# Patient Record
Sex: MALE | Race: WHITE | NOT HISPANIC OR LATINO | URBAN - METROPOLITAN AREA
[De-identification: names, ages, dates, MRNs, and addresses within clinical notes are randomized per-mention and may not be internally consistent; named-entity substitution may affect disease eponyms.]

---

## 2017-07-13 ENCOUNTER — INPATIENT (INPATIENT)
Facility: HOSPITAL | Age: 67
LOS: 0 days | Discharge: HOME | End: 2017-07-14
Attending: INTERNAL MEDICINE

## 2017-07-13 DIAGNOSIS — H40.50X0 GLAUCOMA SECONDARY TO OTHER EYE DISORDERS, UNSPECIFIED EYE, STAGE UNSPECIFIED: ICD-10-CM

## 2017-07-13 DIAGNOSIS — H54.41 BLINDNESS, RIGHT EYE, NORMAL VISION LEFT EYE: ICD-10-CM

## 2017-07-13 DIAGNOSIS — I10 ESSENTIAL (PRIMARY) HYPERTENSION: ICD-10-CM

## 2017-07-13 DIAGNOSIS — I25.10 ATHEROSCLEROTIC HEART DISEASE OF NATIVE CORONARY ARTERY WITHOUT ANGINA PECTORIS: ICD-10-CM

## 2017-07-13 DIAGNOSIS — E11.621 TYPE 2 DIABETES MELLITUS WITH FOOT ULCER: ICD-10-CM

## 2017-07-13 DIAGNOSIS — E11.9 TYPE 2 DIABETES MELLITUS WITHOUT COMPLICATIONS: ICD-10-CM

## 2017-07-13 DIAGNOSIS — M54.9 DORSALGIA, UNSPECIFIED: ICD-10-CM

## 2017-07-19 DIAGNOSIS — Z72.0 TOBACCO USE: ICD-10-CM

## 2017-07-19 DIAGNOSIS — H35.051 RETINAL NEOVASCULARIZATION, UNSPECIFIED, RIGHT EYE: ICD-10-CM

## 2017-07-19 DIAGNOSIS — Z86.73 PERSONAL HISTORY OF TRANSIENT ISCHEMIC ATTACK (TIA), AND CEREBRAL INFARCTION WITHOUT RESIDUAL DEFICITS: ICD-10-CM

## 2017-07-19 DIAGNOSIS — G89.4 CHRONIC PAIN SYNDROME: ICD-10-CM

## 2017-07-19 DIAGNOSIS — Z95.1 PRESENCE OF AORTOCORONARY BYPASS GRAFT: ICD-10-CM

## 2017-07-19 DIAGNOSIS — E11.39 TYPE 2 DIABETES MELLITUS WITH OTHER DIABETIC OPHTHALMIC COMPLICATION: ICD-10-CM

## 2017-07-19 DIAGNOSIS — H40.89 OTHER SPECIFIED GLAUCOMA: ICD-10-CM

## 2017-07-19 DIAGNOSIS — M54.5 LOW BACK PAIN: ICD-10-CM

## 2017-07-19 DIAGNOSIS — H54.41 BLINDNESS, RIGHT EYE, NORMAL VISION LEFT EYE: ICD-10-CM

## 2017-07-19 DIAGNOSIS — I25.10 ATHEROSCLEROTIC HEART DISEASE OF NATIVE CORONARY ARTERY WITHOUT ANGINA PECTORIS: ICD-10-CM

## 2017-07-19 DIAGNOSIS — I10 ESSENTIAL (PRIMARY) HYPERTENSION: ICD-10-CM

## 2017-07-19 DIAGNOSIS — H40.51X0 GLAUCOMA SECONDARY TO OTHER EYE DISORDERS, RIGHT EYE, STAGE UNSPECIFIED: ICD-10-CM

## 2017-07-19 DIAGNOSIS — R53.1 WEAKNESS: ICD-10-CM

## 2017-07-19 DIAGNOSIS — H33.301 UNSPECIFIED RETINAL BREAK, RIGHT EYE: ICD-10-CM

## 2017-07-19 DIAGNOSIS — Z79.4 LONG TERM (CURRENT) USE OF INSULIN: ICD-10-CM

## 2017-07-19 DIAGNOSIS — E11.40 TYPE 2 DIABETES MELLITUS WITH DIABETIC NEUROPATHY, UNSPECIFIED: ICD-10-CM

## 2017-08-14 ENCOUNTER — EMERGENCY (EMERGENCY)
Facility: HOSPITAL | Age: 67
LOS: 0 days | Discharge: HOME | End: 2017-08-14

## 2017-08-14 DIAGNOSIS — R51 HEADACHE: ICD-10-CM

## 2017-08-14 DIAGNOSIS — Z79.02 LONG TERM (CURRENT) USE OF ANTITHROMBOTICS/ANTIPLATELETS: ICD-10-CM

## 2017-08-14 DIAGNOSIS — I10 ESSENTIAL (PRIMARY) HYPERTENSION: ICD-10-CM

## 2017-08-14 DIAGNOSIS — E11.9 TYPE 2 DIABETES MELLITUS WITHOUT COMPLICATIONS: ICD-10-CM

## 2017-08-14 DIAGNOSIS — Z79.899 OTHER LONG TERM (CURRENT) DRUG THERAPY: ICD-10-CM

## 2017-08-14 DIAGNOSIS — H57.11 OCULAR PAIN, RIGHT EYE: ICD-10-CM

## 2017-08-14 DIAGNOSIS — M54.9 DORSALGIA, UNSPECIFIED: ICD-10-CM

## 2017-08-14 DIAGNOSIS — H40.50X0 GLAUCOMA SECONDARY TO OTHER EYE DISORDERS, UNSPECIFIED EYE, STAGE UNSPECIFIED: ICD-10-CM

## 2017-08-14 DIAGNOSIS — E11.621 TYPE 2 DIABETES MELLITUS WITH FOOT ULCER: ICD-10-CM

## 2017-08-14 DIAGNOSIS — I25.10 ATHEROSCLEROTIC HEART DISEASE OF NATIVE CORONARY ARTERY WITHOUT ANGINA PECTORIS: ICD-10-CM

## 2017-08-14 DIAGNOSIS — H40.051 OCULAR HYPERTENSION, RIGHT EYE: ICD-10-CM

## 2017-08-14 DIAGNOSIS — Z79.4 LONG TERM (CURRENT) USE OF INSULIN: ICD-10-CM

## 2017-08-14 DIAGNOSIS — Z79.82 LONG TERM (CURRENT) USE OF ASPIRIN: ICD-10-CM

## 2017-08-14 DIAGNOSIS — Z95.5 PRESENCE OF CORONARY ANGIOPLASTY IMPLANT AND GRAFT: ICD-10-CM

## 2017-08-14 DIAGNOSIS — Z86.73 PERSONAL HISTORY OF TRANSIENT ISCHEMIC ATTACK (TIA), AND CEREBRAL INFARCTION WITHOUT RESIDUAL DEFICITS: ICD-10-CM

## 2018-06-14 ENCOUNTER — OUTPATIENT (OUTPATIENT)
Dept: OUTPATIENT SERVICES | Facility: HOSPITAL | Age: 68
LOS: 1 days | Discharge: HOME | End: 2018-06-14

## 2018-06-14 VITALS
SYSTOLIC BLOOD PRESSURE: 144 MMHG | RESPIRATION RATE: 20 BRPM | OXYGEN SATURATION: 96 % | HEIGHT: 70 IN | HEART RATE: 100 BPM | TEMPERATURE: 99 F | WEIGHT: 241.19 LBS | DIASTOLIC BLOOD PRESSURE: 78 MMHG

## 2018-06-14 DIAGNOSIS — Z90.49 ACQUIRED ABSENCE OF OTHER SPECIFIED PARTS OF DIGESTIVE TRACT: Chronic | ICD-10-CM

## 2018-06-14 DIAGNOSIS — Z01.818 ENCOUNTER FOR OTHER PREPROCEDURAL EXAMINATION: ICD-10-CM

## 2018-06-14 DIAGNOSIS — G89.29 OTHER CHRONIC PAIN: ICD-10-CM

## 2018-06-14 DIAGNOSIS — I25.810 ATHEROSCLEROSIS OF CORONARY ARTERY BYPASS GRAFT(S) WITHOUT ANGINA PECTORIS: Chronic | ICD-10-CM

## 2018-06-14 DIAGNOSIS — Z89.431 ACQUIRED ABSENCE OF RIGHT FOOT: Chronic | ICD-10-CM

## 2018-06-14 LAB
ALBUMIN SERPL ELPH-MCNC: 4.3 G/DL — SIGNIFICANT CHANGE UP (ref 3.5–5.2)
ALP SERPL-CCNC: 86 U/L — SIGNIFICANT CHANGE UP (ref 30–115)
ALT FLD-CCNC: 21 U/L — SIGNIFICANT CHANGE UP (ref 0–41)
ANION GAP SERPL CALC-SCNC: 12 MMOL/L — SIGNIFICANT CHANGE UP (ref 7–14)
APTT BLD: 32.5 SEC — SIGNIFICANT CHANGE UP (ref 27–39.2)
AST SERPL-CCNC: 18 U/L — SIGNIFICANT CHANGE UP (ref 0–41)
BASOPHILS # BLD AUTO: 0.11 K/UL — SIGNIFICANT CHANGE UP (ref 0–0.2)
BASOPHILS NFR BLD AUTO: 1 % — SIGNIFICANT CHANGE UP (ref 0–1)
BILIRUB SERPL-MCNC: 0.4 MG/DL — SIGNIFICANT CHANGE UP (ref 0.2–1.2)
BLD GP AB SCN SERPL QL: SIGNIFICANT CHANGE UP
BUN SERPL-MCNC: 19 MG/DL — SIGNIFICANT CHANGE UP (ref 10–20)
CALCIUM SERPL-MCNC: 9.1 MG/DL — SIGNIFICANT CHANGE UP (ref 8.5–10.1)
CHLORIDE SERPL-SCNC: 103 MMOL/L — SIGNIFICANT CHANGE UP (ref 98–110)
CO2 SERPL-SCNC: 26 MMOL/L — SIGNIFICANT CHANGE UP (ref 17–32)
CREAT SERPL-MCNC: 1.1 MG/DL — SIGNIFICANT CHANGE UP (ref 0.7–1.5)
EOSINOPHIL # BLD AUTO: 0.32 K/UL — SIGNIFICANT CHANGE UP (ref 0–0.7)
EOSINOPHIL NFR BLD AUTO: 2.9 % — SIGNIFICANT CHANGE UP (ref 0–8)
GLUCOSE SERPL-MCNC: 150 MG/DL — HIGH (ref 70–99)
HCT VFR BLD CALC: 41.5 % — LOW (ref 42–52)
HGB BLD-MCNC: 13 G/DL — LOW (ref 14–18)
IMM GRANULOCYTES NFR BLD AUTO: 0.4 % — HIGH (ref 0.1–0.3)
INR BLD: 1.09 RATIO — SIGNIFICANT CHANGE UP (ref 0.65–1.3)
LYMPHOCYTES # BLD AUTO: 2.61 K/UL — SIGNIFICANT CHANGE UP (ref 1.2–3.4)
LYMPHOCYTES # BLD AUTO: 23.3 % — SIGNIFICANT CHANGE UP (ref 20.5–51.1)
MCHC RBC-ENTMCNC: 28.1 PG — SIGNIFICANT CHANGE UP (ref 27–31)
MCHC RBC-ENTMCNC: 31.3 G/DL — LOW (ref 32–37)
MCV RBC AUTO: 89.8 FL — SIGNIFICANT CHANGE UP (ref 80–94)
MONOCYTES # BLD AUTO: 0.81 K/UL — HIGH (ref 0.1–0.6)
MONOCYTES NFR BLD AUTO: 7.2 % — SIGNIFICANT CHANGE UP (ref 1.7–9.3)
NEUTROPHILS # BLD AUTO: 7.29 K/UL — HIGH (ref 1.4–6.5)
NEUTROPHILS NFR BLD AUTO: 65.2 % — SIGNIFICANT CHANGE UP (ref 42.2–75.2)
NRBC # BLD: 0 /100 WBCS — SIGNIFICANT CHANGE UP (ref 0–0)
PLATELET # BLD AUTO: 234 K/UL — SIGNIFICANT CHANGE UP (ref 130–400)
POTASSIUM SERPL-MCNC: 5 MMOL/L — SIGNIFICANT CHANGE UP (ref 3.5–5)
POTASSIUM SERPL-SCNC: 5 MMOL/L — SIGNIFICANT CHANGE UP (ref 3.5–5)
PROT SERPL-MCNC: 7.2 G/DL — SIGNIFICANT CHANGE UP (ref 6–8)
PROTHROM AB SERPL-ACNC: 11.8 SEC — SIGNIFICANT CHANGE UP (ref 9.95–12.87)
RBC # BLD: 4.62 M/UL — LOW (ref 4.7–6.1)
RBC # FLD: 13.7 % — SIGNIFICANT CHANGE UP (ref 11.5–14.5)
SODIUM SERPL-SCNC: 141 MMOL/L — SIGNIFICANT CHANGE UP (ref 135–146)
TYPE + AB SCN PNL BLD: SIGNIFICANT CHANGE UP
WBC # BLD: 11.19 K/UL — HIGH (ref 4.8–10.8)
WBC # FLD AUTO: 11.19 K/UL — HIGH (ref 4.8–10.8)

## 2018-06-14 NOTE — H&P PST ADULT - PMH
Anemia    Arthritis    CAD (coronary artery disease) of bypass graft  cabg 1995 3v  Depression    DM (diabetes mellitus)    GERD (gastroesophageal reflux disease)    Glaucoma    HTN (hypertension)

## 2018-06-14 NOTE — H&P PST ADULT - ATTENDING COMMENTS
06-27-18 @ 07:16  PRIYANK WESLEY  [id number]  67yMale    I have discussed the risks and benefits of ITP removal with the patient including but not limited to bleeding, infection, weakness, numbness, paralysis, CSF leak requiring repair, no change or increase in pain or other symptoms, change in bowel/bladder/sexual function, need for decompression, revision, repeat or other procedure(s).  I have also discussed the possibility of the following:    I have also discussed the alternatives to the procedure as well as options for no treatment and/or expected courses for all.  I also discussed the role of any MD/PA first assistant and the patient assents to their participation.  All questions were answered and the patient wishes to proceed.

## 2018-06-14 NOTE — H&P PST ADULT - HISTORY OF PRESENT ILLNESS
69 y/o male scheduled for removal of intrathecal pain pump pt reports that intrathecal pump is non-functioning x 3 yrs  reports no c/o cp,sob,palpitations,cough or dysuria  1/2 block without sob

## 2018-06-14 NOTE — H&P PST ADULT - PSH
CAD (coronary artery disease) of bypass graft    Foot amputation status, right  partial foot amputation  tarsals and metatarsals of right foot  S/P laparoscopic cholecystectomy

## 2018-06-27 ENCOUNTER — RESULT REVIEW (OUTPATIENT)
Age: 68
End: 2018-06-27

## 2018-06-27 ENCOUNTER — OUTPATIENT (OUTPATIENT)
Dept: OUTPATIENT SERVICES | Facility: HOSPITAL | Age: 68
LOS: 1 days | Discharge: HOME | End: 2018-06-27

## 2018-06-27 VITALS — HEART RATE: 70 BPM | DIASTOLIC BLOOD PRESSURE: 66 MMHG | RESPIRATION RATE: 17 BRPM | SYSTOLIC BLOOD PRESSURE: 170 MMHG

## 2018-06-27 VITALS
HEIGHT: 69 IN | WEIGHT: 240.08 LBS | RESPIRATION RATE: 18 BRPM | SYSTOLIC BLOOD PRESSURE: 166 MMHG | HEART RATE: 84 BPM | TEMPERATURE: 99 F | DIASTOLIC BLOOD PRESSURE: 93 MMHG

## 2018-06-27 DIAGNOSIS — Z89.431 ACQUIRED ABSENCE OF RIGHT FOOT: Chronic | ICD-10-CM

## 2018-06-27 DIAGNOSIS — Z90.49 ACQUIRED ABSENCE OF OTHER SPECIFIED PARTS OF DIGESTIVE TRACT: Chronic | ICD-10-CM

## 2018-06-27 DIAGNOSIS — I25.810 ATHEROSCLEROSIS OF CORONARY ARTERY BYPASS GRAFT(S) WITHOUT ANGINA PECTORIS: Chronic | ICD-10-CM

## 2018-06-27 RX ORDER — OXYCODONE AND ACETAMINOPHEN 5; 325 MG/1; MG/1
1 TABLET ORAL ONCE
Qty: 0 | Refills: 0 | Status: DISCONTINUED | OUTPATIENT
Start: 2018-06-27 | End: 2018-06-28

## 2018-06-27 RX ORDER — ASPIRIN/CALCIUM CARB/MAGNESIUM 324 MG
1 TABLET ORAL
Qty: 0 | Refills: 0 | COMMUNITY

## 2018-06-27 RX ORDER — HYDROMORPHONE HYDROCHLORIDE 2 MG/ML
0.5 INJECTION INTRAMUSCULAR; INTRAVENOUS; SUBCUTANEOUS
Qty: 0 | Refills: 0 | Status: DISCONTINUED | OUTPATIENT
Start: 2018-06-27 | End: 2018-06-27

## 2018-06-27 RX ORDER — MEPERIDINE HYDROCHLORIDE 50 MG/ML
12.5 INJECTION INTRAMUSCULAR; INTRAVENOUS; SUBCUTANEOUS
Qty: 0 | Refills: 0 | Status: DISCONTINUED | OUTPATIENT
Start: 2018-06-27 | End: 2018-06-28

## 2018-06-27 RX ORDER — SODIUM CHLORIDE 9 MG/ML
1000 INJECTION, SOLUTION INTRAVENOUS
Qty: 0 | Refills: 0 | Status: DISCONTINUED | OUTPATIENT
Start: 2018-06-27 | End: 2018-06-28

## 2018-06-27 RX ORDER — ONDANSETRON 8 MG/1
4 TABLET, FILM COATED ORAL ONCE
Qty: 0 | Refills: 0 | Status: DISCONTINUED | OUTPATIENT
Start: 2018-06-27 | End: 2018-06-28

## 2018-06-27 RX ORDER — MORPHINE SULFATE 50 MG/1
2 CAPSULE, EXTENDED RELEASE ORAL
Qty: 0 | Refills: 0 | Status: DISCONTINUED | OUTPATIENT
Start: 2018-06-27 | End: 2018-06-28

## 2018-06-27 RX ADMIN — SODIUM CHLORIDE 100 MILLILITER(S): 9 INJECTION, SOLUTION INTRAVENOUS at 10:58

## 2018-06-27 RX ADMIN — HYDROMORPHONE HYDROCHLORIDE 0.5 MILLIGRAM(S): 2 INJECTION INTRAMUSCULAR; INTRAVENOUS; SUBCUTANEOUS at 11:22

## 2018-06-27 RX ADMIN — HYDROMORPHONE HYDROCHLORIDE 0.5 MILLIGRAM(S): 2 INJECTION INTRAMUSCULAR; INTRAVENOUS; SUBCUTANEOUS at 12:00

## 2018-06-27 RX ADMIN — HYDROMORPHONE HYDROCHLORIDE 0.5 MILLIGRAM(S): 2 INJECTION INTRAMUSCULAR; INTRAVENOUS; SUBCUTANEOUS at 11:37

## 2018-06-27 NOTE — CHART NOTE - NSCHARTNOTEFT_GEN_A_CORE
06-27-18 @ 07:18  PRIYANK WESLEY  [id number]  67yMale    I have discussed the risks and benefits of ITP removal with the patient including but not limited to bleeding, infection, weakness, numbness, paralysis, CSF leak requiring repair, no change or increase in pain or other symptoms, change in bowel/bladder/sexual function, need for decompression, revision, repeat or other procedure(s).  I have also discussed the possibility of the following:    I have also discussed the alternatives to the procedure as well as options for no treatment and/or expected courses for all.  I also discussed the role of any MD/PA first assistant and the patient assents to their participation.  All questions were answered and the patient wishes to proceed.

## 2018-06-27 NOTE — BRIEF OPERATIVE NOTE - PROCEDURE
<<-----Click on this checkbox to enter Procedure Intrathecal morphine pump implantation  06/27/2018  removal of intrathecal pain pump  Active  [id number]

## 2018-06-27 NOTE — CHART NOTE - NSCHARTNOTEFT_GEN_A_CORE
PACU ANESTHESIA ADMISSION NOTE      Procedure: Intrathecal morphine pump implantation: removal of intrathecal pain pump    Post op diagnosis:  Chronic pain      ____  Intubated  TV:______       Rate: ______      FiO2: ______    _x___  Patent Airway    __x__  Full return of protective reflexes    _x___  Full recovery from anesthesia / back to baseline     Vitals:   T: 97.9          R:   16               BP:  164/72                Sat:  96%                 P: 16      Mental Status:  __x__ Awake   _____ Alert   _____ Drowsy   _____ Sedated    Nausea/Vomiting:  ____ NO  ____x__Yes,   See Post - Op Orders          Pain Scale (0-10):  _____    Treatment: ____ None    _x___ See Post - Op/PCA Orders    Post - Operative Fluids:   ____ Oral   __x__ See Post - Op Orders    Plan: Discharge:   _x___Home       _____Floor     _____Critical Care    _____  Other:_________________    Comments:  V/S stable  no anesthesia complication  D/C when criteria met

## 2018-06-28 RX ORDER — ASPIRIN/CALCIUM CARB/MAGNESIUM 324 MG
1 TABLET ORAL
Qty: 0 | Refills: 0 | COMMUNITY
Start: 2018-06-28

## 2018-06-29 DIAGNOSIS — Z46.2 ENCOUNTER FOR FITTING AND ADJUSTMENT OF OTHER DEVICES RELATED TO NERVOUS SYSTEM AND SPECIAL SENSES: ICD-10-CM

## 2018-06-29 DIAGNOSIS — Z79.4 LONG TERM (CURRENT) USE OF INSULIN: ICD-10-CM

## 2018-06-29 DIAGNOSIS — E11.9 TYPE 2 DIABETES MELLITUS WITHOUT COMPLICATIONS: ICD-10-CM

## 2018-06-29 DIAGNOSIS — G89.29 OTHER CHRONIC PAIN: ICD-10-CM

## 2018-06-29 DIAGNOSIS — F32.9 MAJOR DEPRESSIVE DISORDER, SINGLE EPISODE, UNSPECIFIED: ICD-10-CM

## 2018-06-29 DIAGNOSIS — Z88.0 ALLERGY STATUS TO PENICILLIN: ICD-10-CM

## 2018-06-29 DIAGNOSIS — I10 ESSENTIAL (PRIMARY) HYPERTENSION: ICD-10-CM

## 2018-06-29 DIAGNOSIS — F12.99 CANNABIS USE, UNSPECIFIED WITH UNSPECIFIED CANNABIS-INDUCED DISORDER: ICD-10-CM

## 2018-07-01 LAB — SURGICAL PATHOLOGY STUDY: SIGNIFICANT CHANGE UP

## 2018-07-16 ENCOUNTER — INPATIENT (INPATIENT)
Facility: HOSPITAL | Age: 68
LOS: 12 days | Discharge: HOME | End: 2018-07-29
Attending: NEUROLOGICAL SURGERY | Admitting: NEUROLOGICAL SURGERY

## 2018-07-16 VITALS
SYSTOLIC BLOOD PRESSURE: 177 MMHG | TEMPERATURE: 98 F | HEART RATE: 76 BPM | OXYGEN SATURATION: 98 % | DIASTOLIC BLOOD PRESSURE: 110 MMHG | RESPIRATION RATE: 18 BRPM

## 2018-07-16 DIAGNOSIS — Z90.49 ACQUIRED ABSENCE OF OTHER SPECIFIED PARTS OF DIGESTIVE TRACT: Chronic | ICD-10-CM

## 2018-07-16 DIAGNOSIS — Z89.431 ACQUIRED ABSENCE OF RIGHT FOOT: Chronic | ICD-10-CM

## 2018-07-16 DIAGNOSIS — I25.810 ATHEROSCLEROSIS OF CORONARY ARTERY BYPASS GRAFT(S) WITHOUT ANGINA PECTORIS: Chronic | ICD-10-CM

## 2018-07-16 RX ORDER — LATANOPROST 0.05 MG/ML
1 SOLUTION/ DROPS OPHTHALMIC; TOPICAL AT BEDTIME
Qty: 0 | Refills: 0 | Status: DISCONTINUED | OUTPATIENT
Start: 2018-07-16 | End: 2018-07-18

## 2018-07-16 RX ORDER — SERTRALINE 25 MG/1
100 TABLET, FILM COATED ORAL DAILY
Qty: 0 | Refills: 0 | Status: DISCONTINUED | OUTPATIENT
Start: 2018-07-16 | End: 2018-07-18

## 2018-07-16 RX ORDER — ACETAMINOPHEN 500 MG
650 TABLET ORAL EVERY 6 HOURS
Qty: 0 | Refills: 0 | Status: DISCONTINUED | OUTPATIENT
Start: 2018-07-16 | End: 2018-07-18

## 2018-07-16 RX ORDER — DEXTROSE 50 % IN WATER 50 %
25 SYRINGE (ML) INTRAVENOUS ONCE
Qty: 0 | Refills: 0 | Status: DISCONTINUED | OUTPATIENT
Start: 2018-07-16 | End: 2018-07-18

## 2018-07-16 RX ORDER — OXYCODONE HYDROCHLORIDE 5 MG/1
10 TABLET ORAL EVERY 12 HOURS
Qty: 0 | Refills: 0 | Status: DISCONTINUED | OUTPATIENT
Start: 2018-07-16 | End: 2018-07-18

## 2018-07-16 RX ORDER — INSULIN LISPRO 100/ML
VIAL (ML) SUBCUTANEOUS
Qty: 0 | Refills: 0 | Status: DISCONTINUED | OUTPATIENT
Start: 2018-07-16 | End: 2018-07-18

## 2018-07-16 RX ORDER — FERROUS SULFATE 325(65) MG
325 TABLET ORAL DAILY
Qty: 0 | Refills: 0 | Status: DISCONTINUED | OUTPATIENT
Start: 2018-07-16 | End: 2018-07-18

## 2018-07-16 RX ORDER — TIMOLOL 0.5 %
1 DROPS OPHTHALMIC (EYE) EVERY 12 HOURS
Qty: 0 | Refills: 0 | Status: DISCONTINUED | OUTPATIENT
Start: 2018-07-16 | End: 2018-07-18

## 2018-07-16 RX ORDER — METHOCARBAMOL 500 MG/1
750 TABLET, FILM COATED ORAL ONCE
Qty: 0 | Refills: 0 | Status: COMPLETED | OUTPATIENT
Start: 2018-07-16 | End: 2018-07-16

## 2018-07-16 RX ORDER — LANOLIN ALCOHOL/MO/W.PET/CERES
5 CREAM (GRAM) TOPICAL AT BEDTIME
Qty: 0 | Refills: 0 | Status: DISCONTINUED | OUTPATIENT
Start: 2018-07-16 | End: 2018-07-18

## 2018-07-16 RX ORDER — FENTANYL CITRATE 50 UG/ML
2 INJECTION INTRAVENOUS
Qty: 0 | Refills: 0 | Status: DISCONTINUED | OUTPATIENT
Start: 2018-07-16 | End: 2018-07-18

## 2018-07-16 RX ORDER — INSULIN GLARGINE 100 [IU]/ML
5 INJECTION, SOLUTION SUBCUTANEOUS EVERY MORNING
Qty: 0 | Refills: 0 | Status: DISCONTINUED | OUTPATIENT
Start: 2018-07-16 | End: 2018-07-18

## 2018-07-16 RX ORDER — PANTOPRAZOLE SODIUM 20 MG/1
40 TABLET, DELAYED RELEASE ORAL
Qty: 0 | Refills: 0 | Status: DISCONTINUED | OUTPATIENT
Start: 2018-07-16 | End: 2018-07-18

## 2018-07-16 RX ORDER — BUPROPION HYDROCHLORIDE 150 MG/1
150 TABLET, EXTENDED RELEASE ORAL DAILY
Qty: 0 | Refills: 0 | Status: DISCONTINUED | OUTPATIENT
Start: 2018-07-16 | End: 2018-07-18

## 2018-07-16 RX ORDER — METHOCARBAMOL 500 MG/1
750 TABLET, FILM COATED ORAL EVERY 8 HOURS
Qty: 0 | Refills: 0 | Status: DISCONTINUED | OUTPATIENT
Start: 2018-07-16 | End: 2018-07-18

## 2018-07-16 RX ORDER — CLOPIDOGREL BISULFATE 75 MG/1
1 TABLET, FILM COATED ORAL
Qty: 0 | Refills: 0 | COMMUNITY

## 2018-07-16 RX ORDER — INSULIN LISPRO 100/ML
5 VIAL (ML) SUBCUTANEOUS
Qty: 0 | Refills: 0 | Status: DISCONTINUED | OUTPATIENT
Start: 2018-07-16 | End: 2018-07-18

## 2018-07-16 RX ORDER — BRIMONIDINE TARTRATE 2 MG/MG
1 SOLUTION/ DROPS OPHTHALMIC THREE TIMES A DAY
Qty: 0 | Refills: 0 | Status: DISCONTINUED | OUTPATIENT
Start: 2018-07-16 | End: 2018-07-18

## 2018-07-16 RX ORDER — LISINOPRIL 2.5 MG/1
20 TABLET ORAL DAILY
Qty: 0 | Refills: 0 | Status: DISCONTINUED | OUTPATIENT
Start: 2018-07-16 | End: 2018-07-18

## 2018-07-16 RX ORDER — INSULIN GLARGINE 100 [IU]/ML
5 INJECTION, SOLUTION SUBCUTANEOUS AT BEDTIME
Qty: 0 | Refills: 0 | Status: DISCONTINUED | OUTPATIENT
Start: 2018-07-16 | End: 2018-07-18

## 2018-07-16 RX ORDER — NIFEDIPINE 30 MG
60 TABLET, EXTENDED RELEASE 24 HR ORAL DAILY
Qty: 0 | Refills: 0 | Status: DISCONTINUED | OUTPATIENT
Start: 2018-07-16 | End: 2018-07-18

## 2018-07-16 RX ORDER — CHOLECALCIFEROL (VITAMIN D3) 125 MCG
1000 CAPSULE ORAL DAILY
Qty: 0 | Refills: 0 | Status: DISCONTINUED | OUTPATIENT
Start: 2018-07-16 | End: 2018-07-18

## 2018-07-16 RX ORDER — SENNA PLUS 8.6 MG/1
2 TABLET ORAL AT BEDTIME
Qty: 0 | Refills: 0 | Status: DISCONTINUED | OUTPATIENT
Start: 2018-07-16 | End: 2018-07-18

## 2018-07-16 RX ADMIN — Medication 5 MILLIGRAM(S): at 21:42

## 2018-07-16 RX ADMIN — LATANOPROST 1 DROP(S): 0.05 SOLUTION/ DROPS OPHTHALMIC; TOPICAL at 22:09

## 2018-07-16 RX ADMIN — INSULIN GLARGINE 5 UNIT(S): 100 INJECTION, SOLUTION SUBCUTANEOUS at 22:09

## 2018-07-16 RX ADMIN — BRIMONIDINE TARTRATE 1 DROP(S): 2 SOLUTION/ DROPS OPHTHALMIC at 22:09

## 2018-07-16 RX ADMIN — Medication 60 MILLIGRAM(S): at 21:43

## 2018-07-16 RX ADMIN — METHOCARBAMOL 750 MILLIGRAM(S): 500 TABLET, FILM COATED ORAL at 21:42

## 2018-07-16 RX ADMIN — Medication 1 DROP(S): at 21:42

## 2018-07-16 RX ADMIN — METHOCARBAMOL 750 MILLIGRAM(S): 500 TABLET, FILM COATED ORAL at 16:51

## 2018-07-16 NOTE — ED PROVIDER NOTE - OBJECTIVE STATEMENT
68 y/o M pmh DM, neuropathy, p/w leakage if fluid from site of intrathecal pump removed 6/27 by Dr Paris. Pt had leak after removal, found to have CSF leak, pt had blood patch placed with no resolution. 66 y/o M pmh DM, neuropathy, p/w leakage if fluid from site of intrathecal pump removed 6/27 by Dr Paris. Pt had leak after removal, found to have CSF leak, pt had blood patch placed with no resolution. No other complaints at this time. Denies f/c.

## 2018-07-16 NOTE — H&P ADULT - ASSESSMENT
Assessment and PlaN:  Admit pt to Neurosurgery  will need medical/cardiology clearanmce for OR  Labs, CXR, EKG  will likely return to OR for Lumbar repair of CSF leak on weds  d/w attending Dr. Hall

## 2018-07-16 NOTE — CONSULT NOTE ADULT - ASSESSMENT
67 yr old male with PMH of CAD/3v CABG in 1995 (no stents, no MI since then), DM2, HTN, CVA, neuropathy, chronic back pain, carotid stent, right SFA stent, p/w CSF leak after his recent procedure for intrathecal pump removal.  As per neurosurgery, pt will go to OR for lumbar CSF leak repair and cardiology was consulted of preop clearance.     -h/o 3v CABG in 1995 ( no MI, no stents since then )  -denies any chest pain/dyspnea upon exertion  -clinically not in HF  -ECG- SR, 1st degree AV block, no ischemic changes  -h/o DM2/HTN/CVA/PVD  -RCRI- 3, METs <4  -Pt is atleast at moderate risk for the planned intermediate risk procedure  -no further cardiac work up needed  -resume antiplatelets after surgery when pt is cleared by neurosurgery 67 yr old male with PMH of CAD s/p 3V CABG in 1995, DM2, HTN, CVA, JAMEL s/p stenting, PAD s/p right SFA stent, p/w CSF leak      -denies any chest pain/dyspnea upon exertion  -euvolemic and clinically not in HF  -ECG - SR, 1st degree AV block, no ischemic changes  -RCRI 3, METs <4  -Pt is at least at moderate risk for the planned intermediate risk procedure  -no further cardiac workup needed  -resume antiplatelets after surgery when pt is cleared by neurosurgery

## 2018-07-16 NOTE — ED PROVIDER NOTE - PHYSICAL EXAMINATION
AOx4, Non toxic appearing, NAD, speaking in full sentences. Skin  warm and dry, no acute rash. Head normocephalic, atraumatic. Conjunctiva and sclera clear. MM moist, no nasal discharge.  Neck supple nt, no meningeal signs. Heart RRR s1s2 nl, no rub/murmur. Lungs- No retractions, BS equal, CTAB. Abdomen soft ntnd no r/g,m c/d/i surgical wound over L side. Back: bandage w/ tegaderm in place over lumbar spine, c/d/i, no s/s of infection, no active drainage noted. Extremities- moves all.

## 2018-07-16 NOTE — H&P ADULT - HISTORY OF PRESENT ILLNESS
Pt is a 67yoM with a pmhx of   CAD (coronary artery disease) of bypass graft: cabg 1995 3v  Glaucoma  Arthritis  GERD (gastroesophageal reflux disease)  HTN (hypertension)  Depression  Anemia  DM (diabetes mellitus)  Foot amputation status, right: partial foot amputation  tarsals and metatarsals of right foot  S/P laparoscopic cholecystectomy  CAD (coronary artery disease) of bypass graft    Pt had surgery for placement of intrathecal pain pump in 1996 in [place]. The pump malfunctioned and was removed and replaced in 2006. Pt was doing well with pump until this year when it malfunctioned again. Pt went to see Dr. Crenshaw and the intrathecal pain pump was removed on 6/27/18. At this time pt had an uneventful procedure and pt was discharged home the same day as the procedure. Three days later, he noted some oozing from the back. Pt eventually went to see [facility] and pt was scheduled for a blood patch with Dr. Reddy. PT went for the blood patch on 7/13/18 and said initially had relief of symptoms. Two days later his back was wet again and returned to Dr. Crenshaw's office today. Pt was noted to have leakage from the lumbar wound and sent to the ER. Pt was seen and examined in the ER> Complaining of mild headache, no neck pain, dizziness or visual changes. Pt has baseline neuropathy with numbness to the b/l ankles down to the toes.

## 2018-07-16 NOTE — H&P ADULT - NSHPPHYSICALEXAM_GEN_ALL_CORE
Neuro Exam:  AAOX3. Verbal function intact  tongue midline, facial motions symmetric  PERRLA, EOMI  Pronator Drift: none  Finger to Nose intact  Motor: MAEx4, 5/5 power in b/l UE and LE  Sensation: decreased sensation b/l, past hx of diabetes, half od right foot amputated    Wound: dressing removed, staples in place, when pressure is applied, serous/clear fluid is expressed from superior portion of the wound.

## 2018-07-16 NOTE — ED PROVIDER NOTE - MEDICAL DECISION MAKING DETAILS
As documented Patient presented with leakage from a recently placed intra-thecal pump. Neurosurgery consulted, who recommended admission to their service for repair.

## 2018-07-16 NOTE — ED PROVIDER NOTE - NS ED ROS FT
Constitutional: See HPI.  Eyes: No visual changes, eye pain or discharge.  ENMT: No hearing changes, pain, discharge or infections. No neck pain or stiffness.  Cardiac: No chest pain, SOB or edema. No chest pain with exertion.  Respiratory: No cough or respiratory distress.   GI: No nausea, vomiting, diarrhea or abdominal pain.  : No dysuria, frequency or burning.  MS: No myalgia, muscle weakness, joint pain or back pain. +fluid leak from spine  Neuro: No headache or weakness. No LOC.  Skin: No skin rash.

## 2018-07-16 NOTE — H&P ADULT - ATTENDING COMMENTS
Persistent CSF leak after ITP removal even despite blood patch by Dr. Reddy on Friday.  Will need OR tomorrow for open exploration and repair of CSF fistula tomorrow.

## 2018-07-16 NOTE — ED PROVIDER NOTE - ATTENDING CONTRIBUTION TO CARE
66 y/o M pmh DM, neuropathy, p/w leakage if fluid from site of intrathecal pump removed 6/27 by Dr Paris. Pt had leak after removal, found to have CSF leak, pt had blood patch placed with no resolution.    Vital Signs: I have reviewed the initial vital signs.  Constitutional: NAD, well-nourished, appears stated age, no acute distress.  HEENT: Airway patent, moist MM, no erythema/swelling/deformity of oral structures. EOMI, PERRLA.  CV: regular rate, regular rhythm, well-perfused extremities, 2+ b/l DP and radial pulses equal.  Lungs: BCTA, no increased WOB.  ABD: NTND, no guarding or rebound, no pulsatile mass, no hernias.   MSK: Neck supple, nontender, nl ROM, no stepoff. Chest nontender. Back nontender in TLS spine or to b/l bony structures or flanks. Ext nontender, nl rom, no deformity.   INTEG: Skin warm, dry, no rash.  NEURO: A&Ox3, CN II-XII intact, normal strength 5/5 all 4 ext, nl sensation throughout, normal speech and coordination.  PSYCH: Calm, cooperative, normal affect and interaction.    Will consult neurosurgery for further recommendations, re-eval. 68 y/o M pmh DM, neuropathy, p/w leakage if fluid from site of intrathecal pump removed 6/27 by Dr Paris. Pt had leak after removal, found to have CSF leak, pt had blood patch placed with no resolution.    Vital Signs: I have reviewed the initial vital signs.  Constitutional: NAD, well-nourished, appears stated age, no acute distress.  HEENT: Airway patent, moist MM, no erythema/swelling/deformity of oral structures. EOMI, PERRLA.  CV: regular rate, regular rhythm, well-perfused extremities, 2+ b/l DP and radial pulses equal.  Lungs: BCTA, no increased WOB.  ABD: NTND, no guarding or rebound, no pulsatile mass, no hernias.   MSK: Neck supple, nontender, nl ROM, no stepoff. Chest nontender. Back nontender in TLS spine or to b/l bony structures or flanks. Ext nontender, nl rom, no deformity.   INTEG: Skin warm, dry, no rash.  NEURO: A&Ox3, CN II-XII intact, normal strength 5/5 all 4 ext, nl sensation throughout, normal speech and coordination.  BACK: Wound site C/d/i, tegaderm in place, no obvious drainage  PSYCH: Calm, cooperative, normal affect and interaction.    Will consult neurosurgery for further recommendations, re-eval.

## 2018-07-16 NOTE — CONSULT NOTE ADULT - SUBJECTIVE AND OBJECTIVE BOX
HPI:  67 yr old male with PMH of CAD/3v CABG in 1995 (no stents, no MI since then), DM2, HTN, CVA, neuropathy, chronic back pain, carotid stent, right SFA stent, p/w CSF leak. pt had intrathecal pump for several years which was initially placed for post laminectomy syndrome. Pump was removed 3 weeks ago by neurosurgery. pt had CSF leak post procedure which was not improved with blood patch and pt presented to ED. As per neurosurgery, pt will go to OR for lumbar CSF leak repair and cardiology was consulted of preop clearance. Pt doesnot remember the name of his cardiologist but states that he was cleared by his cardiologist for his recent procedure done 3 weeks ago. Pt was taking ASA, plavix regularly but were stopped prior to his recent procedure. At baseline, pt has chronic back pain and pt walks with cane. pt denies any chest pain/dyspnea upon exertion.     ROS:  All other systems reviewed and are negative    PAST MEDICAL & SURGICAL HISTORY  CAD (coronary artery disease) of bypass graft: cabg 1995 3v  Glaucoma  Arthritis  GERD (gastroesophageal reflux disease)  HTN (hypertension)  Depression  Anemia  DM (diabetes mellitus)  Foot amputation status, right: partial foot amputation  tarsals and metatarsals of right foot  S/P laparoscopic cholecystectomy  CAD (coronary artery disease) of bypass graft      FAMILY HISTORY:  FAMILY HISTORY:  CAD (coronary artery disease) (Father)      SOCIAL HISTORY:  [-]smoker  [-]Alcohol  [-]Drug    ALLERGIES:  penicillins (Other (U))      MEDICATIONS:  MEDICATIONS  (STANDING):  brimonidine 0.2% Ophthalmic Solution 1 Drop(s) Both EYES three times a day  buPROPion XL . 150 milliGRAM(s) Oral daily  cholecalciferol 1000 Unit(s) Oral daily  dextrose 50% Injectable 25 Gram(s) IV Push once  fentaNYL   Patch 100 MICROgram(s)/Hr 2 Patch Transdermal every 72 hours  ferrous    sulfate 325 milliGRAM(s) Oral daily  insulin glargine Injectable (LANTUS) 5 Unit(s) SubCutaneous every morning  insulin glargine Injectable (LANTUS) 5 Unit(s) SubCutaneous at bedtime  insulin lispro (HumaLOG) corrective regimen sliding scale   SubCutaneous three times a day before meals  insulin lispro Injectable (HumaLOG) 5 Unit(s) SubCutaneous before breakfast  insulin lispro Injectable (HumaLOG) 5 Unit(s) SubCutaneous before lunch  insulin lispro Injectable (HumaLOG) 5 Unit(s) SubCutaneous before dinner  latanoprost 0.005% Ophthalmic Solution 1 Drop(s) Both EYES at bedtime  lisinopril 20 milliGRAM(s) Oral daily  melatonin 5 milliGRAM(s) Oral at bedtime  methocarbamol 750 milliGRAM(s) Oral every 8 hours  NIFEdipine XL 60 milliGRAM(s) Oral daily  pantoprazole    Tablet 40 milliGRAM(s) Oral before breakfast  pregabalin 150 milliGRAM(s) Oral every 12 hours  sertraline 100 milliGRAM(s) Oral daily  timolol 0.5% Solution 1 Drop(s) Both EYES every 12 hours    MEDICATIONS  (PRN):  acetaminophen   Tablet. 650 milliGRAM(s) Oral every 6 hours PRN Mild Pain (1 - 3)  oxyCODONE  ER Tablet 10 milliGRAM(s) Oral every 12 hours PRN pain  senna 2 Tablet(s) Oral at bedtime PRN Constipation      HOME MEDICATIONS:  Home Medications:  Basaglar KwikPen 100 units/mL subcutaneous solution: 50 unit(s) subcutaneous once a day (27 Jun 2018 08:04)  benazepril 20 mg oral tablet: 1 tab(s) orally once a day (27 Jun 2018 08:04)  brimonidine 0.2% ophthalmic solution: 1 drop(s) to each affected eye 3 times a day (27 Jun 2018 08:04)  buPROPion 150 mg/24 hours (XL) oral tablet, extended release: 1 tab(s) orally every 24 hours (27 Jun 2018 08:04)  clopidogrel 75 mg oral tablet: 1 tab(s) orally once a day (16 Jul 2018 17:36)  fentaNYL 100 mcg/hr transdermal film, extended release: 1 film(s) transdermal every 72 hours (27 Jun 2018 08:04)  ferrous sulfate 325 mg (65 mg elemental iron) oral tablet:  (27 Jun 2018 08:04)  latanoprost 0.005% ophthalmic solution: 1 drop(s) to each affected eye once a day (in the evening) (27 Jun 2018 08:04)  Lyrica 150 mg oral capsule: 1 cap(s) orally 2 times a day (27 Jun 2018 08:04)  Melatonin 5 mg oral tablet: 2 tab(s) orally once a day (at bedtime) (27 Jun 2018 08:04)  methocarbamol 750 mg oral tablet: 2 tab(s) orally 3 times a day (27 Jun 2018 08:04)  NIFEdipine 60 mg oral tablet, extended release: 1 tab(s) orally once a day (27 Jun 2018 08:04)  NovoLOG 100 units/mL injectable solution: 2 unit(s) injectable 3 times (27 Jun 2018 08:04)  oxyCODONE 10 mg oral tablet, extended release: 1 tab(s) orally once a day, As Needed (27 Jun 2018 08:04)  pantoprazole 20 mg oral delayed release tablet: 1 tab(s) orally once a day (27 Jun 2018 08:04)  Senexon-S 50 mg-8.6 mg oral tablet: 2 tab(s) orally once a day (at bedtime) (27 Jun 2018 08:04)  sertraline 100 mg oral tablet: 1.5 tab(s) orally once a day (27 Jun 2018 08:04)  timolol hemihydrate 0.5% ophthalmic solution: 1 drop(s) to each affected eye 2 times a day (27 Jun 2018 08:04)  Vitamin D3 2000 intl units oral tablet: 1 tab(s) orally once a day (27 Jun 2018 08:04)      VITALS:   T(F): 99.1 (07-16 @ 19:30), Max: 99.1 (07-16 @ 19:30)  HR: 72 (07-16 @ 19:30) (72 - 76)  BP: 172/73 (07-16 @ 19:30) (172/73 - 177/110)  BP(mean): --  RR: 18 (07-16 @ 19:30) (18 - 18)  SpO2: 98% (07-16 @ 14:38) (98% - 98%)    I&O's Summary      PHYSICAL EXAM:  GEN: Alert and oriented X 3, Well nourished, No acute distress  NECK: Supple, no JVD  LUNGS: Clear to auscultation bilaterally  CARDIOVASCULAR: S1/S2 regular , no murmus or rubs  ABD: Soft, non-tender  EXT: No Lower extremity edema/cyanosis  NEURO: decreased sensation to b/l feet due to neuropathy, right eye blindness    LABS:    hb-13  platelets-234  serum creatinine-1.1        Troponin trend:        Hemoglobin A1C   Thyroid      RADIOLOGY:  -CXR:  -TTE: 2013: normal EF, atheroma in prox ascending aorta.   -CCTA:  -STRESS TEST:  -CATHETERIZATION:    ECG:  SR, 1st degree AV block  TELEMETRY EVENTS: HPI:  67 yr old male with PMH of CAD/3v CABG in 1995 (no stents, no MI since then), DM2, HTN, CVA, neuropathy, chronic back pain, carotid stent, right SFA stent, p/w CSF leak. pt had intrathecal pump for several years which was initially placed for post laminectomy syndrome. Pump was removed 3 weeks ago by neurosurgery. pt had CSF leak post procedure which was not improved with blood patch and pt presented to ED. As per neurosurgery, pt will go to OR for lumbar CSF leak repair and cardiology was consulted of preop clearance. Pt doesnot remember the name of his cardiologist but states that he was cleared by his cardiologist for his recent procedure done 3 weeks ago. Pt was taking ASA, plavix regularly but were stopped prior to his recent procedure. At baseline, pt has chronic back pain and pt walks with cane. pt denies any chest pain/dyspnea upon exertion.    ROS:  All other systems reviewed and are negative    PAST MEDICAL & SURGICAL HISTORY  CAD (coronary artery disease) of bypass graft: cabg 1995 3v  Glaucoma  Arthritis  GERD (gastroesophageal reflux disease)  HTN (hypertension)  Depression  Anemia  DM (diabetes mellitus)  Foot amputation status, right: partial foot amputation  tarsals and metatarsals of right foot  S/P laparoscopic cholecystectomy  CAD (coronary artery disease) of bypass graft      FAMILY HISTORY:  FAMILY HISTORY:  CAD (coronary artery disease) (Father)      SOCIAL HISTORY:  [-]smoker  [-]Alcohol  [-]Drug    ALLERGIES:  penicillins (Other (U))      MEDICATIONS  (STANDING):  brimonidine 0.2% Ophthalmic Solution 1 Drop(s) Both EYES three times a day  buPROPion XL . 150 milliGRAM(s) Oral daily  cholecalciferol 1000 Unit(s) Oral daily  dextrose 50% Injectable 25 Gram(s) IV Push once  fentaNYL   Patch 100 MICROgram(s)/Hr 2 Patch Transdermal every 72 hours  ferrous    sulfate 325 milliGRAM(s) Oral daily  insulin glargine Injectable (LANTUS) 5 Unit(s) SubCutaneous every morning  insulin glargine Injectable (LANTUS) 5 Unit(s) SubCutaneous at bedtime  insulin lispro (HumaLOG) corrective regimen sliding scale   SubCutaneous three times a day before meals  insulin lispro Injectable (HumaLOG) 5 Unit(s) SubCutaneous before breakfast  insulin lispro Injectable (HumaLOG) 5 Unit(s) SubCutaneous before lunch  insulin lispro Injectable (HumaLOG) 5 Unit(s) SubCutaneous before dinner  latanoprost 0.005% Ophthalmic Solution 1 Drop(s) Both EYES at bedtime  lisinopril 20 milliGRAM(s) Oral daily  melatonin 5 milliGRAM(s) Oral at bedtime  methocarbamol 750 milliGRAM(s) Oral every 8 hours  NIFEdipine XL 60 milliGRAM(s) Oral daily  pantoprazole    Tablet 40 milliGRAM(s) Oral before breakfast  pregabalin 150 milliGRAM(s) Oral every 12 hours  sertraline 100 milliGRAM(s) Oral daily  timolol 0.5% Solution 1 Drop(s) Both EYES every 12 hours    MEDICATIONS  (PRN):  acetaminophen   Tablet. 650 milliGRAM(s) Oral every 6 hours PRN Mild Pain (1 - 3)  oxyCODONE  ER Tablet 10 milliGRAM(s) Oral every 12 hours PRN pain  senna 2 Tablet(s) Oral at bedtime PRN Constipation      HOME MEDICATIONS:  Home Medications:  Basaglar KwikPen 100 units/mL subcutaneous solution: 50 unit(s) subcutaneous once a day (27 Jun 2018 08:04)  benazepril 20 mg oral tablet: 1 tab(s) orally once a day (27 Jun 2018 08:04)  brimonidine 0.2% ophthalmic solution: 1 drop(s) to each affected eye 3 times a day (27 Jun 2018 08:04)  buPROPion 150 mg/24 hours (XL) oral tablet, extended release: 1 tab(s) orally every 24 hours (27 Jun 2018 08:04)  clopidogrel 75 mg oral tablet: 1 tab(s) orally once a day (16 Jul 2018 17:36)  fentaNYL 100 mcg/hr transdermal film, extended release: 1 film(s) transdermal every 72 hours (27 Jun 2018 08:04)  ferrous sulfate 325 mg (65 mg elemental iron) oral tablet:  (27 Jun 2018 08:04)  latanoprost 0.005% ophthalmic solution: 1 drop(s) to each affected eye once a day (in the evening) (27 Jun 2018 08:04)  Lyrica 150 mg oral capsule: 1 cap(s) orally 2 times a day (27 Jun 2018 08:04)  Melatonin 5 mg oral tablet: 2 tab(s) orally once a day (at bedtime) (27 Jun 2018 08:04)  methocarbamol 750 mg oral tablet: 2 tab(s) orally 3 times a day (27 Jun 2018 08:04)  NIFEdipine 60 mg oral tablet, extended release: 1 tab(s) orally once a day (27 Jun 2018 08:04)  NovoLOG 100 units/mL injectable solution: 2 unit(s) injectable 3 times (27 Jun 2018 08:04)  oxyCODONE 10 mg oral tablet, extended release: 1 tab(s) orally once a day, As Needed (27 Jun 2018 08:04)  pantoprazole 20 mg oral delayed release tablet: 1 tab(s) orally once a day (27 Jun 2018 08:04)  Senexon-S 50 mg-8.6 mg oral tablet: 2 tab(s) orally once a day (at bedtime) (27 Jun 2018 08:04)  sertraline 100 mg oral tablet: 1.5 tab(s) orally once a day (27 Jun 2018 08:04)  timolol hemihydrate 0.5% ophthalmic solution: 1 drop(s) to each affected eye 2 times a day (27 Jun 2018 08:04)  Vitamin D3 2000 intl units oral tablet: 1 tab(s) orally once a day (27 Jun 2018 08:04)      VITALS:   T(F): 99.1 (07-16 @ 19:30), Max: 99.1 (07-16 @ 19:30)  HR: 72 (07-16 @ 19:30) (72 - 76)  BP: 172/73 (07-16 @ 19:30) (172/73 - 177/110)  RR: 18 (07-16 @ 19:30) (18 - 18)  SpO2: 98% (07-16 @ 14:38) (98% - 98%)      PHYSICAL EXAM:  GEN: Alert and oriented X 3, Well nourished, No acute distress  NECK: Supple, no JVD  LUNGS: Clear to auscultation bilaterally  CARDIOVASCULAR: S1/S2 regular , no murmus or rubs  ABD: Soft, non-tender  EXT: No Lower extremity edema/cyanosis  NEURO: decreased sensation to b/l feet due to neuropathy, right eye blindness    LABS:    hb-13  platelets-234  serum creatinine-1.1      -TTE: 2013: normal EF, atheroma in prox ascending aorta    ECG:  SR, 1st degree AV block HPI:  67 yr old male with PMH of CAD s/p 3V CABG in 1995, DM2, HTN, CVA, JAMEL s/p stenting, PAD s/p right SFA stent, p/w CSF leak. Patient had intrathecal pump for several years which was initially placed for post laminectomy syndrome. Pump was removed 3 weeks ago by neurosurgery complicated by CSF leak post procedure which was not improved with blood patch. As per neurosurgery, pt will go to OR for lumbar CSF leak repair and cardiology was consulted of preop clearance. Pt does not remember the name of his cardiologist but states that he was cleared by his cardiologist for his recent procedure performed 3 weeks ago. Pt was taking ASA and Plavix regularly but were stopped prior to his recent procedure. At baseline, pt has chronic back pain and walks with cane. Denies any chest pain or dyspnea on exertion.    EKG: SR, 1AVB      PAST MEDICAL & SURGICAL HISTORY  CAD (coronary artery disease) of bypass graft: 3V CABG 1995  Glaucoma  Arthritis  GERD (gastroesophageal reflux disease)  HTN (hypertension)  Depression  Anemia  DM (diabetes mellitus)  Foot amputation status, right: partial foot amputation  tarsals and metatarsals of right foot  S/P laparoscopic cholecystectomy  CAD (coronary artery disease) of bypass graft      FAMILY HISTORY:  FAMILY HISTORY:  CAD (coronary artery disease) (Father)      SOCIAL HISTORY:  [-]smoker  [-]Alcohol  [-]Drug    ALLERGIES:  penicillins (Other (U))      MEDICATIONS  (STANDING):  brimonidine 0.2% Ophthalmic Solution 1 Drop(s) Both EYES three times a day  buPROPion XL . 150 milliGRAM(s) Oral daily  cholecalciferol 1000 Unit(s) Oral daily  dextrose 50% Injectable 25 Gram(s) IV Push once  fentaNYL   Patch 100 MICROgram(s)/Hr 2 Patch Transdermal every 72 hours  ferrous    sulfate 325 milliGRAM(s) Oral daily  insulin glargine Injectable (LANTUS) 5 Unit(s) SubCutaneous every morning  insulin glargine Injectable (LANTUS) 5 Unit(s) SubCutaneous at bedtime  insulin lispro (HumaLOG) corrective regimen sliding scale   SubCutaneous three times a day before meals  insulin lispro Injectable (HumaLOG) 5 Unit(s) SubCutaneous before breakfast  insulin lispro Injectable (HumaLOG) 5 Unit(s) SubCutaneous before lunch  insulin lispro Injectable (HumaLOG) 5 Unit(s) SubCutaneous before dinner  latanoprost 0.005% Ophthalmic Solution 1 Drop(s) Both EYES at bedtime  lisinopril 20 milliGRAM(s) Oral daily  melatonin 5 milliGRAM(s) Oral at bedtime  methocarbamol 750 milliGRAM(s) Oral every 8 hours  NIFEdipine XL 60 milliGRAM(s) Oral daily  pantoprazole    Tablet 40 milliGRAM(s) Oral before breakfast  pregabalin 150 milliGRAM(s) Oral every 12 hours  sertraline 100 milliGRAM(s) Oral daily  timolol 0.5% Solution 1 Drop(s) Both EYES every 12 hours    MEDICATIONS  (PRN):  acetaminophen   Tablet. 650 milliGRAM(s) Oral every 6 hours PRN Mild Pain (1 - 3)  oxyCODONE  ER Tablet 10 milliGRAM(s) Oral every 12 hours PRN pain  senna 2 Tablet(s) Oral at bedtime PRN Constipation      HOME MEDICATIONS:  Basaglar KwikPen 100 units/mL subcutaneous solution: 50 unit(s) subcutaneous once a day (27 Jun 2018 08:04)  benazepril 20 mg oral tablet: 1 tab(s) orally once a day (27 Jun 2018 08:04)  brimonidine 0.2% ophthalmic solution: 1 drop(s) to each affected eye 3 times a day (27 Jun 2018 08:04)  buPROPion 150 mg/24 hours (XL) oral tablet, extended release: 1 tab(s) orally every 24 hours (27 Jun 2018 08:04)  clopidogrel 75 mg oral tablet: 1 tab(s) orally once a day (16 Jul 2018 17:36)  fentaNYL 100 mcg/hr transdermal film, extended release: 1 film(s) transdermal every 72 hours (27 Jun 2018 08:04)  ferrous sulfate 325 mg (65 mg elemental iron) oral tablet:  (27 Jun 2018 08:04)  latanoprost 0.005% ophthalmic solution: 1 drop(s) to each affected eye once a day (in the evening) (27 Jun 2018 08:04)  Lyrica 150 mg oral capsule: 1 cap(s) orally 2 times a day (27 Jun 2018 08:04)  Melatonin 5 mg oral tablet: 2 tab(s) orally once a day (at bedtime) (27 Jun 2018 08:04)  methocarbamol 750 mg oral tablet: 2 tab(s) orally 3 times a day (27 Jun 2018 08:04)  NIFEdipine 60 mg oral tablet, extended release: 1 tab(s) orally once a day (27 Jun 2018 08:04)  NovoLOG 100 units/mL injectable solution: 2 unit(s) injectable 3 times (27 Jun 2018 08:04)  oxyCODONE 10 mg oral tablet, extended release: 1 tab(s) orally once a day, As Needed (27 Jun 2018 08:04)  pantoprazole 20 mg oral delayed release tablet: 1 tab(s) orally once a day (27 Jun 2018 08:04)  Senexon-S 50 mg-8.6 mg oral tablet: 2 tab(s) orally once a day (at bedtime) (27 Jun 2018 08:04)  sertraline 100 mg oral tablet: 1.5 tab(s) orally once a day (27 Jun 2018 08:04)  timolol hemihydrate 0.5% ophthalmic solution: 1 drop(s) to each affected eye 2 times a day (27 Jun 2018 08:04)  Vitamin D3 2000 intl units oral tablet: 1 tab(s) orally once a day (27 Jun 2018 08:04)      REVIEW OF SYSTEMS:  CONSTITUTIONAL: No fever, weight loss, fatigue  NECK: No pain or stiffness  RESPIRATORY: No cough, wheezing, shortness of breath  CARDIOVASCULAR: No chest pain, palpitations, leg swelling  GASTROINTESTINAL: No abdominal/epigastric pain, nausea, vomiting, hematemesis, diarrhea, constipation, melena or hematochezia  GENITOURINARY: No dysuria, frequency, hematuria, incontinence  NEUROLOGICAL: No headaches, memory loss, loss of strength, numbness, tremors  SKIN: No rashes or lesions   ENDOCRINE: No heat/cold intolerance or hair loss  MUSCULOSKELETAL: See HPI  HEME/LYMPH: No easy bruising or bleeding gums    VITALS:   T(F): 99.1 (07-16 @ 19:30), Max: 99.1 (07-16 @ 19:30)  HR: 72 (07-16 @ 19:30) (72 - 76)  BP: 172/73 (07-16 @ 19:30) (172/73 - 177/110)  RR: 18 (07-16 @ 19:30) (18 - 18)  SpO2: 98% (07-16 @ 14:38) (98% - 98%)      PHYSICAL EXAM:  GEN: NAD, AAOx3, obese  HEENT: Supple, no JVD,  right eye blindness  LUNGS: Clear to auscultation bilaterally  CARDIOVASCULAR: RRR, S1S2 nl, no murmurs  ABD: soft, NT/ND  EXT: no c/c/e  NEURO: decreased sensation in b/l feet due to neuropathy                            12.9   16.77 )-----------( 241      ( 16 Jul 2018 23:30 )             40.4       07-16    141  |  101  |  19  ----------------------------<  152<H>  5.0   |  24  |  1.1    Ca    9.3      16 Jul 2018 23:30  Mg     2.0     07-16        TTE: 2013: normal EF, atheroma in prox ascending aorta    ECG:  SR, 1st degree AV block

## 2018-07-17 LAB
ALBUMIN SERPL ELPH-MCNC: 4.2 G/DL — SIGNIFICANT CHANGE UP (ref 3.5–5.2)
ALP SERPL-CCNC: 92 U/L — SIGNIFICANT CHANGE UP (ref 30–115)
ALT FLD-CCNC: 21 U/L — SIGNIFICANT CHANGE UP (ref 0–41)
ANION GAP SERPL CALC-SCNC: 16 MMOL/L — HIGH (ref 7–14)
APTT BLD: 29.3 SEC — SIGNIFICANT CHANGE UP (ref 27–39.2)
AST SERPL-CCNC: 20 U/L — SIGNIFICANT CHANGE UP (ref 0–41)
BASOPHILS # BLD AUTO: 0.09 K/UL — SIGNIFICANT CHANGE UP (ref 0–0.2)
BASOPHILS NFR BLD AUTO: 0.5 % — SIGNIFICANT CHANGE UP (ref 0–1)
BILIRUB SERPL-MCNC: 0.2 MG/DL — SIGNIFICANT CHANGE UP (ref 0.2–1.2)
BLD GP AB SCN SERPL QL: SIGNIFICANT CHANGE UP
BUN SERPL-MCNC: 19 MG/DL — SIGNIFICANT CHANGE UP (ref 10–20)
CALCIUM SERPL-MCNC: 9.3 MG/DL — SIGNIFICANT CHANGE UP (ref 8.5–10.1)
CHLORIDE SERPL-SCNC: 101 MMOL/L — SIGNIFICANT CHANGE UP (ref 98–110)
CO2 SERPL-SCNC: 24 MMOL/L — SIGNIFICANT CHANGE UP (ref 17–32)
CREAT SERPL-MCNC: 1.1 MG/DL — SIGNIFICANT CHANGE UP (ref 0.7–1.5)
EOSINOPHIL # BLD AUTO: 0.21 K/UL — SIGNIFICANT CHANGE UP (ref 0–0.7)
EOSINOPHIL NFR BLD AUTO: 1.3 % — SIGNIFICANT CHANGE UP (ref 0–8)
GLUCOSE SERPL-MCNC: 152 MG/DL — HIGH (ref 70–99)
HCT VFR BLD CALC: 40.4 % — LOW (ref 42–52)
HGB BLD-MCNC: 12.9 G/DL — LOW (ref 14–18)
IMM GRANULOCYTES NFR BLD AUTO: 0.4 % — HIGH (ref 0.1–0.3)
INR BLD: 1.08 RATIO — SIGNIFICANT CHANGE UP (ref 0.65–1.3)
LYMPHOCYTES # BLD AUTO: 24.4 % — SIGNIFICANT CHANGE UP (ref 20.5–51.1)
LYMPHOCYTES # BLD AUTO: 4.1 K/UL — HIGH (ref 1.2–3.4)
MAGNESIUM SERPL-MCNC: 2 MG/DL — SIGNIFICANT CHANGE UP (ref 1.8–2.4)
MCHC RBC-ENTMCNC: 28.5 PG — SIGNIFICANT CHANGE UP (ref 27–31)
MCHC RBC-ENTMCNC: 31.9 G/DL — LOW (ref 32–37)
MCV RBC AUTO: 89.4 FL — SIGNIFICANT CHANGE UP (ref 80–94)
MONOCYTES # BLD AUTO: 1.36 K/UL — HIGH (ref 0.1–0.6)
MONOCYTES NFR BLD AUTO: 8.1 % — SIGNIFICANT CHANGE UP (ref 1.7–9.3)
NEUTROPHILS # BLD AUTO: 10.95 K/UL — HIGH (ref 1.4–6.5)
NEUTROPHILS NFR BLD AUTO: 65.3 % — SIGNIFICANT CHANGE UP (ref 42.2–75.2)
NRBC # BLD: 0 /100 WBCS — SIGNIFICANT CHANGE UP (ref 0–0)
PLATELET # BLD AUTO: 241 K/UL — SIGNIFICANT CHANGE UP (ref 130–400)
POTASSIUM SERPL-MCNC: 5 MMOL/L — SIGNIFICANT CHANGE UP (ref 3.5–5)
POTASSIUM SERPL-SCNC: 5 MMOL/L — SIGNIFICANT CHANGE UP (ref 3.5–5)
PROT SERPL-MCNC: 6.8 G/DL — SIGNIFICANT CHANGE UP (ref 6–8)
PROTHROM AB SERPL-ACNC: 11.7 SEC — SIGNIFICANT CHANGE UP (ref 9.95–12.87)
RBC # BLD: 4.52 M/UL — LOW (ref 4.7–6.1)
RBC # FLD: 13.5 % — SIGNIFICANT CHANGE UP (ref 11.5–14.5)
SODIUM SERPL-SCNC: 141 MMOL/L — SIGNIFICANT CHANGE UP (ref 135–146)
TYPE + AB SCN PNL BLD: SIGNIFICANT CHANGE UP
WBC # BLD: 16.77 K/UL — HIGH (ref 4.8–10.8)
WBC # FLD AUTO: 16.77 K/UL — HIGH (ref 4.8–10.8)

## 2018-07-17 RX ORDER — GABAPENTIN 400 MG/1
400 CAPSULE ORAL EVERY 8 HOURS
Qty: 0 | Refills: 0 | Status: DISCONTINUED | OUTPATIENT
Start: 2018-07-17 | End: 2018-07-18

## 2018-07-17 RX ADMIN — METHOCARBAMOL 750 MILLIGRAM(S): 500 TABLET, FILM COATED ORAL at 21:13

## 2018-07-17 RX ADMIN — LISINOPRIL 20 MILLIGRAM(S): 2.5 TABLET ORAL at 05:28

## 2018-07-17 RX ADMIN — Medication 1 DROP(S): at 18:03

## 2018-07-17 RX ADMIN — SERTRALINE 100 MILLIGRAM(S): 25 TABLET, FILM COATED ORAL at 14:37

## 2018-07-17 RX ADMIN — FENTANYL CITRATE 2 PATCH: 50 INJECTION INTRAVENOUS at 11:45

## 2018-07-17 RX ADMIN — PANTOPRAZOLE SODIUM 40 MILLIGRAM(S): 20 TABLET, DELAYED RELEASE ORAL at 08:24

## 2018-07-17 RX ADMIN — BRIMONIDINE TARTRATE 1 DROP(S): 2 SOLUTION/ DROPS OPHTHALMIC at 05:27

## 2018-07-17 RX ADMIN — Medication 5 MILLIGRAM(S): at 21:13

## 2018-07-17 RX ADMIN — Medication 5 UNIT(S): at 11:53

## 2018-07-17 RX ADMIN — LATANOPROST 1 DROP(S): 0.05 SOLUTION/ DROPS OPHTHALMIC; TOPICAL at 21:13

## 2018-07-17 RX ADMIN — Medication 5 UNIT(S): at 08:23

## 2018-07-17 RX ADMIN — Medication 325 MILLIGRAM(S): at 11:53

## 2018-07-17 RX ADMIN — BRIMONIDINE TARTRATE 1 DROP(S): 2 SOLUTION/ DROPS OPHTHALMIC at 21:13

## 2018-07-17 RX ADMIN — Medication 60 MILLIGRAM(S): at 05:28

## 2018-07-17 RX ADMIN — Medication 150 MILLIGRAM(S): at 05:27

## 2018-07-17 RX ADMIN — INSULIN GLARGINE 5 UNIT(S): 100 INJECTION, SOLUTION SUBCUTANEOUS at 09:39

## 2018-07-17 RX ADMIN — Medication 1000 UNIT(S): at 11:53

## 2018-07-17 RX ADMIN — METHOCARBAMOL 750 MILLIGRAM(S): 500 TABLET, FILM COATED ORAL at 14:22

## 2018-07-17 RX ADMIN — BUPROPION HYDROCHLORIDE 150 MILLIGRAM(S): 150 TABLET, EXTENDED RELEASE ORAL at 11:53

## 2018-07-17 RX ADMIN — INSULIN GLARGINE 5 UNIT(S): 100 INJECTION, SOLUTION SUBCUTANEOUS at 21:46

## 2018-07-17 RX ADMIN — METHOCARBAMOL 750 MILLIGRAM(S): 500 TABLET, FILM COATED ORAL at 05:28

## 2018-07-17 RX ADMIN — BRIMONIDINE TARTRATE 1 DROP(S): 2 SOLUTION/ DROPS OPHTHALMIC at 14:22

## 2018-07-17 RX ADMIN — Medication 5 UNIT(S): at 17:01

## 2018-07-17 RX ADMIN — Medication 1 DROP(S): at 05:29

## 2018-07-17 RX ADMIN — GABAPENTIN 400 MILLIGRAM(S): 400 CAPSULE ORAL at 21:43

## 2018-07-17 NOTE — CONSULT NOTE ADULT - SKIN
detailed exam rash, erythema following the shape of dressing over lower back at the leakage site, scaly also noted over left arm, back and fontanelle

## 2018-07-17 NOTE — CONSULT NOTE ADULT - ATTENDING COMMENTS
Intermediate-risk for intermediate-risk surgery  No further cardiac testing required prior to surgery  Resume DAPT when cleared by Neurosurgery
Patient is seen and examined independently at bedside. I, myself, completed the note, physical exam, and plan above. All labs, radiologic studies, vitals, orders and medications list reviewed.

## 2018-07-17 NOTE — CONSULT NOTE ADULT - SUBJECTIVE AND OBJECTIVE BOX
HPI:  Pt is a 67yoM with a pmhx of   CAD (coronary artery disease) of bypass graft: cabg 1995 3v  Glaucoma  Arthritis  GERD (gastroesophageal reflux disease)  HTN (hypertension)  Depression  Anemia  DM (diabetes mellitus)  Foot amputation status, right: partial foot amputation  tarsals and metatarsals of right foot  S/P laparoscopic cholecystectomy  CAD (coronary artery disease) of bypass graft    Pt had surgery for placement of intrathecal pain pump in 1996 in [place]. The pump malfunctioned and was removed and replaced in 2006. Pt was doing well with pump until this year when it malfunctioned again. Pt went to see Dr. Crenshaw and the intrathecal pain pump was removed on 6/27/18. At this time pt had an uneventful procedure and pt was discharged home the same day as the procedure. Three days later, he noted some oozing from the back. Pt eventually went to see [facility] and pt was scheduled for a blood patch with Dr. Reddy. PT went for the blood patch on 7/13/18 and said initially had relief of symptoms. Two days later his back was wet again and returned to Dr. Crenshaw's office today. Pt was noted to have leakage from the lumbar wound and sent to the ER. Pt was seen and examined in the ER> Complaining of mild headache, no neck pain, dizziness or visual changes. Pt has baseline neuropathy with numbness to the b/l ankles down to the toes. (16 Jul 2018 17:13) HPI:    Pt had surgery for placement of intrathecal pain pump in 1996 in [place]. The pump malfunctioned and was removed and replaced in 2006. Pt was doing well with pump until this year when it malfunctioned again. Pt went to see Dr. Crenshaw and the intrathecal pain pump was removed on 6/27/18. At this time pt had an uneventful procedure and pt was discharged home the same day as the procedure. Three days later, he noted some oozing from the back. Pt eventually went to see [facility] and pt was scheduled for a blood patch with Dr. Reddy. PT went for the blood patch on 7/13/18 and said initially had relief of symptoms. Two days later his back was wet again and returned to Dr. Crenshaw's office today. Pt was noted to have leakage from the lumbar wound and sent to the ER. Pt was seen and examined in the ER> Complaining of mild headache, no neck pain, dizziness or visual changes. Pt has baseline neuropathy with numbness to the b/l ankles down to the toes. (16 Jul 2018 17:13)    PAST MEDICAL & SURGICAL HISTORY:  CAD (coronary artery disease) of bypass graft: cabg 1995 3v  Glaucoma  Arthritis  GERD (gastroesophageal reflux disease)  HTN (hypertension)  Depression  Anemia  DM (diabetes mellitus)  Foot amputation status, right: partial foot amputation  tarsals and metatarsals of right foot  S/P laparoscopic cholecystectomy  CAD (coronary artery disease) of bypass graft    Allergies  penicillins (Other (U))    Home Medications:  Basaglar KwikPen 100 units/mL subcutaneous solution: 50 unit(s) subcutaneous once a day (27 Jun 2018 08:04)  benazepril 20 mg oral tablet: 1 tab(s) orally once a day (27 Jun 2018 08:04)  brimonidine 0.2% ophthalmic solution: 1 drop(s) to each affected eye 3 times a day (27 Jun 2018 08:04)  buPROPion 150 mg/24 hours (XL) oral tablet, extended release: 1 tab(s) orally every 24 hours (27 Jun 2018 08:04)  clopidogrel 75 mg oral tablet: 1 tab(s) orally once a day (16 Jul 2018 17:36)  fentaNYL 100 mcg/hr transdermal film, extended release: 1 film(s) transdermal every 72 hours (27 Jun 2018 08:04)  ferrous sulfate 325 mg (65 mg elemental iron) oral tablet:  (27 Jun 2018 08:04)  latanoprost 0.005% ophthalmic solution: 1 drop(s) to each affected eye once a day (in the evening) (27 Jun 2018 08:04)  Lyrica 150 mg oral capsule: 1 cap(s) orally 2 times a day (27 Jun 2018 08:04)  Melatonin 5 mg oral tablet: 2 tab(s) orally once a day (at bedtime) (27 Jun 2018 08:04)  methocarbamol 750 mg oral tablet: 2 tab(s) orally 3 times a day (27 Jun 2018 08:04)  NIFEdipine 60 mg oral tablet, extended release: 1 tab(s) orally once a day (27 Jun 2018 08:04)  NovoLOG 100 units/mL injectable solution: 2 unit(s) injectable 3 times (27 Jun 2018 08:04)  oxyCODONE 10 mg oral tablet, extended release: 1 tab(s) orally once a day, As Needed (27 Jun 2018 08:04)  pantoprazole 20 mg oral delayed release tablet: 1 tab(s) orally once a day (27 Jun 2018 08:04)  Senexon-S 50 mg-8.6 mg oral tablet: 2 tab(s) orally once a day (at bedtime) (27 Jun 2018 08:04)  sertraline 100 mg oral tablet: 1.5 tab(s) orally once a day (27 Jun 2018 08:04)  timolol hemihydrate 0.5% ophthalmic solution: 1 drop(s) to each affected eye 2 times a day (27 Jun 2018 08:04)  Vitamin D3 2000 intl units oral tablet: 1 tab(s) orally once a day (27 Jun 2018 08:04)    Social History: Quit smoking 20 years ago. Denies alcohol or illicit drug use other than chronic pain medications.    Family History: Family of valvular heart disease in mother (mitral valve)

## 2018-07-17 NOTE — CONSULT NOTE ADULT - ASSESSMENT
66 y/o M with PMH of CAD s/p CABG for 3V Disease in 1995, PAD s/p right LE angioplasty s/p right foot amputation in 2013, right carotid stenosis with carotid stent placed in 2012, multiple replacement of intrathecal pump last one in 2008 which stopped functioning a few years ago presented with drainage of CSF from surgical site. Patient recently had removal of non-functioning pump on 6/27/18. The procedure was uneventful. However, pt noted some oozing from the back 3 days after surgery, went for the blood patch on 7/13/18 with initially relief of symptoms but continues to have leakage from the lumbar wound. Patient can walk a block without experiencing chest symptoms, however, activity is limited due to pain.    CSF Leakage requiring surgical closure for medical clearance  - moderate interoperative risk for surgery  - EKG reviewed, normal sinus rhythm, no ischemic changes  - wound care as per neurosurgerical team    CAD s/p CABG    Glaucoma, Right eye blindness  - c/w eye dropsArthritis  GERD (gastroesophageal reflux disease)  HTN (hypertension)  Depression  Anemia  DM (diabetes mellitus)  Foot amputation status, right: partial foot amputation  tarsals and metatarsals of right foot  S/P laparoscopic cholecystectomy  CAD (coronary artery disease) of bypass graft 68 y/o M with PMH of CAD s/p CABG for 3V Disease in 1995, PAD s/p right LE angioplasty s/p right foot amputation in 2013, right carotid stenosis with carotid stent placed in 2012, multiple replacement of intrathecal pump last one in 2008 which stopped functioning a few years ago presented with drainage of CSF from surgical site. Patient recently had removal of non-functioning pump on 6/27/18. The procedure was uneventful. However, pt noted some oozing from the back 3 days after surgery, went for the blood patch on 7/13/18 with initially relief of symptoms but continues to have leakage from the lumbar wound. Patient can walk a block without experiencing chest symptoms, however, activity is limited due to pain.    CSF Leakage at prior Intrathecal pump insertion site at lower back requiring surgical closure for medical clearance  - moderate interoperative risk for surgery,  - cardio clearance noted; EKG reviewed, normal sinus rhythm, no ischemic changes  - wound care and pain control as per neurosurgerical team  CAD s/p CABG, no further heart attacks or stent placement since 1995  - as per patient, stopped aspirin and plavix more than 10 days ago due to recent surgery and acute CSF leakage; continue to hold aspirin and plavix until clear by neurosurgery  Glaucoma, Right eye blindness - c/w eye drops to left eye  Neuropathy - c/w gabapentin 400 mg TID (patient is not on lyrica)  HTN - c/w ACE-inhibitor and nifedipine  GERD - c/w protnix  Depression - hold buproprion and zoloft on the day of surgical procedure  DM - c/w insulin regimen, monitor fingerstick  DVT Prophylaxis - SCD since patient currently cannot be on chemical prophylaxis 68 y/o M with PMH of CAD s/p CABG for 3V Disease in 1995, PAD s/p right LE angioplasty s/p right foot amputation in 2013, right carotid stenosis with carotid stent placed in 2012, multiple replacement of intrathecal pump last one in 2008 which stopped functioning a few years ago presented with drainage of CSF from surgical site. Patient recently had removal of non-functioning pump on 6/27/18. The procedure was uneventful. However, pt noted some oozing from the back 3 days after surgery, went for the blood patch on 7/13/18 with initially relief of symptoms but continues to have leakage from the lumbar wound. Patient can walk a block without experiencing chest symptoms, however, activity is limited due to pain.    CSF Leakage at prior Intrathecal pump insertion site at lower back requiring surgical closure for medical clearance  - moderate interoperative risk for surgery,  - cardio clearance noted; EKG reviewed, normal sinus rhythm, no ischemic changes  - wound care and pain control as per neurosurgerical team  PAD, Carotid Stenosis s/p right carotid stent, CAD s/p CABG  - no further heart attacks or cardiac stent placement since 1995  - as per patient, stopped aspirin and plavix more than 10 days ago due to recent surgery and acute CSF leakage; continue to hold aspirin and plavix until clear by neurosurgery  Glaucoma, Right eye blindness - c/w eye drops to left eye  Neuropathy - c/w gabapentin 400 mg TID (patient is not on lyrica)  HTN - stable, c/w ACE-inhibitor and nifedipine  GERD - c/w protonix  Depression - hold buproprion and zoloft on the day of surgical procedure  DM - c/w insulin regimen, monitor fingerstick  DVT Prophylaxis - SCD since patient currently cannot be on chemical prophylaxis

## 2018-07-18 RX ORDER — BRIMONIDINE TARTRATE 2 MG/MG
1 SOLUTION/ DROPS OPHTHALMIC THREE TIMES A DAY
Qty: 0 | Refills: 0 | Status: DISCONTINUED | OUTPATIENT
Start: 2018-07-18 | End: 2018-07-23

## 2018-07-18 RX ORDER — TIMOLOL 0.5 %
1 DROPS OPHTHALMIC (EYE) EVERY 12 HOURS
Qty: 0 | Refills: 0 | Status: DISCONTINUED | OUTPATIENT
Start: 2018-07-18 | End: 2018-07-23

## 2018-07-18 RX ORDER — ONDANSETRON 8 MG/1
4 TABLET, FILM COATED ORAL ONCE
Qty: 0 | Refills: 0 | Status: COMPLETED | OUTPATIENT
Start: 2018-07-18 | End: 2018-07-18

## 2018-07-18 RX ORDER — ACETAMINOPHEN 500 MG
650 TABLET ORAL EVERY 6 HOURS
Qty: 0 | Refills: 0 | Status: DISCONTINUED | OUTPATIENT
Start: 2018-07-18 | End: 2018-07-23

## 2018-07-18 RX ORDER — LATANOPROST 0.05 MG/ML
1 SOLUTION/ DROPS OPHTHALMIC; TOPICAL AT BEDTIME
Qty: 0 | Refills: 0 | Status: DISCONTINUED | OUTPATIENT
Start: 2018-07-18 | End: 2018-07-23

## 2018-07-18 RX ORDER — NIFEDIPINE 30 MG
60 TABLET, EXTENDED RELEASE 24 HR ORAL DAILY
Qty: 0 | Refills: 0 | Status: DISCONTINUED | OUTPATIENT
Start: 2018-07-18 | End: 2018-07-23

## 2018-07-18 RX ORDER — HYDROMORPHONE HYDROCHLORIDE 2 MG/ML
2 INJECTION INTRAMUSCULAR; INTRAVENOUS; SUBCUTANEOUS
Qty: 0 | Refills: 0 | Status: DISCONTINUED | OUTPATIENT
Start: 2018-07-18 | End: 2018-07-18

## 2018-07-18 RX ORDER — ACETAMINOPHEN 500 MG
975 TABLET ORAL ONCE
Qty: 0 | Refills: 0 | Status: DISCONTINUED | OUTPATIENT
Start: 2018-07-18 | End: 2018-07-18

## 2018-07-18 RX ORDER — OXYCODONE HYDROCHLORIDE 5 MG/1
10 TABLET ORAL EVERY 12 HOURS
Qty: 0 | Refills: 0 | Status: DISCONTINUED | OUTPATIENT
Start: 2018-07-18 | End: 2018-07-23

## 2018-07-18 RX ORDER — MEPERIDINE HYDROCHLORIDE 50 MG/ML
12.5 INJECTION INTRAMUSCULAR; INTRAVENOUS; SUBCUTANEOUS
Qty: 0 | Refills: 0 | Status: DISCONTINUED | OUTPATIENT
Start: 2018-07-18 | End: 2018-07-18

## 2018-07-18 RX ORDER — FERROUS SULFATE 325(65) MG
325 TABLET ORAL DAILY
Qty: 0 | Refills: 0 | Status: DISCONTINUED | OUTPATIENT
Start: 2018-07-18 | End: 2018-07-23

## 2018-07-18 RX ORDER — BUPROPION HYDROCHLORIDE 150 MG/1
150 TABLET, EXTENDED RELEASE ORAL DAILY
Qty: 0 | Refills: 0 | Status: DISCONTINUED | OUTPATIENT
Start: 2018-07-18 | End: 2018-07-23

## 2018-07-18 RX ORDER — INSULIN GLARGINE 100 [IU]/ML
5 INJECTION, SOLUTION SUBCUTANEOUS EVERY MORNING
Qty: 0 | Refills: 0 | Status: DISCONTINUED | OUTPATIENT
Start: 2018-07-18 | End: 2018-07-23

## 2018-07-18 RX ORDER — OXYCODONE AND ACETAMINOPHEN 5; 325 MG/1; MG/1
1 TABLET ORAL EVERY 4 HOURS
Qty: 0 | Refills: 0 | Status: DISCONTINUED | OUTPATIENT
Start: 2018-07-18 | End: 2018-07-18

## 2018-07-18 RX ORDER — GABAPENTIN 400 MG/1
400 CAPSULE ORAL EVERY 8 HOURS
Qty: 0 | Refills: 0 | Status: DISCONTINUED | OUTPATIENT
Start: 2018-07-18 | End: 2018-07-23

## 2018-07-18 RX ORDER — MORPHINE SULFATE 50 MG/1
4 CAPSULE, EXTENDED RELEASE ORAL EVERY 4 HOURS
Qty: 0 | Refills: 0 | Status: DISCONTINUED | OUTPATIENT
Start: 2018-07-18 | End: 2018-07-23

## 2018-07-18 RX ORDER — METHOCARBAMOL 500 MG/1
750 TABLET, FILM COATED ORAL EVERY 8 HOURS
Qty: 0 | Refills: 0 | Status: DISCONTINUED | OUTPATIENT
Start: 2018-07-18 | End: 2018-07-23

## 2018-07-18 RX ORDER — SODIUM CHLORIDE 9 MG/ML
1000 INJECTION, SOLUTION INTRAVENOUS
Qty: 0 | Refills: 0 | Status: DISCONTINUED | OUTPATIENT
Start: 2018-07-18 | End: 2018-07-18

## 2018-07-18 RX ORDER — LISINOPRIL 2.5 MG/1
20 TABLET ORAL DAILY
Qty: 0 | Refills: 0 | Status: DISCONTINUED | OUTPATIENT
Start: 2018-07-18 | End: 2018-07-23

## 2018-07-18 RX ORDER — FENTANYL CITRATE 50 UG/ML
2 INJECTION INTRAVENOUS
Qty: 0 | Refills: 0 | Status: DISCONTINUED | OUTPATIENT
Start: 2018-07-18 | End: 2018-07-23

## 2018-07-18 RX ORDER — PANTOPRAZOLE SODIUM 20 MG/1
40 TABLET, DELAYED RELEASE ORAL
Qty: 0 | Refills: 0 | Status: DISCONTINUED | OUTPATIENT
Start: 2018-07-18 | End: 2018-07-23

## 2018-07-18 RX ORDER — INSULIN GLARGINE 100 [IU]/ML
5 INJECTION, SOLUTION SUBCUTANEOUS AT BEDTIME
Qty: 0 | Refills: 0 | Status: DISCONTINUED | OUTPATIENT
Start: 2018-07-18 | End: 2018-07-23

## 2018-07-18 RX ORDER — INSULIN LISPRO 100/ML
5 VIAL (ML) SUBCUTANEOUS
Qty: 0 | Refills: 0 | Status: DISCONTINUED | OUTPATIENT
Start: 2018-07-18 | End: 2018-07-23

## 2018-07-18 RX ORDER — HYDROMORPHONE HYDROCHLORIDE 2 MG/ML
1 INJECTION INTRAMUSCULAR; INTRAVENOUS; SUBCUTANEOUS
Qty: 0 | Refills: 0 | Status: DISCONTINUED | OUTPATIENT
Start: 2018-07-18 | End: 2018-07-18

## 2018-07-18 RX ORDER — DEXTROSE 50 % IN WATER 50 %
25 SYRINGE (ML) INTRAVENOUS ONCE
Qty: 0 | Refills: 0 | Status: DISCONTINUED | OUTPATIENT
Start: 2018-07-18 | End: 2018-07-23

## 2018-07-18 RX ORDER — SENNA PLUS 8.6 MG/1
2 TABLET ORAL AT BEDTIME
Qty: 0 | Refills: 0 | Status: DISCONTINUED | OUTPATIENT
Start: 2018-07-18 | End: 2018-07-23

## 2018-07-18 RX ORDER — ONDANSETRON 8 MG/1
4 TABLET, FILM COATED ORAL ONCE
Qty: 0 | Refills: 0 | Status: DISCONTINUED | OUTPATIENT
Start: 2018-07-18 | End: 2018-07-18

## 2018-07-18 RX ORDER — INSULIN LISPRO 100/ML
VIAL (ML) SUBCUTANEOUS
Qty: 0 | Refills: 0 | Status: DISCONTINUED | OUTPATIENT
Start: 2018-07-18 | End: 2018-07-23

## 2018-07-18 RX ORDER — SERTRALINE 25 MG/1
100 TABLET, FILM COATED ORAL DAILY
Qty: 0 | Refills: 0 | Status: DISCONTINUED | OUTPATIENT
Start: 2018-07-18 | End: 2018-07-23

## 2018-07-18 RX ORDER — LABETALOL HCL 100 MG
5 TABLET ORAL
Qty: 0 | Refills: 0 | Status: DISCONTINUED | OUTPATIENT
Start: 2018-07-18 | End: 2018-07-23

## 2018-07-18 RX ORDER — LANOLIN ALCOHOL/MO/W.PET/CERES
5 CREAM (GRAM) TOPICAL AT BEDTIME
Qty: 0 | Refills: 0 | Status: DISCONTINUED | OUTPATIENT
Start: 2018-07-18 | End: 2018-07-23

## 2018-07-18 RX ADMIN — HYDROMORPHONE HYDROCHLORIDE 1 MILLIGRAM(S): 2 INJECTION INTRAMUSCULAR; INTRAVENOUS; SUBCUTANEOUS at 19:15

## 2018-07-18 RX ADMIN — BRIMONIDINE TARTRATE 1 DROP(S): 2 SOLUTION/ DROPS OPHTHALMIC at 06:42

## 2018-07-18 RX ADMIN — Medication 100 MILLIGRAM(S): at 22:51

## 2018-07-18 RX ADMIN — Medication 60 MILLIGRAM(S): at 23:25

## 2018-07-18 RX ADMIN — Medication 1 DROP(S): at 22:52

## 2018-07-18 RX ADMIN — MORPHINE SULFATE 4 MILLIGRAM(S): 50 CAPSULE, EXTENDED RELEASE ORAL at 23:24

## 2018-07-18 RX ADMIN — GABAPENTIN 400 MILLIGRAM(S): 400 CAPSULE ORAL at 22:53

## 2018-07-18 RX ADMIN — HYDROMORPHONE HYDROCHLORIDE 1 MILLIGRAM(S): 2 INJECTION INTRAMUSCULAR; INTRAVENOUS; SUBCUTANEOUS at 18:50

## 2018-07-18 RX ADMIN — BRIMONIDINE TARTRATE 1 DROP(S): 2 SOLUTION/ DROPS OPHTHALMIC at 22:52

## 2018-07-18 RX ADMIN — Medication 5 MILLIGRAM(S): at 19:00

## 2018-07-18 RX ADMIN — ONDANSETRON 4 MILLIGRAM(S): 8 TABLET, FILM COATED ORAL at 14:37

## 2018-07-18 RX ADMIN — SODIUM CHLORIDE 100 MILLILITER(S): 9 INJECTION, SOLUTION INTRAVENOUS at 19:30

## 2018-07-18 RX ADMIN — METHOCARBAMOL 750 MILLIGRAM(S): 500 TABLET, FILM COATED ORAL at 22:52

## 2018-07-18 RX ADMIN — SERTRALINE 100 MILLIGRAM(S): 25 TABLET, FILM COATED ORAL at 11:36

## 2018-07-18 RX ADMIN — HYDROMORPHONE HYDROCHLORIDE 1 MILLIGRAM(S): 2 INJECTION INTRAMUSCULAR; INTRAVENOUS; SUBCUTANEOUS at 20:15

## 2018-07-18 RX ADMIN — BUPROPION HYDROCHLORIDE 150 MILLIGRAM(S): 150 TABLET, EXTENDED RELEASE ORAL at 11:36

## 2018-07-18 RX ADMIN — Medication 325 MILLIGRAM(S): at 22:52

## 2018-07-18 RX ADMIN — LATANOPROST 1 DROP(S): 0.05 SOLUTION/ DROPS OPHTHALMIC; TOPICAL at 22:52

## 2018-07-18 RX ADMIN — HYDROMORPHONE HYDROCHLORIDE 1 MILLIGRAM(S): 2 INJECTION INTRAMUSCULAR; INTRAVENOUS; SUBCUTANEOUS at 19:20

## 2018-07-18 RX ADMIN — Medication 1 DROP(S): at 06:42

## 2018-07-18 RX ADMIN — Medication 325 MILLIGRAM(S): at 11:36

## 2018-07-18 RX ADMIN — PANTOPRAZOLE SODIUM 40 MILLIGRAM(S): 20 TABLET, DELAYED RELEASE ORAL at 11:37

## 2018-07-18 RX ADMIN — INSULIN GLARGINE 5 UNIT(S): 100 INJECTION, SOLUTION SUBCUTANEOUS at 22:53

## 2018-07-18 RX ADMIN — BRIMONIDINE TARTRATE 1 DROP(S): 2 SOLUTION/ DROPS OPHTHALMIC at 13:58

## 2018-07-18 RX ADMIN — Medication 5 MILLIGRAM(S): at 23:23

## 2018-07-18 NOTE — BRIEF OPERATIVE NOTE - PROCEDURE
<<-----Click on this checkbox to enter Procedure Lumbar laminectomy with repair of CSF leak using dural graft  07/18/2018    Active  [id number]

## 2018-07-18 NOTE — CHART NOTE - NSCHARTNOTEFT_GEN_A_CORE
07-18-18 @ 11:33  PRIYANK WESLEY  [id number]  67yMale    I have discussed the risks and benefits of CSF fistula repair with the patient including but not limited to bleeding, infection, weakness, numbness, paralysis, further CSF leak requiring repair or lumbar drainage, no change or increase in pain or other symptoms, change in bowel/bladder/sexual function, need for decompression, revision, repeat or other procedure(s).  I have also discussed the possibility of the following:    Need for flat bedrest    I have also discussed the alternatives to the procedure as well as options for no treatment and/or expected courses for all.  I also discussed the role of any MD/PA first assistant and the patient assents to their participation.  All questions were answered and the patient wishes to proceed.

## 2018-07-19 RX ORDER — ACETAMINOPHEN 500 MG
650 TABLET ORAL EVERY 6 HOURS
Qty: 0 | Refills: 0 | Status: DISCONTINUED | OUTPATIENT
Start: 2018-07-19 | End: 2018-07-23

## 2018-07-19 RX ORDER — INSULIN LISPRO 100/ML
5 VIAL (ML) SUBCUTANEOUS
Qty: 0 | Refills: 0 | Status: DISCONTINUED | OUTPATIENT
Start: 2018-07-19 | End: 2018-07-23

## 2018-07-19 RX ADMIN — MORPHINE SULFATE 4 MILLIGRAM(S): 50 CAPSULE, EXTENDED RELEASE ORAL at 09:47

## 2018-07-19 RX ADMIN — Medication 100 MILLIGRAM(S): at 14:13

## 2018-07-19 RX ADMIN — FENTANYL CITRATE 2 PATCH: 50 INJECTION INTRAVENOUS at 12:22

## 2018-07-19 RX ADMIN — Medication 5 UNIT(S): at 11:48

## 2018-07-19 RX ADMIN — PANTOPRAZOLE SODIUM 40 MILLIGRAM(S): 20 TABLET, DELAYED RELEASE ORAL at 07:51

## 2018-07-19 RX ADMIN — BRIMONIDINE TARTRATE 1 DROP(S): 2 SOLUTION/ DROPS OPHTHALMIC at 21:29

## 2018-07-19 RX ADMIN — Medication 60 MILLIGRAM(S): at 05:28

## 2018-07-19 RX ADMIN — LATANOPROST 1 DROP(S): 0.05 SOLUTION/ DROPS OPHTHALMIC; TOPICAL at 21:31

## 2018-07-19 RX ADMIN — METHOCARBAMOL 750 MILLIGRAM(S): 500 TABLET, FILM COATED ORAL at 21:34

## 2018-07-19 RX ADMIN — MORPHINE SULFATE 4 MILLIGRAM(S): 50 CAPSULE, EXTENDED RELEASE ORAL at 07:08

## 2018-07-19 RX ADMIN — METHOCARBAMOL 750 MILLIGRAM(S): 500 TABLET, FILM COATED ORAL at 14:14

## 2018-07-19 RX ADMIN — Medication 100 MILLIGRAM(S): at 05:28

## 2018-07-19 RX ADMIN — GABAPENTIN 400 MILLIGRAM(S): 400 CAPSULE ORAL at 05:29

## 2018-07-19 RX ADMIN — BRIMONIDINE TARTRATE 1 DROP(S): 2 SOLUTION/ DROPS OPHTHALMIC at 05:29

## 2018-07-19 RX ADMIN — Medication 2: at 17:01

## 2018-07-19 RX ADMIN — INSULIN GLARGINE 5 UNIT(S): 100 INJECTION, SOLUTION SUBCUTANEOUS at 07:52

## 2018-07-19 RX ADMIN — BUPROPION HYDROCHLORIDE 150 MILLIGRAM(S): 150 TABLET, EXTENDED RELEASE ORAL at 11:24

## 2018-07-19 RX ADMIN — GABAPENTIN 400 MILLIGRAM(S): 400 CAPSULE ORAL at 14:13

## 2018-07-19 RX ADMIN — Medication 325 MILLIGRAM(S): at 11:24

## 2018-07-19 RX ADMIN — LISINOPRIL 20 MILLIGRAM(S): 2.5 TABLET ORAL at 05:28

## 2018-07-19 RX ADMIN — Medication 1 DROP(S): at 05:29

## 2018-07-19 RX ADMIN — MORPHINE SULFATE 4 MILLIGRAM(S): 50 CAPSULE, EXTENDED RELEASE ORAL at 17:30

## 2018-07-19 RX ADMIN — GABAPENTIN 400 MILLIGRAM(S): 400 CAPSULE ORAL at 21:30

## 2018-07-19 RX ADMIN — BRIMONIDINE TARTRATE 1 DROP(S): 2 SOLUTION/ DROPS OPHTHALMIC at 14:13

## 2018-07-19 RX ADMIN — Medication 5 UNIT(S): at 07:52

## 2018-07-19 RX ADMIN — METHOCARBAMOL 750 MILLIGRAM(S): 500 TABLET, FILM COATED ORAL at 05:28

## 2018-07-19 RX ADMIN — Medication 5 MILLIGRAM(S): at 21:33

## 2018-07-19 RX ADMIN — Medication 2: at 07:52

## 2018-07-19 RX ADMIN — INSULIN GLARGINE 5 UNIT(S): 100 INJECTION, SOLUTION SUBCUTANEOUS at 21:30

## 2018-07-19 RX ADMIN — Medication 650 MILLIGRAM(S): at 09:02

## 2018-07-19 RX ADMIN — Medication 1 DROP(S): at 17:49

## 2018-07-19 RX ADMIN — MORPHINE SULFATE 4 MILLIGRAM(S): 50 CAPSULE, EXTENDED RELEASE ORAL at 10:00

## 2018-07-19 RX ADMIN — MORPHINE SULFATE 4 MILLIGRAM(S): 50 CAPSULE, EXTENDED RELEASE ORAL at 17:10

## 2018-07-19 RX ADMIN — SERTRALINE 100 MILLIGRAM(S): 25 TABLET, FILM COATED ORAL at 11:24

## 2018-07-19 RX ADMIN — MORPHINE SULFATE 4 MILLIGRAM(S): 50 CAPSULE, EXTENDED RELEASE ORAL at 05:26

## 2018-07-19 NOTE — PROGRESS NOTE ADULT - ASSESSMENT
67y old Male s/p lumbar laminectomy with repair of CSF leak POD#0.    PLAN:  1.	Pain control  2.	Flat bed rest x 24H  3.	Clindamycin x24H for ppx  4.	Neuro checks q4H

## 2018-07-19 NOTE — PROGRESS NOTE ADULT - SUBJECTIVE AND OBJECTIVE BOX
Patient is a 67y old Male s/p lumbar laminectomy with repair of CSF leak POD#0. Patient seen and examined at bedside. Had some pain to lumbar back but controlled. Denies numbness/tingling to b/l LE, no saddle anesthesia, no HA, N/V, fever, chills, bowel/bladder incontinence.    VS: T(F): 98.5, Max: 98.5 (07-18-18 @ 18:40)  HR: 59 (54 - 86)  BP: 186/80 (139/72 - 188/86)  RR: 18  SpO2: 99%  GEN: Awake, alert, NAD  NEURO: A&O, NAPIER x 4 with strength 5/5 throughout, sensation intact to light touch, helen-incisonal TTP

## 2018-07-20 RX ORDER — HEPARIN SODIUM 5000 [USP'U]/ML
5000 INJECTION INTRAVENOUS; SUBCUTANEOUS EVERY 8 HOURS
Qty: 0 | Refills: 0 | Status: DISCONTINUED | OUTPATIENT
Start: 2018-07-20 | End: 2018-07-22

## 2018-07-20 RX ADMIN — BRIMONIDINE TARTRATE 1 DROP(S): 2 SOLUTION/ DROPS OPHTHALMIC at 13:47

## 2018-07-20 RX ADMIN — INSULIN GLARGINE 5 UNIT(S): 100 INJECTION, SOLUTION SUBCUTANEOUS at 21:15

## 2018-07-20 RX ADMIN — MORPHINE SULFATE 4 MILLIGRAM(S): 50 CAPSULE, EXTENDED RELEASE ORAL at 08:22

## 2018-07-20 RX ADMIN — Medication 2: at 11:50

## 2018-07-20 RX ADMIN — Medication 1 DROP(S): at 05:18

## 2018-07-20 RX ADMIN — GABAPENTIN 400 MILLIGRAM(S): 400 CAPSULE ORAL at 05:17

## 2018-07-20 RX ADMIN — Medication 60 MILLIGRAM(S): at 05:18

## 2018-07-20 RX ADMIN — Medication 5 UNIT(S): at 07:44

## 2018-07-20 RX ADMIN — PANTOPRAZOLE SODIUM 40 MILLIGRAM(S): 20 TABLET, DELAYED RELEASE ORAL at 06:21

## 2018-07-20 RX ADMIN — Medication 5 MILLIGRAM(S): at 20:38

## 2018-07-20 RX ADMIN — LISINOPRIL 20 MILLIGRAM(S): 2.5 TABLET ORAL at 05:17

## 2018-07-20 RX ADMIN — METHOCARBAMOL 750 MILLIGRAM(S): 500 TABLET, FILM COATED ORAL at 21:07

## 2018-07-20 RX ADMIN — LATANOPROST 1 DROP(S): 0.05 SOLUTION/ DROPS OPHTHALMIC; TOPICAL at 21:04

## 2018-07-20 RX ADMIN — BRIMONIDINE TARTRATE 1 DROP(S): 2 SOLUTION/ DROPS OPHTHALMIC at 05:16

## 2018-07-20 RX ADMIN — SERTRALINE 100 MILLIGRAM(S): 25 TABLET, FILM COATED ORAL at 11:41

## 2018-07-20 RX ADMIN — BRIMONIDINE TARTRATE 1 DROP(S): 2 SOLUTION/ DROPS OPHTHALMIC at 21:04

## 2018-07-20 RX ADMIN — MORPHINE SULFATE 4 MILLIGRAM(S): 50 CAPSULE, EXTENDED RELEASE ORAL at 08:45

## 2018-07-20 RX ADMIN — MORPHINE SULFATE 4 MILLIGRAM(S): 50 CAPSULE, EXTENDED RELEASE ORAL at 20:54

## 2018-07-20 RX ADMIN — METHOCARBAMOL 750 MILLIGRAM(S): 500 TABLET, FILM COATED ORAL at 13:48

## 2018-07-20 RX ADMIN — Medication 4: at 16:55

## 2018-07-20 RX ADMIN — Medication 5 UNIT(S): at 11:50

## 2018-07-20 RX ADMIN — Medication 5 UNIT(S): at 16:55

## 2018-07-20 RX ADMIN — BUPROPION HYDROCHLORIDE 150 MILLIGRAM(S): 150 TABLET, EXTENDED RELEASE ORAL at 11:41

## 2018-07-20 RX ADMIN — HEPARIN SODIUM 5000 UNIT(S): 5000 INJECTION INTRAVENOUS; SUBCUTANEOUS at 21:05

## 2018-07-20 RX ADMIN — MORPHINE SULFATE 4 MILLIGRAM(S): 50 CAPSULE, EXTENDED RELEASE ORAL at 21:24

## 2018-07-20 RX ADMIN — Medication 5 MILLIGRAM(S): at 21:05

## 2018-07-20 RX ADMIN — INSULIN GLARGINE 5 UNIT(S): 100 INJECTION, SOLUTION SUBCUTANEOUS at 07:44

## 2018-07-20 RX ADMIN — Medication 325 MILLIGRAM(S): at 11:41

## 2018-07-20 RX ADMIN — Medication 1 DROP(S): at 17:21

## 2018-07-20 RX ADMIN — MORPHINE SULFATE 4 MILLIGRAM(S): 50 CAPSULE, EXTENDED RELEASE ORAL at 01:23

## 2018-07-20 RX ADMIN — METHOCARBAMOL 750 MILLIGRAM(S): 500 TABLET, FILM COATED ORAL at 05:18

## 2018-07-20 RX ADMIN — GABAPENTIN 400 MILLIGRAM(S): 400 CAPSULE ORAL at 13:47

## 2018-07-20 RX ADMIN — GABAPENTIN 400 MILLIGRAM(S): 400 CAPSULE ORAL at 21:05

## 2018-07-20 NOTE — PROGRESS NOTE ADULT - SUBJECTIVE AND OBJECTIVE BOX
POD # 2    S/P Repair of CSF Leak    Pt seen and examined at bedside. Pt c/o incisional pain and some mild HA. Denies radiculopathy    Vital Signs Last 24 Hrs  T(C): 37.6 (20 Jul 2018 19:33), Max: 37.6 (19 Jul 2018 22:55)  T(F): 99.6 (20 Jul 2018 19:33), Max: 99.7 (19 Jul 2018 22:55)  HR: 63 (20 Jul 2018 19:33) (59 - 66)  BP: 183/77 (20 Jul 2018 19:33) (110/63 - 183/77)  BP(mean): --  RR: 20 (20 Jul 2018 19:33) (18 - 20)  SpO2: --    PHYSICAL EXAM:  + clear drainage from mid incision  No erythema  No pus    MEDICATIONS:  Antibiotics:    Neuro:  acetaminophen   Tablet 650 milliGRAM(s) Oral every 6 hours PRN  acetaminophen   Tablet. 650 milliGRAM(s) Oral every 6 hours PRN  buPROPion XL . 150 milliGRAM(s) Oral daily  fentaNYL   Patch 100 MICROgram(s)/Hr 2 Patch Transdermal every 72 hours  gabapentin 400 milliGRAM(s) Oral every 8 hours  melatonin 5 milliGRAM(s) Oral at bedtime  methocarbamol 750 milliGRAM(s) Oral every 8 hours  morphine  - Injectable 4 milliGRAM(s) IV Push every 4 hours PRN  oxyCODONE  ER Tablet 10 milliGRAM(s) Oral every 12 hours PRN  sertraline 100 milliGRAM(s) Oral daily    Anticoagulation:    OTHER:  brimonidine 0.2% Ophthalmic Solution 1 Drop(s) Both EYES three times a day  dextrose 50% Injectable 25 Gram(s) IV Push once  insulin glargine Injectable (LANTUS) 5 Unit(s) SubCutaneous every morning  insulin glargine Injectable (LANTUS) 5 Unit(s) SubCutaneous at bedtime  insulin lispro (HumaLOG) corrective regimen sliding scale   SubCutaneous three times a day before meals  insulin lispro Injectable (HumaLOG) 5 Unit(s) SubCutaneous before lunch  insulin lispro Injectable (HumaLOG) 5 Unit(s) SubCutaneous before breakfast  insulin lispro Injectable (HumaLOG) 5 Unit(s) SubCutaneous before dinner  labetalol Injectable 5 milliGRAM(s) IV Push every 20 minutes PRN  latanoprost 0.005% Ophthalmic Solution 1 Drop(s) Both EYES at bedtime  lisinopril 20 milliGRAM(s) Oral daily  NIFEdipine XL 60 milliGRAM(s) Oral daily  pantoprazole    Tablet 40 milliGRAM(s) Oral before breakfast  senna 2 Tablet(s) Oral at bedtime PRN  timolol 0.5% Solution 1 Drop(s) Both EYES every 12 hours    IVF:  ferrous    sulfate 325 milliGRAM(s) Oral daily    Assessment:  As above    Plan:  Keep flat  Possible OR Monday

## 2018-07-21 LAB
CK SERPL-CCNC: 237 U/L — HIGH (ref 0–225)
TROPONIN T SERPL-MCNC: <0.01 NG/ML — SIGNIFICANT CHANGE UP

## 2018-07-21 RX ADMIN — Medication 5 UNIT(S): at 11:53

## 2018-07-21 RX ADMIN — MORPHINE SULFATE 4 MILLIGRAM(S): 50 CAPSULE, EXTENDED RELEASE ORAL at 11:24

## 2018-07-21 RX ADMIN — LATANOPROST 1 DROP(S): 0.05 SOLUTION/ DROPS OPHTHALMIC; TOPICAL at 22:06

## 2018-07-21 RX ADMIN — Medication 60 MILLIGRAM(S): at 05:37

## 2018-07-21 RX ADMIN — GABAPENTIN 400 MILLIGRAM(S): 400 CAPSULE ORAL at 06:39

## 2018-07-21 RX ADMIN — MORPHINE SULFATE 4 MILLIGRAM(S): 50 CAPSULE, EXTENDED RELEASE ORAL at 06:40

## 2018-07-21 RX ADMIN — METHOCARBAMOL 750 MILLIGRAM(S): 500 TABLET, FILM COATED ORAL at 22:03

## 2018-07-21 RX ADMIN — INSULIN GLARGINE 5 UNIT(S): 100 INJECTION, SOLUTION SUBCUTANEOUS at 08:14

## 2018-07-21 RX ADMIN — BRIMONIDINE TARTRATE 1 DROP(S): 2 SOLUTION/ DROPS OPHTHALMIC at 22:05

## 2018-07-21 RX ADMIN — SERTRALINE 100 MILLIGRAM(S): 25 TABLET, FILM COATED ORAL at 11:20

## 2018-07-21 RX ADMIN — Medication 4: at 17:22

## 2018-07-21 RX ADMIN — BRIMONIDINE TARTRATE 1 DROP(S): 2 SOLUTION/ DROPS OPHTHALMIC at 14:36

## 2018-07-21 RX ADMIN — HEPARIN SODIUM 5000 UNIT(S): 5000 INJECTION INTRAVENOUS; SUBCUTANEOUS at 14:37

## 2018-07-21 RX ADMIN — PANTOPRAZOLE SODIUM 40 MILLIGRAM(S): 20 TABLET, DELAYED RELEASE ORAL at 06:43

## 2018-07-21 RX ADMIN — INSULIN GLARGINE 5 UNIT(S): 100 INJECTION, SOLUTION SUBCUTANEOUS at 22:04

## 2018-07-21 RX ADMIN — Medication 2: at 11:54

## 2018-07-21 RX ADMIN — METHOCARBAMOL 750 MILLIGRAM(S): 500 TABLET, FILM COATED ORAL at 05:37

## 2018-07-21 RX ADMIN — Medication 325 MILLIGRAM(S): at 11:20

## 2018-07-21 RX ADMIN — BRIMONIDINE TARTRATE 1 DROP(S): 2 SOLUTION/ DROPS OPHTHALMIC at 05:38

## 2018-07-21 RX ADMIN — GABAPENTIN 400 MILLIGRAM(S): 400 CAPSULE ORAL at 22:03

## 2018-07-21 RX ADMIN — LISINOPRIL 20 MILLIGRAM(S): 2.5 TABLET ORAL at 05:37

## 2018-07-21 RX ADMIN — HEPARIN SODIUM 5000 UNIT(S): 5000 INJECTION INTRAVENOUS; SUBCUTANEOUS at 05:38

## 2018-07-21 RX ADMIN — Medication 5 UNIT(S): at 17:20

## 2018-07-21 RX ADMIN — METHOCARBAMOL 750 MILLIGRAM(S): 500 TABLET, FILM COATED ORAL at 14:37

## 2018-07-21 RX ADMIN — Medication 5 MILLIGRAM(S): at 22:03

## 2018-07-21 RX ADMIN — BUPROPION HYDROCHLORIDE 150 MILLIGRAM(S): 150 TABLET, EXTENDED RELEASE ORAL at 11:20

## 2018-07-21 RX ADMIN — OXYCODONE HYDROCHLORIDE 10 MILLIGRAM(S): 5 TABLET ORAL at 15:38

## 2018-07-21 RX ADMIN — Medication 1 DROP(S): at 05:38

## 2018-07-21 RX ADMIN — Medication 1 DROP(S): at 17:22

## 2018-07-21 RX ADMIN — HEPARIN SODIUM 5000 UNIT(S): 5000 INJECTION INTRAVENOUS; SUBCUTANEOUS at 22:03

## 2018-07-21 RX ADMIN — Medication 5 UNIT(S): at 08:14

## 2018-07-21 RX ADMIN — MORPHINE SULFATE 4 MILLIGRAM(S): 50 CAPSULE, EXTENDED RELEASE ORAL at 11:40

## 2018-07-21 RX ADMIN — MORPHINE SULFATE 4 MILLIGRAM(S): 50 CAPSULE, EXTENDED RELEASE ORAL at 22:05

## 2018-07-21 RX ADMIN — MORPHINE SULFATE 4 MILLIGRAM(S): 50 CAPSULE, EXTENDED RELEASE ORAL at 22:35

## 2018-07-21 RX ADMIN — GABAPENTIN 400 MILLIGRAM(S): 400 CAPSULE ORAL at 14:36

## 2018-07-21 NOTE — PROGRESS NOTE ADULT - SUBJECTIVE AND OBJECTIVE BOX
POD # 3    S/P Repair of CSF Leak    Pt seen and examined at [facility].e Pt c/o slight HA at this time. Denies dizziness or visual changes. C/o incisional pain. Denies radiculopathy. c/o baseline lower extremity parasthesias.     Vital Signs Last 24 Hrs  T(C): 36.7 (21 Jul 2018 05:58), Max: 37.6 (20 Jul 2018 19:33)  T(F): 98.1 (21 Jul 2018 05:58), Max: 99.7 (21 Jul 2018 01:23)  HR: 61 (21 Jul 2018 05:58) (59 - 66)  BP: 130/60 (21 Jul 2018 05:58) (111/57 - 183/77)  BP(mean): --  RR: 18 (21 Jul 2018 05:58) (18 - 20)  SpO2: --    PHYSICAL EXAM:  Strength 5/5  sensation intact to light touch  + Drainage from incision    MEDICATIONS:  Antibiotics:    Neuro:  acetaminophen   Tablet 650 milliGRAM(s) Oral every 6 hours PRN  acetaminophen   Tablet. 650 milliGRAM(s) Oral every 6 hours PRN  buPROPion XL . 150 milliGRAM(s) Oral daily  fentaNYL   Patch 100 MICROgram(s)/Hr 2 Patch Transdermal every 72 hours  gabapentin 400 milliGRAM(s) Oral every 8 hours  melatonin 5 milliGRAM(s) Oral at bedtime  methocarbamol 750 milliGRAM(s) Oral every 8 hours  morphine  - Injectable 4 milliGRAM(s) IV Push every 4 hours PRN  oxyCODONE  ER Tablet 10 milliGRAM(s) Oral every 12 hours PRN  sertraline 100 milliGRAM(s) Oral daily    Anticoagulation:  heparin  Injectable 5000 Unit(s) SubCutaneous every 8 hours    OTHER:  brimonidine 0.2% Ophthalmic Solution 1 Drop(s) Both EYES three times a day  dextrose 50% Injectable 25 Gram(s) IV Push once  insulin glargine Injectable (LANTUS) 5 Unit(s) SubCutaneous every morning  insulin glargine Injectable (LANTUS) 5 Unit(s) SubCutaneous at bedtime  insulin lispro (HumaLOG) corrective regimen sliding scale   SubCutaneous three times a day before meals  insulin lispro Injectable (HumaLOG) 5 Unit(s) SubCutaneous before lunch  insulin lispro Injectable (HumaLOG) 5 Unit(s) SubCutaneous before breakfast  insulin lispro Injectable (HumaLOG) 5 Unit(s) SubCutaneous before dinner  labetalol Injectable 5 milliGRAM(s) IV Push every 20 minutes PRN  latanoprost 0.005% Ophthalmic Solution 1 Drop(s) Both EYES at bedtime  lisinopril 20 milliGRAM(s) Oral daily  NIFEdipine XL 60 milliGRAM(s) Oral daily  pantoprazole    Tablet 40 milliGRAM(s) Oral before breakfast  senna 2 Tablet(s) Oral at bedtime PRN  timolol 0.5% Solution 1 Drop(s) Both EYES every 12 hours    IVF:  ferrous    sulfate 325 milliGRAM(s) Oral daily    Assessment:  As above    Plan:  Monitor incision  Possible OR monday

## 2018-07-21 NOTE — PROGRESS NOTE ADULT - SUBJECTIVE AND OBJECTIVE BOX
EKG shows Atrial Fibrillation of new onset. Cardiac enzymes sent. Cardiology fellow called. Recommends transfer to Telemetry for cardiac monitoring. Pt unable to be put on Heparin at this time due to CSF Leak. Will monitor on 3C. Dr. Hughes aware

## 2018-07-22 LAB
ANION GAP SERPL CALC-SCNC: 12 MMOL/L — SIGNIFICANT CHANGE UP (ref 7–14)
APTT BLD: 29.3 SEC — SIGNIFICANT CHANGE UP (ref 27–39.2)
BLD GP AB SCN SERPL QL: SIGNIFICANT CHANGE UP
BUN SERPL-MCNC: 19 MG/DL — SIGNIFICANT CHANGE UP (ref 10–20)
CALCIUM SERPL-MCNC: 8.8 MG/DL — SIGNIFICANT CHANGE UP (ref 8.5–10.1)
CHLORIDE SERPL-SCNC: 101 MMOL/L — SIGNIFICANT CHANGE UP (ref 98–110)
CO2 SERPL-SCNC: 29 MMOL/L — SIGNIFICANT CHANGE UP (ref 17–32)
CREAT SERPL-MCNC: 1 MG/DL — SIGNIFICANT CHANGE UP (ref 0.7–1.5)
GLUCOSE SERPL-MCNC: 219 MG/DL — HIGH (ref 70–99)
HCT VFR BLD CALC: 31.7 % — LOW (ref 42–52)
HGB BLD-MCNC: 10 G/DL — LOW (ref 14–18)
INR BLD: 1.14 RATIO — SIGNIFICANT CHANGE UP (ref 0.65–1.3)
MCHC RBC-ENTMCNC: 28.1 PG — SIGNIFICANT CHANGE UP (ref 27–31)
MCHC RBC-ENTMCNC: 31.5 G/DL — LOW (ref 32–37)
MCV RBC AUTO: 89 FL — SIGNIFICANT CHANGE UP (ref 80–94)
NRBC # BLD: 0 /100 WBCS — SIGNIFICANT CHANGE UP (ref 0–0)
PLATELET # BLD AUTO: 248 K/UL — SIGNIFICANT CHANGE UP (ref 130–400)
POTASSIUM SERPL-MCNC: 5.1 MMOL/L — HIGH (ref 3.5–5)
POTASSIUM SERPL-SCNC: 5.1 MMOL/L — HIGH (ref 3.5–5)
PROTHROM AB SERPL-ACNC: 12.3 SEC — SIGNIFICANT CHANGE UP (ref 9.95–12.87)
RBC # BLD: 3.56 M/UL — LOW (ref 4.7–6.1)
RBC # FLD: 13.3 % — SIGNIFICANT CHANGE UP (ref 11.5–14.5)
SODIUM SERPL-SCNC: 142 MMOL/L — SIGNIFICANT CHANGE UP (ref 135–146)
TYPE + AB SCN PNL BLD: SIGNIFICANT CHANGE UP
WBC # BLD: 11.99 K/UL — HIGH (ref 4.8–10.8)
WBC # FLD AUTO: 11.99 K/UL — HIGH (ref 4.8–10.8)

## 2018-07-22 RX ORDER — SIMETHICONE 80 MG/1
80 TABLET, CHEWABLE ORAL EVERY 8 HOURS
Qty: 0 | Refills: 0 | Status: DISCONTINUED | OUTPATIENT
Start: 2018-07-22 | End: 2018-07-23

## 2018-07-22 RX ADMIN — Medication 5 UNIT(S): at 12:43

## 2018-07-22 RX ADMIN — BRIMONIDINE TARTRATE 1 DROP(S): 2 SOLUTION/ DROPS OPHTHALMIC at 06:07

## 2018-07-22 RX ADMIN — MORPHINE SULFATE 4 MILLIGRAM(S): 50 CAPSULE, EXTENDED RELEASE ORAL at 14:48

## 2018-07-22 RX ADMIN — PANTOPRAZOLE SODIUM 40 MILLIGRAM(S): 20 TABLET, DELAYED RELEASE ORAL at 06:08

## 2018-07-22 RX ADMIN — MORPHINE SULFATE 4 MILLIGRAM(S): 50 CAPSULE, EXTENDED RELEASE ORAL at 09:14

## 2018-07-22 RX ADMIN — FENTANYL CITRATE 2 PATCH: 50 INJECTION INTRAVENOUS at 13:05

## 2018-07-22 RX ADMIN — HEPARIN SODIUM 5000 UNIT(S): 5000 INJECTION INTRAVENOUS; SUBCUTANEOUS at 06:12

## 2018-07-22 RX ADMIN — Medication 1 DROP(S): at 18:39

## 2018-07-22 RX ADMIN — GABAPENTIN 400 MILLIGRAM(S): 400 CAPSULE ORAL at 13:18

## 2018-07-22 RX ADMIN — Medication 60 MILLIGRAM(S): at 06:08

## 2018-07-22 RX ADMIN — Medication 5 UNIT(S): at 18:39

## 2018-07-22 RX ADMIN — LATANOPROST 1 DROP(S): 0.05 SOLUTION/ DROPS OPHTHALMIC; TOPICAL at 21:59

## 2018-07-22 RX ADMIN — HEPARIN SODIUM 5000 UNIT(S): 5000 INJECTION INTRAVENOUS; SUBCUTANEOUS at 13:18

## 2018-07-22 RX ADMIN — Medication 4: at 12:43

## 2018-07-22 RX ADMIN — MORPHINE SULFATE 4 MILLIGRAM(S): 50 CAPSULE, EXTENDED RELEASE ORAL at 20:13

## 2018-07-22 RX ADMIN — Medication 5 UNIT(S): at 08:41

## 2018-07-22 RX ADMIN — MORPHINE SULFATE 4 MILLIGRAM(S): 50 CAPSULE, EXTENDED RELEASE ORAL at 20:43

## 2018-07-22 RX ADMIN — Medication 5 MILLIGRAM(S): at 21:57

## 2018-07-22 RX ADMIN — BRIMONIDINE TARTRATE 1 DROP(S): 2 SOLUTION/ DROPS OPHTHALMIC at 13:17

## 2018-07-22 RX ADMIN — SERTRALINE 100 MILLIGRAM(S): 25 TABLET, FILM COATED ORAL at 12:44

## 2018-07-22 RX ADMIN — METHOCARBAMOL 750 MILLIGRAM(S): 500 TABLET, FILM COATED ORAL at 06:08

## 2018-07-22 RX ADMIN — INSULIN GLARGINE 5 UNIT(S): 100 INJECTION, SOLUTION SUBCUTANEOUS at 10:45

## 2018-07-22 RX ADMIN — BUPROPION HYDROCHLORIDE 150 MILLIGRAM(S): 150 TABLET, EXTENDED RELEASE ORAL at 12:44

## 2018-07-22 RX ADMIN — Medication 325 MILLIGRAM(S): at 12:44

## 2018-07-22 RX ADMIN — GABAPENTIN 400 MILLIGRAM(S): 400 CAPSULE ORAL at 06:08

## 2018-07-22 RX ADMIN — LISINOPRIL 20 MILLIGRAM(S): 2.5 TABLET ORAL at 06:08

## 2018-07-22 RX ADMIN — FENTANYL CITRATE 2 PATCH: 50 INJECTION INTRAVENOUS at 13:10

## 2018-07-22 RX ADMIN — INSULIN GLARGINE 5 UNIT(S): 100 INJECTION, SOLUTION SUBCUTANEOUS at 21:57

## 2018-07-22 RX ADMIN — METHOCARBAMOL 750 MILLIGRAM(S): 500 TABLET, FILM COATED ORAL at 13:17

## 2018-07-22 RX ADMIN — METHOCARBAMOL 750 MILLIGRAM(S): 500 TABLET, FILM COATED ORAL at 21:57

## 2018-07-22 RX ADMIN — BRIMONIDINE TARTRATE 1 DROP(S): 2 SOLUTION/ DROPS OPHTHALMIC at 21:56

## 2018-07-22 RX ADMIN — GABAPENTIN 400 MILLIGRAM(S): 400 CAPSULE ORAL at 21:58

## 2018-07-22 RX ADMIN — Medication 1 DROP(S): at 06:13

## 2018-07-22 RX ADMIN — MORPHINE SULFATE 4 MILLIGRAM(S): 50 CAPSULE, EXTENDED RELEASE ORAL at 08:44

## 2018-07-22 NOTE — PROGRESS NOTE ADULT - NSHPATTENDINGPLANDISCUSS_GEN_ALL_CORE
neurosurgery Erich Danielson
WILMER Edwards
neurosurgery PA
neurosurgery WILMER Edwards
neurosurgery WILMER Edwards

## 2018-07-22 NOTE — PRE-ANESTHESIA EVALUATION ADULT - MALLAMPATI CLASS
Class III - visualization of the soft palate and the base of the uvula
Class II - visualization of the soft palate, fauces, and uvula
Class I (easy) - visualization of the soft palate, fauces, uvula, and both anterior and posterior pillars

## 2018-07-22 NOTE — PRE-ANESTHESIA EVALUATION ADULT - NSANTHADDINFOFT_GEN_ALL_CORE
patient unable to be anticoagulated due to neurosurgical procedure. he was advised of possibility of perioperative stroke and was willing to accept this risk. also, the patient denies any medication allergies.

## 2018-07-22 NOTE — PROGRESS NOTE ADULT - SUBJECTIVE AND OBJECTIVE BOX
POD # 4    S/P Repair of CSF Leak    Pt seen and examined at bedside. Pt c/o incisional pain. no HA at this time. Sts pain in his left sided chest resolved.     Vital Signs Last 24 Hrs  T(C): 35.7 (22 Jul 2018 15:09), Max: 36.3 (21 Jul 2018 20:29)  T(F): 96.2 (22 Jul 2018 13:20), Max: 97.4 (21 Jul 2018 20:29)  HR: 81 (22 Jul 2018 15:09) (69 - 81)  BP: 154/78 (22 Jul 2018 15:09) (125/60 - 177/74)  BP(mean): --  RR: 18 (22 Jul 2018 15:09) (18 - 18)  SpO2: 97% (22 Jul 2018 15:09) (97% - 98%)    PHYSICAL EXAM:  + drainage from wound  No pus    MEDICATIONS:  Antibiotics:    Neuro:  acetaminophen   Tablet 650 milliGRAM(s) Oral every 6 hours PRN  acetaminophen   Tablet. 650 milliGRAM(s) Oral every 6 hours PRN  buPROPion XL . 150 milliGRAM(s) Oral daily  fentaNYL   Patch 100 MICROgram(s)/Hr 2 Patch Transdermal every 72 hours  gabapentin 400 milliGRAM(s) Oral every 8 hours  melatonin 5 milliGRAM(s) Oral at bedtime  methocarbamol 750 milliGRAM(s) Oral every 8 hours  morphine  - Injectable 4 milliGRAM(s) IV Push every 4 hours PRN  oxyCODONE  ER Tablet 10 milliGRAM(s) Oral every 12 hours PRN  sertraline 100 milliGRAM(s) Oral daily    Anticoagulation:  heparin  Injectable 5000 Unit(s) SubCutaneous every 8 hours    OTHER:  brimonidine 0.2% Ophthalmic Solution 1 Drop(s) Both EYES three times a day  dextrose 50% Injectable 25 Gram(s) IV Push once  insulin glargine Injectable (LANTUS) 5 Unit(s) SubCutaneous every morning  insulin glargine Injectable (LANTUS) 5 Unit(s) SubCutaneous at bedtime  insulin lispro (HumaLOG) corrective regimen sliding scale   SubCutaneous three times a day before meals  insulin lispro Injectable (HumaLOG) 5 Unit(s) SubCutaneous before lunch  insulin lispro Injectable (HumaLOG) 5 Unit(s) SubCutaneous before breakfast  insulin lispro Injectable (HumaLOG) 5 Unit(s) SubCutaneous before dinner  labetalol Injectable 5 milliGRAM(s) IV Push every 20 minutes PRN  latanoprost 0.005% Ophthalmic Solution 1 Drop(s) Both EYES at bedtime  lisinopril 20 milliGRAM(s) Oral daily  NIFEdipine XL 60 milliGRAM(s) Oral daily  pantoprazole    Tablet 40 milliGRAM(s) Oral before breakfast  senna 2 Tablet(s) Oral at bedtime PRN  timolol 0.5% Solution 1 Drop(s) Both EYES every 12 hours    IVF:  ferrous    sulfate 325 milliGRAM(s) Oral daily    Assessment:  As above    Plan:  OR tomorrow  Cardiology F/U

## 2018-07-22 NOTE — PRE-ANESTHESIA EVALUATION ADULT - NSANTHOSAYNRD_GEN_A_CORE
No. SHREYAS screening performed.  STOP BANG Legend: 0-2 = LOW Risk; 3-4 = INTERMEDIATE Risk; 5-8 = HIGH Risk/see shreyas tool

## 2018-07-23 RX ORDER — DEXTROSE 50 % IN WATER 50 %
25 SYRINGE (ML) INTRAVENOUS ONCE
Qty: 0 | Refills: 0 | Status: DISCONTINUED | OUTPATIENT
Start: 2018-07-23 | End: 2018-07-29

## 2018-07-23 RX ORDER — FENTANYL CITRATE 50 UG/ML
2 INJECTION INTRAVENOUS
Qty: 0 | Refills: 0 | Status: DISCONTINUED | OUTPATIENT
Start: 2018-07-23 | End: 2018-07-28

## 2018-07-23 RX ORDER — TIMOLOL 0.5 %
1 DROPS OPHTHALMIC (EYE) EVERY 12 HOURS
Qty: 0 | Refills: 0 | Status: DISCONTINUED | OUTPATIENT
Start: 2018-07-23 | End: 2018-07-29

## 2018-07-23 RX ORDER — INSULIN GLARGINE 100 [IU]/ML
5 INJECTION, SOLUTION SUBCUTANEOUS AT BEDTIME
Qty: 0 | Refills: 0 | Status: DISCONTINUED | OUTPATIENT
Start: 2018-07-23 | End: 2018-07-29

## 2018-07-23 RX ORDER — DEXAMETHASONE 0.5 MG/5ML
8 ELIXIR ORAL ONCE
Qty: 0 | Refills: 0 | Status: DISCONTINUED | OUTPATIENT
Start: 2018-07-23 | End: 2018-07-23

## 2018-07-23 RX ORDER — MEPERIDINE HYDROCHLORIDE 50 MG/ML
12.5 INJECTION INTRAMUSCULAR; INTRAVENOUS; SUBCUTANEOUS
Qty: 0 | Refills: 0 | Status: DISCONTINUED | OUTPATIENT
Start: 2018-07-23 | End: 2018-07-23

## 2018-07-23 RX ORDER — METOCLOPRAMIDE HCL 10 MG
10 TABLET ORAL ONCE
Qty: 0 | Refills: 0 | Status: DISCONTINUED | OUTPATIENT
Start: 2018-07-23 | End: 2018-07-23

## 2018-07-23 RX ORDER — HYDROMORPHONE HYDROCHLORIDE 2 MG/ML
1 INJECTION INTRAMUSCULAR; INTRAVENOUS; SUBCUTANEOUS
Qty: 0 | Refills: 0 | Status: DISCONTINUED | OUTPATIENT
Start: 2018-07-23 | End: 2018-07-23

## 2018-07-23 RX ORDER — BUPROPION HYDROCHLORIDE 150 MG/1
150 TABLET, EXTENDED RELEASE ORAL DAILY
Qty: 0 | Refills: 0 | Status: DISCONTINUED | OUTPATIENT
Start: 2018-07-23 | End: 2018-07-29

## 2018-07-23 RX ORDER — MORPHINE SULFATE 50 MG/1
4 CAPSULE, EXTENDED RELEASE ORAL EVERY 4 HOURS
Qty: 0 | Refills: 0 | Status: DISCONTINUED | OUTPATIENT
Start: 2018-07-23 | End: 2018-07-24

## 2018-07-23 RX ORDER — ONDANSETRON 8 MG/1
4 TABLET, FILM COATED ORAL ONCE
Qty: 0 | Refills: 0 | Status: DISCONTINUED | OUTPATIENT
Start: 2018-07-23 | End: 2018-07-23

## 2018-07-23 RX ORDER — GABAPENTIN 400 MG/1
400 CAPSULE ORAL EVERY 8 HOURS
Qty: 0 | Refills: 0 | Status: DISCONTINUED | OUTPATIENT
Start: 2018-07-23 | End: 2018-07-29

## 2018-07-23 RX ORDER — SENNA PLUS 8.6 MG/1
2 TABLET ORAL AT BEDTIME
Qty: 0 | Refills: 0 | Status: DISCONTINUED | OUTPATIENT
Start: 2018-07-23 | End: 2018-07-29

## 2018-07-23 RX ORDER — SERTRALINE 25 MG/1
100 TABLET, FILM COATED ORAL DAILY
Qty: 0 | Refills: 0 | Status: DISCONTINUED | OUTPATIENT
Start: 2018-07-23 | End: 2018-07-29

## 2018-07-23 RX ORDER — HYDROMORPHONE HYDROCHLORIDE 2 MG/ML
0.5 INJECTION INTRAMUSCULAR; INTRAVENOUS; SUBCUTANEOUS
Qty: 0 | Refills: 0 | Status: DISCONTINUED | OUTPATIENT
Start: 2018-07-23 | End: 2018-07-23

## 2018-07-23 RX ORDER — INSULIN LISPRO 100/ML
5 VIAL (ML) SUBCUTANEOUS
Qty: 0 | Refills: 0 | Status: DISCONTINUED | OUTPATIENT
Start: 2018-07-23 | End: 2018-07-29

## 2018-07-23 RX ORDER — ACETAMINOPHEN 500 MG
650 TABLET ORAL EVERY 6 HOURS
Qty: 0 | Refills: 0 | Status: DISCONTINUED | OUTPATIENT
Start: 2018-07-23 | End: 2018-07-29

## 2018-07-23 RX ORDER — SODIUM CHLORIDE 9 MG/ML
1000 INJECTION, SOLUTION INTRAVENOUS
Qty: 0 | Refills: 0 | Status: DISCONTINUED | OUTPATIENT
Start: 2018-07-23 | End: 2018-07-24

## 2018-07-23 RX ORDER — LANOLIN ALCOHOL/MO/W.PET/CERES
5 CREAM (GRAM) TOPICAL AT BEDTIME
Qty: 0 | Refills: 0 | Status: DISCONTINUED | OUTPATIENT
Start: 2018-07-23 | End: 2018-07-29

## 2018-07-23 RX ORDER — INSULIN GLARGINE 100 [IU]/ML
5 INJECTION, SOLUTION SUBCUTANEOUS EVERY MORNING
Qty: 0 | Refills: 0 | Status: DISCONTINUED | OUTPATIENT
Start: 2018-07-23 | End: 2018-07-29

## 2018-07-23 RX ORDER — LISINOPRIL 2.5 MG/1
20 TABLET ORAL DAILY
Qty: 0 | Refills: 0 | Status: DISCONTINUED | OUTPATIENT
Start: 2018-07-23 | End: 2018-07-29

## 2018-07-23 RX ORDER — OXYCODONE HYDROCHLORIDE 5 MG/1
10 TABLET ORAL EVERY 12 HOURS
Qty: 0 | Refills: 0 | Status: DISCONTINUED | OUTPATIENT
Start: 2018-07-23 | End: 2018-07-29

## 2018-07-23 RX ORDER — LABETALOL HCL 100 MG
5 TABLET ORAL
Qty: 0 | Refills: 0 | Status: DISCONTINUED | OUTPATIENT
Start: 2018-07-23 | End: 2018-07-29

## 2018-07-23 RX ORDER — NIFEDIPINE 30 MG
60 TABLET, EXTENDED RELEASE 24 HR ORAL DAILY
Qty: 0 | Refills: 0 | Status: DISCONTINUED | OUTPATIENT
Start: 2018-07-23 | End: 2018-07-29

## 2018-07-23 RX ORDER — LATANOPROST 0.05 MG/ML
1 SOLUTION/ DROPS OPHTHALMIC; TOPICAL AT BEDTIME
Qty: 0 | Refills: 0 | Status: DISCONTINUED | OUTPATIENT
Start: 2018-07-23 | End: 2018-07-29

## 2018-07-23 RX ORDER — INSULIN LISPRO 100/ML
VIAL (ML) SUBCUTANEOUS
Qty: 0 | Refills: 0 | Status: DISCONTINUED | OUTPATIENT
Start: 2018-07-23 | End: 2018-07-29

## 2018-07-23 RX ORDER — BRIMONIDINE TARTRATE 2 MG/MG
1 SOLUTION/ DROPS OPHTHALMIC THREE TIMES A DAY
Qty: 0 | Refills: 0 | Status: DISCONTINUED | OUTPATIENT
Start: 2018-07-23 | End: 2018-07-29

## 2018-07-23 RX ORDER — PANTOPRAZOLE SODIUM 20 MG/1
40 TABLET, DELAYED RELEASE ORAL
Qty: 0 | Refills: 0 | Status: DISCONTINUED | OUTPATIENT
Start: 2018-07-23 | End: 2018-07-29

## 2018-07-23 RX ORDER — METHOCARBAMOL 500 MG/1
750 TABLET, FILM COATED ORAL EVERY 8 HOURS
Qty: 0 | Refills: 0 | Status: DISCONTINUED | OUTPATIENT
Start: 2018-07-23 | End: 2018-07-29

## 2018-07-23 RX ORDER — SIMETHICONE 80 MG/1
80 TABLET, CHEWABLE ORAL EVERY 8 HOURS
Qty: 0 | Refills: 0 | Status: DISCONTINUED | OUTPATIENT
Start: 2018-07-23 | End: 2018-07-29

## 2018-07-23 RX ORDER — FERROUS SULFATE 325(65) MG
325 TABLET ORAL DAILY
Qty: 0 | Refills: 0 | Status: DISCONTINUED | OUTPATIENT
Start: 2018-07-23 | End: 2018-07-29

## 2018-07-23 RX ADMIN — SODIUM CHLORIDE 75 MILLILITER(S): 9 INJECTION, SOLUTION INTRAVENOUS at 15:54

## 2018-07-23 RX ADMIN — HYDROMORPHONE HYDROCHLORIDE 1 MILLIGRAM(S): 2 INJECTION INTRAMUSCULAR; INTRAVENOUS; SUBCUTANEOUS at 15:37

## 2018-07-23 RX ADMIN — LISINOPRIL 20 MILLIGRAM(S): 2.5 TABLET ORAL at 06:03

## 2018-07-23 RX ADMIN — Medication 2: at 18:20

## 2018-07-23 RX ADMIN — SIMETHICONE 80 MILLIGRAM(S): 80 TABLET, CHEWABLE ORAL at 00:10

## 2018-07-23 RX ADMIN — HYDROMORPHONE HYDROCHLORIDE 1 MILLIGRAM(S): 2 INJECTION INTRAMUSCULAR; INTRAVENOUS; SUBCUTANEOUS at 15:00

## 2018-07-23 RX ADMIN — GABAPENTIN 400 MILLIGRAM(S): 400 CAPSULE ORAL at 06:03

## 2018-07-23 RX ADMIN — MORPHINE SULFATE 4 MILLIGRAM(S): 50 CAPSULE, EXTENDED RELEASE ORAL at 23:25

## 2018-07-23 RX ADMIN — METHOCARBAMOL 750 MILLIGRAM(S): 500 TABLET, FILM COATED ORAL at 16:04

## 2018-07-23 RX ADMIN — BRIMONIDINE TARTRATE 1 DROP(S): 2 SOLUTION/ DROPS OPHTHALMIC at 21:38

## 2018-07-23 RX ADMIN — MORPHINE SULFATE 4 MILLIGRAM(S): 50 CAPSULE, EXTENDED RELEASE ORAL at 22:55

## 2018-07-23 RX ADMIN — GABAPENTIN 400 MILLIGRAM(S): 400 CAPSULE ORAL at 21:38

## 2018-07-23 RX ADMIN — INSULIN GLARGINE 5 UNIT(S): 100 INJECTION, SOLUTION SUBCUTANEOUS at 21:40

## 2018-07-23 RX ADMIN — HYDROMORPHONE HYDROCHLORIDE 1 MILLIGRAM(S): 2 INJECTION INTRAMUSCULAR; INTRAVENOUS; SUBCUTANEOUS at 16:17

## 2018-07-23 RX ADMIN — LATANOPROST 1 DROP(S): 0.05 SOLUTION/ DROPS OPHTHALMIC; TOPICAL at 21:37

## 2018-07-23 RX ADMIN — METHOCARBAMOL 750 MILLIGRAM(S): 500 TABLET, FILM COATED ORAL at 06:03

## 2018-07-23 RX ADMIN — Medication 1 DROP(S): at 06:03

## 2018-07-23 RX ADMIN — MORPHINE SULFATE 4 MILLIGRAM(S): 50 CAPSULE, EXTENDED RELEASE ORAL at 18:42

## 2018-07-23 RX ADMIN — Medication 5 MILLIGRAM(S): at 21:37

## 2018-07-23 RX ADMIN — Medication 5 UNIT(S): at 18:20

## 2018-07-23 RX ADMIN — Medication 1 DROP(S): at 18:19

## 2018-07-23 RX ADMIN — OXYCODONE HYDROCHLORIDE 10 MILLIGRAM(S): 5 TABLET ORAL at 03:35

## 2018-07-23 RX ADMIN — PANTOPRAZOLE SODIUM 40 MILLIGRAM(S): 20 TABLET, DELAYED RELEASE ORAL at 06:03

## 2018-07-23 RX ADMIN — Medication 60 MILLIGRAM(S): at 21:37

## 2018-07-23 RX ADMIN — MORPHINE SULFATE 4 MILLIGRAM(S): 50 CAPSULE, EXTENDED RELEASE ORAL at 06:33

## 2018-07-23 RX ADMIN — HYDROMORPHONE HYDROCHLORIDE 1 MILLIGRAM(S): 2 INJECTION INTRAMUSCULAR; INTRAVENOUS; SUBCUTANEOUS at 15:21

## 2018-07-23 RX ADMIN — Medication 100 MILLIGRAM(S): at 21:39

## 2018-07-23 RX ADMIN — PANTOPRAZOLE SODIUM 40 MILLIGRAM(S): 20 TABLET, DELAYED RELEASE ORAL at 21:37

## 2018-07-23 RX ADMIN — BRIMONIDINE TARTRATE 1 DROP(S): 2 SOLUTION/ DROPS OPHTHALMIC at 06:03

## 2018-07-23 RX ADMIN — Medication 60 MILLIGRAM(S): at 06:03

## 2018-07-23 RX ADMIN — MORPHINE SULFATE 4 MILLIGRAM(S): 50 CAPSULE, EXTENDED RELEASE ORAL at 06:15

## 2018-07-23 RX ADMIN — GABAPENTIN 400 MILLIGRAM(S): 400 CAPSULE ORAL at 16:00

## 2018-07-23 RX ADMIN — LISINOPRIL 20 MILLIGRAM(S): 2.5 TABLET ORAL at 21:37

## 2018-07-23 RX ADMIN — SODIUM CHLORIDE 125 MILLILITER(S): 9 INJECTION, SOLUTION INTRAVENOUS at 15:37

## 2018-07-23 RX ADMIN — METHOCARBAMOL 750 MILLIGRAM(S): 500 TABLET, FILM COATED ORAL at 21:37

## 2018-07-23 RX ADMIN — OXYCODONE HYDROCHLORIDE 10 MILLIGRAM(S): 5 TABLET ORAL at 04:05

## 2018-07-23 NOTE — CHART NOTE - NSCHARTNOTEFT_GEN_A_CORE
Registered Dietitian Follow-Up (limited)    Pt. out of room for OR at 3 attempts to visit today. Will complete initial assessment tomorrow 7/24.

## 2018-07-23 NOTE — PROGRESS NOTE ADULT - SUBJECTIVE AND OBJECTIVE BOX
Post op check     S/P reexploration of wound  reinforcement of CSF repair     Pt seen and examined at bedside pt with c/o some incisional pain no radicular pain       Vital Signs Last 24 Hrs  T(C): 37.1 (23 Jul 2018 17:47), Max: 37.1 (23 Jul 2018 17:47)  T(F): 98.8 (23 Jul 2018 17:47), Max: 98.8 (23 Jul 2018 17:47)  HR: 76 (23 Jul 2018 17:47) (65 - 80)  BP: 153/92 (23 Jul 2018 17:47) (141/64 - 193/82)  BP(mean): --  RR: 18 (23 Jul 2018 17:47) (18 - 22)  SpO2: 95% (23 Jul 2018 17:06) (93% - 98%)    PHYSICAL EXAM:  Alert , MAEX4  Strength equal bilaterally   incision clean dry intact         MEDICATIONS:  Antibiotics:  clindamycin IVPB 600 milliGRAM(s) IV Intermittent every 8 hours    Neuro:  acetaminophen   Tablet 650 milliGRAM(s) Oral every 6 hours PRN  buPROPion XL . 150 milliGRAM(s) Oral daily  fentaNYL   Patch 100 MICROgram(s)/Hr 2 Patch Transdermal every 72 hours  gabapentin 400 milliGRAM(s) Oral every 8 hours  HYDROmorphone  Injectable 1 milliGRAM(s) IV Push every 10 minutes PRN  HYDROmorphone  Injectable 0.5 milliGRAM(s) IV Push every 10 minutes PRN  melatonin 5 milliGRAM(s) Oral at bedtime  meperidine     Injectable 12.5 milliGRAM(s) IV Push every 10 minutes PRN  methocarbamol 750 milliGRAM(s) Oral every 8 hours  metoclopramide Injectable 10 milliGRAM(s) IV Push once PRN  morphine  - Injectable 4 milliGRAM(s) IV Push every 4 hours PRN  ondansetron Injectable 4 milliGRAM(s) IV Push once PRN  oxyCODONE  ER Tablet 10 milliGRAM(s) Oral every 12 hours PRN  sertraline 100 milliGRAM(s) Oral daily    Anticoagulation:    OTHER:  brimonidine 0.2% Ophthalmic Solution 1 Drop(s) Both EYES three times a day  dexamethasone  IVPB 8 milliGRAM(s) IV Intermittent once PRN  dextrose 50% Injectable 25 Gram(s) IV Push once  insulin glargine Injectable (LANTUS) 5 Unit(s) SubCutaneous every morning  insulin glargine Injectable (LANTUS) 5 Unit(s) SubCutaneous at bedtime  insulin lispro (HumaLOG) corrective regimen sliding scale   SubCutaneous three times a day before meals  insulin lispro Injectable (HumaLOG) 5 Unit(s) SubCutaneous before breakfast  insulin lispro Injectable (HumaLOG) 5 Unit(s) SubCutaneous before lunch  insulin lispro Injectable (HumaLOG) 5 Unit(s) SubCutaneous before dinner  labetalol Injectable 5 milliGRAM(s) IV Push every 20 minutes PRN  latanoprost 0.005% Ophthalmic Solution 1 Drop(s) Both EYES at bedtime  lisinopril 20 milliGRAM(s) Oral daily  NIFEdipine XL 60 milliGRAM(s) Oral daily  pantoprazole    Tablet 40 milliGRAM(s) Oral before breakfast  senna 2 Tablet(s) Oral at bedtime PRN  simethicone 80 milliGRAM(s) Chew every 8 hours PRN  timolol 0.5% Solution 1 Drop(s) Both EYES every 12 hours    IVF:  dextrose 5% + sodium chloride 0.45%. 1000 milliLiter(s) IV Continuous <Continuous>  ferrous    sulfate 325 milliGRAM(s) Oral daily  lactated ringers. 1000 milliLiter(s) IV Continuous <Continuous>        LABS:                        10.0   11.99 )-----------( 248      ( 22 Jul 2018 18:21 )             31.7     07-22    142  |  101  |  19  ----------------------------<  219<H>  5.1<H>   |  29  |  1.0    Ca    8.8      22 Jul 2018 18:21      PT/INR - ( 22 Jul 2018 18:21 )   PT: 12.30 sec;   INR: 1.14 ratio         PTT - ( 22 Jul 2018 18:21 )  PTT:29.3 sec      A/P          S/P Re exploration of wound and reinforcement of CSF repair               pain control                PT/Rehab

## 2018-07-23 NOTE — BRIEF OPERATIVE NOTE - OPERATION/FINDINGS
Serosanguinous fluid within subfascial and suprafascial space without anjel evidence of CSF dural opening.  Hemovac left in subfascial space with Duragen reinforcement of primary dural closure.
see operative report [id number]

## 2018-07-23 NOTE — BRIEF OPERATIVE NOTE - PROCEDURE
<<-----Click on this checkbox to enter Procedure Reexploration of wound  07/23/2018  Reinforcement of CSF Leak  Active  [email or web address]

## 2018-07-24 RX ORDER — MORPHINE SULFATE 50 MG/1
4 CAPSULE, EXTENDED RELEASE ORAL
Qty: 0 | Refills: 0 | Status: DISCONTINUED | OUTPATIENT
Start: 2018-07-24 | End: 2018-07-29

## 2018-07-24 RX ADMIN — BRIMONIDINE TARTRATE 1 DROP(S): 2 SOLUTION/ DROPS OPHTHALMIC at 21:36

## 2018-07-24 RX ADMIN — BRIMONIDINE TARTRATE 1 DROP(S): 2 SOLUTION/ DROPS OPHTHALMIC at 13:51

## 2018-07-24 RX ADMIN — MORPHINE SULFATE 4 MILLIGRAM(S): 50 CAPSULE, EXTENDED RELEASE ORAL at 08:57

## 2018-07-24 RX ADMIN — GABAPENTIN 400 MILLIGRAM(S): 400 CAPSULE ORAL at 05:33

## 2018-07-24 RX ADMIN — BRIMONIDINE TARTRATE 1 DROP(S): 2 SOLUTION/ DROPS OPHTHALMIC at 05:32

## 2018-07-24 RX ADMIN — Medication 5 MILLIGRAM(S): at 21:35

## 2018-07-24 RX ADMIN — Medication 100 MILLIGRAM(S): at 13:50

## 2018-07-24 RX ADMIN — Medication 4: at 12:39

## 2018-07-24 RX ADMIN — MORPHINE SULFATE 4 MILLIGRAM(S): 50 CAPSULE, EXTENDED RELEASE ORAL at 05:02

## 2018-07-24 RX ADMIN — METHOCARBAMOL 750 MILLIGRAM(S): 500 TABLET, FILM COATED ORAL at 21:34

## 2018-07-24 RX ADMIN — Medication 5 UNIT(S): at 08:59

## 2018-07-24 RX ADMIN — MORPHINE SULFATE 4 MILLIGRAM(S): 50 CAPSULE, EXTENDED RELEASE ORAL at 22:54

## 2018-07-24 RX ADMIN — Medication 325 MILLIGRAM(S): at 11:36

## 2018-07-24 RX ADMIN — Medication 2: at 09:00

## 2018-07-24 RX ADMIN — INSULIN GLARGINE 5 UNIT(S): 100 INJECTION, SOLUTION SUBCUTANEOUS at 21:37

## 2018-07-24 RX ADMIN — LISINOPRIL 20 MILLIGRAM(S): 2.5 TABLET ORAL at 05:33

## 2018-07-24 RX ADMIN — Medication 100 MILLIGRAM(S): at 05:32

## 2018-07-24 RX ADMIN — Medication 5 UNIT(S): at 17:49

## 2018-07-24 RX ADMIN — GABAPENTIN 400 MILLIGRAM(S): 400 CAPSULE ORAL at 21:35

## 2018-07-24 RX ADMIN — METHOCARBAMOL 750 MILLIGRAM(S): 500 TABLET, FILM COATED ORAL at 05:33

## 2018-07-24 RX ADMIN — MORPHINE SULFATE 4 MILLIGRAM(S): 50 CAPSULE, EXTENDED RELEASE ORAL at 18:45

## 2018-07-24 RX ADMIN — MORPHINE SULFATE 4 MILLIGRAM(S): 50 CAPSULE, EXTENDED RELEASE ORAL at 22:23

## 2018-07-24 RX ADMIN — Medication 1 DROP(S): at 05:42

## 2018-07-24 RX ADMIN — METHOCARBAMOL 750 MILLIGRAM(S): 500 TABLET, FILM COATED ORAL at 13:51

## 2018-07-24 RX ADMIN — SERTRALINE 100 MILLIGRAM(S): 25 TABLET, FILM COATED ORAL at 11:36

## 2018-07-24 RX ADMIN — MORPHINE SULFATE 4 MILLIGRAM(S): 50 CAPSULE, EXTENDED RELEASE ORAL at 17:52

## 2018-07-24 RX ADMIN — MORPHINE SULFATE 4 MILLIGRAM(S): 50 CAPSULE, EXTENDED RELEASE ORAL at 15:02

## 2018-07-24 RX ADMIN — MORPHINE SULFATE 4 MILLIGRAM(S): 50 CAPSULE, EXTENDED RELEASE ORAL at 04:32

## 2018-07-24 RX ADMIN — BUPROPION HYDROCHLORIDE 150 MILLIGRAM(S): 150 TABLET, EXTENDED RELEASE ORAL at 11:36

## 2018-07-24 RX ADMIN — LATANOPROST 1 DROP(S): 0.05 SOLUTION/ DROPS OPHTHALMIC; TOPICAL at 21:36

## 2018-07-24 RX ADMIN — Medication 60 MILLIGRAM(S): at 05:32

## 2018-07-24 RX ADMIN — Medication 1 DROP(S): at 17:49

## 2018-07-24 RX ADMIN — GABAPENTIN 400 MILLIGRAM(S): 400 CAPSULE ORAL at 13:51

## 2018-07-24 RX ADMIN — MORPHINE SULFATE 4 MILLIGRAM(S): 50 CAPSULE, EXTENDED RELEASE ORAL at 10:21

## 2018-07-24 RX ADMIN — PANTOPRAZOLE SODIUM 40 MILLIGRAM(S): 20 TABLET, DELAYED RELEASE ORAL at 06:27

## 2018-07-24 RX ADMIN — Medication 5 UNIT(S): at 12:39

## 2018-07-24 RX ADMIN — INSULIN GLARGINE 5 UNIT(S): 100 INJECTION, SOLUTION SUBCUTANEOUS at 09:01

## 2018-07-24 RX ADMIN — MORPHINE SULFATE 4 MILLIGRAM(S): 50 CAPSULE, EXTENDED RELEASE ORAL at 18:46

## 2018-07-24 NOTE — PROGRESS NOTE ADULT - SUBJECTIVE AND OBJECTIVE BOX
POD # 1    S/P  re exploration and reinforcement of CSF repair  pt seen and examined at bedside pt resting on his side , no complaints       Vital Signs Last 24 Hrs  T(C): 37.4 (24 Jul 2018 05:49), Max: 37.4 (24 Jul 2018 05:49)  T(F): 99.4 (24 Jul 2018 05:49), Max: 99.4 (24 Jul 2018 05:49)  HR: 83 (24 Jul 2018 05:49) (65 - 83)  BP: 162/73 (24 Jul 2018 05:49) (141/64 - 193/82)  BP(mean): --  RR: 18 (24 Jul 2018 05:49) (18 - 22)  SpO2: 95% (23 Jul 2018 17:06) (93% - 98%)    PHYSICAL EXAM:  Alert, MAEX4   strength equal bilateral   incision clean dry intact         MEDICATIONS:  Antibiotics:  clindamycin IVPB 600 milliGRAM(s) IV Intermittent every 8 hours    Neuro:  acetaminophen   Tablet 650 milliGRAM(s) Oral every 6 hours PRN  buPROPion XL . 150 milliGRAM(s) Oral daily  fentaNYL   Patch 100 MICROgram(s)/Hr 2 Patch Transdermal every 72 hours  gabapentin 400 milliGRAM(s) Oral every 8 hours  melatonin 5 milliGRAM(s) Oral at bedtime  methocarbamol 750 milliGRAM(s) Oral every 8 hours  morphine  - Injectable 4 milliGRAM(s) IV Push every 4 hours PRN  oxyCODONE  ER Tablet 10 milliGRAM(s) Oral every 12 hours PRN  sertraline 100 milliGRAM(s) Oral daily    Anticoagulation:    OTHER:  brimonidine 0.2% Ophthalmic Solution 1 Drop(s) Both EYES three times a day  dextrose 50% Injectable 25 Gram(s) IV Push once  insulin glargine Injectable (LANTUS) 5 Unit(s) SubCutaneous every morning  insulin glargine Injectable (LANTUS) 5 Unit(s) SubCutaneous at bedtime  insulin lispro (HumaLOG) corrective regimen sliding scale   SubCutaneous three times a day before meals  insulin lispro Injectable (HumaLOG) 5 Unit(s) SubCutaneous before breakfast  insulin lispro Injectable (HumaLOG) 5 Unit(s) SubCutaneous before lunch  insulin lispro Injectable (HumaLOG) 5 Unit(s) SubCutaneous before dinner  labetalol Injectable 5 milliGRAM(s) IV Push every 20 minutes PRN  latanoprost 0.005% Ophthalmic Solution 1 Drop(s) Both EYES at bedtime  lisinopril 20 milliGRAM(s) Oral daily  NIFEdipine XL 60 milliGRAM(s) Oral daily  pantoprazole    Tablet 40 milliGRAM(s) Oral before breakfast  senna 2 Tablet(s) Oral at bedtime PRN  simethicone 80 milliGRAM(s) Chew every 8 hours PRN  timolol 0.5% Solution 1 Drop(s) Both EYES every 12 hours    IVF:  dextrose 5% + sodium chloride 0.45%. 1000 milliLiter(s) IV Continuous <Continuous>  ferrous    sulfate 325 milliGRAM(s) Oral daily  lactated ringers. 1000 milliLiter(s) IV Continuous <Continuous>        LABS:                        10.0   11.99 )-----------( 248      ( 22 Jul 2018 18:21 )             31.7     07-22    142  |  101  |  19  ----------------------------<  219<H>  5.1<H>   |  29  |  1.0    Ca    8.8      22 Jul 2018 18:21      PT/INR - ( 22 Jul 2018 18:21 )   PT: 12.30 sec;   INR: 1.14 ratio         PTT - ( 22 Jul 2018 18:21 )  PTT:29.3 sec      A/ p       S/P reexploration and reinforcement of CSF repair             pain control             PT /rehab

## 2018-07-24 NOTE — DIETITIAN INITIAL EVALUATION ADULT. - PT NOT SOURCE
other (specify)/LOS- pt is alert and oriented. no edema. Surgical incision/wound. LBM yesterday before going to OR. No chewing problem but having some sore throat.

## 2018-07-24 NOTE — DIETITIAN INITIAL EVALUATION ADULT. - DIET TYPE
consistent carbohydrate (no snacks)/with Low sodium and Glucerna shake q12hr. Patient reported able to consume about 75% of each meals or more. In addition with Glucerna shakes at least once a day.

## 2018-07-24 NOTE — DIETITIAN INITIAL EVALUATION ADULT. - SOURCE
s/p ex-exploration and reinforcement of CSF repair by NS team. PT/rehab, pain control. Admitted for eval of CSF leak after removal of intrathecal pain pump/patient/other (specify)

## 2018-07-24 NOTE — DIETITIAN INITIAL EVALUATION ADULT. - PERTINENT MEDS FT
abx, d5w, LR, acetaminophen, d5w, fentanyl, iron, hydromorphone, insulin, oxy, senna, protonix, simvastatin, timolol

## 2018-07-25 RX ADMIN — METHOCARBAMOL 750 MILLIGRAM(S): 500 TABLET, FILM COATED ORAL at 13:04

## 2018-07-25 RX ADMIN — BRIMONIDINE TARTRATE 1 DROP(S): 2 SOLUTION/ DROPS OPHTHALMIC at 21:48

## 2018-07-25 RX ADMIN — MORPHINE SULFATE 4 MILLIGRAM(S): 50 CAPSULE, EXTENDED RELEASE ORAL at 08:46

## 2018-07-25 RX ADMIN — Medication 325 MILLIGRAM(S): at 12:55

## 2018-07-25 RX ADMIN — BUPROPION HYDROCHLORIDE 150 MILLIGRAM(S): 150 TABLET, EXTENDED RELEASE ORAL at 12:49

## 2018-07-25 RX ADMIN — MORPHINE SULFATE 4 MILLIGRAM(S): 50 CAPSULE, EXTENDED RELEASE ORAL at 20:00

## 2018-07-25 RX ADMIN — BRIMONIDINE TARTRATE 1 DROP(S): 2 SOLUTION/ DROPS OPHTHALMIC at 05:15

## 2018-07-25 RX ADMIN — Medication 60 MILLIGRAM(S): at 05:16

## 2018-07-25 RX ADMIN — Medication 5 UNIT(S): at 17:56

## 2018-07-25 RX ADMIN — Medication 2: at 12:50

## 2018-07-25 RX ADMIN — MORPHINE SULFATE 4 MILLIGRAM(S): 50 CAPSULE, EXTENDED RELEASE ORAL at 02:06

## 2018-07-25 RX ADMIN — METHOCARBAMOL 750 MILLIGRAM(S): 500 TABLET, FILM COATED ORAL at 21:49

## 2018-07-25 RX ADMIN — Medication 1 DROP(S): at 05:15

## 2018-07-25 RX ADMIN — GABAPENTIN 400 MILLIGRAM(S): 400 CAPSULE ORAL at 05:16

## 2018-07-25 RX ADMIN — GABAPENTIN 400 MILLIGRAM(S): 400 CAPSULE ORAL at 21:49

## 2018-07-25 RX ADMIN — MORPHINE SULFATE 4 MILLIGRAM(S): 50 CAPSULE, EXTENDED RELEASE ORAL at 23:10

## 2018-07-25 RX ADMIN — MORPHINE SULFATE 4 MILLIGRAM(S): 50 CAPSULE, EXTENDED RELEASE ORAL at 05:16

## 2018-07-25 RX ADMIN — MORPHINE SULFATE 4 MILLIGRAM(S): 50 CAPSULE, EXTENDED RELEASE ORAL at 20:30

## 2018-07-25 RX ADMIN — LISINOPRIL 20 MILLIGRAM(S): 2.5 TABLET ORAL at 05:16

## 2018-07-25 RX ADMIN — Medication 5 MILLIGRAM(S): at 21:49

## 2018-07-25 RX ADMIN — MORPHINE SULFATE 4 MILLIGRAM(S): 50 CAPSULE, EXTENDED RELEASE ORAL at 22:40

## 2018-07-25 RX ADMIN — GABAPENTIN 400 MILLIGRAM(S): 400 CAPSULE ORAL at 13:04

## 2018-07-25 RX ADMIN — MORPHINE SULFATE 4 MILLIGRAM(S): 50 CAPSULE, EXTENDED RELEASE ORAL at 09:00

## 2018-07-25 RX ADMIN — FENTANYL CITRATE 2 PATCH: 50 INJECTION INTRAVENOUS at 12:54

## 2018-07-25 RX ADMIN — SERTRALINE 100 MILLIGRAM(S): 25 TABLET, FILM COATED ORAL at 12:55

## 2018-07-25 RX ADMIN — MORPHINE SULFATE 4 MILLIGRAM(S): 50 CAPSULE, EXTENDED RELEASE ORAL at 01:36

## 2018-07-25 RX ADMIN — INSULIN GLARGINE 5 UNIT(S): 100 INJECTION, SOLUTION SUBCUTANEOUS at 08:24

## 2018-07-25 RX ADMIN — MORPHINE SULFATE 4 MILLIGRAM(S): 50 CAPSULE, EXTENDED RELEASE ORAL at 13:32

## 2018-07-25 RX ADMIN — MORPHINE SULFATE 4 MILLIGRAM(S): 50 CAPSULE, EXTENDED RELEASE ORAL at 14:00

## 2018-07-25 RX ADMIN — INSULIN GLARGINE 5 UNIT(S): 100 INJECTION, SOLUTION SUBCUTANEOUS at 21:51

## 2018-07-25 RX ADMIN — MORPHINE SULFATE 4 MILLIGRAM(S): 50 CAPSULE, EXTENDED RELEASE ORAL at 05:46

## 2018-07-25 RX ADMIN — Medication 2: at 17:57

## 2018-07-25 RX ADMIN — BRIMONIDINE TARTRATE 1 DROP(S): 2 SOLUTION/ DROPS OPHTHALMIC at 13:05

## 2018-07-25 RX ADMIN — Medication 5 UNIT(S): at 12:50

## 2018-07-25 RX ADMIN — PANTOPRAZOLE SODIUM 40 MILLIGRAM(S): 20 TABLET, DELAYED RELEASE ORAL at 06:22

## 2018-07-25 RX ADMIN — Medication 5 UNIT(S): at 08:32

## 2018-07-25 RX ADMIN — LATANOPROST 1 DROP(S): 0.05 SOLUTION/ DROPS OPHTHALMIC; TOPICAL at 21:48

## 2018-07-25 RX ADMIN — Medication 1 DROP(S): at 17:55

## 2018-07-25 RX ADMIN — METHOCARBAMOL 750 MILLIGRAM(S): 500 TABLET, FILM COATED ORAL at 05:16

## 2018-07-25 RX ADMIN — FENTANYL CITRATE 2 PATCH: 50 INJECTION INTRAVENOUS at 13:06

## 2018-07-25 NOTE — PROGRESS NOTE ADULT - SUBJECTIVE AND OBJECTIVE BOX
POD # 2    S/P re-exploration and reinforcement of CSF repair. Patient seen and examined at bedside resting comfortably in NAD. Complaint of minimal incisional pain but states that pain has generally been improving. Patient states that he walked to the bathroom and is wanting to ambulate out of bed more.      Vital Signs Last 24 Hrs  T(C): 37.1 (25 Jul 2018 05:34), Max: 37.4 (24 Jul 2018 14:33)  T(F): 98.7 (25 Jul 2018 05:34), Max: 99.4 (24 Jul 2018 14:33)  HR: 80 (25 Jul 2018 05:34) (80 - 89)  BP: 158/69 (25 Jul 2018 05:34) (119/59 - 158/69)  BP(mean): --  RR: 18 (25 Jul 2018 05:34) (18 - 18)  SpO2: --    PHYSICAL EXAM:    General: Alert, NAD, resting in bed.  Neuro: incision site CDI. Ambulating with good strength B/L. MAEx4      MEDICATIONS:    Neuro:  acetaminophen   Tablet 650 milliGRAM(s) Oral every 6 hours PRN  buPROPion XL . 150 milliGRAM(s) Oral daily  fentaNYL   Patch 100 MICROgram(s)/Hr 2 Patch Transdermal every 72 hours  gabapentin 400 milliGRAM(s) Oral every 8 hours  melatonin 5 milliGRAM(s) Oral at bedtime  methocarbamol 750 milliGRAM(s) Oral every 8 hours  morphine  - Injectable 4 milliGRAM(s) IV Push every 3 hours PRN  oxyCODONE  ER Tablet 10 milliGRAM(s) Oral every 12 hours PRN  sertraline 100 milliGRAM(s) Oral daily    Anticoagulation:    OTHER:  brimonidine 0.2% Ophthalmic Solution 1 Drop(s) Both EYES three times a day  dextrose 50% Injectable 25 Gram(s) IV Push once  insulin glargine Injectable (LANTUS) 5 Unit(s) SubCutaneous every morning  insulin glargine Injectable (LANTUS) 5 Unit(s) SubCutaneous at bedtime  insulin lispro (HumaLOG) corrective regimen sliding scale   SubCutaneous three times a day before meals  insulin lispro Injectable (HumaLOG) 5 Unit(s) SubCutaneous before breakfast  insulin lispro Injectable (HumaLOG) 5 Unit(s) SubCutaneous before lunch  insulin lispro Injectable (HumaLOG) 5 Unit(s) SubCutaneous before dinner  labetalol Injectable 5 milliGRAM(s) IV Push every 20 minutes PRN  latanoprost 0.005% Ophthalmic Solution 1 Drop(s) Both EYES at bedtime  lisinopril 20 milliGRAM(s) Oral daily  NIFEdipine XL 60 milliGRAM(s) Oral daily  pantoprazole    Tablet 40 milliGRAM(s) Oral before breakfast  senna 2 Tablet(s) Oral at bedtime PRN  simethicone 80 milliGRAM(s) Chew every 8 hours PRN  timolol 0.5% Solution 1 Drop(s) Both EYES every 12 hours    IVF:  ferrous    sulfate 325 milliGRAM(s) Oral daily        A/P:   s/p re-exploration and reinforcement of CSF repair.  pain control  PT/Rehab  Ambulate out of bed

## 2018-07-25 NOTE — CONSULT NOTE ADULT - ASSESSMENT
IMPRESSION: Rehab of gait dysfunction / repair of CSF leakage      PRECAUTIONS: [  ] Cardiac  [  ] Respiratory  [  ] Seizures [  ] Contact Isolation  [  ] Droplet Isolation  [  ] Other    Weight Bearing Status:     RECOMMENDATION:    Out of Bed to Chair     DVT/Decubiti Prophylaxis    REHAB PLAN:     [ x  ] Bedside P/T 3-5 times a week   [   ]   Bedside O/T  2-3 times a week             [   ] No Rehab Therapy Indicated                   [   ]  Speech Therapy   Conditioning/ROM                                    ADL  Bed Mobility                                               Conditioning/ROM  Transfers                                                     Bed Mobility  Sitting /Standing Balance                         Transfers                                        Gait Training                                               Sitting/Standing Balance  Stair Training [   ]Applicable                    Home equipment Eval                                                                        Splinting  [   ] Only      GOALS:   ADL   [   ]   Independent                    Transfers  [ x  ] Independent                          Ambulation  [ x  ] Independent     [  x  ] With device                            [   ]  CG                                                         [   ]  CG                                                                  [   ] CG                            [    ] Min A                                                   [   ] Min A                                                              [   ] Min  A          DISCHARGE PLAN:   [   ]  Good candidate for Intensive Rehabilitation/Hospital based-[facility]                                             Will tolerate 3hrs Intensive Rehab Daily                                       [ x   ]  Short Term Rehab in Skilled Nursing Facility                             vs          [  x  ]  Home with Outpatient or  services                                         [    ]  Possible Candidate for Intensive Hospital based Rehab

## 2018-07-25 NOTE — CONSULT NOTE ADULT - SUBJECTIVE AND OBJECTIVE BOX
HPI:  Pt is a 67yoM with a pmhx of   CAD (coronary artery disease) of bypass graft: cabg 1995 3v  Glaucoma  Arthritis  GERD (gastroesophageal reflux disease)  HTN (hypertension)  Depression  Anemia  DM (diabetes mellitus)  Foot amputation status, right: partial foot amputation  tarsals and metatarsals of right foot  S/P laparoscopic cholecystectomy  CAD (coronary artery disease) of bypass graft    Pt had surgery for placement of intrathecal pain pump in 1996 in [place]. The pump malfunctioned and was removed and replaced in 2006. Pt was doing well with pump until this year when it malfunctioned again. Pt went to see Dr. Crenshaw and the intrathecal pain pump was removed on 6/27/18. At this time pt had an uneventful procedure and pt was discharged home the same day as the procedure. Three days later, he noted some oozing from the back. Pt eventually went to see [facility] and pt was scheduled for a blood patch with Dr. Reddy. PT went for the blood patch on 7/13/18 and said initially had relief of symptoms. Two days later his back was wet again and returned to Dr. Crenshaw's office today. Pt was noted to have leakage from the lumbar wound and sent to the ER. Pt was seen and examined in the ER> Complaining of mild headache, no neck pain, dizziness or visual changes. Pt has baseline neuropathy with numbness to the b/l ankles down to the toes. (16 Jul 2018 17:13). s/p repair of CSF leakage.    PAST MEDICAL & SURGICAL HISTORY:  CAD (coronary artery disease) of bypass graft: cabg 1995 3v  Glaucoma  Arthritis  GERD (gastroesophageal reflux disease)  HTN (hypertension)  Depression  Anemia  DM (diabetes mellitus)  Foot amputation status, right: partial foot amputation  tarsals and metatarsals of right foot  S/P laparoscopic cholecystectomy  CAD (coronary artery disease) of bypass graft      Hospital Course:    TODAY'S SUBJECTIVE & REVIEW OF SYMPTOMS:     Constitutional WNL   Cardio WNL   Resp WNL   GI WNL  Heme WNL  Endo WNL  Skin WNL  MSK tired  Neuro WNL  Cognitive WNL  Psych WNL      MEDICATIONS  (STANDING):  brimonidine 0.2% Ophthalmic Solution 1 Drop(s) Both EYES three times a day  buPROPion XL . 150 milliGRAM(s) Oral daily  dextrose 50% Injectable 25 Gram(s) IV Push once  fentaNYL   Patch 100 MICROgram(s)/Hr 2 Patch Transdermal every 72 hours  ferrous    sulfate 325 milliGRAM(s) Oral daily  gabapentin 400 milliGRAM(s) Oral every 8 hours  insulin glargine Injectable (LANTUS) 5 Unit(s) SubCutaneous every morning  insulin glargine Injectable (LANTUS) 5 Unit(s) SubCutaneous at bedtime  insulin lispro (HumaLOG) corrective regimen sliding scale   SubCutaneous three times a day before meals  insulin lispro Injectable (HumaLOG) 5 Unit(s) SubCutaneous before breakfast  insulin lispro Injectable (HumaLOG) 5 Unit(s) SubCutaneous before lunch  insulin lispro Injectable (HumaLOG) 5 Unit(s) SubCutaneous before dinner  latanoprost 0.005% Ophthalmic Solution 1 Drop(s) Both EYES at bedtime  lisinopril 20 milliGRAM(s) Oral daily  melatonin 5 milliGRAM(s) Oral at bedtime  methocarbamol 750 milliGRAM(s) Oral every 8 hours  NIFEdipine XL 60 milliGRAM(s) Oral daily  pantoprazole    Tablet 40 milliGRAM(s) Oral before breakfast  sertraline 100 milliGRAM(s) Oral daily  timolol 0.5% Solution 1 Drop(s) Both EYES every 12 hours    MEDICATIONS  (PRN):  acetaminophen   Tablet 650 milliGRAM(s) Oral every 6 hours PRN For Temp greater than 38.5 C (101.3 F)  labetalol Injectable 5 milliGRAM(s) IV Push every 20 minutes PRN SBP > 150 AND HR > 60  morphine  - Injectable 4 milliGRAM(s) IV Push every 3 hours PRN Severe Pain (7 - 10)  oxyCODONE  ER Tablet 10 milliGRAM(s) Oral every 12 hours PRN pain  senna 2 Tablet(s) Oral at bedtime PRN Constipation  simethicone 80 milliGRAM(s) Chew every 8 hours PRN Dyspepsia      FAMILY HISTORY:  CAD (coronary artery disease) (Father)      Allergies    penicillins (Other (U))    Intolerances        SOCIAL HISTORY:    [  ] Etoh  [  ] Smoking  [  ] Substance abuse     Home Environment:  [  ] Home Alone  [x  ] Lives with Family  [  ] Home Health Aid    Dwelling:  [  ] Apartment  [ x ] Private House  [  ] Adult Home  [  ] Skilled Nursing Facility      [  ] Short Term  [  ] Long Term  [ x ] Stairs       Elevator [  ]    FUNCTIONAL STATUS PTA: (Check all that apply)  Ambulation: [  x ]Independent    [  ] Dependent     [  ] Non-Ambulatory  Assistive Device: [  ] SA Cane  [  ]  Q Cane  [ x ] Walker  [  ]  Wheelchair  ADL : [x  ] Independent  [  ]  Dependent       Vital Signs Last 24 Hrs  T(C): 37.1 (25 Jul 2018 05:34), Max: 37.4 (24 Jul 2018 14:33)  T(F): 98.7 (25 Jul 2018 05:34), Max: 99.4 (24 Jul 2018 14:33)  HR: 80 (25 Jul 2018 05:34) (80 - 89)  BP: 158/69 (25 Jul 2018 05:34) (119/59 - 158/69)  BP(mean): --  RR: 18 (25 Jul 2018 05:34) (18 - 18)  SpO2: --      PHYSICAL EXAM: Alert & Oriented X3  GENERAL: NAD, well-groomed, well-developed  HEAD:  Atraumatic, Normocephalic  CHEST/LUNG: Clear   HEART: Regular   ABDOMEN: Soft, Nontender  EXTREMITIES:  no calf tenderness    NERVOUS SYSTEM:  Cranial Nerves 2-12 intact [  ] Abnormal  [  ]  ROM: WFL all extremities [  ]  Abnormal [  ]limited exam / poor participation  Motor Strength: WFL all extremities  [  ]  Abnormal [  ]moves all ext  Sensation: intact to light touch [  ] Abnormal [  ]  Reflexes: Symmetric [  ]  Abnormal [  ]    FUNCTIONAL STATUS:  Bed Mobility: Independent [  ]  Supervision [  ]  Needs Assistance [x  ]  N/A [  ]  Transfers: Independent [  ]  Supervision [  ]  Needs Assistance [ x ]  N/A [  ]   Ambulation: Independent [  ]  Supervision [  ]  Needs Assistance [  ]  N/A [  ]  ADL: Independent [  ] Requires Assistance [  ] N/A [  ]      LABS:                RADIOLOGY & ADDITIONAL STUDIES:    Assesment:

## 2018-07-26 RX ORDER — ZOLPIDEM TARTRATE 10 MG/1
5 TABLET ORAL ONCE
Qty: 0 | Refills: 0 | Status: DISCONTINUED | OUTPATIENT
Start: 2018-07-26 | End: 2018-07-26

## 2018-07-26 RX ORDER — TEMAZEPAM 15 MG/1
7.5 CAPSULE ORAL ONCE
Qty: 0 | Refills: 0 | Status: DISCONTINUED | OUTPATIENT
Start: 2018-07-26 | End: 2018-07-26

## 2018-07-26 RX ADMIN — METHOCARBAMOL 750 MILLIGRAM(S): 500 TABLET, FILM COATED ORAL at 05:05

## 2018-07-26 RX ADMIN — MORPHINE SULFATE 4 MILLIGRAM(S): 50 CAPSULE, EXTENDED RELEASE ORAL at 12:30

## 2018-07-26 RX ADMIN — MORPHINE SULFATE 4 MILLIGRAM(S): 50 CAPSULE, EXTENDED RELEASE ORAL at 05:05

## 2018-07-26 RX ADMIN — MORPHINE SULFATE 4 MILLIGRAM(S): 50 CAPSULE, EXTENDED RELEASE ORAL at 22:30

## 2018-07-26 RX ADMIN — MORPHINE SULFATE 4 MILLIGRAM(S): 50 CAPSULE, EXTENDED RELEASE ORAL at 11:44

## 2018-07-26 RX ADMIN — LISINOPRIL 20 MILLIGRAM(S): 2.5 TABLET ORAL at 05:05

## 2018-07-26 RX ADMIN — BRIMONIDINE TARTRATE 1 DROP(S): 2 SOLUTION/ DROPS OPHTHALMIC at 05:05

## 2018-07-26 RX ADMIN — GABAPENTIN 400 MILLIGRAM(S): 400 CAPSULE ORAL at 21:42

## 2018-07-26 RX ADMIN — Medication 1 DROP(S): at 17:06

## 2018-07-26 RX ADMIN — Medication 5 UNIT(S): at 12:31

## 2018-07-26 RX ADMIN — Medication 2: at 12:31

## 2018-07-26 RX ADMIN — GABAPENTIN 400 MILLIGRAM(S): 400 CAPSULE ORAL at 05:06

## 2018-07-26 RX ADMIN — INSULIN GLARGINE 5 UNIT(S): 100 INJECTION, SOLUTION SUBCUTANEOUS at 21:44

## 2018-07-26 RX ADMIN — METHOCARBAMOL 750 MILLIGRAM(S): 500 TABLET, FILM COATED ORAL at 21:43

## 2018-07-26 RX ADMIN — BUPROPION HYDROCHLORIDE 150 MILLIGRAM(S): 150 TABLET, EXTENDED RELEASE ORAL at 11:44

## 2018-07-26 RX ADMIN — METHOCARBAMOL 750 MILLIGRAM(S): 500 TABLET, FILM COATED ORAL at 13:56

## 2018-07-26 RX ADMIN — SERTRALINE 100 MILLIGRAM(S): 25 TABLET, FILM COATED ORAL at 11:44

## 2018-07-26 RX ADMIN — BRIMONIDINE TARTRATE 1 DROP(S): 2 SOLUTION/ DROPS OPHTHALMIC at 21:44

## 2018-07-26 RX ADMIN — Medication 5 UNIT(S): at 08:10

## 2018-07-26 RX ADMIN — Medication 5 UNIT(S): at 17:06

## 2018-07-26 RX ADMIN — MORPHINE SULFATE 4 MILLIGRAM(S): 50 CAPSULE, EXTENDED RELEASE ORAL at 05:35

## 2018-07-26 RX ADMIN — Medication 1 DROP(S): at 05:05

## 2018-07-26 RX ADMIN — PANTOPRAZOLE SODIUM 40 MILLIGRAM(S): 20 TABLET, DELAYED RELEASE ORAL at 08:10

## 2018-07-26 RX ADMIN — MORPHINE SULFATE 4 MILLIGRAM(S): 50 CAPSULE, EXTENDED RELEASE ORAL at 22:00

## 2018-07-26 RX ADMIN — Medication 5 MILLIGRAM(S): at 21:43

## 2018-07-26 RX ADMIN — INSULIN GLARGINE 5 UNIT(S): 100 INJECTION, SOLUTION SUBCUTANEOUS at 08:11

## 2018-07-26 RX ADMIN — LATANOPROST 1 DROP(S): 0.05 SOLUTION/ DROPS OPHTHALMIC; TOPICAL at 21:44

## 2018-07-26 RX ADMIN — BRIMONIDINE TARTRATE 1 DROP(S): 2 SOLUTION/ DROPS OPHTHALMIC at 13:55

## 2018-07-26 RX ADMIN — GABAPENTIN 400 MILLIGRAM(S): 400 CAPSULE ORAL at 13:55

## 2018-07-26 RX ADMIN — Medication 2: at 17:06

## 2018-07-26 RX ADMIN — Medication 60 MILLIGRAM(S): at 05:05

## 2018-07-26 RX ADMIN — Medication 325 MILLIGRAM(S): at 11:44

## 2018-07-26 NOTE — PROGRESS NOTE ADULT - SUBJECTIVE AND OBJECTIVE BOX
POD # 3    S/P re exploration and reinforcement of CSF repair      Pt seen and examined at bedside pt alert states he would like to go home tomorrow       Vital Signs Last 24 Hrs  T(C): 36.8 (26 Jul 2018 13:49), Max: 37 (26 Jul 2018 05:31)  T(F): 98.2 (26 Jul 2018 13:49), Max: 98.6 (26 Jul 2018 05:31)  HR: 69 (26 Jul 2018 13:49) (69 - 81)  BP: 148/65 (26 Jul 2018 13:49) (148/65 - 177/70)  BP(mean): --  RR: 18 (26 Jul 2018 13:49) (18 - 18)  SpO2: --    PHYSICAL EXAM:  Alert, MAEX4   strength equal bilateral   incision clean dry intact      MEDICATIONS:  Antibiotics:    Neuro:  acetaminophen   Tablet 650 milliGRAM(s) Oral every 6 hours PRN  buPROPion XL . 150 milliGRAM(s) Oral daily  fentaNYL   Patch 100 MICROgram(s)/Hr 2 Patch Transdermal every 72 hours  gabapentin 400 milliGRAM(s) Oral every 8 hours  melatonin 5 milliGRAM(s) Oral at bedtime  methocarbamol 750 milliGRAM(s) Oral every 8 hours  morphine  - Injectable 4 milliGRAM(s) IV Push every 3 hours PRN  oxyCODONE  ER Tablet 10 milliGRAM(s) Oral every 12 hours PRN  sertraline 100 milliGRAM(s) Oral daily    Anticoagulation:    OTHER:  brimonidine 0.2% Ophthalmic Solution 1 Drop(s) Both EYES three times a day  dextrose 50% Injectable 25 Gram(s) IV Push once  insulin glargine Injectable (LANTUS) 5 Unit(s) SubCutaneous every morning  insulin glargine Injectable (LANTUS) 5 Unit(s) SubCutaneous at bedtime  insulin lispro (HumaLOG) corrective regimen sliding scale   SubCutaneous three times a day before meals  insulin lispro Injectable (HumaLOG) 5 Unit(s) SubCutaneous before breakfast  insulin lispro Injectable (HumaLOG) 5 Unit(s) SubCutaneous before lunch  insulin lispro Injectable (HumaLOG) 5 Unit(s) SubCutaneous before dinner  labetalol Injectable 5 milliGRAM(s) IV Push every 20 minutes PRN  latanoprost 0.005% Ophthalmic Solution 1 Drop(s) Both EYES at bedtime  lisinopril 20 milliGRAM(s) Oral daily  NIFEdipine XL 60 milliGRAM(s) Oral daily  pantoprazole    Tablet 40 milliGRAM(s) Oral before breakfast  senna 2 Tablet(s) Oral at bedtime PRN  simethicone 80 milliGRAM(s) Chew every 8 hours PRN  timolol 0.5% Solution 1 Drop(s) Both EYES every 12 hours    IVF:  ferrous    sulfate 325 milliGRAM(s) Oral daily        A/P        S/p re exploration and re inforcement of CSF repair                   PT/ rehab                  possible dc in am

## 2018-07-27 RX ORDER — HEPARIN SODIUM 5000 [USP'U]/ML
5000 INJECTION INTRAVENOUS; SUBCUTANEOUS EVERY 8 HOURS
Qty: 0 | Refills: 0 | Status: DISCONTINUED | OUTPATIENT
Start: 2018-07-27 | End: 2018-07-29

## 2018-07-27 RX ORDER — ZOLPIDEM TARTRATE 10 MG/1
5 TABLET ORAL ONCE
Qty: 0 | Refills: 0 | Status: DISCONTINUED | OUTPATIENT
Start: 2018-07-27 | End: 2018-07-27

## 2018-07-27 RX ADMIN — Medication 60 MILLIGRAM(S): at 06:07

## 2018-07-27 RX ADMIN — PANTOPRAZOLE SODIUM 40 MILLIGRAM(S): 20 TABLET, DELAYED RELEASE ORAL at 06:07

## 2018-07-27 RX ADMIN — GABAPENTIN 400 MILLIGRAM(S): 400 CAPSULE ORAL at 15:40

## 2018-07-27 RX ADMIN — BRIMONIDINE TARTRATE 1 DROP(S): 2 SOLUTION/ DROPS OPHTHALMIC at 22:02

## 2018-07-27 RX ADMIN — Medication 5 UNIT(S): at 09:39

## 2018-07-27 RX ADMIN — MORPHINE SULFATE 4 MILLIGRAM(S): 50 CAPSULE, EXTENDED RELEASE ORAL at 22:02

## 2018-07-27 RX ADMIN — Medication 5 UNIT(S): at 13:12

## 2018-07-27 RX ADMIN — BRIMONIDINE TARTRATE 1 DROP(S): 2 SOLUTION/ DROPS OPHTHALMIC at 06:06

## 2018-07-27 RX ADMIN — Medication 2: at 13:13

## 2018-07-27 RX ADMIN — HEPARIN SODIUM 5000 UNIT(S): 5000 INJECTION INTRAVENOUS; SUBCUTANEOUS at 22:02

## 2018-07-27 RX ADMIN — LATANOPROST 1 DROP(S): 0.05 SOLUTION/ DROPS OPHTHALMIC; TOPICAL at 22:03

## 2018-07-27 RX ADMIN — SERTRALINE 100 MILLIGRAM(S): 25 TABLET, FILM COATED ORAL at 13:15

## 2018-07-27 RX ADMIN — Medication 325 MILLIGRAM(S): at 13:14

## 2018-07-27 RX ADMIN — MORPHINE SULFATE 4 MILLIGRAM(S): 50 CAPSULE, EXTENDED RELEASE ORAL at 18:12

## 2018-07-27 RX ADMIN — MORPHINE SULFATE 4 MILLIGRAM(S): 50 CAPSULE, EXTENDED RELEASE ORAL at 03:50

## 2018-07-27 RX ADMIN — MORPHINE SULFATE 4 MILLIGRAM(S): 50 CAPSULE, EXTENDED RELEASE ORAL at 17:44

## 2018-07-27 RX ADMIN — Medication 1 DROP(S): at 06:07

## 2018-07-27 RX ADMIN — MORPHINE SULFATE 4 MILLIGRAM(S): 50 CAPSULE, EXTENDED RELEASE ORAL at 22:32

## 2018-07-27 RX ADMIN — Medication 1 DROP(S): at 17:44

## 2018-07-27 RX ADMIN — BRIMONIDINE TARTRATE 1 DROP(S): 2 SOLUTION/ DROPS OPHTHALMIC at 13:15

## 2018-07-27 RX ADMIN — ZOLPIDEM TARTRATE 5 MILLIGRAM(S): 10 TABLET ORAL at 23:23

## 2018-07-27 RX ADMIN — INSULIN GLARGINE 5 UNIT(S): 100 INJECTION, SOLUTION SUBCUTANEOUS at 13:11

## 2018-07-27 RX ADMIN — BUPROPION HYDROCHLORIDE 150 MILLIGRAM(S): 150 TABLET, EXTENDED RELEASE ORAL at 13:14

## 2018-07-27 RX ADMIN — Medication 5 MILLIGRAM(S): at 22:03

## 2018-07-27 RX ADMIN — METHOCARBAMOL 750 MILLIGRAM(S): 500 TABLET, FILM COATED ORAL at 22:04

## 2018-07-27 RX ADMIN — ZOLPIDEM TARTRATE 5 MILLIGRAM(S): 10 TABLET ORAL at 00:19

## 2018-07-27 RX ADMIN — GABAPENTIN 400 MILLIGRAM(S): 400 CAPSULE ORAL at 22:02

## 2018-07-27 RX ADMIN — Medication 2: at 17:43

## 2018-07-27 RX ADMIN — Medication 5 UNIT(S): at 17:42

## 2018-07-27 RX ADMIN — METHOCARBAMOL 750 MILLIGRAM(S): 500 TABLET, FILM COATED ORAL at 15:41

## 2018-07-27 RX ADMIN — LISINOPRIL 20 MILLIGRAM(S): 2.5 TABLET ORAL at 06:06

## 2018-07-27 RX ADMIN — MORPHINE SULFATE 4 MILLIGRAM(S): 50 CAPSULE, EXTENDED RELEASE ORAL at 03:20

## 2018-07-27 RX ADMIN — METHOCARBAMOL 750 MILLIGRAM(S): 500 TABLET, FILM COATED ORAL at 06:07

## 2018-07-27 RX ADMIN — GABAPENTIN 400 MILLIGRAM(S): 400 CAPSULE ORAL at 06:06

## 2018-07-27 RX ADMIN — INSULIN GLARGINE 5 UNIT(S): 100 INJECTION, SOLUTION SUBCUTANEOUS at 22:04

## 2018-07-27 NOTE — PROGRESS NOTE ADULT - SUBJECTIVE AND OBJECTIVE BOX
PRIYANK WESLEY  MRN-[id number]      POD # 4    S/P re exploration and reinforcement of CSF repair    Current issues:  67y Male  doing better no ha.  waiting to go home.     HPI:  Pt is a 67yoM with a pmhx of   CAD (coronary artery disease) of bypass graft: cabg 1995 3v  Glaucoma  Arthritis  GERD (gastroesophageal reflux disease)  HTN (hypertension)  Depression  Anemia  DM (diabetes mellitus)  Foot amputation status, right: partial foot amputation  tarsals and metatarsals of right foot  S/P laparoscopic cholecystectomy  CAD (coronary artery disease) of bypass graft    Pt had surgery for placement of intrathecal pain pump in 1996 in [place]. The pump malfunctioned and was removed and replaced in 2006. Pt was doing well with pump until this year when it malfunctioned again. Pt went to see Dr. Crenshaw and the intrathecal pain pump was removed on 6/27/18. At this time pt had an uneventful procedure and pt was discharged home the same day as the procedure. Three days later, he noted some oozing from the back. Pt eventually went to see [facility] and pt was scheduled for a blood patch with Dr. Reddy. PT went for the blood patch on 7/13/18 and said initially had relief of symptoms. Two days later his back was wet again and returned to Dr. Crenshaw's office today. Pt was noted to have leakage from the lumbar wound and sent to the ER. Pt was seen and examined in the ER> Complaining of mild headache, no neck pain, dizziness or visual changes. Pt has baseline neuropathy with numbness to the b/l ankles down to the toes. (16 Jul 2018 17:13)      Allergies    penicillins (Other (U))    Intolerances      MEDICATIONS  (STANDING):  brimonidine 0.2% Ophthalmic Solution 1 Drop(s) Both EYES three times a day  buPROPion XL . 150 milliGRAM(s) Oral daily  dextrose 50% Injectable 25 Gram(s) IV Push once  fentaNYL   Patch 100 MICROgram(s)/Hr 2 Patch Transdermal every 72 hours  ferrous    sulfate 325 milliGRAM(s) Oral daily  gabapentin 400 milliGRAM(s) Oral every 8 hours  insulin glargine Injectable (LANTUS) 5 Unit(s) SubCutaneous every morning  insulin glargine Injectable (LANTUS) 5 Unit(s) SubCutaneous at bedtime  insulin lispro (HumaLOG) corrective regimen sliding scale   SubCutaneous three times a day before meals  insulin lispro Injectable (HumaLOG) 5 Unit(s) SubCutaneous before breakfast  insulin lispro Injectable (HumaLOG) 5 Unit(s) SubCutaneous before lunch  insulin lispro Injectable (HumaLOG) 5 Unit(s) SubCutaneous before dinner  latanoprost 0.005% Ophthalmic Solution 1 Drop(s) Both EYES at bedtime  lisinopril 20 milliGRAM(s) Oral daily  melatonin 5 milliGRAM(s) Oral at bedtime  methocarbamol 750 milliGRAM(s) Oral every 8 hours  NIFEdipine XL 60 milliGRAM(s) Oral daily  pantoprazole    Tablet 40 milliGRAM(s) Oral before breakfast  sertraline 100 milliGRAM(s) Oral daily  timolol 0.5% Solution 1 Drop(s) Both EYES every 12 hours    MEDICATIONS  (PRN):  acetaminophen   Tablet 650 milliGRAM(s) Oral every 6 hours PRN For Temp greater than 38.5 C (101.3 F)  labetalol Injectable 5 milliGRAM(s) IV Push every 20 minutes PRN SBP > 150 AND HR > 60  morphine  - Injectable 4 milliGRAM(s) IV Push every 3 hours PRN Severe Pain (7 - 10)  oxyCODONE  ER Tablet 10 milliGRAM(s) Oral every 12 hours PRN pain  senna 2 Tablet(s) Oral at bedtime PRN Constipation  simethicone 80 milliGRAM(s) Chew every 8 hours PRN Dyspepsia    Vital Signs Last 24 Hrs  T(C): 37 (27 Jul 2018 05:03), Max: 37 (27 Jul 2018 05:03)  T(F): 98.6 (27 Jul 2018 05:03), Max: 98.6 (27 Jul 2018 05:03)  HR: 75 (27 Jul 2018 05:03) (69 - 75)  BP: 153/70 (27 Jul 2018 05:03) (148/65 - 190/75)  BP(mean): --  RR: 18 (27 Jul 2018 05:03) (18 - 18)  SpO2: 96% (26 Jul 2018 20:59) (96% - 96%)  I&O's Detail    26 Jul 2018 07:01  -  27 Jul 2018 07:00  --------------------------------------------------------  IN:    Oral Fluid: 1240 mL  Total IN: 1240 mL    OUT:    Voided: 2100 mL  Total OUT: 2100 mL    Total NET: -860 mL      27 Jul 2018 07:01  -  27 Jul 2018 12:42  --------------------------------------------------------  IN:    Oral Fluid: 400 mL  Total IN: 400 mL    OUT:    Voided: 400 mL  Total OUT: 400 mL    Total NET: 0 mL                          Exam:    sits up ambulates with assistance  dressing back dry  mobile strength intact  no ha.      Plan:    stable for dc   still on telemetry  will re consult cardio for clearance to home  PT rehab eval                 Home Medications:  Basaglar KwikPen 100 units/mL subcutaneous solution: 50 unit(s) subcutaneous once a day (27 Jun 2018 08:04)  benazepril 20 mg oral tablet: 1 tab(s) orally once a day (27 Jun 2018 08:04)  brimonidine 0.2% ophthalmic solution: 1 drop(s) to each affected eye 3 times a day (27 Jun 2018 08:04)  buPROPion 150 mg/24 hours (XL) oral tablet, extended release: 1 tab(s) orally every 24 hours (27 Jun 2018 08:04)  clopidogrel 75 mg oral tablet: 1 tab(s) orally once a day (16 Jul 2018 17:36)  fentaNYL 100 mcg/hr transdermal film, extended release: 1 film(s) transdermal every 72 hours (27 Jun 2018 08:04)  ferrous sulfate 325 mg (65 mg elemental iron) oral tablet:  (27 Jun 2018 08:04)  latanoprost 0.005% ophthalmic solution: 1 drop(s) to each affected eye once a day (in the evening) (27 Jun 2018 08:04)  Lyrica 150 mg oral capsule: 1 cap(s) orally 2 times a day (27 Jun 2018 08:04)  Melatonin 5 mg oral tablet: 2 tab(s) orally once a day (at bedtime) (27 Jun 2018 08:04)  methocarbamol 750 mg oral tablet: 2 tab(s) orally 3 times a day (27 Jun 2018 08:04)  NIFEdipine 60 mg oral tablet, extended release: 1 tab(s) orally once a day (27 Jun 2018 08:04)  NovoLOG 100 units/mL injectable solution: 2 unit(s) injectable 3 times (27 Jun 2018 08:04)  oxyCODONE 10 mg oral tablet, extended release: 1 tab(s) orally once a day, As Needed (27 Jun 2018 08:04)  pantoprazole 20 mg oral delayed release tablet: 1 tab(s) orally once a day (27 Jun 2018 08:04)  Senexon-S 50 mg-8.6 mg oral tablet: 2 tab(s) orally once a day (at bedtime) (27 Jun 2018 08:04)  sertraline 100 mg oral tablet: 1.5 tab(s) orally once a day (27 Jun 2018 08:04)  timolol hemihydrate 0.5% ophthalmic solution: 1 drop(s) to each affected eye 2 times a day (27 Jun 2018 08:04)  Vitamin D3 2000 intl units oral tablet: 1 tab(s) orally once a day (27 Jun 2018 08:04)    PAST MEDICAL & SURGICAL HISTORY:  CAD (coronary artery disease) of bypass graft: cabg 1995 3v  Glaucoma  Arthritis  GERD (gastroesophageal reflux disease)  HTN (hypertension)  Depression  Anemia  DM (diabetes mellitus)  Foot amputation status, right: partial foot amputation  tarsals and metatarsals of right foot  S/P laparoscopic cholecystectomy  CAD (coronary artery disease) of bypass graft

## 2018-07-28 RX ORDER — ZOLPIDEM TARTRATE 10 MG/1
5 TABLET ORAL AT BEDTIME
Qty: 0 | Refills: 0 | Status: DISCONTINUED | OUTPATIENT
Start: 2018-07-28 | End: 2018-07-29

## 2018-07-28 RX ADMIN — Medication 1 DROP(S): at 05:52

## 2018-07-28 RX ADMIN — METHOCARBAMOL 750 MILLIGRAM(S): 500 TABLET, FILM COATED ORAL at 05:54

## 2018-07-28 RX ADMIN — HEPARIN SODIUM 5000 UNIT(S): 5000 INJECTION INTRAVENOUS; SUBCUTANEOUS at 05:52

## 2018-07-28 RX ADMIN — GABAPENTIN 400 MILLIGRAM(S): 400 CAPSULE ORAL at 21:39

## 2018-07-28 RX ADMIN — Medication 5 MILLIGRAM(S): at 21:40

## 2018-07-28 RX ADMIN — MORPHINE SULFATE 4 MILLIGRAM(S): 50 CAPSULE, EXTENDED RELEASE ORAL at 23:00

## 2018-07-28 RX ADMIN — PANTOPRAZOLE SODIUM 40 MILLIGRAM(S): 20 TABLET, DELAYED RELEASE ORAL at 06:09

## 2018-07-28 RX ADMIN — MORPHINE SULFATE 4 MILLIGRAM(S): 50 CAPSULE, EXTENDED RELEASE ORAL at 16:31

## 2018-07-28 RX ADMIN — MORPHINE SULFATE 4 MILLIGRAM(S): 50 CAPSULE, EXTENDED RELEASE ORAL at 12:00

## 2018-07-28 RX ADMIN — GABAPENTIN 400 MILLIGRAM(S): 400 CAPSULE ORAL at 05:53

## 2018-07-28 RX ADMIN — Medication 6: at 15:00

## 2018-07-28 RX ADMIN — METHOCARBAMOL 750 MILLIGRAM(S): 500 TABLET, FILM COATED ORAL at 14:59

## 2018-07-28 RX ADMIN — BRIMONIDINE TARTRATE 1 DROP(S): 2 SOLUTION/ DROPS OPHTHALMIC at 21:37

## 2018-07-28 RX ADMIN — INSULIN GLARGINE 5 UNIT(S): 100 INJECTION, SOLUTION SUBCUTANEOUS at 21:38

## 2018-07-28 RX ADMIN — MORPHINE SULFATE 4 MILLIGRAM(S): 50 CAPSULE, EXTENDED RELEASE ORAL at 18:46

## 2018-07-28 RX ADMIN — Medication 5 UNIT(S): at 18:50

## 2018-07-28 RX ADMIN — MORPHINE SULFATE 4 MILLIGRAM(S): 50 CAPSULE, EXTENDED RELEASE ORAL at 06:39

## 2018-07-28 RX ADMIN — LISINOPRIL 20 MILLIGRAM(S): 2.5 TABLET ORAL at 05:52

## 2018-07-28 RX ADMIN — BUPROPION HYDROCHLORIDE 150 MILLIGRAM(S): 150 TABLET, EXTENDED RELEASE ORAL at 14:55

## 2018-07-28 RX ADMIN — GABAPENTIN 400 MILLIGRAM(S): 400 CAPSULE ORAL at 14:57

## 2018-07-28 RX ADMIN — MORPHINE SULFATE 4 MILLIGRAM(S): 50 CAPSULE, EXTENDED RELEASE ORAL at 11:11

## 2018-07-28 RX ADMIN — HEPARIN SODIUM 5000 UNIT(S): 5000 INJECTION INTRAVENOUS; SUBCUTANEOUS at 14:58

## 2018-07-28 RX ADMIN — Medication 325 MILLIGRAM(S): at 14:56

## 2018-07-28 RX ADMIN — Medication 60 MILLIGRAM(S): at 05:52

## 2018-07-28 RX ADMIN — Medication 5 UNIT(S): at 15:00

## 2018-07-28 RX ADMIN — INSULIN GLARGINE 5 UNIT(S): 100 INJECTION, SOLUTION SUBCUTANEOUS at 09:07

## 2018-07-28 RX ADMIN — MORPHINE SULFATE 4 MILLIGRAM(S): 50 CAPSULE, EXTENDED RELEASE ORAL at 20:20

## 2018-07-28 RX ADMIN — BRIMONIDINE TARTRATE 1 DROP(S): 2 SOLUTION/ DROPS OPHTHALMIC at 14:57

## 2018-07-28 RX ADMIN — METHOCARBAMOL 750 MILLIGRAM(S): 500 TABLET, FILM COATED ORAL at 21:39

## 2018-07-28 RX ADMIN — FENTANYL CITRATE 2 PATCH: 50 INJECTION INTRAVENOUS at 14:59

## 2018-07-28 RX ADMIN — Medication 5 UNIT(S): at 09:09

## 2018-07-28 RX ADMIN — HEPARIN SODIUM 5000 UNIT(S): 5000 INJECTION INTRAVENOUS; SUBCUTANEOUS at 21:38

## 2018-07-28 RX ADMIN — SERTRALINE 100 MILLIGRAM(S): 25 TABLET, FILM COATED ORAL at 14:56

## 2018-07-28 RX ADMIN — FENTANYL CITRATE 2 PATCH: 50 INJECTION INTRAVENOUS at 14:56

## 2018-07-28 RX ADMIN — LATANOPROST 1 DROP(S): 0.05 SOLUTION/ DROPS OPHTHALMIC; TOPICAL at 21:38

## 2018-07-28 RX ADMIN — Medication 1 DROP(S): at 18:52

## 2018-07-28 RX ADMIN — MORPHINE SULFATE 4 MILLIGRAM(S): 50 CAPSULE, EXTENDED RELEASE ORAL at 06:09

## 2018-07-28 RX ADMIN — BRIMONIDINE TARTRATE 1 DROP(S): 2 SOLUTION/ DROPS OPHTHALMIC at 05:53

## 2018-07-28 NOTE — PROGRESS NOTE ADULT - SUBJECTIVE AND OBJECTIVE BOX
PRIYANK WESLEY  MRN-[id number]    HD#    POD#  S/P       Current issues:  67y Male    HPI:  Pt is a 67yoM with a pmhx of   CAD (coronary artery disease) of bypass graft: cabg 1995 3v  Glaucoma  Arthritis  GERD (gastroesophageal reflux disease)  HTN (hypertension)  Depression  Anemia  DM (diabetes mellitus)  Foot amputation status, right: partial foot amputation  tarsals and metatarsals of right foot  S/P laparoscopic cholecystectomy  CAD (coronary artery disease) of bypass graft    Pt had surgery for placement of intrathecal pain pump in 1996 in [place]. The pump malfunctioned and was removed and replaced in 2006. Pt was doing well with pump until this year when it malfunctioned again. Pt went to see Dr. Crenshaw and the intrathecal pain pump was removed on 6/27/18. At this time pt had an uneventful procedure and pt was discharged home the same day as the procedure. Three days later, he noted some oozing from the back. Pt eventually went to see [facility] and pt was scheduled for a blood patch with Dr. Reddy. PT went for the blood patch on 7/13/18 and said initially had relief of symptoms. Two days later his back was wet again and returned to Dr. Crenshaw's office today. Pt was noted to have leakage from the lumbar wound and sent to the ER. Pt was seen and examined in the ER> Complaining of mild headache, no neck pain, dizziness or visual changes. Pt has baseline neuropathy with numbness to the b/l ankles down to the toes. (16 Jul 2018 17:13)      Allergies    penicillins (Other (U))    Intolerances      MEDICATIONS  (STANDING):  brimonidine 0.2% Ophthalmic Solution 1 Drop(s) Both EYES three times a day  buPROPion XL . 150 milliGRAM(s) Oral daily  dextrose 50% Injectable 25 Gram(s) IV Push once  fentaNYL   Patch 100 MICROgram(s)/Hr 2 Patch Transdermal every 72 hours  ferrous    sulfate 325 milliGRAM(s) Oral daily  gabapentin 400 milliGRAM(s) Oral every 8 hours  heparin  Injectable 5000 Unit(s) SubCutaneous every 8 hours  insulin glargine Injectable (LANTUS) 5 Unit(s) SubCutaneous every morning  insulin glargine Injectable (LANTUS) 5 Unit(s) SubCutaneous at bedtime  insulin lispro (HumaLOG) corrective regimen sliding scale   SubCutaneous three times a day before meals  insulin lispro Injectable (HumaLOG) 5 Unit(s) SubCutaneous before breakfast  insulin lispro Injectable (HumaLOG) 5 Unit(s) SubCutaneous before lunch  insulin lispro Injectable (HumaLOG) 5 Unit(s) SubCutaneous before dinner  latanoprost 0.005% Ophthalmic Solution 1 Drop(s) Both EYES at bedtime  lisinopril 20 milliGRAM(s) Oral daily  melatonin 5 milliGRAM(s) Oral at bedtime  methocarbamol 750 milliGRAM(s) Oral every 8 hours  NIFEdipine XL 60 milliGRAM(s) Oral daily  pantoprazole    Tablet 40 milliGRAM(s) Oral before breakfast  sertraline 100 milliGRAM(s) Oral daily  timolol 0.5% Solution 1 Drop(s) Both EYES every 12 hours    MEDICATIONS  (PRN):  acetaminophen   Tablet 650 milliGRAM(s) Oral every 6 hours PRN For Temp greater than 38.5 C (101.3 F)  labetalol Injectable 5 milliGRAM(s) IV Push every 20 minutes PRN SBP > 150 AND HR > 60  morphine  - Injectable 4 milliGRAM(s) IV Push every 3 hours PRN Severe Pain (7 - 10)  oxyCODONE  ER Tablet 10 milliGRAM(s) Oral every 12 hours PRN pain  senna 2 Tablet(s) Oral at bedtime PRN Constipation  simethicone 80 milliGRAM(s) Chew every 8 hours PRN Dyspepsia    Vital Signs Last 24 Hrs  T(C): 36.3 (28 Jul 2018 06:01), Max: 37.1 (27 Jul 2018 20:14)  T(F): 97.4 (28 Jul 2018 06:01), Max: 98.8 (27 Jul 2018 20:14)  HR: 56 (28 Jul 2018 06:01) (56 - 62)  BP: 137/65 (28 Jul 2018 06:01) (137/65 - 157/72)  BP(mean): --  RR: 18 (28 Jul 2018 06:01) (18 - 18)  SpO2: --    I&O's Detail    27 Jul 2018 07:01  -  28 Jul 2018 07:00  --------------------------------------------------------  IN:    Oral Fluid: 700 mL  Total IN: 700 mL    OUT:    Voided: 400 mL  Total OUT: 400 mL    Total NET: 300 mL                      Exam:        Plan:                Home Medications:  Basaglar KwikPen 100 units/mL subcutaneous solution: 50 unit(s) subcutaneous once a day (27 Jun 2018 08:04)  benazepril 20 mg oral tablet: 1 tab(s) orally once a day (27 Jun 2018 08:04)  brimonidine 0.2% ophthalmic solution: 1 drop(s) to each affected eye 3 times a day (27 Jun 2018 08:04)  buPROPion 150 mg/24 hours (XL) oral tablet, extended release: 1 tab(s) orally every 24 hours (27 Jun 2018 08:04)  clopidogrel 75 mg oral tablet: 1 tab(s) orally once a day (16 Jul 2018 17:36)  fentaNYL 100 mcg/hr transdermal film, extended release: 1 film(s) transdermal every 72 hours (27 Jun 2018 08:04)  ferrous sulfate 325 mg (65 mg elemental iron) oral tablet:  (27 Jun 2018 08:04)  latanoprost 0.005% ophthalmic solution: 1 drop(s) to each affected eye once a day (in the evening) (27 Jun 2018 08:04)  Lyrica 150 mg oral capsule: 1 cap(s) orally 2 times a day (27 Jun 2018 08:04)  Melatonin 5 mg oral tablet: 2 tab(s) orally once a day (at bedtime) (27 Jun 2018 08:04)  methocarbamol 750 mg oral tablet: 2 tab(s) orally 3 times a day (27 Jun 2018 08:04)  NIFEdipine 60 mg oral tablet, extended release: 1 tab(s) orally once a day (27 Jun 2018 08:04)  NovoLOG 100 units/mL injectable solution: 2 unit(s) injectable 3 times (27 Jun 2018 08:04)  oxyCODONE 10 mg oral tablet, extended release: 1 tab(s) orally once a day, As Needed (27 Jun 2018 08:04)  pantoprazole 20 mg oral delayed release tablet: 1 tab(s) orally once a day (27 Jun 2018 08:04)  Senexon-S 50 mg-8.6 mg oral tablet: 2 tab(s) orally once a day (at bedtime) (27 Jun 2018 08:04)  sertraline 100 mg oral tablet: 1.5 tab(s) orally once a day (27 Jun 2018 08:04)  timolol hemihydrate 0.5% ophthalmic solution: 1 drop(s) to each affected eye 2 times a day (27 Jun 2018 08:04)  Vitamin D3 2000 intl units oral tablet: 1 tab(s) orally once a day (27 Jun 2018 08:04)    PAST MEDICAL & SURGICAL HISTORY:  CAD (coronary artery disease) of bypass graft: cabg 1995 3v  Glaucoma  Arthritis  GERD (gastroesophageal reflux disease)  HTN (hypertension)  Depression  Anemia  DM (diabetes mellitus)  Foot amputation status, right: partial foot amputation  tarsals and metatarsals of right foot  S/P laparoscopic cholecystectomy  CAD (coronary artery disease) of bypass graft PRIYANK WESLEY  MRN-[id number]    POD # 5    S/P re exploration and reinforcement of CSF repair      Current issues:  67y Male some chronic back pain only no leg pain dry dressin g    HPI:  Pt is a 67yoM with a pmhx of   CAD (coronary artery disease) of bypass graft: cabg 1995 3v  Glaucoma  Arthritis  GERD (gastroesophageal reflux disease)  HTN (hypertension)  Depression  Anemia  DM (diabetes mellitus)  Foot amputation status, right: partial foot amputation  tarsals and metatarsals of right foot  S/P laparoscopic cholecystectomy  CAD (coronary artery disease) of bypass graft    Pt had surgery for placement of intrathecal pain pump in 1996 in [place]. The pump malfunctioned and was removed and replaced in 2006. Pt was doing well with pump until this year when it malfunctioned again. Pt went to see Dr. Crenshaw and the intrathecal pain pump was removed on 6/27/18. At this time pt had an uneventful procedure and pt was discharged home the same day as the procedure. Three days later, he noted some oozing from the back. Pt eventually went to see [facility] and pt was scheduled for a blood patch with Dr. Reddy. PT went for the blood patch on 7/13/18 and said initially had relief of symptoms. Two days later his back was wet again and returned to Dr. Crenshaw's office today. Pt was noted to have leakage from the lumbar wound and sent to the ER. Pt was seen and examined in the ER> Complaining of mild headache, no neck pain, dizziness or visual changes. Pt has baseline neuropathy with numbness to the b/l ankles down to the toes. (16 Jul 2018 17:13)      Allergies    penicillins (Other (U))    Intolerances      MEDICATIONS  (STANDING):  brimonidine 0.2% Ophthalmic Solution 1 Drop(s) Both EYES three times a day  buPROPion XL . 150 milliGRAM(s) Oral daily  dextrose 50% Injectable 25 Gram(s) IV Push once  fentaNYL   Patch 100 MICROgram(s)/Hr 2 Patch Transdermal every 72 hours  ferrous    sulfate 325 milliGRAM(s) Oral daily  gabapentin 400 milliGRAM(s) Oral every 8 hours  heparin  Injectable 5000 Unit(s) SubCutaneous every 8 hours  insulin glargine Injectable (LANTUS) 5 Unit(s) SubCutaneous every morning  insulin glargine Injectable (LANTUS) 5 Unit(s) SubCutaneous at bedtime  insulin lispro (HumaLOG) corrective regimen sliding scale   SubCutaneous three times a day before meals  insulin lispro Injectable (HumaLOG) 5 Unit(s) SubCutaneous before breakfast  insulin lispro Injectable (HumaLOG) 5 Unit(s) SubCutaneous before lunch  insulin lispro Injectable (HumaLOG) 5 Unit(s) SubCutaneous before dinner  latanoprost 0.005% Ophthalmic Solution 1 Drop(s) Both EYES at bedtime  lisinopril 20 milliGRAM(s) Oral daily  melatonin 5 milliGRAM(s) Oral at bedtime  methocarbamol 750 milliGRAM(s) Oral every 8 hours  NIFEdipine XL 60 milliGRAM(s) Oral daily  pantoprazole    Tablet 40 milliGRAM(s) Oral before breakfast  sertraline 100 milliGRAM(s) Oral daily  timolol 0.5% Solution 1 Drop(s) Both EYES every 12 hours    MEDICATIONS  (PRN):  acetaminophen   Tablet 650 milliGRAM(s) Oral every 6 hours PRN For Temp greater than 38.5 C (101.3 F)  labetalol Injectable 5 milliGRAM(s) IV Push every 20 minutes PRN SBP > 150 AND HR > 60  morphine  - Injectable 4 milliGRAM(s) IV Push every 3 hours PRN Severe Pain (7 - 10)  oxyCODONE  ER Tablet 10 milliGRAM(s) Oral every 12 hours PRN pain  senna 2 Tablet(s) Oral at bedtime PRN Constipation  simethicone 80 milliGRAM(s) Chew every 8 hours PRN Dyspepsia    Vital Signs Last 24 Hrs  T(C): 36.3 (28 Jul 2018 06:01), Max: 37.1 (27 Jul 2018 20:14)  T(F): 97.4 (28 Jul 2018 06:01), Max: 98.8 (27 Jul 2018 20:14)  HR: 56 (28 Jul 2018 06:01) (56 - 62)  BP: 137/65 (28 Jul 2018 06:01) (137/65 - 157/72)  BP(mean): --  RR: 18 (28 Jul 2018 06:01) (18 - 18)  SpO2: --    I&O's Detail    27 Jul 2018 07:01  -  28 Jul 2018 07:00  --------------------------------------------------------  IN:    Oral Fluid: 700 mL  Total IN: 700 mL    OUT:    Voided: 400 mL  Total OUT: 400 mL    Total NET: 300 mL                      Exam:    sits up ambulates with assistance  dressing back dry  mobile strength intact  no ha.      Plan:  stable for dc   still on telemetry  will re consult cardio for clearance to home  PT rehab eval               Home Medications:  Basaglar KwikPen 100 units/mL subcutaneous solution: 50 unit(s) subcutaneous once a day (27 Jun 2018 08:04)  benazepril 20 mg oral tablet: 1 tab(s) orally once a day (27 Jun 2018 08:04)  brimonidine 0.2% ophthalmic solution: 1 drop(s) to each affected eye 3 times a day (27 Jun 2018 08:04)  buPROPion 150 mg/24 hours (XL) oral tablet, extended release: 1 tab(s) orally every 24 hours (27 Jun 2018 08:04)  clopidogrel 75 mg oral tablet: 1 tab(s) orally once a day (16 Jul 2018 17:36)  fentaNYL 100 mcg/hr transdermal film, extended release: 1 film(s) transdermal every 72 hours (27 Jun 2018 08:04)  ferrous sulfate 325 mg (65 mg elemental iron) oral tablet:  (27 Jun 2018 08:04)  latanoprost 0.005% ophthalmic solution: 1 drop(s) to each affected eye once a day (in the evening) (27 Jun 2018 08:04)  Lyrica 150 mg oral capsule: 1 cap(s) orally 2 times a day (27 Jun 2018 08:04)  Melatonin 5 mg oral tablet: 2 tab(s) orally once a day (at bedtime) (27 Jun 2018 08:04)  methocarbamol 750 mg oral tablet: 2 tab(s) orally 3 times a day (27 Jun 2018 08:04)  NIFEdipine 60 mg oral tablet, extended release: 1 tab(s) orally once a day (27 Jun 2018 08:04)  NovoLOG 100 units/mL injectable solution: 2 unit(s) injectable 3 times (27 Jun 2018 08:04)  oxyCODONE 10 mg oral tablet, extended release: 1 tab(s) orally once a day, As Needed (27 Jun 2018 08:04)  pantoprazole 20 mg oral delayed release tablet: 1 tab(s) orally once a day (27 Jun 2018 08:04)  Senexon-S 50 mg-8.6 mg oral tablet: 2 tab(s) orally once a day (at bedtime) (27 Jun 2018 08:04)  sertraline 100 mg oral tablet: 1.5 tab(s) orally once a day (27 Jun 2018 08:04)  timolol hemihydrate 0.5% ophthalmic solution: 1 drop(s) to each affected eye 2 times a day (27 Jun 2018 08:04)  Vitamin D3 2000 intl units oral tablet: 1 tab(s) orally once a day (27 Jun 2018 08:04)    PAST MEDICAL & SURGICAL HISTORY:  CAD (coronary artery disease) of bypass graft: cabg 1995 3v  Glaucoma  Arthritis  GERD (gastroesophageal reflux disease)  HTN (hypertension)  Depression  Anemia  DM (diabetes mellitus)  Foot amputation status, right: partial foot amputation  tarsals and metatarsals of right foot  S/P laparoscopic cholecystectomy  CAD (coronary artery disease) of bypass graft

## 2018-07-29 ENCOUNTER — TRANSCRIPTION ENCOUNTER (OUTPATIENT)
Age: 68
End: 2018-07-29

## 2018-07-29 VITALS
RESPIRATION RATE: 18 BRPM | DIASTOLIC BLOOD PRESSURE: 80 MMHG | HEART RATE: 62 BPM | SYSTOLIC BLOOD PRESSURE: 175 MMHG | TEMPERATURE: 96 F

## 2018-07-29 RX ORDER — BUPROPION HYDROCHLORIDE 150 MG/1
1 TABLET, EXTENDED RELEASE ORAL
Qty: 0 | Refills: 0 | COMMUNITY

## 2018-07-29 RX ORDER — APIXABAN 2.5 MG/1
5 TABLET, FILM COATED ORAL ONCE
Qty: 0 | Refills: 0 | Status: COMPLETED | OUTPATIENT
Start: 2018-07-29 | End: 2018-07-29

## 2018-07-29 RX ORDER — FERROUS SULFATE 325(65) MG
0 TABLET ORAL
Qty: 0 | Refills: 0 | COMMUNITY

## 2018-07-29 RX ORDER — OXYCODONE HYDROCHLORIDE 5 MG/1
1 TABLET ORAL
Qty: 0 | Refills: 0 | COMMUNITY

## 2018-07-29 RX ORDER — TIMOLOL 0.5 %
1 DROPS OPHTHALMIC (EYE)
Qty: 0 | Refills: 0 | COMMUNITY

## 2018-07-29 RX ORDER — BRIMONIDINE TARTRATE 2 MG/MG
1 SOLUTION/ DROPS OPHTHALMIC
Qty: 0 | Refills: 0 | COMMUNITY

## 2018-07-29 RX ORDER — NIFEDIPINE 30 MG
1 TABLET, EXTENDED RELEASE 24 HR ORAL
Qty: 0 | Refills: 0 | COMMUNITY

## 2018-07-29 RX ORDER — SERTRALINE 25 MG/1
1.5 TABLET, FILM COATED ORAL
Qty: 0 | Refills: 0 | COMMUNITY

## 2018-07-29 RX ORDER — APIXABAN 2.5 MG/1
1 TABLET, FILM COATED ORAL
Qty: 0 | Refills: 0 | COMMUNITY
Start: 2018-07-29

## 2018-07-29 RX ORDER — APIXABAN 2.5 MG/1
1 TABLET, FILM COATED ORAL
Qty: 28 | Refills: 0 | OUTPATIENT
Start: 2018-07-29 | End: 2018-08-11

## 2018-07-29 RX ORDER — LATANOPROST 0.05 MG/ML
1 SOLUTION/ DROPS OPHTHALMIC; TOPICAL
Qty: 0 | Refills: 0 | COMMUNITY

## 2018-07-29 RX ORDER — PANTOPRAZOLE SODIUM 20 MG/1
1 TABLET, DELAYED RELEASE ORAL
Qty: 0 | Refills: 0 | COMMUNITY

## 2018-07-29 RX ORDER — INSULIN GLARGINE 100 [IU]/ML
50 INJECTION, SOLUTION SUBCUTANEOUS
Qty: 0 | Refills: 0 | COMMUNITY

## 2018-07-29 RX ORDER — LANOLIN ALCOHOL/MO/W.PET/CERES
2 CREAM (GRAM) TOPICAL
Qty: 0 | Refills: 0 | COMMUNITY

## 2018-07-29 RX ADMIN — MORPHINE SULFATE 4 MILLIGRAM(S): 50 CAPSULE, EXTENDED RELEASE ORAL at 05:20

## 2018-07-29 RX ADMIN — GABAPENTIN 400 MILLIGRAM(S): 400 CAPSULE ORAL at 06:01

## 2018-07-29 RX ADMIN — Medication 1 DROP(S): at 17:18

## 2018-07-29 RX ADMIN — HEPARIN SODIUM 5000 UNIT(S): 5000 INJECTION INTRAVENOUS; SUBCUTANEOUS at 06:00

## 2018-07-29 RX ADMIN — Medication 5 UNIT(S): at 12:06

## 2018-07-29 RX ADMIN — MORPHINE SULFATE 4 MILLIGRAM(S): 50 CAPSULE, EXTENDED RELEASE ORAL at 13:11

## 2018-07-29 RX ADMIN — APIXABAN 5 MILLIGRAM(S): 2.5 TABLET, FILM COATED ORAL at 19:56

## 2018-07-29 RX ADMIN — MORPHINE SULFATE 4 MILLIGRAM(S): 50 CAPSULE, EXTENDED RELEASE ORAL at 08:39

## 2018-07-29 RX ADMIN — METHOCARBAMOL 750 MILLIGRAM(S): 500 TABLET, FILM COATED ORAL at 13:13

## 2018-07-29 RX ADMIN — SERTRALINE 100 MILLIGRAM(S): 25 TABLET, FILM COATED ORAL at 12:09

## 2018-07-29 RX ADMIN — GABAPENTIN 400 MILLIGRAM(S): 400 CAPSULE ORAL at 13:16

## 2018-07-29 RX ADMIN — HEPARIN SODIUM 5000 UNIT(S): 5000 INJECTION INTRAVENOUS; SUBCUTANEOUS at 13:14

## 2018-07-29 RX ADMIN — Medication 60 MILLIGRAM(S): at 06:01

## 2018-07-29 RX ADMIN — LISINOPRIL 20 MILLIGRAM(S): 2.5 TABLET ORAL at 06:01

## 2018-07-29 RX ADMIN — BUPROPION HYDROCHLORIDE 150 MILLIGRAM(S): 150 TABLET, EXTENDED RELEASE ORAL at 12:10

## 2018-07-29 RX ADMIN — BRIMONIDINE TARTRATE 1 DROP(S): 2 SOLUTION/ DROPS OPHTHALMIC at 13:14

## 2018-07-29 RX ADMIN — MORPHINE SULFATE 4 MILLIGRAM(S): 50 CAPSULE, EXTENDED RELEASE ORAL at 19:45

## 2018-07-29 RX ADMIN — Medication 5 UNIT(S): at 17:17

## 2018-07-29 RX ADMIN — ZOLPIDEM TARTRATE 5 MILLIGRAM(S): 10 TABLET ORAL at 00:16

## 2018-07-29 RX ADMIN — MORPHINE SULFATE 4 MILLIGRAM(S): 50 CAPSULE, EXTENDED RELEASE ORAL at 05:58

## 2018-07-29 RX ADMIN — BRIMONIDINE TARTRATE 1 DROP(S): 2 SOLUTION/ DROPS OPHTHALMIC at 06:00

## 2018-07-29 RX ADMIN — INSULIN GLARGINE 5 UNIT(S): 100 INJECTION, SOLUTION SUBCUTANEOUS at 08:36

## 2018-07-29 RX ADMIN — Medication 2: at 12:07

## 2018-07-29 RX ADMIN — Medication 325 MILLIGRAM(S): at 12:09

## 2018-07-29 RX ADMIN — Medication 2: at 17:18

## 2018-07-29 RX ADMIN — Medication 1 DROP(S): at 06:00

## 2018-07-29 RX ADMIN — METHOCARBAMOL 750 MILLIGRAM(S): 500 TABLET, FILM COATED ORAL at 06:01

## 2018-07-29 RX ADMIN — PANTOPRAZOLE SODIUM 40 MILLIGRAM(S): 20 TABLET, DELAYED RELEASE ORAL at 06:05

## 2018-07-29 NOTE — DISCHARGE NOTE ADULT - CARE PLAN
Principal Discharge DX:	CSF leak  Goal:	to repair csf leak  Assessment and plan of treatment:	under went a repair of csf leak reexploration of lumbar wound

## 2018-07-29 NOTE — DISCHARGE NOTE ADULT - CARE PROVIDERS DIRECT ADDRESSES
,DirectAddress_Unknown,[email or web address] ,DirectAddress_Unknown,DirectAddress_Unknown

## 2018-07-29 NOTE — DISCHARGE NOTE ADULT - HOSPITAL COURSE
patient underwent a reexploration of incision where a CSF leak was found and repaired.  patient was transferred to telemetry service and monitored for wound drainage and cardiac events.  Patient did well without any further drainage and good pain control.  Cardiac fellow contacted to review telemtry events on monitor and cleared.   He is discharged home to follow up outpt.  continue all medications and a prescription was sent to [facility] for percocet .

## 2018-07-29 NOTE — PROGRESS NOTE ADULT - SUBJECTIVE AND OBJECTIVE BOX
PRIYANK WESLEY  MRN-[id number]    HD#    POD#  S/P       Current issues:  67y Male    HPI:  Pt is a 67yoM with a pmhx of   CAD (coronary artery disease) of bypass graft: cabg 1995 3v  Glaucoma  Arthritis  GERD (gastroesophageal reflux disease)  HTN (hypertension)  Depression  Anemia  DM (diabetes mellitus)  Foot amputation status, right: partial foot amputation  tarsals and metatarsals of right foot  S/P laparoscopic cholecystectomy  CAD (coronary artery disease) of bypass graft    Pt had surgery for placement of intrathecal pain pump in 1996 in [place]. The pump malfunctioned and was removed and replaced in 2006. Pt was doing well with pump until this year when it malfunctioned again. Pt went to see Dr. Crenshaw and the intrathecal pain pump was removed on 6/27/18. At this time pt had an uneventful procedure and pt was discharged home the same day as the procedure. Three days later, he noted some oozing from the back. Pt eventually went to see [facility] and pt was scheduled for a blood patch with Dr. Reddy. PT went for the blood patch on 7/13/18 and said initially had relief of symptoms. Two days later his back was wet again and returned to Dr. Crenshaw's office today. Pt was noted to have leakage from the lumbar wound and sent to the ER. Pt was seen and examined in the ER> Complaining of mild headache, no neck pain, dizziness or visual changes. Pt has baseline neuropathy with numbness to the b/l ankles down to the toes. (16 Jul 2018 17:13)      Allergies    penicillins (Other (U))    Intolerances      MEDICATIONS  (STANDING):  brimonidine 0.2% Ophthalmic Solution 1 Drop(s) Both EYES three times a day  buPROPion XL . 150 milliGRAM(s) Oral daily  dextrose 50% Injectable 25 Gram(s) IV Push once  ferrous    sulfate 325 milliGRAM(s) Oral daily  gabapentin 400 milliGRAM(s) Oral every 8 hours  heparin  Injectable 5000 Unit(s) SubCutaneous every 8 hours  insulin glargine Injectable (LANTUS) 5 Unit(s) SubCutaneous every morning  insulin glargine Injectable (LANTUS) 5 Unit(s) SubCutaneous at bedtime  insulin lispro (HumaLOG) corrective regimen sliding scale   SubCutaneous three times a day before meals  insulin lispro Injectable (HumaLOG) 5 Unit(s) SubCutaneous before breakfast  insulin lispro Injectable (HumaLOG) 5 Unit(s) SubCutaneous before lunch  insulin lispro Injectable (HumaLOG) 5 Unit(s) SubCutaneous before dinner  latanoprost 0.005% Ophthalmic Solution 1 Drop(s) Both EYES at bedtime  lisinopril 20 milliGRAM(s) Oral daily  melatonin 5 milliGRAM(s) Oral at bedtime  methocarbamol 750 milliGRAM(s) Oral every 8 hours  NIFEdipine XL 60 milliGRAM(s) Oral daily  pantoprazole    Tablet 40 milliGRAM(s) Oral before breakfast  sertraline 100 milliGRAM(s) Oral daily  timolol 0.5% Solution 1 Drop(s) Both EYES every 12 hours    MEDICATIONS  (PRN):  acetaminophen   Tablet 650 milliGRAM(s) Oral every 6 hours PRN For Temp greater than 38.5 C (101.3 F)  labetalol Injectable 5 milliGRAM(s) IV Push every 20 minutes PRN SBP > 150 AND HR > 60  morphine  - Injectable 4 milliGRAM(s) IV Push every 3 hours PRN Severe Pain (7 - 10)  oxyCODONE  ER Tablet 10 milliGRAM(s) Oral every 12 hours PRN pain  senna 2 Tablet(s) Oral at bedtime PRN Constipation  simethicone 80 milliGRAM(s) Chew every 8 hours PRN Dyspepsia  zolpidem 5 milliGRAM(s) Oral at bedtime PRN Insomnia    Vital Signs Last 24 Hrs  T(C): 37 (29 Jul 2018 05:59), Max: 37 (29 Jul 2018 05:59)  T(F): 98.6 (29 Jul 2018 05:59), Max: 98.6 (29 Jul 2018 05:59)  HR: 55 (29 Jul 2018 05:59) (55 - 61)  BP: 162/77 (29 Jul 2018 05:59) (162/77 - 190/74)  BP(mean): --  RR: 18 (29 Jul 2018 05:59) (18 - 84)  SpO2: --    I&O's Detail                  Exam:        Plan:     waiting for Cardiac team to clear from telemetry             Home Medications:  Basaglar KwikPen 100 units/mL subcutaneous solution: 50 unit(s) subcutaneous once a day (27 Jun 2018 08:04)  benazepril 20 mg oral tablet: 1 tab(s) orally once a day (27 Jun 2018 08:04)  brimonidine 0.2% ophthalmic solution: 1 drop(s) to each affected eye 3 times a day (27 Jun 2018 08:04)  buPROPion 150 mg/24 hours (XL) oral tablet, extended release: 1 tab(s) orally every 24 hours (27 Jun 2018 08:04)  clopidogrel 75 mg oral tablet: 1 tab(s) orally once a day (16 Jul 2018 17:36)  fentaNYL 100 mcg/hr transdermal film, extended release: 1 film(s) transdermal every 72 hours (27 Jun 2018 08:04)  ferrous sulfate 325 mg (65 mg elemental iron) oral tablet:  (27 Jun 2018 08:04)  latanoprost 0.005% ophthalmic solution: 1 drop(s) to each affected eye once a day (in the evening) (27 Jun 2018 08:04)  Lyrica 150 mg oral capsule: 1 cap(s) orally 2 times a day (27 Jun 2018 08:04)  Melatonin 5 mg oral tablet: 2 tab(s) orally once a day (at bedtime) (27 Jun 2018 08:04)  methocarbamol 750 mg oral tablet: 2 tab(s) orally 3 times a day (27 Jun 2018 08:04)  NIFEdipine 60 mg oral tablet, extended release: 1 tab(s) orally once a day (27 Jun 2018 08:04)  NovoLOG 100 units/mL injectable solution: 2 unit(s) injectable 3 times (27 Jun 2018 08:04)  oxyCODONE 10 mg oral tablet, extended release: 1 tab(s) orally once a day, As Needed (27 Jun 2018 08:04)  pantoprazole 20 mg oral delayed release tablet: 1 tab(s) orally once a day (27 Jun 2018 08:04)  Senexon-S 50 mg-8.6 mg oral tablet: 2 tab(s) orally once a day (at bedtime) (27 Jun 2018 08:04)  sertraline 100 mg oral tablet: 1.5 tab(s) orally once a day (27 Jun 2018 08:04)  timolol hemihydrate 0.5% ophthalmic solution: 1 drop(s) to each affected eye 2 times a day (27 Jun 2018 08:04)  Vitamin D3 2000 intl units oral tablet: 1 tab(s) orally once a day (27 Jun 2018 08:04)    PAST MEDICAL & SURGICAL HISTORY:  CAD (coronary artery disease) of bypass graft: cabg 1995 3v  Glaucoma  Arthritis  GERD (gastroesophageal reflux disease)  HTN (hypertension)  Depression  Anemia  DM (diabetes mellitus)  Foot amputation status, right: partial foot amputation  tarsals and metatarsals of right foot  S/P laparoscopic cholecystectomy  CAD (coronary artery disease) of bypass graft PRIYANK WESLEY  MRN-[id number]    POD # 6    S/P re exploration and reinforcement of CSF repair       Current issues:  67y Male  doing well waiting to go home      HPI:  Pt is a 67yoM with a pmhx of   CAD (coronary artery disease) of bypass graft: cabg 1995 3v  Glaucoma  Arthritis  GERD (gastroesophageal reflux disease)  HTN (hypertension)  Depression  Anemia  DM (diabetes mellitus)  Foot amputation status, right: partial foot amputation  tarsals and metatarsals of right foot  S/P laparoscopic cholecystectomy  CAD (coronary artery disease) of bypass graft    Pt had surgery for placement of intrathecal pain pump in 1996 in [place]. The pump malfunctioned and was removed and replaced in 2006. Pt was doing well with pump until this year when it malfunctioned again. Pt went to see Dr. Crenshaw and the intrathecal pain pump was removed on 6/27/18. At this time pt had an uneventful procedure and pt was discharged home the same day as the procedure. Three days later, he noted some oozing from the back. Pt eventually went to see [facility] and pt was scheduled for a blood patch with Dr. Reddy. PT went for the blood patch on 7/13/18 and said initially had relief of symptoms. Two days later his back was wet again and returned to Dr. Crenshaw's office today. Pt was noted to have leakage from the lumbar wound and sent to the ER. Pt was seen and examined in the ER> Complaining of mild headache, no neck pain, dizziness or visual changes. Pt has baseline neuropathy with numbness to the b/l ankles down to the toes. (16 Jul 2018 17:13)      Allergies    penicillins (Other (U))    Intolerances      MEDICATIONS  (STANDING):  brimonidine 0.2% Ophthalmic Solution 1 Drop(s) Both EYES three times a day  buPROPion XL . 150 milliGRAM(s) Oral daily  dextrose 50% Injectable 25 Gram(s) IV Push once  ferrous    sulfate 325 milliGRAM(s) Oral daily  gabapentin 400 milliGRAM(s) Oral every 8 hours  heparin  Injectable 5000 Unit(s) SubCutaneous every 8 hours  insulin glargine Injectable (LANTUS) 5 Unit(s) SubCutaneous every morning  insulin glargine Injectable (LANTUS) 5 Unit(s) SubCutaneous at bedtime  insulin lispro (HumaLOG) corrective regimen sliding scale   SubCutaneous three times a day before meals  insulin lispro Injectable (HumaLOG) 5 Unit(s) SubCutaneous before breakfast  insulin lispro Injectable (HumaLOG) 5 Unit(s) SubCutaneous before lunch  insulin lispro Injectable (HumaLOG) 5 Unit(s) SubCutaneous before dinner  latanoprost 0.005% Ophthalmic Solution 1 Drop(s) Both EYES at bedtime  lisinopril 20 milliGRAM(s) Oral daily  melatonin 5 milliGRAM(s) Oral at bedtime  methocarbamol 750 milliGRAM(s) Oral every 8 hours  NIFEdipine XL 60 milliGRAM(s) Oral daily  pantoprazole    Tablet 40 milliGRAM(s) Oral before breakfast  sertraline 100 milliGRAM(s) Oral daily  timolol 0.5% Solution 1 Drop(s) Both EYES every 12 hours    MEDICATIONS  (PRN):  acetaminophen   Tablet 650 milliGRAM(s) Oral every 6 hours PRN For Temp greater than 38.5 C (101.3 F)  labetalol Injectable 5 milliGRAM(s) IV Push every 20 minutes PRN SBP > 150 AND HR > 60  morphine  - Injectable 4 milliGRAM(s) IV Push every 3 hours PRN Severe Pain (7 - 10)  oxyCODONE  ER Tablet 10 milliGRAM(s) Oral every 12 hours PRN pain  senna 2 Tablet(s) Oral at bedtime PRN Constipation  simethicone 80 milliGRAM(s) Chew every 8 hours PRN Dyspepsia  zolpidem 5 milliGRAM(s) Oral at bedtime PRN Insomnia    Vital Signs Last 24 Hrs  T(C): 37 (29 Jul 2018 05:59), Max: 37 (29 Jul 2018 05:59)  T(F): 98.6 (29 Jul 2018 05:59), Max: 98.6 (29 Jul 2018 05:59)  HR: 55 (29 Jul 2018 05:59) (55 - 61)  BP: 162/77 (29 Jul 2018 05:59) (162/77 - 190/74)  BP(mean): --  RR: 18 (29 Jul 2018 05:59) (18 - 84)  SpO2: --    I&O's Detail                  Exam:    sits up ambulates with assistance  dressing back dry  mobile strength intact  no ha.        Plan:     waiting for Cardiac team to clear from telemetry   dc home when cleared.             Home Medications:  Basaglar KwikPen 100 units/mL subcutaneous solution: 50 unit(s) subcutaneous once a day (27 Jun 2018 08:04)  benazepril 20 mg oral tablet: 1 tab(s) orally once a day (27 Jun 2018 08:04)  brimonidine 0.2% ophthalmic solution: 1 drop(s) to each affected eye 3 times a day (27 Jun 2018 08:04)  buPROPion 150 mg/24 hours (XL) oral tablet, extended release: 1 tab(s) orally every 24 hours (27 Jun 2018 08:04)  clopidogrel 75 mg oral tablet: 1 tab(s) orally once a day (16 Jul 2018 17:36)  fentaNYL 100 mcg/hr transdermal film, extended release: 1 film(s) transdermal every 72 hours (27 Jun 2018 08:04)  ferrous sulfate 325 mg (65 mg elemental iron) oral tablet:  (27 Jun 2018 08:04)  latanoprost 0.005% ophthalmic solution: 1 drop(s) to each affected eye once a day (in the evening) (27 Jun 2018 08:04)  Lyrica 150 mg oral capsule: 1 cap(s) orally 2 times a day (27 Jun 2018 08:04)  Melatonin 5 mg oral tablet: 2 tab(s) orally once a day (at bedtime) (27 Jun 2018 08:04)  methocarbamol 750 mg oral tablet: 2 tab(s) orally 3 times a day (27 Jun 2018 08:04)  NIFEdipine 60 mg oral tablet, extended release: 1 tab(s) orally once a day (27 Jun 2018 08:04)  NovoLOG 100 units/mL injectable solution: 2 unit(s) injectable 3 times (27 Jun 2018 08:04)  oxyCODONE 10 mg oral tablet, extended release: 1 tab(s) orally once a day, As Needed (27 Jun 2018 08:04)  pantoprazole 20 mg oral delayed release tablet: 1 tab(s) orally once a day (27 Jun 2018 08:04)  Senexon-S 50 mg-8.6 mg oral tablet: 2 tab(s) orally once a day (at bedtime) (27 Jun 2018 08:04)  sertraline 100 mg oral tablet: 1.5 tab(s) orally once a day (27 Jun 2018 08:04)  timolol hemihydrate 0.5% ophthalmic solution: 1 drop(s) to each affected eye 2 times a day (27 Jun 2018 08:04)  Vitamin D3 2000 intl units oral tablet: 1 tab(s) orally once a day (27 Jun 2018 08:04)    PAST MEDICAL & SURGICAL HISTORY:  CAD (coronary artery disease) of bypass graft: cabg 1995 3v  Glaucoma  Arthritis  GERD (gastroesophageal reflux disease)  HTN (hypertension)  Depression  Anemia  DM (diabetes mellitus)  Foot amputation status, right: partial foot amputation  tarsals and metatarsals of right foot  S/P laparoscopic cholecystectomy  CAD (coronary artery disease) of bypass graft

## 2018-07-29 NOTE — PROGRESS NOTE ADULT - ASSESSMENT
67 yr old male with PMH of CAD s/p 3V CABG in 1995, DM2, HTN, CVA, JAMEL s/p stenting, PAD s/p right SFA stent, p/w CSF leak    s/p CSF leak repair  Parox afib on monitor and 12 lead ekg  Pt had aflutter in 2013    Recommend adding AC if no neurosurgical contraindication  [id number] is 6  Pt to follow with his primary cardiologist on discharge  case d/w Dr. Ross 67 yr old male with PMH of CAD s/p 3V CABG in 1995, DM2, HTN, CVA, JAMEL s/p stenting, PAD s/p right SFA stent, p/w CSF leak    s/p CSF leak repair  Parox afib on monitor and 12 lead ekg  Pt had aflutter in 2013    Recommend adding AC if no neurosurgical contraindication  [id number] is 6  Pt to follow with his primary cardiologist on discharge  case d/w Dr. Ross

## 2018-07-29 NOTE — DISCHARGE NOTE ADULT - CARE PROVIDER_API CALL
Rickey Crenshaw), Neurological Surgery  [facility] [place] [address]  Phone: [phone]  Fax: [phone]    Any Ross), Internal Medicine  [address] [place] [address]  Phone: [phone]  Fax: [phone] Rickey Crenshaw), Neurological Surgery  [address] [place] [address]  Phone: [phone]  Fax: [phone]    cardiologist,   Phone: (   )    -  Fax: (   )    -

## 2018-07-29 NOTE — DISCHARGE NOTE ADULT - MEDICATION SUMMARY - MEDICATIONS TO TAKE
I will START or STAY ON the medications listed below when I get home from the hospital:    fentaNYL 100 mcg/hr transdermal film, extended release  -- 1 film(s) by transdermal patch every 72 hours  -- Indication: For pain     oxyCODONE-acetaminophen 5 mg-325 mg oral tablet  -- 1 tab(s) by mouth every 6 hours, As Needed -for moderate pain MDD:4 tabs   -- Caution federal law prohibits the transfer of this drug to any person other  than the person for whom it was prescribed.  May cause drowsiness.  Alcohol may intensify this effect.  Use care when operating dangerous machinery.  This prescription cannot be refilled.  This product contains acetaminophen.  Do not use  with any other product containing acetaminophen to prevent possible liver damage.  Using more of this medication than prescribed may cause serious breathing problems.    -- Indication: For pain     benazepril 20 mg oral tablet  -- 1 tab(s) by mouth once a day  -- Indication: For blood pressure    Lyrica 150 mg oral capsule  -- 1 cap(s) by mouth 2 times a day  -- Indication: For nerve pain    NovoLOG 100 units/mL injectable solution  -- 2 unit(s) injectable 3 times  -- Indication: For DM     clopidogrel 75 mg oral tablet  -- 1 tab(s) by mouth once a day  -- Indication: For heart med    Senexon-S 50 mg-8.6 mg oral tablet  -- 2 tab(s) by mouth once a day (at bedtime)  -- Indication: For stool soft    methocarbamol 750 mg oral tablet  -- 2 tab(s) by mouth 3 times a day  -- Indication: For muscle spasms    Vitamin D3 2000 intl units oral tablet  -- 1 tab(s) by mouth once a day  -- Indication: For vitamin I will START or STAY ON the medications listed below when I get home from the hospital:    fentaNYL 100 mcg/hr transdermal film, extended release  -- 1 film(s) by transdermal patch every 72 hours  -- Indication: For pain     oxyCODONE-acetaminophen 5 mg-325 mg oral tablet  -- 1 tab(s) by mouth every 6 hours, As Needed -for moderate pain MDD:4 tabs   -- Caution federal law prohibits the transfer of this drug to any person other  than the person for whom it was prescribed.  May cause drowsiness.  Alcohol may intensify this effect.  Use care when operating dangerous machinery.  This prescription cannot be refilled.  This product contains acetaminophen.  Do not use  with any other product containing acetaminophen to prevent possible liver damage.  Using more of this medication than prescribed may cause serious breathing problems.    -- Indication: For pain     benazepril 20 mg oral tablet  -- 1 tab(s) by mouth once a day  -- Indication: For blood pressure    Eliquis 5 mg oral tablet  -- 1 tab(s) by mouth 2 times a day   -- Check with your doctor before becoming pregnant.  It is very important that you take or use this exactly as directed.  Do not skip doses or discontinue unless directed by your doctor.  Obtain medical advice before taking any non-prescription drugs as some may affect the action of this medication.    -- Indication: For afib    Lyrica 150 mg oral capsule  -- 1 cap(s) by mouth 2 times a day  -- Indication: For nerve pain    NovoLOG 100 units/mL injectable solution  -- 2 unit(s) injectable 3 times  -- Indication: For DM     clopidogrel 75 mg oral tablet  -- 1 tab(s) by mouth once a day  -- Indication: For heart med    Senexon-S 50 mg-8.6 mg oral tablet  -- 2 tab(s) by mouth once a day (at bedtime)  -- Indication: For stool soft    methocarbamol 750 mg oral tablet  -- 2 tab(s) by mouth 3 times a day  -- Indication: For muscle spasms    Vitamin D3 2000 intl units oral tablet  -- 1 tab(s) by mouth once a day  -- Indication: For vitamin I will START or STAY ON the medications listed below when I get home from the hospital:    fentaNYL 100 mcg/hr transdermal film, extended release  -- 1 film(s) by transdermal patch every 72 hours  -- Indication: For pain     oxyCODONE-acetaminophen 5 mg-325 mg oral tablet  -- 1 tab(s) by mouth every 6 hours, As Needed -for moderate pain MDD:4 tabs   -- Caution federal law prohibits the transfer of this drug to any person other  than the person for whom it was prescribed.  May cause drowsiness.  Alcohol may intensify this effect.  Use care when operating dangerous machinery.  This prescription cannot be refilled.  This product contains acetaminophen.  Do not use  with any other product containing acetaminophen to prevent possible liver damage.  Using more of this medication than prescribed may cause serious breathing problems.    -- Indication: For pain     benazepril 20 mg oral tablet  -- 1 tab(s) by mouth once a day  -- Indication: For blood pressure    Eliquis 5 mg oral tablet  -- 1 tab(s) by mouth every 12 hours x 14 days   -- Indication: For atrial fibrillation    Lyrica 150 mg oral capsule  -- 1 cap(s) by mouth 2 times a day  -- Indication: For nerve pain    NovoLOG 100 units/mL injectable solution  -- 2 unit(s) injectable 3 times  -- Indication: For DM     clopidogrel 75 mg oral tablet  -- 1 tab(s) by mouth once a day  -- Indication: For heart med    Senexon-S 50 mg-8.6 mg oral tablet  -- 2 tab(s) by mouth once a day (at bedtime)  -- Indication: For stool soft    methocarbamol 750 mg oral tablet  -- 2 tab(s) by mouth 3 times a day  -- Indication: For muscle spasms    Vitamin D3 2000 intl units oral tablet  -- 1 tab(s) by mouth once a day  -- Indication: For vitamin

## 2018-07-29 NOTE — DISCHARGE NOTE ADULT - PROVIDER TOKENS
TOKEN:'[id number]',TOKEN:'[id number]' TOKEN:'[id number]',FREE:[LAST:[cardiologist],PHONE:[(   )    -],FAX:[(   )    -]]

## 2018-07-29 NOTE — PROGRESS NOTE ADULT - ATTENDING COMMENTS
Patient seen and examined.  Patient is on right side, but still flat.  He denies any anjel headaches.  The patient's dressing was removed and changed.  Upon slight coughing, there is evidence of serosanguineous drainage from middle portion of incision.  Minimal expression of similar fluid also noted.  Dressing was changed.  The present time, recommend close monitoring of the wound.  Also recommend maintenance of flat bed rest restrictions for now.  We will need to monitor incision.  Continue with DVT prophylaxis.
Dressing changes, still with drainage.     Potential return to OR for wound revision, reexploration of CSF leak, Monday.     Obtain cardiac workup via CXR, cardiac enzymes, EKG given persistent chest pain on left.
Plan for OR Monday
Pt planned for OR Monday given persistent CSF leak.     If absolutely indicated per Cardiology, can initiate Heparin gtt understanding pt is higher risk for epidural hematoma.   negative cardiac enzymes noted.
Pt seen and examined.     Continued serous drainage from mid wound.     Discussed with patient re: possibility for return to OR if wound continues to leak.  Also spoke with pt's brother David, re: this.  Pt pre-emptively on schedule for Monday.
Paroxysmal AF  CV=6      Treatment with NOAC/Warfarin is recommended if no GI or neurosurgical contraindication    Outpatient follow up with primary cardiologist in [place]

## 2018-07-29 NOTE — DISCHARGE NOTE ADULT - MEDICATION SUMMARY - MEDICATIONS TO STOP TAKING
I will STOP taking the medications listed below when I get home from the hospital:  None I will STOP taking the medications listed below when I get home from the hospital:    Aspir 81 oral delayed release tablet  -- 1 tab(s) by mouth once a day    clopidogrel 75 mg oral tablet  -- 1 tab(s) by mouth once a day I will STOP taking the medications listed below when I get home from the hospital:    Aspir 81 oral delayed release tablet  -- 1 tab(s) by mouth once a day

## 2018-07-29 NOTE — DISCHARGE NOTE ADULT - PATIENT PORTAL LINK FT
You can access the [facility], offered by [facility], by registering with the following website: [email or web address]

## 2018-07-29 NOTE — PROGRESS NOTE ADULT - SUBJECTIVE AND OBJECTIVE BOX
INTERVAL EVENTS:  No complains  lying in bed    PAST MEDICAL & SURGICAL HISTORY:  CAD (coronary artery disease) of bypass graft: cabg 1995 3v  Glaucoma  Arthritis  GERD (gastroesophageal reflux disease)  HTN (hypertension)  Depression  Anemia  DM (diabetes mellitus)  Foot amputation status, right: partial foot amputation  tarsals and metatarsals of right foot  S/P laparoscopic cholecystectomy  CAD (coronary artery disease) of bypass graft      MEDICATIONS  (STANDING):  brimonidine 0.2% Ophthalmic Solution 1 Drop(s) Both EYES three times a day  buPROPion XL . 150 milliGRAM(s) Oral daily  dextrose 50% Injectable 25 Gram(s) IV Push once  ferrous    sulfate 325 milliGRAM(s) Oral daily  gabapentin 400 milliGRAM(s) Oral every 8 hours  heparin  Injectable 5000 Unit(s) SubCutaneous every 8 hours  insulin glargine Injectable (LANTUS) 5 Unit(s) SubCutaneous every morning  insulin glargine Injectable (LANTUS) 5 Unit(s) SubCutaneous at bedtime  insulin lispro (HumaLOG) corrective regimen sliding scale   SubCutaneous three times a day before meals  insulin lispro Injectable (HumaLOG) 5 Unit(s) SubCutaneous before breakfast  insulin lispro Injectable (HumaLOG) 5 Unit(s) SubCutaneous before lunch  insulin lispro Injectable (HumaLOG) 5 Unit(s) SubCutaneous before dinner  latanoprost 0.005% Ophthalmic Solution 1 Drop(s) Both EYES at bedtime  lisinopril 20 milliGRAM(s) Oral daily  melatonin 5 milliGRAM(s) Oral at bedtime  methocarbamol 750 milliGRAM(s) Oral every 8 hours  NIFEdipine XL 60 milliGRAM(s) Oral daily  pantoprazole    Tablet 40 milliGRAM(s) Oral before breakfast  sertraline 100 milliGRAM(s) Oral daily  timolol 0.5% Solution 1 Drop(s) Both EYES every 12 hours    MEDICATIONS  (PRN):  acetaminophen   Tablet 650 milliGRAM(s) Oral every 6 hours PRN For Temp greater than 38.5 C (101.3 F)  labetalol Injectable 5 milliGRAM(s) IV Push every 20 minutes PRN SBP > 150 AND HR > 60  morphine  - Injectable 4 milliGRAM(s) IV Push every 3 hours PRN Severe Pain (7 - 10)  oxyCODONE  ER Tablet 10 milliGRAM(s) Oral every 12 hours PRN pain  senna 2 Tablet(s) Oral at bedtime PRN Constipation  simethicone 80 milliGRAM(s) Chew every 8 hours PRN Dyspepsia  zolpidem 5 milliGRAM(s) Oral at bedtime PRN Insomnia      Vital Signs Last 24 Hrs  T(C): 36 (29 Jul 2018 13:17), Max: 37 (29 Jul 2018 05:59)  T(F): 96.8 (29 Jul 2018 13:17), Max: 98.6 (29 Jul 2018 05:59)  HR: 58 (29 Jul 2018 13:17) (55 - 59)  BP: 162/72 (29 Jul 2018 13:17) (162/72 - 174/74)  BP(mean): --  RR: 18 (29 Jul 2018 13:17) (18 - 18)  SpO2: --     PHYSICAL EXAM:  GEN: Awake, alert. NAD.   HEENT: NCAT, PERRL, EOMI. Mucosa moist. No JVD.  RESP: CTA b/l  CV: RRR. Normal S1/S2. No m/r/g.  ABD: Soft. NT/ND. BS+  EXT: Warm. No edema, clubbing, or cyanosis.   NEURO: AAOx3. No focal deficits.       I&O's Summary    BNP  RADIOLOGY & ADDITIONAL STUDIES:    TELEMETRY: No event      EKG afib INTERVAL EVENTS:  No complaints  lying in bed    EKG: AF  Telemetry: No events, brief AF    PAST MEDICAL & SURGICAL HISTORY:  CAD (coronary artery disease) of bypass graft: cabg 1995 3v  Glaucoma  Arthritis  GERD (gastroesophageal reflux disease)  HTN (hypertension)  Depression  Anemia  DM (diabetes mellitus)  Foot amputation status, right: partial foot amputation  tarsals and metatarsals of right foot  S/P laparoscopic cholecystectomy  CAD (coronary artery disease) of bypass graft      MEDICATIONS  (STANDING):  brimonidine 0.2% Ophthalmic Solution 1 Drop(s) Both EYES three times a day  buPROPion XL . 150 milliGRAM(s) Oral daily  dextrose 50% Injectable 25 Gram(s) IV Push once  ferrous    sulfate 325 milliGRAM(s) Oral daily  gabapentin 400 milliGRAM(s) Oral every 8 hours  heparin  Injectable 5000 Unit(s) SubCutaneous every 8 hours  insulin glargine Injectable (LANTUS) 5 Unit(s) SubCutaneous every morning  insulin glargine Injectable (LANTUS) 5 Unit(s) SubCutaneous at bedtime  insulin lispro (HumaLOG) corrective regimen sliding scale   SubCutaneous three times a day before meals  insulin lispro Injectable (HumaLOG) 5 Unit(s) SubCutaneous before breakfast  insulin lispro Injectable (HumaLOG) 5 Unit(s) SubCutaneous before lunch  insulin lispro Injectable (HumaLOG) 5 Unit(s) SubCutaneous before dinner  latanoprost 0.005% Ophthalmic Solution 1 Drop(s) Both EYES at bedtime  lisinopril 20 milliGRAM(s) Oral daily  melatonin 5 milliGRAM(s) Oral at bedtime  methocarbamol 750 milliGRAM(s) Oral every 8 hours  NIFEdipine XL 60 milliGRAM(s) Oral daily  pantoprazole    Tablet 40 milliGRAM(s) Oral before breakfast  sertraline 100 milliGRAM(s) Oral daily  timolol 0.5% Solution 1 Drop(s) Both EYES every 12 hours    MEDICATIONS  (PRN):  acetaminophen   Tablet 650 milliGRAM(s) Oral every 6 hours PRN For Temp greater than 38.5 C (101.3 F)  labetalol Injectable 5 milliGRAM(s) IV Push every 20 minutes PRN SBP > 150 AND HR > 60  morphine  - Injectable 4 milliGRAM(s) IV Push every 3 hours PRN Severe Pain (7 - 10)  oxyCODONE  ER Tablet 10 milliGRAM(s) Oral every 12 hours PRN pain  senna 2 Tablet(s) Oral at bedtime PRN Constipation  simethicone 80 milliGRAM(s) Chew every 8 hours PRN Dyspepsia  zolpidem 5 milliGRAM(s) Oral at bedtime PRN Insomnia      Vital Signs Last 24 Hrs  T(C): 36 (29 Jul 2018 13:17), Max: 37 (29 Jul 2018 05:59)  T(F): 96.8 (29 Jul 2018 13:17), Max: 98.6 (29 Jul 2018 05:59)  HR: 58 (29 Jul 2018 13:17) (55 - 59)  BP: 162/72 (29 Jul 2018 13:17) (162/72 - 174/74)  BP(mean): --  RR: 18 (29 Jul 2018 13:17) (18 - 18)  SpO2: --     PHYSICAL EXAM:  GEN: Awake, alert. NAD.   HEENT: NCAT, PERRL, EOMI, No JVD.  RESP: CTA b/l  CV: RRR. Normal S1/S2. No m/r/g.  ABD: Soft. NT/ND. BS+  EXT: Warm. No edema, clubbing, or cyanosis.   NEURO: AAOx3. No focal deficits.

## 2018-07-29 NOTE — PROGRESS NOTE ADULT - PROVIDER SPECIALTY LIST ADULT
Cardiology
Neurosurgery

## 2018-08-02 DIAGNOSIS — K21.9 GASTRO-ESOPHAGEAL REFLUX DISEASE WITHOUT ESOPHAGITIS: ICD-10-CM

## 2018-08-02 DIAGNOSIS — E11.40 TYPE 2 DIABETES MELLITUS WITH DIABETIC NEUROPATHY, UNSPECIFIED: ICD-10-CM

## 2018-08-02 DIAGNOSIS — Z79.82 LONG TERM (CURRENT) USE OF ASPIRIN: ICD-10-CM

## 2018-08-02 DIAGNOSIS — D64.9 ANEMIA, UNSPECIFIED: ICD-10-CM

## 2018-08-02 DIAGNOSIS — F32.9 MAJOR DEPRESSIVE DISORDER, SINGLE EPISODE, UNSPECIFIED: ICD-10-CM

## 2018-08-02 DIAGNOSIS — G97.0 CEREBROSPINAL FLUID LEAK FROM SPINAL PUNCTURE: ICD-10-CM

## 2018-08-02 DIAGNOSIS — I25.10 ATHEROSCLEROTIC HEART DISEASE OF NATIVE CORONARY ARTERY WITHOUT ANGINA PECTORIS: ICD-10-CM

## 2018-08-02 DIAGNOSIS — M19.90 UNSPECIFIED OSTEOARTHRITIS, UNSPECIFIED SITE: ICD-10-CM

## 2018-08-02 DIAGNOSIS — Z89.421 ACQUIRED ABSENCE OF OTHER RIGHT TOE(S): ICD-10-CM

## 2018-08-02 DIAGNOSIS — Z95.1 PRESENCE OF AORTOCORONARY BYPASS GRAFT: ICD-10-CM

## 2018-08-02 DIAGNOSIS — G96.0 CEREBROSPINAL FLUID LEAK: ICD-10-CM

## 2018-08-02 DIAGNOSIS — Z79.4 LONG TERM (CURRENT) USE OF INSULIN: ICD-10-CM

## 2018-08-02 DIAGNOSIS — I48.0 PAROXYSMAL ATRIAL FIBRILLATION: ICD-10-CM

## 2018-08-02 DIAGNOSIS — Z88.0 ALLERGY STATUS TO PENICILLIN: ICD-10-CM

## 2018-08-02 DIAGNOSIS — I10 ESSENTIAL (PRIMARY) HYPERTENSION: ICD-10-CM

## 2018-08-02 DIAGNOSIS — H40.9 UNSPECIFIED GLAUCOMA: ICD-10-CM

## 2018-08-10 ENCOUNTER — INPATIENT (INPATIENT)
Facility: HOSPITAL | Age: 68
LOS: 3 days | Discharge: HOME | End: 2018-08-14
Attending: INTERNAL MEDICINE | Admitting: INTERNAL MEDICINE

## 2018-08-10 VITALS
HEART RATE: 102 BPM | TEMPERATURE: 97 F | SYSTOLIC BLOOD PRESSURE: 140 MMHG | RESPIRATION RATE: 18 BRPM | DIASTOLIC BLOOD PRESSURE: 80 MMHG | OXYGEN SATURATION: 97 %

## 2018-08-10 DIAGNOSIS — Z90.49 ACQUIRED ABSENCE OF OTHER SPECIFIED PARTS OF DIGESTIVE TRACT: Chronic | ICD-10-CM

## 2018-08-10 DIAGNOSIS — I25.810 ATHEROSCLEROSIS OF CORONARY ARTERY BYPASS GRAFT(S) WITHOUT ANGINA PECTORIS: Chronic | ICD-10-CM

## 2018-08-10 DIAGNOSIS — Z98.890 OTHER SPECIFIED POSTPROCEDURAL STATES: Chronic | ICD-10-CM

## 2018-08-10 DIAGNOSIS — T85.610A BREAKDOWN (MECHANICAL) OF CRANIAL OR SPINAL INFUSION CATHETER, INITIAL ENCOUNTER: Chronic | ICD-10-CM

## 2018-08-10 DIAGNOSIS — Z89.431 ACQUIRED ABSENCE OF RIGHT FOOT: Chronic | ICD-10-CM

## 2018-08-10 PROBLEM — F32.9 MAJOR DEPRESSIVE DISORDER, SINGLE EPISODE, UNSPECIFIED: Chronic | Status: ACTIVE | Noted: 2018-06-14

## 2018-08-10 PROBLEM — K21.9 GASTRO-ESOPHAGEAL REFLUX DISEASE WITHOUT ESOPHAGITIS: Chronic | Status: ACTIVE | Noted: 2018-06-14

## 2018-08-10 PROBLEM — M19.90 UNSPECIFIED OSTEOARTHRITIS, UNSPECIFIED SITE: Chronic | Status: ACTIVE | Noted: 2018-06-14

## 2018-08-10 PROBLEM — I10 ESSENTIAL (PRIMARY) HYPERTENSION: Chronic | Status: ACTIVE | Noted: 2018-06-14

## 2018-08-10 PROBLEM — H40.9 UNSPECIFIED GLAUCOMA: Chronic | Status: ACTIVE | Noted: 2018-06-14

## 2018-08-10 PROBLEM — E11.9 TYPE 2 DIABETES MELLITUS WITHOUT COMPLICATIONS: Chronic | Status: ACTIVE | Noted: 2018-06-14

## 2018-08-10 LAB
ACETONE SERPL-MCNC: NEGATIVE — SIGNIFICANT CHANGE UP
ALBUMIN SERPL ELPH-MCNC: 4.3 G/DL — SIGNIFICANT CHANGE UP (ref 3.5–5.2)
ALP SERPL-CCNC: 117 U/L — HIGH (ref 30–115)
ALT FLD-CCNC: 60 U/L — HIGH (ref 0–41)
ANION GAP SERPL CALC-SCNC: 18 MMOL/L — HIGH (ref 7–14)
APPEARANCE UR: ABNORMAL
AST SERPL-CCNC: 65 U/L — HIGH (ref 0–41)
BASE EXCESS BLDV CALC-SCNC: -0.4 MMOL/L — SIGNIFICANT CHANGE UP (ref -2–2)
BASE EXCESS BLDV CALC-SCNC: 0.9 MMOL/L — SIGNIFICANT CHANGE UP (ref -2–2)
BASOPHILS # BLD AUTO: 0.07 K/UL — SIGNIFICANT CHANGE UP (ref 0–0.2)
BASOPHILS NFR BLD AUTO: 0.4 % — SIGNIFICANT CHANGE UP (ref 0–1)
BILIRUB SERPL-MCNC: 0.3 MG/DL — SIGNIFICANT CHANGE UP (ref 0.2–1.2)
BILIRUB UR-MCNC: NEGATIVE — SIGNIFICANT CHANGE UP
BUN SERPL-MCNC: 24 MG/DL — HIGH (ref 10–20)
CA-I SERPL-SCNC: 1.23 MMOL/L — SIGNIFICANT CHANGE UP (ref 1.12–1.3)
CA-I SERPL-SCNC: 1.27 MMOL/L — SIGNIFICANT CHANGE UP (ref 1.12–1.3)
CALCIUM SERPL-MCNC: 9.6 MG/DL — SIGNIFICANT CHANGE UP (ref 8.5–10.1)
CHLORIDE SERPL-SCNC: 97 MMOL/L — LOW (ref 98–110)
CK SERPL-CCNC: 63 U/L — SIGNIFICANT CHANGE UP (ref 0–225)
CO2 SERPL-SCNC: 27 MMOL/L — SIGNIFICANT CHANGE UP (ref 17–32)
COLOR SPEC: YELLOW — SIGNIFICANT CHANGE UP
CREAT SERPL-MCNC: 1.4 MG/DL — SIGNIFICANT CHANGE UP (ref 0.7–1.5)
DIFF PNL FLD: NEGATIVE — SIGNIFICANT CHANGE UP
EOSINOPHIL # BLD AUTO: 0.01 K/UL — SIGNIFICANT CHANGE UP (ref 0–0.7)
EOSINOPHIL NFR BLD AUTO: 0.1 % — SIGNIFICANT CHANGE UP (ref 0–8)
EPI CELLS # UR: ABNORMAL /HPF
GAS PNL BLDV: 142 MMOL/L — SIGNIFICANT CHANGE UP (ref 136–145)
GAS PNL BLDV: 143 MMOL/L — SIGNIFICANT CHANGE UP (ref 136–145)
GAS PNL BLDV: SIGNIFICANT CHANGE UP
GAS PNL BLDV: SIGNIFICANT CHANGE UP
GLUCOSE SERPL-MCNC: 261 MG/DL — HIGH (ref 70–99)
GLUCOSE UR QL: 250 MG/DL
HCO3 BLDV-SCNC: 29 MMOL/L — SIGNIFICANT CHANGE UP (ref 22–29)
HCO3 BLDV-SCNC: 30 MMOL/L — HIGH (ref 22–29)
HCT VFR BLD CALC: 39.8 % — LOW (ref 42–52)
HCT VFR BLDA CALC: 36.9 % — SIGNIFICANT CHANGE UP (ref 34–44)
HCT VFR BLDA CALC: 45.1 % — HIGH (ref 34–44)
HGB BLD CALC-MCNC: 12.1 G/DL — LOW (ref 14–18)
HGB BLD CALC-MCNC: 14.7 G/DL — SIGNIFICANT CHANGE UP (ref 14–18)
HGB BLD-MCNC: 12.1 G/DL — LOW (ref 14–18)
IMM GRANULOCYTES NFR BLD AUTO: 0.8 % — HIGH (ref 0.1–0.3)
KETONES UR-MCNC: NEGATIVE — SIGNIFICANT CHANGE UP
LACTATE BLDV-MCNC: 2.7 MMOL/L — HIGH (ref 0.5–1.6)
LACTATE BLDV-MCNC: 2.9 MMOL/L — HIGH (ref 0.5–1.6)
LACTATE BLDV-MCNC: 2.9 MMOL/L — HIGH (ref 0.5–1.6)
LACTATE SERPL-SCNC: 1.3 MMOL/L — SIGNIFICANT CHANGE UP (ref 0.5–2.2)
LACTATE SERPL-SCNC: 3.3 MMOL/L — HIGH (ref 0.5–2.2)
LEUKOCYTE ESTERASE UR-ACNC: NEGATIVE — SIGNIFICANT CHANGE UP
LYMPHOCYTES # BLD AUTO: 1.28 K/UL — SIGNIFICANT CHANGE UP (ref 1.2–3.4)
LYMPHOCYTES # BLD AUTO: 6.8 % — LOW (ref 20.5–51.1)
MCHC RBC-ENTMCNC: 28.2 PG — SIGNIFICANT CHANGE UP (ref 27–31)
MCHC RBC-ENTMCNC: 30.4 G/DL — LOW (ref 32–37)
MCV RBC AUTO: 92.8 FL — SIGNIFICANT CHANGE UP (ref 80–94)
MONOCYTES # BLD AUTO: 1.52 K/UL — HIGH (ref 0.1–0.6)
MONOCYTES NFR BLD AUTO: 8.1 % — SIGNIFICANT CHANGE UP (ref 1.7–9.3)
NEUTROPHILS # BLD AUTO: 15.85 K/UL — HIGH (ref 1.4–6.5)
NEUTROPHILS NFR BLD AUTO: 83.8 % — HIGH (ref 42.2–75.2)
NITRITE UR-MCNC: NEGATIVE — SIGNIFICANT CHANGE UP
NRBC # BLD: 0 /100 WBCS — SIGNIFICANT CHANGE UP (ref 0–0)
PCO2 BLDV: 65 MMHG — HIGH (ref 41–51)
PCO2 BLDV: 78 MMHG — HIGH (ref 41–51)
PH BLDV: 7.2 — LOW (ref 7.26–7.43)
PH BLDV: 7.26 — SIGNIFICANT CHANGE UP (ref 7.26–7.43)
PH UR: 6 — SIGNIFICANT CHANGE UP (ref 5–8)
PLATELET # BLD AUTO: 330 K/UL — SIGNIFICANT CHANGE UP (ref 130–400)
PO2 BLDV: 26 MMHG — SIGNIFICANT CHANGE UP (ref 20–40)
PO2 BLDV: 30 MMHG — SIGNIFICANT CHANGE UP (ref 20–40)
POTASSIUM BLDV-SCNC: 4.8 MMOL/L — SIGNIFICANT CHANGE UP (ref 3.3–5.6)
POTASSIUM BLDV-SCNC: 5.1 MMOL/L — SIGNIFICANT CHANGE UP (ref 3.3–5.6)
POTASSIUM SERPL-MCNC: 5.7 MMOL/L — HIGH (ref 3.5–5)
POTASSIUM SERPL-SCNC: 5.7 MMOL/L — HIGH (ref 3.5–5)
PROT SERPL-MCNC: 7.3 G/DL — SIGNIFICANT CHANGE UP (ref 6–8)
PROT UR-MCNC: 30 MG/DL
RBC # BLD: 4.29 M/UL — LOW (ref 4.7–6.1)
RBC # FLD: 14 % — SIGNIFICANT CHANGE UP (ref 11.5–14.5)
SAO2 % BLDV: 41 % — SIGNIFICANT CHANGE UP
SAO2 % BLDV: 45 % — SIGNIFICANT CHANGE UP
SODIUM SERPL-SCNC: 142 MMOL/L — SIGNIFICANT CHANGE UP (ref 135–146)
SP GR SPEC: 1.02 — SIGNIFICANT CHANGE UP (ref 1.01–1.03)
TROPONIN T SERPL-MCNC: 0.07 NG/ML — CRITICAL HIGH
UROBILINOGEN FLD QL: 0.2 MG/DL — SIGNIFICANT CHANGE UP (ref 0.2–0.2)
WBC # BLD: 18.88 K/UL — HIGH (ref 4.8–10.8)
WBC # FLD AUTO: 18.88 K/UL — HIGH (ref 4.8–10.8)
WBC UR QL: SIGNIFICANT CHANGE UP /HPF

## 2018-08-10 RX ORDER — SODIUM CHLORIDE 9 MG/ML
1000 INJECTION, SOLUTION INTRAVENOUS ONCE
Qty: 0 | Refills: 0 | Status: COMPLETED | OUTPATIENT
Start: 2018-08-10 | End: 2018-08-10

## 2018-08-10 RX ORDER — DOCUSATE SODIUM 100 MG
100 CAPSULE ORAL THREE TIMES A DAY
Qty: 0 | Refills: 0 | Status: DISCONTINUED | OUTPATIENT
Start: 2018-08-10 | End: 2018-08-13

## 2018-08-10 RX ORDER — CEFEPIME 1 G/1
1000 INJECTION, POWDER, FOR SOLUTION INTRAMUSCULAR; INTRAVENOUS ONCE
Qty: 0 | Refills: 0 | Status: COMPLETED | OUTPATIENT
Start: 2018-08-10 | End: 2018-08-10

## 2018-08-10 RX ORDER — CLOPIDOGREL BISULFATE 75 MG/1
75 TABLET, FILM COATED ORAL DAILY
Qty: 0 | Refills: 0 | Status: DISCONTINUED | OUTPATIENT
Start: 2018-08-10 | End: 2018-08-14

## 2018-08-10 RX ORDER — INSULIN LISPRO 100/ML
VIAL (ML) SUBCUTANEOUS
Qty: 0 | Refills: 0 | Status: DISCONTINUED | OUTPATIENT
Start: 2018-08-10 | End: 2018-08-14

## 2018-08-10 RX ORDER — PANTOPRAZOLE SODIUM 20 MG/1
40 TABLET, DELAYED RELEASE ORAL
Qty: 0 | Refills: 0 | Status: DISCONTINUED | OUTPATIENT
Start: 2018-08-10 | End: 2018-08-14

## 2018-08-10 RX ORDER — DEXTROSE 50 % IN WATER 50 %
15 SYRINGE (ML) INTRAVENOUS ONCE
Qty: 0 | Refills: 0 | Status: DISCONTINUED | OUTPATIENT
Start: 2018-08-10 | End: 2018-08-14

## 2018-08-10 RX ORDER — BUPROPION HYDROCHLORIDE 150 MG/1
150 TABLET, EXTENDED RELEASE ORAL DAILY
Qty: 0 | Refills: 0 | Status: DISCONTINUED | OUTPATIENT
Start: 2018-08-10 | End: 2018-08-14

## 2018-08-10 RX ORDER — DEXTROSE 50 % IN WATER 50 %
25 SYRINGE (ML) INTRAVENOUS ONCE
Qty: 0 | Refills: 0 | Status: DISCONTINUED | OUTPATIENT
Start: 2018-08-10 | End: 2018-08-14

## 2018-08-10 RX ORDER — SENNOSIDES/DOCUSATE SODIUM 8.6MG-50MG
2 TABLET ORAL
Qty: 0 | Refills: 0 | COMMUNITY

## 2018-08-10 RX ORDER — GLUCAGON INJECTION, SOLUTION 0.5 MG/.1ML
1 INJECTION, SOLUTION SUBCUTANEOUS ONCE
Qty: 0 | Refills: 0 | Status: DISCONTINUED | OUTPATIENT
Start: 2018-08-10 | End: 2018-08-14

## 2018-08-10 RX ORDER — DEXTROSE 50 % IN WATER 50 %
12.5 SYRINGE (ML) INTRAVENOUS ONCE
Qty: 0 | Refills: 0 | Status: DISCONTINUED | OUTPATIENT
Start: 2018-08-10 | End: 2018-08-14

## 2018-08-10 RX ORDER — TIMOLOL 0.5 %
1 DROPS OPHTHALMIC (EYE) EVERY 12 HOURS
Qty: 0 | Refills: 0 | Status: DISCONTINUED | OUTPATIENT
Start: 2018-08-10 | End: 2018-08-14

## 2018-08-10 RX ORDER — APIXABAN 2.5 MG/1
5 TABLET, FILM COATED ORAL EVERY 12 HOURS
Qty: 0 | Refills: 0 | Status: DISCONTINUED | OUTPATIENT
Start: 2018-08-10 | End: 2018-08-14

## 2018-08-10 RX ORDER — LISINOPRIL 2.5 MG/1
20 TABLET ORAL DAILY
Qty: 0 | Refills: 0 | Status: DISCONTINUED | OUTPATIENT
Start: 2018-08-10 | End: 2018-08-14

## 2018-08-10 RX ORDER — METHOCARBAMOL 500 MG/1
750 TABLET, FILM COATED ORAL THREE TIMES A DAY
Qty: 0 | Refills: 0 | Status: DISCONTINUED | OUTPATIENT
Start: 2018-08-10 | End: 2018-08-14

## 2018-08-10 RX ORDER — SODIUM CHLORIDE 9 MG/ML
1000 INJECTION, SOLUTION INTRAVENOUS
Qty: 0 | Refills: 0 | Status: DISCONTINUED | OUTPATIENT
Start: 2018-08-10 | End: 2018-08-14

## 2018-08-10 RX ORDER — SERTRALINE 25 MG/1
100 TABLET, FILM COATED ORAL DAILY
Qty: 0 | Refills: 0 | Status: DISCONTINUED | OUTPATIENT
Start: 2018-08-10 | End: 2018-08-14

## 2018-08-10 RX ORDER — GABAPENTIN 400 MG/1
300 CAPSULE ORAL THREE TIMES A DAY
Qty: 0 | Refills: 0 | Status: DISCONTINUED | OUTPATIENT
Start: 2018-08-10 | End: 2018-08-13

## 2018-08-10 RX ORDER — SODIUM CHLORIDE 9 MG/ML
1000 INJECTION INTRAMUSCULAR; INTRAVENOUS; SUBCUTANEOUS ONCE
Qty: 0 | Refills: 0 | Status: COMPLETED | OUTPATIENT
Start: 2018-08-10 | End: 2018-08-10

## 2018-08-10 RX ORDER — ATROPINE SULFATE 1 %
1 DROPS OPHTHALMIC (EYE)
Qty: 0 | Refills: 0 | Status: DISCONTINUED | OUTPATIENT
Start: 2018-08-10 | End: 2018-08-14

## 2018-08-10 RX ORDER — NIFEDIPINE 30 MG
60 TABLET, EXTENDED RELEASE 24 HR ORAL DAILY
Qty: 0 | Refills: 0 | Status: DISCONTINUED | OUTPATIENT
Start: 2018-08-10 | End: 2018-08-14

## 2018-08-10 RX ORDER — BRIMONIDINE TARTRATE 2 MG/MG
1 SOLUTION/ DROPS OPHTHALMIC THREE TIMES A DAY
Qty: 0 | Refills: 0 | Status: DISCONTINUED | OUTPATIENT
Start: 2018-08-10 | End: 2018-08-14

## 2018-08-10 RX ORDER — LANOLIN ALCOHOL/MO/W.PET/CERES
5 CREAM (GRAM) TOPICAL AT BEDTIME
Qty: 0 | Refills: 0 | Status: DISCONTINUED | OUTPATIENT
Start: 2018-08-10 | End: 2018-08-14

## 2018-08-10 RX ORDER — ATROPINE SULFATE 1 %
1 DROPS OPHTHALMIC (EYE)
Qty: 0 | Refills: 0 | Status: DISCONTINUED | OUTPATIENT
Start: 2018-08-10 | End: 2018-08-10

## 2018-08-10 RX ORDER — INSULIN LISPRO 100/ML
4 VIAL (ML) SUBCUTANEOUS
Qty: 0 | Refills: 0 | Status: DISCONTINUED | OUTPATIENT
Start: 2018-08-10 | End: 2018-08-14

## 2018-08-10 RX ORDER — INSULIN GLARGINE 100 [IU]/ML
50 INJECTION, SOLUTION SUBCUTANEOUS EVERY MORNING
Qty: 0 | Refills: 0 | Status: DISCONTINUED | OUTPATIENT
Start: 2018-08-10 | End: 2018-08-14

## 2018-08-10 RX ORDER — SENNA PLUS 8.6 MG/1
2 TABLET ORAL AT BEDTIME
Qty: 0 | Refills: 0 | Status: DISCONTINUED | OUTPATIENT
Start: 2018-08-10 | End: 2018-08-13

## 2018-08-10 RX ORDER — LATANOPROST 0.05 MG/ML
1 SOLUTION/ DROPS OPHTHALMIC; TOPICAL AT BEDTIME
Qty: 0 | Refills: 0 | Status: DISCONTINUED | OUTPATIENT
Start: 2018-08-10 | End: 2018-08-14

## 2018-08-10 RX ADMIN — APIXABAN 5 MILLIGRAM(S): 2.5 TABLET, FILM COATED ORAL at 18:49

## 2018-08-10 RX ADMIN — CLOPIDOGREL BISULFATE 75 MILLIGRAM(S): 75 TABLET, FILM COATED ORAL at 18:49

## 2018-08-10 RX ADMIN — Medication 1 DROP(S): at 18:49

## 2018-08-10 RX ADMIN — SODIUM CHLORIDE 1000 MILLILITER(S): 9 INJECTION, SOLUTION INTRAVENOUS at 11:45

## 2018-08-10 RX ADMIN — LISINOPRIL 20 MILLIGRAM(S): 2.5 TABLET ORAL at 18:49

## 2018-08-10 RX ADMIN — Medication 60 MILLIGRAM(S): at 18:49

## 2018-08-10 RX ADMIN — LATANOPROST 1 DROP(S): 0.05 SOLUTION/ DROPS OPHTHALMIC; TOPICAL at 22:39

## 2018-08-10 RX ADMIN — GABAPENTIN 300 MILLIGRAM(S): 400 CAPSULE ORAL at 22:39

## 2018-08-10 RX ADMIN — BRIMONIDINE TARTRATE 1 DROP(S): 2 SOLUTION/ DROPS OPHTHALMIC at 22:40

## 2018-08-10 RX ADMIN — SODIUM CHLORIDE 2000 MILLILITER(S): 9 INJECTION INTRAMUSCULAR; INTRAVENOUS; SUBCUTANEOUS at 10:17

## 2018-08-10 RX ADMIN — Medication 4 UNIT(S): at 19:00

## 2018-08-10 RX ADMIN — BUPROPION HYDROCHLORIDE 150 MILLIGRAM(S): 150 TABLET, EXTENDED RELEASE ORAL at 18:49

## 2018-08-10 RX ADMIN — SERTRALINE 100 MILLIGRAM(S): 25 TABLET, FILM COATED ORAL at 18:49

## 2018-08-10 RX ADMIN — CEFEPIME 100 MILLIGRAM(S): 1 INJECTION, POWDER, FOR SOLUTION INTRAMUSCULAR; INTRAVENOUS at 13:06

## 2018-08-10 NOTE — ED PROVIDER NOTE - CRITICAL CARE PROVIDED
direct patient care (not related to procedure)/additional history taking/consultation with other physicians/interpretation of diagnostic studies/documentation

## 2018-08-10 NOTE — ED PROVIDER NOTE - PHYSICAL EXAMINATION
CONSTITUTIONAL: Well-appearing; well-nourished; in no apparent distress.   HEAD: Normocephalic; atraumatic.   EYES: + R eye blindness  ENT: Normal pharynx with no tonsillar hypertrophy. MMM.  NECK: Supple; non-tender; no cervical lymphadenopathy.   CHEST: Normal chest excursion with respiration. S/P sternotomy.  CARDIOVASCULAR: Normal S1, S2; no murmurs, rubs, or gallops.   RESPIRATORY: Normal chest excursion with respiration; breath sounds clear and equal bilaterally; no wheezes, rhonchi, or rales.  GI/: Normal bowel sounds; non-distended; non-tender. LLQ surgical scar well healed.   BACK: No evidence of trauma or deformity. Lumbar surgical scar healing well. + tender to palpation over scar. No CVA tenderness.   EXT: S/P R transmet  SKIN: Normal for age and race; warm; dry; good turgor.  NEURO: A & O x 4; CN 2-12 intact. Grossly unremarkable.

## 2018-08-10 NOTE — ED PROVIDER NOTE - CARE PLAN
Principal Discharge DX:	Weakness  Secondary Diagnosis:	Hypercarbia  Secondary Diagnosis:	Cardiac enzymes elevated

## 2018-08-10 NOTE — ED ADULT NURSE NOTE - OBJECTIVE STATEMENT
Patient brought in from home by home health aide because she was unable to arouse patient from sleep this morning for about 20 minutes. Patient's only complaint is feeling tired.

## 2018-08-10 NOTE — H&P ADULT - ATTENDING COMMENTS
encephalopathy resolved; suspect opioid overdose based on chart notes  monitor off abx  discharge planning  on NS IVF, mild lactic acid elevation, repeat in am    #elevated cardiac enzymes  no chest pain  ekg wnl  ck flat  monitor, low suspicion for acs

## 2018-08-10 NOTE — H&P ADULT - PMH
Arthritis    CAD (coronary artery disease) of bypass graft  cabg 1995 3v  Depression    DM (diabetes mellitus)    GERD (gastroesophageal reflux disease)    Glaucoma    HTN (hypertension)

## 2018-08-10 NOTE — ED ADULT NURSE REASSESSMENT NOTE - NS ED NURSE REASSESS COMMENT FT1
patient refusing mattress alarm states he will not get up without assistance, call bell in reach, side rails up x 2 and family at bedside

## 2018-08-10 NOTE — H&P ADULT - NSHPLABSRESULTS_GEN_ALL_CORE
LABS:                        12.1   18.88 )-----------( 330      ( 10 Aug 2018 09:12 )             39.8     08-10    142  |  97<L>  |  24<H>  ----------------------------<  261<H>  5.7<H>   |  27  |  1.4    Ca    9.6      10 Aug 2018 09:12    TPro  7.3  /  Alb  4.3  /  TBili  0.3  /  DBili  x   /  AST  65<H>  /  ALT  60<H>  /  AlkPhos  117<H>  08-10      Urinalysis Basic - ( 10 Aug 2018 09:15 )    Color: Yellow / Appearance: Cloudy / S.025 / pH: x  Gluc: x / Ketone: Negative  / Bili: Negative / Urobili: 0.2 mg/dL   Blood: x / Protein: 30 mg/dL / Nitrite: Negative   Leuk Esterase: Negative / RBC: x / WBC 1-2 /HPF   Sq Epi: x / Non Sq Epi: Few /HPF / Bacteria: x        Creatine Kinase, Serum: 63 U/L (08-10-18 @ 09:12)  Troponin T, Serum: 0.07 ng/mL <HH> (08-10-18 @ 09:12)  Lactate, Blood: 3.3 mmol/L <H> (08-10-18 @ 09:12)      CARDIAC MARKERS ( 10 Aug 2018 09:12 )  x     / 0.07 ng/mL / 63 U/L / x     / x          RADIOLOGY:  < from: US Abdomen Limited (08.10.18 @ 14:59) >    The liver is enlarged measuring 23 cm in sagittal diameter. It is   homogeneous in echogenicity with no discrete mass formation seen. It   should be noted that hepatocellular disease cannot be excluded on this   exam.  The pancreas is not well visualized.  The gallbladder has been removed. The common bile duct is normal in size.  The right kidney appears normal.  There is no ascites.  Impression  Hepatomegaly    < end of copied text >    < from: Xray Chest 1 View AP/PA (08.10.18 @ 09:37) >    No radiographic evidence of acute cardiopulmonary disease.    < end of copied text >

## 2018-08-10 NOTE — ED PROVIDER NOTE - PROGRESS NOTE DETAILS
NEUROSURGERY CONSULTED, WILL EVALUATE PT IN THE ED. CASE D/W DR. DIAS, WILL EVALUATE PT FOR ICU PT EVALUATED BY DR. DIAS AND NOT A CANDIDATE FOR ICU ADMISSION AT THIS TIME.

## 2018-08-10 NOTE — H&P ADULT - PSH
CAD (coronary artery disease) of bypass graft    Foot amputation status, right  partial foot amputation  tarsals and metatarsals of right foot  Malfunction of intrathecal infusion pump  implanted initially in 1996  reimplanted 2006  removed 6/27/2018  S/P laparoscopic cholecystectomy    S/P lumbar laminectomy  with repair of CSF leak using dural graft 7/8/2018 - dr Crenshaw

## 2018-08-10 NOTE — ED ADULT NURSE NOTE - PAIN RATING/NUMBER SCALE (0-10): ACTIVITY
0 Tissue Cultured Epidermal Autograft Text: The defect edges were debeveled with a #15 scalpel blade.  Given the location of the defect, shape of the defect and the proximity to free margins a tissue cultured epidermal autograft was deemed most appropriate.  The graft was then trimmed to fit the size of the defect.  The graft was then placed in the primary defect and oriented appropriately.

## 2018-08-10 NOTE — H&P ADULT - HISTORY OF PRESENT ILLNESS
a 67 year old man with DM, HTN, CAD s/p CABG, afib on eliquis and hx of intrathecal pump that was removed complicated by CSF leak s/p repair is bought in by aid for unresponsiveness this morning. As per the patient and per ED note, patient was checked by the aid that found the patient weka and unresponsive, she checked his FS and was 400 so she brought him in.  the patient does not remember the episode but he is saying that he feels ok and he wants to go home.  to note patient has fentanyl patch 100mch in the middle of his back and he said that it is possible that he took some extra pain meds last night but he is not sure.  he denies headache, dizziness, fever, chills, aspiration, SOB, cough,  abdominal pain, constipation, dysuria, hematuria, motor weakness.  patient reports pinpoint chest pain on the left lower ribs that is intermittent mild and is there since surgery.  he also reports rectal incontinence after the multiple back surgeries he had in the past.    in the ED, glucose 261, positive glucose in urine but negative ketones  qo=167/80  tk=509  rr=18  sat=97% on RA, as per provider note patient was oriented *4  WBC Count= 18.88-AST: 65 -ALT: 60-Alkaline Phosphatase: 117 a 67 year old man with DM, HTN, CAD s/p CABG, afib on eliquis, GERD, depression and hx of intrathecal pump that was removed complicated by CSF leak s/p repair is bought in by aid for unresponsiveness this morning. As per the patient and per ED note, patient was checked by the aid that found the patient weka and unresponsive, she checked his FS and was 400 so she brought him in.  the patient does not remember the episode but he is saying that he feels ok and he wants to go home.  to note patient has fentanyl patch 100mch in the middle of his back and he said that it is possible that he took some extra pain meds last night but he is not sure.    after calling [facility] the patient follows usually, the nurse told me that they got report about patient having fentanyl patch in his mouth this morning, and she confirmed that patient should be off fentanyl. medications list faxed to us, a copy is in the chart and there are no fentanyl no percocet.  the patient denies headache, dizziness, fever, chills, aspiration, SOB, cough,  abdominal pain, constipation, dysuria, hematuria, motor weakness.  patient reports pinpoint chest pain on the left lower ribs that is intermittent mild and is there since surgery.  he also reports rectal incontinence after the multiple back surgeries he had in the past.    in the ED, glucose 261, positive glucose in urine but negative ketones  mp=667/80  wb=313  rr=18  sat=97% on RA, as per provider note patient was oriented *4  WBC Count= 18.88-AST: 65 -ALT: 60-Alkaline Phosphatase: 117

## 2018-08-10 NOTE — ED ADULT NURSE NOTE - INTERVENTIONS DEFINITIONS
Non-slip footwear when patient is off stretcher/Physically safe environment: no spills, clutter or unnecessary equipment/Call bell, personal items and telephone within reach/Stretcher in lowest position, wheels locked, appropriate side rails in place/Monitor for mental status changes and reorient to person, place, and time/[place] to call system/Instruct patient to call for assistance/Monitor gait and stability/Provide visual cue, wrist band, yellow gown, etc.

## 2018-08-10 NOTE — ED PROVIDER NOTE - OBJECTIVE STATEMENT
68 Y/O M HTN, DM, S/P R TRANSMET AMPUTATIONS, CAD, S/P CABG, ANEMIA, GLAUCOMA, R EYE BLINDNESS, S/P INTRATHECAL PUMP REMOVAL 6/27., S/P BLOOD PATCH 7/13 AND LUMBAR LAMI AND CSF LEAK REPAIR ON 7/18 PRESENTS NOW WITH GENERALIZED WEAKNESS. PT REPORTS "I FEEL VERY TIRED." HHA UNABLE TO AWAKEN PT THIS AM AND NOTED BLOOD SUGAR >400 THIS AM. PT DENIES ANY OTHER COMPLAINTS. NO HEADACHE. BACK PAIN UNCHANGED. NO FEVER, CHILLS. NO N/V/D. NO NECK PAIN. NO CP, SOB. NO ABD PAIN. NO POLYURIA OR POLYDIPSIA. 68 Y/O M HTN, DM, S/P R TRANSMET AMPUTATIONS, CAD, S/P CABG, ANEMIA, PAD S/P SFA STENT, CVA. GLAUCOMA, R EYE BLINDNESS, S/P INTRATHECAL PUMP REMOVAL 6/27., S/P BLOOD PATCH 7/13 AND S/P CSF LEAK REPAIR ON 7/18 PRESENTS NOW WITH GENERALIZED WEAKNESS. PT DISCHARGED ON ELIQUIS FOR PAF. PT REPORTS "I FEEL VERY TIRED." HHA UNABLE TO AWAKEN PT THIS AM AND NOTED BLOOD SUGAR >400 THIS AM. PT DENIES ANY OTHER COMPLAINTS. NO HEADACHE. BACK PAIN UNCHANGED. NO FEVER, CHILLS. NO N/V/D. NO NECK PAIN. NO CP, SOB. NO ABD PAIN. NO POLYURIA OR POLYDIPSIA. 66 Y/O M HTN, DM, S/P R TRANSMET AMPUTATION, CAD, S/P CABG, ANEMIA, PAD S/P SFA STENT, CVA. GLAUCOMA, R EYE BLINDNESS, S/P INTRATHECAL PUMP REMOVAL 6/27., S/P BLOOD PATCH 7/13 AND S/P CSF LEAK REPAIR ON 7/18 PRESENTS NOW WITH GENERALIZED WEAKNESS. PT DISCHARGED ON ELIQUIS FOR PAF. PT REPORTS "I FEEL VERY TIRED." HHA UNABLE TO AWAKEN PT THIS AM AND NOTED BLOOD SUGAR >400 THIS AM. PT DENIES ANY OTHER COMPLAINTS. NO HEADACHE. BACK PAIN UNCHANGED. NO FEVER, CHILLS. NO N/V/D. NO NECK PAIN. NO CP, SOB. NO ABD PAIN. NO POLYURIA OR POLYDIPSIA.

## 2018-08-10 NOTE — ED PROVIDER NOTE - NS ED ROS FT
Constitutional:  See HPI.  ENMT:  No hearing changes, pain, discharge or infections. No neck pain or stiffness.  Cardiac:  No chest pain, SOB or edema. No chest pain with exertion.  Respiratory:  No cough or respiratory distress. No hemoptysis. No history of asthma or RAD.  GI:  No nausea, vomiting, diarrhea or abdominal pain.  :  No dysuria, frequency or burning.  MS:  No myalgia, muscle weakness, joint pain  Neuro:  No headache .  No LOC.  Skin:  No skin rash.   Endocrine: No history of thyroid disease   Except as documented in the HPI,  all other systems are negative.

## 2018-08-10 NOTE — H&P ADULT - NSHPOUTPATIENTPROVIDERS_GEN_ALL_CORE
[facility] [phone]  PMD Ramandeep [phone] / on call over the weekend [phone]

## 2018-08-10 NOTE — CONSULT NOTE ADULT - ASSESSMENT
IMPRESSION  >  >  >    PLAN     1. CNS    2. CVS    3. PULMONARY   - Hob > 45  - Oral care    4. INFECTIOUS DISEASE    5. GI    6. RENAL AND ACID BASE     7. ENDOCRINE     8. HEMATOLOGY     9. DVT AND GI PROPHYLAXIS     10. CODE STATUS    11. DISPO IMPRESSION  >Transient Altered mental status   >lactic acidosis  >HAGMA + Respiratory acidosis    PLAN     1. CNS  Avoid sedatives       2. CVS    3. PULMONARY   - Hob > 45  - Oral care    4. INFECTIOUS DISEASE    5. GI    6. RENAL AND ACID BASE     7. ENDOCRINE     8. HEMATOLOGY     9. DVT AND GI PROPHYLAXIS     10. CODE STATUS    11. DISPO IMPRESSION  >Transient Altered mental status   >lactic acidosis - type B  >HAGMA + Respiratory acidosis  >Transaminitis     PLAN     1. CNS  Avoid sedatives and opioids  NS eval.   LP by IR or Neuro surg     2. CVS  Keep MAP > 65   check bnp, ECHO    3. PULMONARY   - Hob > 45  - Oral care  - Bipap for now - check abg in 2 hours    4. INFECTIOUS DISEASE  pan culture  broad spectrum antibiotics    5. GI  Check RUQ sono  oral diet     6. RENAL AND ACID BASE   Trend Lactic acid   Repeat Cbc, bmp, abg    7. ENDOCRINE   insulin basal bolus regimen     9. DVT AND GI PROPHYLAXIS   PPI    10. CODE STATUS  Full code     11. DISPO   TBD pending repeat labs IMPRESSION  >Transient Altered mental status   >lactic acidosis - type B  >HAGMA + Respiratory acidosis  >Transaminitis     PLAN     1. CNS  Avoid sedatives and opioids  NS eval.   LP by IR or Neuro surg     2. CVS  Keep MAP > 65   check bnp, ECHO    3. PULMONARY   - Hob > 45  - Oral care  - Bipap for now 14/7 - check abg in 2 hours    4. INFECTIOUS DISEASE  pan culture  broad spectrum antibiotics    5. GI  Check RUQ sono  oral diet     6. RENAL AND ACID BASE   Trend Lactic acid   Repeat Cbc, bmp, abg    7. ENDOCRINE   insulin basal bolus regimen     9. DVT AND GI PROPHYLAXIS   PPI    10. CODE STATUS  Full code     11. DISPO   TBD pending repeat labs IMPRESSION  >Transient Altered mental status   > Sepsis - no shock  >lactic acidosis - type B  >HAGMA + Respiratory acidosis  >Transaminitis     PLAN     1. CNS  Avoid sedatives and opioids  NS eval.   LP by IR or Neuro surg     2. CVS  Keep MAP > 65   check bnp, ECHO    3. PULMONARY   - Hob > 45  - Oral care  - Bipap for now 14/7 - check abg in 2 hours    4. INFECTIOUS DISEASE  pan culture  broad spectrum antibiotics  CT spine if recommended by NS  ID eval    5. GI  Check RUQ sono  oral diet     6. RENAL AND ACID BASE   Trend Lactic acid   Repeat Cbc, bmp, abg    7. ENDOCRINE   insulin basal bolus regimen     9. DVT AND GI PROPHYLAXIS   PPI    10. CODE STATUS  Full code     11. DISPO   Tele   If deteriorates, will consider ICU placement.

## 2018-08-10 NOTE — CONSULT NOTE ADULT - SUBJECTIVE AND OBJECTIVE BOX
Patient is a 67y old  Male who presents with a chief complaint of weakness     HPI:  69 year old male s/p recent removal of Intrathecal Pain Pump with subsequent CSF leak s/p revisions. Pt was diagnosed with new onset a-fib and started on Eliquis. Pt was discharged home on . Pt was brought in this am for lethargy. FS >400. Pt denies back pain, HA. ER called for abnormal labs with HAGMA, elevated BS    PAST MEDICAL & SURGICAL HISTORY:  CAD (coronary artery disease) of bypass graft: cabg  3v  Glaucoma  Arthritis  GERD (gastroesophageal reflux disease)  HTN (hypertension)  Depression  Anemia  DM (diabetes mellitus)  Foot amputation status, right: partial foot amputation  tarsals and metatarsals of right foot  S/P laparoscopic cholecystectomy  CAD (coronary artery disease) of bypass graft      SOCIAL HX:   Smoking                         ETOH                            Other    FAMILY HISTORY:  CAD (coronary artery disease) (Father)      ROS:  See HPI     Allergies    penicillins (Other (U))    Intolerances          PHYSICAL EXAM    ICU Vital Signs Last 24 Hrs  T(C): 37.1 (10 Aug 2018 12:58), Max: 37.1 (10 Aug 2018 09:38)  T(F): 98.7 (10 Aug 2018 12:58), Max: 98.8 (10 Aug 2018 09:38)  HR: 86 (10 Aug 2018 12:58) (86 - 102)  BP: 147/68 (10 Aug 2018 12:58) (140/80 - 147/68)  BP(mean): --  ABP: --  ABP(mean): --  RR: 18 (10 Aug 2018 12:58) (18 - 18)  SpO2: 98% (10 Aug 2018 12:58) (97% - 98%)      General:  HEENT:  SARAH              Lymph Nodes: No cervical LN   Lungs: Bilateral BS  Cardiovascular: Regular  Abdomen: Soft, Positive BS  Extremities: No clubbing  Skin: Warm  Neurological: Non focal         LABS:                          12.1   18.88 )-----------( 330      ( 10 Aug 2018 09:12 )             39.8                                               08-10    142  |  97<L>  |  24<H>  ----------------------------<  261<H>  5.7<H>   |  27  |  1.4    Ca    9.6      10 Aug 2018 09:12    TPro  7.3  /  Alb  4.3  /  TBili  0.3  /  DBili  x   /  AST  65<H>  /  ALT  60<H>  /  AlkPhos  117<H>  08-10                                             Urinalysis Basic - ( 10 Aug 2018 09:15 )    Color: Yellow / Appearance: Cloudy / S.025 / pH: x  Gluc: x / Ketone: Negative  / Bili: Negative / Urobili: 0.2 mg/dL   Blood: x / Protein: 30 mg/dL / Nitrite: Negative   Leuk Esterase: Negative / RBC: x / WBC 1-2 /HPF   Sq Epi: x / Non Sq Epi: Few /HPF / Bacteria: x        CARDIAC MARKERS ( 10 Aug 2018 09:12 )  x     / 0.07 ng/mL / 63 U/L / x     / x                                                LIVER FUNCTIONS - ( 10 Aug 2018 09:12 )  Alb: 4.3 g/dL / Pro: 7.3 g/dL / ALK PHOS: 117 U/L / ALT: 60 U/L / AST: 65 U/L / GGT: x                                                                                                                                       X-Rays                                                                                     ECHO    MEDICATIONS  (STANDING):  cefepime   IVPB 1000 milliGRAM(s) IV Intermittent once    MEDICATIONS  (PRN): Patient is a 67y old  Male who presents with a chief complaint of weakness     HPI:  69 year old male s/p recent removal of Intrathecal Pain Pump with subsequent CSF leak s/p revisions. Pt was diagnosed with new onset a-fib and started on Eliquis. Pt was discharged home on . Pt was brought in this am for lethargy. FS >400. Pt claims he did not slee the night before and today am, when his aid tried to wake up he had hesitation to wake up. His aid called ems to bring in the pt. Currently pt is AAO x 3, c/o pain in the surgical site. ICU called for abnormal labs with HAGMA, lactic acidosis.     PAST MEDICAL & SURGICAL HISTORY:  CAD (coronary artery disease) of bypass graft: cabg  3v  Glaucoma  Arthritis  GERD (gastroesophageal reflux disease)  HTN (hypertension)  Depression  Anemia  DM (diabetes mellitus)  Foot amputation status, right: partial foot amputation  tarsals and metatarsals of right foot  S/P laparoscopic cholecystectomy  CAD (coronary artery disease) of bypass graft      SOCIAL HX:   Smoking     quit long time ago                    ETOH  denies                            Other    FAMILY HISTORY:  CAD (coronary artery disease) (Father)      ROS:  See HPI     Allergies    penicillins (Other (U))    Intolerances    PHYSICAL EXAM    ICU Vital Signs Last 24 Hrs  T(C): 37.1 (10 Aug 2018 12:58), Max: 37.1 (10 Aug 2018 09:38)  T(F): 98.7 (10 Aug 2018 12:58), Max: 98.8 (10 Aug 2018 09:38)  HR: 86 (10 Aug 2018 12:58) (86 - 102)  BP: 147/68 (10 Aug 2018 12:58) (140/80 - 147/68)  RR: 18 (10 Aug 2018 12:58) (18 - 18)  SpO2: 98% (10 Aug 2018 12:58) (97% - 98%)    General: AAO x 3   HEENT:  SARAH              Lymph Nodes: No cervical LN   Lungs: Bilateral BS  Cardiovascular: Regular  Abdomen: Soft, Positive BS  Extremities: No clubbing  Skin: Warm, lower back wound red, TTT, mildly inflamed.   Neurological: Non focal         LABS:                          12.1   18.88 )-----------( 330      ( 10 Aug 2018 09:12 )             39.8                                               08-10    142  |  97<L>  |  24<H>  ----------------------------<  261<H>  5.7<H>   |  27  |  1.4    Ca    9.6      10 Aug 2018 09:12    TPro  7.3  /  Alb  4.3  /  TBili  0.3  /  DBili  x   /  AST  65<H>  /  ALT  60<H>  /  AlkPhos  117<H>  08-10                                             Urinalysis Basic - ( 10 Aug 2018 09:15 )    Color: Yellow / Appearance: Cloudy / S.025 / pH: x  Gluc: x / Ketone: Negative  / Bili: Negative / Urobili: 0.2 mg/dL   Blood: x / Protein: 30 mg/dL / Nitrite: Negative   Leuk Esterase: Negative / RBC: x / WBC 1-2 /HPF   Sq Epi: x / Non Sq Epi: Few /HPF / Bacteria: x    CARDIAC MARKERS ( 10 Aug 2018 09:12 )  x     / 0.07 ng/mL / 63 U/L / x     / x                                                LIVER FUNCTIONS - ( 10 Aug 2018 09:12 )  Alb: 4.3 g/dL / Pro: 7.3 g/dL / ALK PHOS: 117 U/L / ALT: 60 U/L / AST: 65 U/L / GGT: x                                                   X-Rays                     no infiltrate                                                                 ECHO    MEDICATIONS  (STANDING):  cefepime   IVPB 1000 milliGRAM(s) IV Intermittent once    MEDICATIONS  (PRN):

## 2018-08-10 NOTE — ED PROVIDER NOTE - MEDICAL DECISION MAKING DETAILS
66 Y/O M PMHX AS DOCUMENTED WITH PERIOD OF DECREASED RESPONSIVENESS THIS AM. LABS WITH LEUKOCYTOSIS, ELEVATED TROPONIN, HYPERGLYCEMIA AND ELEVATED LFTS (NEW). VBG WITH HYPERCARBIA. LACTATE AND pCO2 IMPROVING. PT EVALUATED BY ICU AND NOT A CANDIDATE AT THIS TIME.

## 2018-08-10 NOTE — H&P ADULT - NSHPPHYSICALEXAM_GEN_ALL_CORE
ICU Vital Signs Last 24 Hrs  T(C): 37.1 (10 Aug 2018 12:58), Max: 37.1 (10 Aug 2018 09:38)  T(F): 98.7 (10 Aug 2018 12:58), Max: 98.8 (10 Aug 2018 09:38)  HR: 86 (10 Aug 2018 12:58) (86 - 102)  BP: 147/68 (10 Aug 2018 12:58) (140/80 - 147/68)  BP(mean): --  ABP: --  ABP(mean): --  RR: 18 (10 Aug 2018 12:58) (18 - 18)  SpO2: 98% (10 Aug 2018 12:58) (97% - 98%)      Constitutional: NAD, lying on his right side    Eyes: right eye blindness    ENMT: anicteric sclera, fair injected conjunctiva    Back: scar or recent surgery, no open wound no signs of infection    Respiratory: decrease BS bilaterally (poor inspiratory effort / obese) no wheezes    Cardiovascular: regular S1S2, no appreciated murmur    Gastrointestinal: positive BS, soft non tender, non distended, scar of intrathecalpump LLQ    Genitourinary: no CVA tenderness    Extremities: right foot metatarsal amputation, clean stump. no ext edema    Neurological: alert, oriented *4, CN intact, soft non tender.    Skin: no rash except mentioned above ICU Vital Signs Last 24 Hrs  T(C): 37.1 (10 Aug 2018 12:58), Max: 37.1 (10 Aug 2018 09:38)  T(F): 98.7 (10 Aug 2018 12:58), Max: 98.8 (10 Aug 2018 09:38)  HR: 86 (10 Aug 2018 12:58) (86 - 102)  BP: 147/68 (10 Aug 2018 12:58) (140/80 - 147/68)  BP(mean): --  ABP: --  ABP(mean): --  RR: 18 (10 Aug 2018 12:58) (18 - 18)  SpO2: 98% (10 Aug 2018 12:58) (97% - 98%)      Constitutional: NAD, lying on his right side    Eyes: right eye blindness    ENMT: anicteric sclera, fair injected conjunctiva    Back: scar or recent surgery, no open wound no signs of infection    Respiratory: decrease BS bilaterally (poor inspiratory effort / obese) no wheezes    Cardiovascular: regular S1S2, no appreciated murmur    Gastrointestinal: positive BS, soft non tender, non distended, scar of intrathecalpump LLQ    Genitourinary: no CVA tenderness    Extremities: right foot metatarsal amputation, clean stump. no ext edema    Neurological: alert, oriented *4, CN intact, soft non tender.    Skin: no rash except mentioned above    Psych: nl affect

## 2018-08-10 NOTE — H&P ADULT - ASSESSMENT
a 67 year old man with DM, HTN, CAD s/p CABG, afib on eliquis, GERD, depression and hx of intrathecal pump that was removed complicated by CSF leak s/p repair is bought in by aid for unresponsiveness this morning.    **Acute short time of unresponsiveness  -high on the differential is opoid intoxication  (fentanyl patch reported in the mouth - as per [facility] center he should be off fentanyl)? with associated hypercapnic respiratory failure?  less likely infectious (no focus although his wbc is high) less likey stroke (patient is back to baseline, no focal deficit)   -detox consult to hep taper opioids  -keep the current fentanyl patch until further detox recs, no order for fentanyl was put in  -avoid IV narcotics if possible; patient seems comfortable now  -check drug screen to see if other substances are also ingested  -check urine and blood culture; patient had 1 dose of cefepime; CXR negative, UA cloudy with few epithelial cell but no wbc  -check urine and blood culture  -no need for antibiotics now  -if fever or HD instability consider broad spectrum atbx    **elevated LFTs  -will check acetaminophen level  -US showed hepatomegaly  -check INR, repeat Lfts    **HAGMA with mild lactic acidosis  -will repeat lactic acid  -patient is HD stable    **DM  -patient is on basaglar kwikpen at home 50units in the morning and corrective scale only  -please check FS and adjust insulin as needed  -carb consistent diet    **HTN  -c/w benazepril + nifedipine for now  -monitor BP    **Recent afib  -rate controlled now  -eliquis for anticoagulation    **CAD s/p CABG  -patient as per [facility] center is on aspirin and plavix but not on eliquis although discharge papers noted for plavix and eliquis  -continue plavix for now --> patient should follow up with cardiologist in [place]    **depression  -c/w bupropion and zoloft    **Pain management  -c/w fentanyl patch for now until detox recs  -avoid IV narcotics  -gabapentin decreased to 300 tid  -methocarbamol decreased and is PRN now    **GERD on Protonix    **DVT ppx: eliquis a 67 year old man with DM, HTN, CAD s/p CABG, afib on eliquis, GERD, depression and hx of intrathecal pump that was removed complicated by CSF leak s/p repair is bought in by aid for unresponsiveness this morning.    **Acute short time of unresponsiveness  -high on the differential is opoid intoxication  (fentanyl patch reported in the mouth - as per [facility] center he should be off fentanyl)? with associated hypercapnic respiratory failure?  less likely infectious (no focus although his wbc is high) less likey stroke (patient is back to baseline, no focal deficit)   -detox consult to hep taper opioids  -keep the current fentanyl patch until further detox recs, no order for fentanyl was put in  -avoid IV narcotics if possible; patient seems comfortable now  -check drug screen to see if other substances are also ingested  -check urine and blood culture; patient had 1 dose of cefepime; CXR negative, UA cloudy with few epithelial cell but no wbc  -check urine and blood culture  -no need for antibiotics now  -if fever or HD instability consider broad spectrum atbx    **elevated LFTs  -will check acetaminophen level  -US showed hepatomegaly  -check INR, repeat Lfts    **HAGMA with mild lactic acidosis  -will repeat lactic acid  -patient is HD stable    **DM  -patient is on basaglar kwikpen at home 50units in the morning and corrective scale only  -please check FS and adjust insulin as needed  -carb consistent diet    **HTN  -c/w benazepril + nifedipine for now  -monitor BP    **Recent afib  -rate controlled now  -eliquis for anticoagulation    **CAD s/p CABG  -patient as per [facility] center is on aspirin and plavix but not on eliquis although discharge papers noted for plavix and eliquis  -continue plavix for now --> patient should follow up with cardiologist in [place]    **depression  -c/w bupropion and zoloft    **Pain management  -c/w fentanyl patch for now until detox recs  -avoid IV narcotics  -gabapentin decreased to 300 tid  -methocarbamol decreased and is PRN now    **GERD on Protonix    **DVT ppx: eliquis    **disposition: home with home care; he has home health aid 8hrs a day for 6 days, uses a walker. a 67 year old man with DM, HTN, CAD s/p CABG, afib on eliquis, GERD, depression and hx of intrathecal pump that was removed complicated by CSF leak s/p repair is bought in by aid for unresponsiveness this morning.    **Encephalopathy  -high on the differential is opoid intoxication  (fentanyl patch reported in the mouth - as per [facility] center he should be off fentanyl)? with associated hypercapnic respiratory failure?  less likely infectious (no focus although his wbc is high) less likey stroke (patient is back to baseline, no focal deficit)   -detox consult to hep taper opioids  -keep the current fentanyl patch until further detox recs, no order for fentanyl was put in  -avoid IV narcotics if possible; patient seems comfortable now  -check drug screen to see if other substances are also ingested  -check urine and blood culture; patient had 1 dose of cefepime; CXR negative, UA cloudy with few epithelial cell but no wbc  -check urine and blood culture  -no need for antibiotics now  -if fever or HD instability consider broad spectrum atbx    **elevated LFTs  -will check acetaminophen level  -US showed hepatomegaly  -check INR, repeat Lfts      **DM  -patient is on basaglar kwikpen at home 50units in the morning and corrective scale only  -please check FS and adjust insulin as needed  -carb consistent diet    **HTN  -c/w benazepril + nifedipine for now  -monitor BP    **Recent afib  -rate controlled now  -eliquis for anticoagulation    **CAD s/p CABG  -patient as per [facility] center is on aspirin and plavix but not on eliquis although discharge papers noted for plavix and eliquis  -continue plavix for now --> patient should follow up with cardiologist in [place]    **depression  -c/w bupropion and zoloft    **Pain management  -c/w fentanyl patch for now until detox recs  -avoid IV narcotics  -gabapentin decreased to 300 tid  -methocarbamol decreased and is PRN now    **GERD on Protonix    **DVT ppx: eliquis    **disposition: home with home care; he has home health aid 8hrs a day for 6 days, uses a walker.

## 2018-08-11 PROBLEM — D64.9 ANEMIA, UNSPECIFIED: Chronic | Status: INACTIVE | Noted: 2018-06-14 | Resolved: 2018-08-10

## 2018-08-11 LAB
ALBUMIN SERPL ELPH-MCNC: 3.7 G/DL — SIGNIFICANT CHANGE UP (ref 3.5–5.2)
ALBUMIN SERPL ELPH-MCNC: 4.4 G/DL — SIGNIFICANT CHANGE UP (ref 3.5–5.2)
ALP SERPL-CCNC: 104 U/L — SIGNIFICANT CHANGE UP (ref 30–115)
ALP SERPL-CCNC: 95 U/L — SIGNIFICANT CHANGE UP (ref 30–115)
ALT FLD-CCNC: 43 U/L — HIGH (ref 0–41)
ALT FLD-CCNC: 44 U/L — HIGH (ref 0–41)
ANION GAP SERPL CALC-SCNC: 13 MMOL/L — SIGNIFICANT CHANGE UP (ref 7–14)
ANION GAP SERPL CALC-SCNC: 16 MMOL/L — HIGH (ref 7–14)
APAP SERPL-MCNC: <5 UG/ML — LOW (ref 10–30)
AST SERPL-CCNC: 41 U/L — SIGNIFICANT CHANGE UP (ref 0–41)
AST SERPL-CCNC: 43 U/L — HIGH (ref 0–41)
BASOPHILS # BLD AUTO: 0.11 K/UL — SIGNIFICANT CHANGE UP (ref 0–0.2)
BASOPHILS NFR BLD AUTO: 0.6 % — SIGNIFICANT CHANGE UP (ref 0–1)
BILIRUB DIRECT SERPL-MCNC: <0.2 MG/DL — SIGNIFICANT CHANGE UP (ref 0–0.2)
BILIRUB DIRECT SERPL-MCNC: <0.2 MG/DL — SIGNIFICANT CHANGE UP (ref 0–0.2)
BILIRUB INDIRECT FLD-MCNC: >0.1 MG/DL — LOW (ref 0.2–1.2)
BILIRUB INDIRECT FLD-MCNC: >0.2 MG/DL — SIGNIFICANT CHANGE UP (ref 0.2–1.2)
BILIRUB SERPL-MCNC: 0.3 MG/DL — SIGNIFICANT CHANGE UP (ref 0.2–1.2)
BILIRUB SERPL-MCNC: 0.4 MG/DL — SIGNIFICANT CHANGE UP (ref 0.2–1.2)
BUN SERPL-MCNC: 20 MG/DL — SIGNIFICANT CHANGE UP (ref 10–20)
BUN SERPL-MCNC: 21 MG/DL — HIGH (ref 10–20)
CALCIUM SERPL-MCNC: 9.3 MG/DL — SIGNIFICANT CHANGE UP (ref 8.5–10.1)
CALCIUM SERPL-MCNC: 9.5 MG/DL — SIGNIFICANT CHANGE UP (ref 8.5–10.1)
CHLORIDE SERPL-SCNC: 100 MMOL/L — SIGNIFICANT CHANGE UP (ref 98–110)
CHLORIDE SERPL-SCNC: 98 MMOL/L — SIGNIFICANT CHANGE UP (ref 98–110)
CK MB CFR SERPL CALC: 9.1 NG/ML — HIGH (ref 0.6–6.3)
CK MB CFR SERPL CALC: 9.9 NG/ML — HIGH (ref 0.6–6.3)
CK SERPL-CCNC: 108 U/L — SIGNIFICANT CHANGE UP (ref 0–225)
CK SERPL-CCNC: 111 U/L — SIGNIFICANT CHANGE UP (ref 0–225)
CO2 SERPL-SCNC: 26 MMOL/L — SIGNIFICANT CHANGE UP (ref 17–32)
CO2 SERPL-SCNC: 27 MMOL/L — SIGNIFICANT CHANGE UP (ref 17–32)
CREAT SERPL-MCNC: 1 MG/DL — SIGNIFICANT CHANGE UP (ref 0.7–1.5)
CREAT SERPL-MCNC: 1 MG/DL — SIGNIFICANT CHANGE UP (ref 0.7–1.5)
CULTURE RESULTS: SIGNIFICANT CHANGE UP
EOSINOPHIL # BLD AUTO: 0.22 K/UL — SIGNIFICANT CHANGE UP (ref 0–0.7)
EOSINOPHIL NFR BLD AUTO: 1.2 % — SIGNIFICANT CHANGE UP (ref 0–8)
GLUCOSE SERPL-MCNC: 112 MG/DL — HIGH (ref 70–99)
GLUCOSE SERPL-MCNC: 134 MG/DL — HIGH (ref 70–99)
HCT VFR BLD CALC: 38.2 % — LOW (ref 42–52)
HGB BLD-MCNC: 11.9 G/DL — LOW (ref 14–18)
IMM GRANULOCYTES NFR BLD AUTO: 0.3 % — SIGNIFICANT CHANGE UP (ref 0.1–0.3)
LACTATE SERPL-SCNC: 2.2 MMOL/L — SIGNIFICANT CHANGE UP (ref 0.5–2.2)
LACTATE SERPL-SCNC: 4.1 MMOL/L — CRITICAL HIGH (ref 0.5–2.2)
LYMPHOCYTES # BLD AUTO: 33.1 % — SIGNIFICANT CHANGE UP (ref 20.5–51.1)
LYMPHOCYTES # BLD AUTO: 5.89 K/UL — HIGH (ref 1.2–3.4)
MAGNESIUM SERPL-MCNC: 2.2 MG/DL — SIGNIFICANT CHANGE UP (ref 1.8–2.4)
MCHC RBC-ENTMCNC: 28 PG — SIGNIFICANT CHANGE UP (ref 27–31)
MCHC RBC-ENTMCNC: 31.2 G/DL — LOW (ref 32–37)
MCV RBC AUTO: 89.9 FL — SIGNIFICANT CHANGE UP (ref 80–94)
MONOCYTES # BLD AUTO: 1.51 K/UL — HIGH (ref 0.1–0.6)
MONOCYTES NFR BLD AUTO: 8.5 % — SIGNIFICANT CHANGE UP (ref 1.7–9.3)
NEUTROPHILS # BLD AUTO: 10.04 K/UL — HIGH (ref 1.4–6.5)
NEUTROPHILS NFR BLD AUTO: 56.3 % — SIGNIFICANT CHANGE UP (ref 42.2–75.2)
NT-PROBNP SERPL-SCNC: 2064 PG/ML — HIGH (ref 0–300)
PLATELET # BLD AUTO: 289 K/UL — SIGNIFICANT CHANGE UP (ref 130–400)
POTASSIUM SERPL-MCNC: 4.6 MMOL/L — SIGNIFICANT CHANGE UP (ref 3.5–5)
POTASSIUM SERPL-MCNC: 4.9 MMOL/L — SIGNIFICANT CHANGE UP (ref 3.5–5)
POTASSIUM SERPL-SCNC: 4.6 MMOL/L — SIGNIFICANT CHANGE UP (ref 3.5–5)
POTASSIUM SERPL-SCNC: 4.9 MMOL/L — SIGNIFICANT CHANGE UP (ref 3.5–5)
PROT SERPL-MCNC: 6.4 G/DL — SIGNIFICANT CHANGE UP (ref 6–8)
PROT SERPL-MCNC: 7 G/DL — SIGNIFICANT CHANGE UP (ref 6–8)
RBC # BLD: 4.25 M/UL — LOW (ref 4.7–6.1)
RBC # FLD: 13.9 % — SIGNIFICANT CHANGE UP (ref 11.5–14.5)
SODIUM SERPL-SCNC: 140 MMOL/L — SIGNIFICANT CHANGE UP (ref 135–146)
SODIUM SERPL-SCNC: 140 MMOL/L — SIGNIFICANT CHANGE UP (ref 135–146)
SPECIMEN SOURCE: SIGNIFICANT CHANGE UP
TROPONIN T SERPL-MCNC: 0.2 NG/ML — CRITICAL HIGH
TROPONIN T SERPL-MCNC: 0.22 NG/ML — CRITICAL HIGH
WBC # BLD: 17.82 K/UL — HIGH (ref 4.8–10.8)
WBC # FLD AUTO: 17.82 K/UL — HIGH (ref 4.8–10.8)

## 2018-08-11 RX ORDER — OXYCODONE AND ACETAMINOPHEN 5; 325 MG/1; MG/1
1 TABLET ORAL ONCE
Qty: 0 | Refills: 0 | Status: DISCONTINUED | OUTPATIENT
Start: 2018-08-11 | End: 2018-08-11

## 2018-08-11 RX ORDER — LABETALOL HCL 100 MG
100 TABLET ORAL ONCE
Qty: 0 | Refills: 0 | Status: COMPLETED | OUTPATIENT
Start: 2018-08-11 | End: 2018-08-11

## 2018-08-11 RX ORDER — SODIUM CHLORIDE 9 MG/ML
1000 INJECTION INTRAMUSCULAR; INTRAVENOUS; SUBCUTANEOUS
Qty: 0 | Refills: 0 | Status: DISCONTINUED | OUTPATIENT
Start: 2018-08-11 | End: 2018-08-14

## 2018-08-11 RX ADMIN — GABAPENTIN 300 MILLIGRAM(S): 400 CAPSULE ORAL at 21:04

## 2018-08-11 RX ADMIN — BRIMONIDINE TARTRATE 1 DROP(S): 2 SOLUTION/ DROPS OPHTHALMIC at 13:06

## 2018-08-11 RX ADMIN — LISINOPRIL 20 MILLIGRAM(S): 2.5 TABLET ORAL at 06:58

## 2018-08-11 RX ADMIN — METHOCARBAMOL 750 MILLIGRAM(S): 500 TABLET, FILM COATED ORAL at 21:05

## 2018-08-11 RX ADMIN — Medication 1 DROP(S): at 18:26

## 2018-08-11 RX ADMIN — OXYCODONE AND ACETAMINOPHEN 1 TABLET(S): 5; 325 TABLET ORAL at 13:20

## 2018-08-11 RX ADMIN — BRIMONIDINE TARTRATE 1 DROP(S): 2 SOLUTION/ DROPS OPHTHALMIC at 21:04

## 2018-08-11 RX ADMIN — PANTOPRAZOLE SODIUM 40 MILLIGRAM(S): 20 TABLET, DELAYED RELEASE ORAL at 06:56

## 2018-08-11 RX ADMIN — GABAPENTIN 300 MILLIGRAM(S): 400 CAPSULE ORAL at 13:06

## 2018-08-11 RX ADMIN — SERTRALINE 100 MILLIGRAM(S): 25 TABLET, FILM COATED ORAL at 12:09

## 2018-08-11 RX ADMIN — LATANOPROST 1 DROP(S): 0.05 SOLUTION/ DROPS OPHTHALMIC; TOPICAL at 21:04

## 2018-08-11 RX ADMIN — INSULIN GLARGINE 50 UNIT(S): 100 INJECTION, SOLUTION SUBCUTANEOUS at 13:06

## 2018-08-11 RX ADMIN — GABAPENTIN 300 MILLIGRAM(S): 400 CAPSULE ORAL at 06:55

## 2018-08-11 RX ADMIN — CLOPIDOGREL BISULFATE 75 MILLIGRAM(S): 75 TABLET, FILM COATED ORAL at 12:09

## 2018-08-11 RX ADMIN — Medication 4 UNIT(S): at 18:26

## 2018-08-11 RX ADMIN — SODIUM CHLORIDE 75 MILLILITER(S): 9 INJECTION INTRAMUSCULAR; INTRAVENOUS; SUBCUTANEOUS at 11:23

## 2018-08-11 RX ADMIN — APIXABAN 5 MILLIGRAM(S): 2.5 TABLET, FILM COATED ORAL at 06:59

## 2018-08-11 RX ADMIN — Medication 4 UNIT(S): at 12:10

## 2018-08-11 RX ADMIN — OXYCODONE AND ACETAMINOPHEN 1 TABLET(S): 5; 325 TABLET ORAL at 12:48

## 2018-08-11 RX ADMIN — Medication 100 MILLIGRAM(S): at 15:45

## 2018-08-11 RX ADMIN — APIXABAN 5 MILLIGRAM(S): 2.5 TABLET, FILM COATED ORAL at 18:26

## 2018-08-11 RX ADMIN — BUPROPION HYDROCHLORIDE 150 MILLIGRAM(S): 150 TABLET, EXTENDED RELEASE ORAL at 12:10

## 2018-08-11 RX ADMIN — Medication 60 MILLIGRAM(S): at 06:59

## 2018-08-11 RX ADMIN — Medication 4 UNIT(S): at 08:19

## 2018-08-11 RX ADMIN — METHOCARBAMOL 750 MILLIGRAM(S): 500 TABLET, FILM COATED ORAL at 00:50

## 2018-08-11 NOTE — PROGRESS NOTE ADULT - SUBJECTIVE AND OBJECTIVE BOX
SUBJECTIVE:    Patient is a 67y old Male who presents with a chief complaint of episode of unresponsiveness. (10 Aug 2018 16:09)    Currently admitted to medicine with the primary diagnosis of Weakness     Today is hospital day 1d. This morning he is resting comfortably in bed and reports no new issues or overnight events.     PAST MEDICAL & SURGICAL HISTORY  CAD (coronary artery disease) of bypass graft: cabg  3v  Glaucoma  Arthritis  GERD (gastroesophageal reflux disease)  HTN (hypertension)  Depression  DM (diabetes mellitus)  S/P lumbar laminectomy: with repair of CSF leak using dural graft 2018 - dr Crenshaw  Malfunction of intrathecal infusion pump: implanted initially in   reimplanted   removed 2018  Foot amputation status, right: partial foot amputation  tarsals and metatarsals of right foot  S/P laparoscopic cholecystectomy  CAD (coronary artery disease) of bypass graft    ALLERGIES:  penicillins (Other (U))    MEDICATIONS:  STANDING MEDICATIONS  apixaban 5 milliGRAM(s) Oral every 12 hours  atropine 1% Ophthalmic Solution - Peds 1 Drop(s) Right EYE two times a day  brimonidine 0.2% Ophthalmic Solution 1 Drop(s) Right EYE three times a day  buPROPion XL . 150 milliGRAM(s) Oral daily  clopidogrel Tablet 75 milliGRAM(s) Oral daily  dextrose 5%. 1000 milliLiter(s) IV Continuous <Continuous>  dextrose 50% Injectable 12.5 Gram(s) IV Push once  dextrose 50% Injectable 25 Gram(s) IV Push once  dextrose 50% Injectable 25 Gram(s) IV Push once  docusate sodium 100 milliGRAM(s) Oral three times a day  gabapentin 300 milliGRAM(s) Oral three times a day  insulin glargine Injectable (LANTUS) 50 Unit(s) SubCutaneous every morning  insulin lispro (HumaLOG) corrective regimen sliding scale   SubCutaneous three times a day before meals  insulin lispro Injectable (HumaLOG) 4 Unit(s) SubCutaneous three times a day before meals  latanoprost 0.005% Ophthalmic Solution 1 Drop(s) Both EYES at bedtime  lisinopril 20 milliGRAM(s) Oral daily  NIFEdipine XL 60 milliGRAM(s) Oral daily  pantoprazole    Tablet 40 milliGRAM(s) Oral before breakfast  senna 2 Tablet(s) Oral at bedtime  sertraline 100 milliGRAM(s) Oral daily  sodium chloride 0.9%. 1000 milliLiter(s) IV Continuous <Continuous>  timolol 0.5% Solution 1 Drop(s) Right EYE every 12 hours    PRN MEDICATIONS  dextrose 40% Gel 15 Gram(s) Oral once PRN  glucagon  Injectable 1 milliGRAM(s) IntraMuscular once PRN  melatonin 5 milliGRAM(s) Oral at bedtime PRN  methocarbamol 750 milliGRAM(s) Oral three times a day PRN    VITALS:   T(F): 97.4  HR: 65  BP: 168/79  RR: 20  SpO2: 98%    LABS:                        11.9   17.82 )-----------( 289      ( 11 Aug 2018 05:45 )             38.2         140  |  98  |  20  ----------------------------<  112<H>  4.6   |  26  |  1.0    Ca    9.5      11 Aug 2018 05:45  Mg     2.2         TPro  7.0  /  Alb  4.4  /  TBili  0.4  /  DBili  <0.2  /  AST  43<H>  /  ALT  44<H>  /  AlkPhos  104        Urinalysis Basic - ( 10 Aug 2018 09:15 )    Color: Yellow / Appearance: Cloudy / S.025 / pH: x  Gluc: x / Ketone: Negative  / Bili: Negative / Urobili: 0.2 mg/dL   Blood: x / Protein: 30 mg/dL / Nitrite: Negative   Leuk Esterase: Negative / RBC: x / WBC 1-2 /HPF   Sq Epi: x / Non Sq Epi: Few /HPF / Bacteria: x        Lactate, Blood: 4.1 mmol/L <HH> (18 @ 05:45)  Troponin T, Serum: 0.20 ng/mL <HH> (18 @ 05:45)  Creatine Kinase, Serum: 111 U/L (18 @ 05:45)  Troponin T, Serum: 0.22 ng/mL <HH> (18 @ 00:45)  Creatine Kinase, Serum: 108 U/L (18 @ 00:45)  Lactate, Blood: 1.3 mmol/L (08-10-18 @ 21:28)      CARDIAC MARKERS ( 11 Aug 2018 05:45 )  x     / 0.20 ng/mL / 111 U/L / x     / 9.1 ng/mL  CARDIAC MARKERS ( 11 Aug 2018 00:45 )  x     / 0.22 ng/mL / 108 U/L / x     / 9.9 ng/mL  CARDIAC MARKERS ( 10 Aug 2018 09:12 )  x     / 0.07 ng/mL / 63 U/L / x     / x          Troponin T, Serum: 0.20 ng/mL (18 @ 05:45)  Troponin T, Serum: 0.22 ng/mL (18 @ 00:45)  Troponin T, Serum: 0.07 ng/mL (08-10-18 @ 09:12)    Lactate, Blood: 4.1 mmol/L (18 @ 05:45)  Lactate, Blood: 1.3 mmol/L (08-10-18 @ 21:28)    PHYSICAL EXAM:  GENERAL: NAD, well-groomed, well-developed  HEAD:  NCAT  NERVOUS SYSTEM: AAOX3  CHEST/LUNG: CTA b/l no w/r/r  HEART: +s1s2 RRR no m/g/r  ABDOMEN: soft, NT/ND (+) bs, no HSM  EXTREMITIES:  2+ Peripheral Pulses, No c/c/e

## 2018-08-11 NOTE — CONSULT NOTE ADULT - SUBJECTIVE AND OBJECTIVE BOX
66yo, , domiciled male, unemployed with pph of depression and pmh of lumbar laminectomy s/p removal of intrathecal pump, dm, cad s/p cabg, htn, GERD, afib on eliquis. Patient referred to ED yesterday morning by home aide after being found unresponsive, with reports of him having a fentanyl patch in his mouth. Addiction consult was placed for opioid taper due to concern that patient may go into withdrawals with current medication regimen. Patient reports that his pain is 5-7/10 and that he continues to have severe pain. He reports that he forgot that he had taken his night time dose of percocet and accidentally took at second dose the night prior to presentation. He also reports that he commonly wears 3 fentanyl patches at a time and that he has placed it in his mouth a couple times, with the most recent being 5 days ago. He denies anyone being aware that he did this. Patient states that he was seen by a pain management physician in the past; however, he was discharged from the clinic after testing positive for methadone, which he says was prescribed to him but he was unable to clarify this with the pain specialist. He reports that his primary doctor prescribes him his pain medication. Of note, iStop shows that patient received a prescription for 7day supply of 30percocet on 7/31 and last received 30 day supply of 20 fentanyl patches 100mcg/patch.   Patient denies ever taking pain medications to help with sleep or to try to hurt himself. He also denies any thoughts of wanting to die or kill himself.  Collateral from Dennise, nurse on call for Dr. [facility], reports that patient is no longer supposed to be on opioids, with iStop showing prescription for 28day supply of 84 tablets of lyrica 150mg. Critical care team also recommended avoiding opioids.    Vitals:   Vital Signs Last 24 Hrs  T(C): 36.9 (11 Aug 2018 13:39), Max: 36.9 (11 Aug 2018 13:39)  T(F): 98.4 (11 Aug 2018 13:39), Max: 98.4 (11 Aug 2018 13:39)  HR: 67 (11 Aug 2018 17:31) (65 - 85)  BP: 158/71 (11 Aug 2018 17:31) (158/71 - 211/80)  BP(mean): --  RR: 19 (11 Aug 2018 13:39) (18 - 20)  SpO2: 98% (11 Aug 2018 07:20) (98% - 98%)    MSE:  Appearance: fair hygeine and grooming, casually dressed, leaning on right arm in the bed  Behavior: appropriate for exam, answers questions, cooperative, pleasant  Speech: low volume, normal rate, mild increase in speech latency, good articulation  Mood: "frustrated"  Affect: depressed, congruent  Oriented to person, place, time, and situation  Intellectual functioning: appears average  Thought process: linear, goal-directed  Thought content: pre-occupied with thoughts of pain. no apparent delusions, suicidality, homicidality or obsessions  Perceptions: no apparent hallucinations  Concentration: intact for conversation  Judgment: limited - to - poor  Insight: fair  Clinical Opioid withdrawal scale: 4    Labs:                         11.9   17.82 )-----------( 289      ( 11 Aug 2018 05:45 )             38.2   08-11    140  |  98  |  20  ----------------------------<  112<H>  4.6   |  26  |  1.0    Ca    9.5      11 Aug 2018 05:45  Mg     2.2     08-11    TPro  7.0  /  Alb  4.4  /  TBili  0.4  /  DBili  <0.2  /  AST  43<H>  /  ALT  44<H>  /  AlkPhos  104  08-11 66yo, , domiciled male, unemployed with pph of depression and pmh of lumbar laminectomy s/p removal of intrathecal pump, dm, cad s/p cabg, htn, GERD, afib on eliquis. Patient referred to ED yesterday morning by home aide after being found unresponsive, with reports of him having a fentanyl patch in his mouth. Addiction consult was placed for opioid taper due to concern that patient may go into withdrawals with current medication regimen. Patient reports that his pain is 5-7/10 and that he continues to have severe pain. He reports that he forgot that he had taken his night time dose of percocet and accidentally took at second dose the night prior to presentation. He also reports that he commonly wears 3 fentanyl patches at a time and that he has placed it in his mouth a couple times, with the most recent being 5 days ago. He denies anyone being aware that he did this. Patient states that he was seen by a pain management physician in the past; however, he was discharged from the clinic after testing positive for methadone, which he says was prescribed to him but he was unable to clarify this with the pain specialist. He reports that his primary doctor prescribes him his pain medication. Of note, iStop shows that patient received a prescription for 7day supply of 30percocet on 7/31 and last received 30 day supply of 20 fentanyl patches 100mcg/patch.   Patient denies ever taking pain medications to help with sleep or to try to hurt himself. He also denies any thoughts of wanting to die or kill himself.  Collateral from Dennise, nurse on call for Dr. [facility], reports that patient is no longer supposed to be on opioids, with iStop showing prescription for 28day supply of 84 tablets of lyrica 150mg. Critical care team also recommended avoiding opioids. However, IM intern states that it was verified that patient is in fact on home doses of fentanyl, and the reason for the consult is because he took extra fentanyl orally.     Vitals:   Vital Signs Last 24 Hrs  T(C): 36.9 (11 Aug 2018 13:39), Max: 36.9 (11 Aug 2018 13:39)  T(F): 98.4 (11 Aug 2018 13:39), Max: 98.4 (11 Aug 2018 13:39)  HR: 67 (11 Aug 2018 17:31) (65 - 85)  BP: 158/71 (11 Aug 2018 17:31) (158/71 - 211/80)  BP(mean): --  RR: 19 (11 Aug 2018 13:39) (18 - 20)  SpO2: 98% (11 Aug 2018 07:20) (98% - 98%)    MSE:  Appearance: fair hygeine and grooming, casually dressed, leaning on right arm in the bed  Behavior: appropriate for exam, answers questions, cooperative, pleasant  Speech: low volume, normal rate, mild increase in speech latency, good articulation  Mood: "frustrated"  Affect: depressed, congruent  Oriented to person, place, time, and situation  Intellectual functioning: appears average  Thought process: linear, goal-directed  Thought content: pre-occupied with thoughts of pain. no apparent delusions, suicidality, homicidality or obsessions  Perceptions: no apparent hallucinations  Concentration: intact for conversation  Judgment: limited - to - poor  Insight: fair  Clinical Opioid withdrawal scale: 4, noteable for diarrhea (c.diff rule out) and HR between     Labs:                         11.9   17.82 )-----------( 289      ( 11 Aug 2018 05:45 )             38.2   08-11    140  |  98  |  20  ----------------------------<  112<H>  4.6   |  26  |  1.0    Ca    9.5      11 Aug 2018 05:45  Mg     2.2     08-11    TPro  7.0  /  Alb  4.4  /  TBili  0.4  /  DBili  <0.2  /  AST  43<H>  /  ALT  44<H>  /  AlkPhos  104  08-11 66yo, , domiciled male, unemployed with pph of depression and pmh of lumbar laminectomy s/p removal of intrathecal pump, dm, cad s/p cabg, htn, GERD, afib on eliquis. Patient referred to ED yesterday morning by home aide after being found unresponsive, with reports of him having a fentanyl patch in his mouth. Addiction consult was placed for opioid taper due to concern that patient may go into withdrawals with current medication regimen. Patient reports that his pain is 5-7/10 and that he continues to have severe pain. He reports that he forgot that he had taken his night time dose of percocet and accidentally took at second dose the night prior to presentation. He also reports that he commonly wears 3 fentanyl patches at a time and that he has placed it in his mouth a couple times, with the most recent being 5 days ago. He denies anyone being aware that he did this. Patient states that he was seen by a pain management physician in the past; however, he was discharged from the clinic after testing positive for methadone, which he says was prescribed to him but he was unable to clarify this with the pain specialist. He reports that his primary doctor prescribes him his pain medication. Of note, iStop shows that patient received a prescription for 7day supply of 30percocet on 7/31 and last received 30 day supply of 20 fentanyl patches 100mcg/patch.   Patient denies ever taking pain medications to help with sleep or to try to hurt himself. He also denies any thoughts of wanting to die or kill himself.  Collateral from Dennise, nurse on call for Dr[facility], reports that patient is no longer supposed to be on opioids, with iStop showing prescription for 28day supply of 84 tablets of lyrica 150mg. Critical care team also recommended avoiding opioids. However, IM intern states that it was verified that patient is in fact on home doses of fentanyl, and the reason for the consult is because he took extra fentanyl orally.     Vitals:   Vital Signs Last 24 Hrs  T(C): 36.9 (11 Aug 2018 13:39), Max: 36.9 (11 Aug 2018 13:39)  T(F): 98.4 (11 Aug 2018 13:39), Max: 98.4 (11 Aug 2018 13:39)  HR: 67 (11 Aug 2018 17:31) (65 - 85)  BP: 158/71 (11 Aug 2018 17:31) (158/71 - 211/80)  BP(mean): --  RR: 19 (11 Aug 2018 13:39) (18 - 20)  SpO2: 98% (11 Aug 2018 07:20) (98% - 98%)    Exam:  General; no acute distress  HEENT; no runny nose, pupils normal size  Cardio: normal HR  Skin; no piloerection  Musculo: no tremor, no pma  psych: patient is calm     MSE:  Appearance: fair hygeine and grooming, casually dressed, leaning on right arm in the bed  Behavior: appropriate for exam, answers questions, cooperative, pleasant  Speech: low volume, normal rate, mild increase in speech latency, good articulation  Mood: "frustrated"  Affect: depressed, congruent  Oriented to person, place, time, and situation  Intellectual functioning: appears average  Thought process: linear, goal-directed  Thought content: pre-occupied with thoughts of pain. no apparent delusions, suicidality, homicidality or obsessions  Perceptions: no apparent hallucinations  Concentration: intact for conversation  Judgment: limited - to - poor  Insight: fair    Clinical Opioid withdrawal scale: 4, noteable for diarrhea (c.diff rule out) and HR between     Labs:                         11.9   17.82 )-----------( 289      ( 11 Aug 2018 05:45 )             38.2   08-11    140  |  98  |  20  ----------------------------<  112<H>  4.6   |  26  |  1.0    Ca    9.5      11 Aug 2018 05:45  Mg     2.2     08-11    TPro  7.0  /  Alb  4.4  /  TBili  0.4  /  DBili  <0.2  /  AST  43<H>  /  ALT  44<H>  /  AlkPhos  104  08-11

## 2018-08-11 NOTE — PROGRESS NOTE ADULT - ASSESSMENT
67 year old man with DM, HTN, CAD s/p CABG, afib on eliquis, GERD, depression and hx of intrathecal pump that was removed complicated by CSF leak s/p repair is bought in by aid for unresponsiveness this morning.    Acute, short time of unresponsiveness 2/2 opiod intoxication w/ componenet of hypercapnic respiratory failure vs infectious vs stroke  -fentayl patch found in pt mouth  -f/u detox and keep the current fentanyl patch until recs given  -avoid IV narcotics   -f/u drug screen  -f/u urine and blood culture  -CXR negative  -UA cloudy with few epithelial cell but no wbc  -no need for antibiotics now  -if fever or HD instability consider broad spectrum abx    Elevated LFTs  -acetaminophen level wnl  -US showed hepatomegaly  -follow LFTs    HAGMA with mild lactic acidosis  -lactate increasing  -trend lactate  -patient is HD stable    DM  -c/w insulin and follow FS  -carb consistent diet    HTN  -c/w benazepril, nifedipine  -monitor BP    Recent Afib  -rate controlled now  -c/w eliquis    CAD s/p CABG  -c/w plavix and eliquis    Depression  -c/w bupropion and zoloft    Pain management  -c/w fentanyl patch for now until detox recs  -avoid IV narcotics  -c/w gabapentin   -methocarbamol decreased and is PRN now    **GERD on Protonix    **DVT ppx: eliquis    **disposition: home with home care; he has home health aid 8hrs a day for 6 days, uses a walker. 67 year old man with DM, HTN, CAD s/p CABG, afib on eliquis, GERD, depression and hx of intrathecal pump that was removed complicated by CSF leak s/p repair is bought in by aid for unresponsiveness this morning.    Acute, short time of unresponsiveness 2/2 opiod intoxication w/ componenet of hypercapnic respiratory failure vs infectious vs stroke  -fentayl patch found in pt mouth  -f/u detox and keep the current fentanyl patch until recs given  -avoid IV narcotics   -f/u drug screen  -f/u urine and blood culture  -CXR negative  -UA cloudy with few epithelial cell but no wbc  -no need for antibiotics now  -if fever or HD instability consider broad spectrum abx    Elevated LFTs  -acetaminophen level wnl  -US showed hepatomegaly  -follow LFTs    HAGMA with mild lactic acidosis  -lactate increasing  -trend lactate  -patient is HD stable    DM  -c/w insulin and follow FS  -carb consistent diet    HTN  -c/w benazepril, nifedipine  -monitor BP    Recent Afib  -rate controlled now  -c/w eliquis    CAD s/p CABG  -c/w plavix and eliquis    Depression  -c/w bupropion and zoloft    Pain management  -c/w fentanyl patch for now until detox recs  -avoid IV narcotics  -c/w gabapentin   -c/w methocarbamol PRN    GERD   -c/w protonix    DVT ppx  -c/w eliquis    Dispo  -home with home care  -has home health aid 8hrs/day for 6 days, uses a walker    FULL CODE

## 2018-08-11 NOTE — CONSULT NOTE ADULT - ASSESSMENT
66yo male with pmh of chronic back s/p lumbar laminectomy and now s/p intrathecal pump removal. Patient appears to have h/o of chronic opioid use, with high suspicion of opioid abuse. Pain regimen currently managed outpatient by patient's PMD with no current outpatient pain management specialist. Patient does not appear to be in active withdrawals, with COWS score of 4 on exam. At this time, it is unclear what the primary team pain regimen is planned to be, limiting the assessment and recommendations for detox. Patient not currently interested in detox, and therefore it is recommended that the primary team clarify goals of care with patient and consult pain management for recommendations. If the decision is made to discontinue opioids, appropriate detox recommendations can be made. However if he is to continue opioids, detox recommendations would not be appropriate at this time.   Case discussed with attending, Dr. Sanchez, and medicine resident, Dr. Jarrett.    Recommendations:  1. Consult pain management for safe optimization of pain regimen  2. Please recall if plan is to discontinue opioids

## 2018-08-12 LAB
ANION GAP SERPL CALC-SCNC: 13 MMOL/L — SIGNIFICANT CHANGE UP (ref 7–14)
BASOPHILS # BLD AUTO: 0.04 K/UL — SIGNIFICANT CHANGE UP (ref 0–0.2)
BASOPHILS NFR BLD AUTO: 0.3 % — SIGNIFICANT CHANGE UP (ref 0–1)
BUN SERPL-MCNC: 15 MG/DL — SIGNIFICANT CHANGE UP (ref 10–20)
C DIFF BY PCR RESULT: NEGATIVE — SIGNIFICANT CHANGE UP
C DIFF TOX GENS STL QL NAA+PROBE: SIGNIFICANT CHANGE UP
CALCIUM SERPL-MCNC: 9.5 MG/DL — SIGNIFICANT CHANGE UP (ref 8.5–10.1)
CHLORIDE SERPL-SCNC: 101 MMOL/L — SIGNIFICANT CHANGE UP (ref 98–110)
CO2 SERPL-SCNC: 29 MMOL/L — SIGNIFICANT CHANGE UP (ref 17–32)
CREAT SERPL-MCNC: 0.9 MG/DL — SIGNIFICANT CHANGE UP (ref 0.7–1.5)
EOSINOPHIL # BLD AUTO: 0.1 K/UL — SIGNIFICANT CHANGE UP (ref 0–0.7)
EOSINOPHIL NFR BLD AUTO: 0.7 % — SIGNIFICANT CHANGE UP (ref 0–8)
GLUCOSE SERPL-MCNC: 135 MG/DL — HIGH (ref 70–99)
HCT VFR BLD CALC: 36.7 % — LOW (ref 42–52)
HGB BLD-MCNC: 11.5 G/DL — LOW (ref 14–18)
IMM GRANULOCYTES NFR BLD AUTO: 0.5 % — HIGH (ref 0.1–0.3)
LYMPHOCYTES # BLD AUTO: 2.99 K/UL — SIGNIFICANT CHANGE UP (ref 1.2–3.4)
LYMPHOCYTES # BLD AUTO: 21 % — SIGNIFICANT CHANGE UP (ref 20.5–51.1)
MAGNESIUM SERPL-MCNC: 2 MG/DL — SIGNIFICANT CHANGE UP (ref 1.8–2.4)
MCHC RBC-ENTMCNC: 27.8 PG — SIGNIFICANT CHANGE UP (ref 27–31)
MCHC RBC-ENTMCNC: 31.3 G/DL — LOW (ref 32–37)
MCV RBC AUTO: 88.6 FL — SIGNIFICANT CHANGE UP (ref 80–94)
MONOCYTES # BLD AUTO: 1.27 K/UL — HIGH (ref 0.1–0.6)
MONOCYTES NFR BLD AUTO: 8.9 % — SIGNIFICANT CHANGE UP (ref 1.7–9.3)
NEUTROPHILS # BLD AUTO: 9.74 K/UL — HIGH (ref 1.4–6.5)
NEUTROPHILS NFR BLD AUTO: 68.6 % — SIGNIFICANT CHANGE UP (ref 42.2–75.2)
NRBC # BLD: 0 /100 WBCS — SIGNIFICANT CHANGE UP (ref 0–0)
PLATELET # BLD AUTO: 243 K/UL — SIGNIFICANT CHANGE UP (ref 130–400)
POTASSIUM SERPL-MCNC: 4.3 MMOL/L — SIGNIFICANT CHANGE UP (ref 3.5–5)
POTASSIUM SERPL-SCNC: 4.3 MMOL/L — SIGNIFICANT CHANGE UP (ref 3.5–5)
RBC # BLD: 4.14 M/UL — LOW (ref 4.7–6.1)
RBC # FLD: 13.8 % — SIGNIFICANT CHANGE UP (ref 11.5–14.5)
SODIUM SERPL-SCNC: 143 MMOL/L — SIGNIFICANT CHANGE UP (ref 135–146)
WBC # BLD: 14.21 K/UL — HIGH (ref 4.8–10.8)
WBC # FLD AUTO: 14.21 K/UL — HIGH (ref 4.8–10.8)

## 2018-08-12 RX ORDER — FENTANYL CITRATE 50 UG/ML
1 INJECTION INTRAVENOUS
Qty: 0 | Refills: 0 | Status: DISCONTINUED | OUTPATIENT
Start: 2018-08-12 | End: 2018-08-13

## 2018-08-12 RX ORDER — HYDRALAZINE HCL 50 MG
25 TABLET ORAL ONCE
Qty: 0 | Refills: 0 | Status: COMPLETED | OUTPATIENT
Start: 2018-08-12 | End: 2018-08-12

## 2018-08-12 RX ORDER — OXYCODONE AND ACETAMINOPHEN 5; 325 MG/1; MG/1
1 TABLET ORAL EVERY 4 HOURS
Qty: 0 | Refills: 0 | Status: DISCONTINUED | OUTPATIENT
Start: 2018-08-12 | End: 2018-08-14

## 2018-08-12 RX ADMIN — Medication 60 MILLIGRAM(S): at 05:59

## 2018-08-12 RX ADMIN — BRIMONIDINE TARTRATE 1 DROP(S): 2 SOLUTION/ DROPS OPHTHALMIC at 05:59

## 2018-08-12 RX ADMIN — GABAPENTIN 300 MILLIGRAM(S): 400 CAPSULE ORAL at 14:43

## 2018-08-12 RX ADMIN — Medication 4 UNIT(S): at 08:04

## 2018-08-12 RX ADMIN — OXYCODONE AND ACETAMINOPHEN 1 TABLET(S): 5; 325 TABLET ORAL at 18:13

## 2018-08-12 RX ADMIN — FENTANYL CITRATE 1 PATCH: 50 INJECTION INTRAVENOUS at 11:38

## 2018-08-12 RX ADMIN — INSULIN GLARGINE 50 UNIT(S): 100 INJECTION, SOLUTION SUBCUTANEOUS at 08:05

## 2018-08-12 RX ADMIN — OXYCODONE AND ACETAMINOPHEN 1 TABLET(S): 5; 325 TABLET ORAL at 11:34

## 2018-08-12 RX ADMIN — BRIMONIDINE TARTRATE 1 DROP(S): 2 SOLUTION/ DROPS OPHTHALMIC at 21:03

## 2018-08-12 RX ADMIN — Medication 1 DROP(S): at 18:10

## 2018-08-12 RX ADMIN — APIXABAN 5 MILLIGRAM(S): 2.5 TABLET, FILM COATED ORAL at 05:59

## 2018-08-12 RX ADMIN — Medication 25 MILLIGRAM(S): at 14:42

## 2018-08-12 RX ADMIN — Medication 1 DROP(S): at 05:59

## 2018-08-12 RX ADMIN — BUPROPION HYDROCHLORIDE 150 MILLIGRAM(S): 150 TABLET, EXTENDED RELEASE ORAL at 11:15

## 2018-08-12 RX ADMIN — OXYCODONE AND ACETAMINOPHEN 1 TABLET(S): 5; 325 TABLET ORAL at 11:30

## 2018-08-12 RX ADMIN — LATANOPROST 1 DROP(S): 0.05 SOLUTION/ DROPS OPHTHALMIC; TOPICAL at 21:03

## 2018-08-12 RX ADMIN — OXYCODONE AND ACETAMINOPHEN 1 TABLET(S): 5; 325 TABLET ORAL at 23:46

## 2018-08-12 RX ADMIN — GABAPENTIN 300 MILLIGRAM(S): 400 CAPSULE ORAL at 21:03

## 2018-08-12 RX ADMIN — METHOCARBAMOL 750 MILLIGRAM(S): 500 TABLET, FILM COATED ORAL at 10:55

## 2018-08-12 RX ADMIN — GABAPENTIN 300 MILLIGRAM(S): 400 CAPSULE ORAL at 05:59

## 2018-08-12 RX ADMIN — LISINOPRIL 20 MILLIGRAM(S): 2.5 TABLET ORAL at 05:59

## 2018-08-12 RX ADMIN — BRIMONIDINE TARTRATE 1 DROP(S): 2 SOLUTION/ DROPS OPHTHALMIC at 14:43

## 2018-08-12 RX ADMIN — APIXABAN 5 MILLIGRAM(S): 2.5 TABLET, FILM COATED ORAL at 17:12

## 2018-08-12 RX ADMIN — PANTOPRAZOLE SODIUM 40 MILLIGRAM(S): 20 TABLET, DELAYED RELEASE ORAL at 08:04

## 2018-08-12 RX ADMIN — SERTRALINE 100 MILLIGRAM(S): 25 TABLET, FILM COATED ORAL at 11:15

## 2018-08-12 RX ADMIN — Medication 1 DROP(S): at 17:12

## 2018-08-12 RX ADMIN — CLOPIDOGREL BISULFATE 75 MILLIGRAM(S): 75 TABLET, FILM COATED ORAL at 11:15

## 2018-08-12 NOTE — PROGRESS NOTE ADULT - ASSESSMENT
67M PMHx DM2, HTN, CAD s/p CABG, AFib on eliquis, GERD, depression, chronic back pain s/p intrathecal pump now removed due to csf leak here for opioid overdose/ encephalopathy now resolved.     1. Encephalopathy  resolved, suspect due to opiod overdose  addiction consult  limit opiod use  social work consult  #Back pain  percocet 1 tab q4 prn  fent patch 100  gabapentin 300 tid  neurosx consult  2. Diarrhea  suspect viral gastroenteritis, will monitor  monitor off abx  initial us abd unremarkable  3. Afib  eliquis  procardia 60  4. CAD  plavix  5. DM2  lantus 50, humalog 4 tid  ssi  6. HTN  nifepdine as above  lisinorpil 20  7. Depression  buproprion 150  zoloft 100  8. DVT ppx  eliquis

## 2018-08-12 NOTE — PROGRESS NOTE ADULT - SUBJECTIVE AND OBJECTIVE BOX
PRIYANK WESLEY  67y  Male      Patient is a 67y old  Male who presents with a chief complaint of episode of unresponsiveness. (10 Aug 2018 16:09)      INTERVAL HPI/OVERNIGHT EVENTS:  +diarrhea, unable to quantify amount of times per day. nonbloody, some abd pain, nonradiating, diffuse, crampy, no aggregating factors. continues to have back pain. no cp, sob, fever, dysuria.    Vital Signs Last 24 Hrs  T(C): 36.6 (12 Aug 2018 07:51), Max: 36.9 (11 Aug 2018 13:39)  T(F): 97.8 (12 Aug 2018 07:51), Max: 98.4 (11 Aug 2018 13:39)  HR: 64 (12 Aug 2018 07:51) (64 - 85)  BP: 146/69 (12 Aug 2018 07:51) (146/69 - 246/106)  BP(mean): --  RR: 20 (12 Aug 2018 07:51) (19 - 20)  SpO2: 98% (12 Aug 2018 07:51) (98% - 98%)    PHYSICAL EXAM:  GENERAL: NAD, well-groomed, well-developed  HEAD:  Atraumatic, Normocephalic    CHEST/LUNG: Clear to auscultation bilaterally; No rales, rhonchi, wheezing, or rubs  HEART: Regular rate and rhythm; No murmurs, rubs, or gallops  ABDOMEN: Soft, Nontender, Nondistended;   EXTREMITIES:  2+ Peripheral Pulses, No clubbing, cyanosis, or edema    Consultant(s) Notes Reviewed:  [x ] YES  [ ] NO  Care Discussed with Consultants/Other Providers [ x] YES  [ ] NO    LABS:                        11.5   14.21 )-----------( 243      ( 12 Aug 2018 06:41 )             36.7     08-12    143  |  101  |  15  ----------------------------<  135<H>  4.3   |  29  |  0.9    Ca    9.5      12 Aug 2018 06:41  Mg     2.0     08-12    TPro  7.0  /  Alb  4.4  /  TBili  0.4  /  DBili  <0.2  /  AST  43<H>  /  ALT  44<H>  /  AlkPhos  104  08-11          CAPILLARY BLOOD GLUCOSE  73 (12 Aug 2018 11:06)  124 (12 Aug 2018 07:30)  103 (11 Aug 2018 21:06)  133 (11 Aug 2018 15:59)          RADIOLOGY & ADDITIONAL TESTS:    Imaging Personally Reviewed:  [ ] YES  [ ] NO

## 2018-08-12 NOTE — PROVIDER CONTACT NOTE (OTHER) - ACTION/TREATMENT ORDERED:
Pt given 6AM meds
Dr Jarrett ordered IVF, Stat EKG, Echo and stated to keep the Fentanyl patch in place. Pt educated we will send a stool sample to lab.

## 2018-08-13 LAB — DRUG SCREEN, SERUM: SIGNIFICANT CHANGE UP

## 2018-08-13 RX ORDER — GABAPENTIN 400 MG/1
800 CAPSULE ORAL THREE TIMES A DAY
Qty: 0 | Refills: 0 | Status: DISCONTINUED | OUTPATIENT
Start: 2018-08-13 | End: 2018-08-14

## 2018-08-13 RX ORDER — METHADONE HYDROCHLORIDE 40 MG/1
10 TABLET ORAL THREE TIMES A DAY
Qty: 0 | Refills: 0 | Status: DISCONTINUED | OUTPATIENT
Start: 2018-08-13 | End: 2018-08-14

## 2018-08-13 RX ADMIN — GABAPENTIN 800 MILLIGRAM(S): 400 CAPSULE ORAL at 21:45

## 2018-08-13 RX ADMIN — Medication 1 DROP(S): at 17:02

## 2018-08-13 RX ADMIN — Medication 100 MILLIGRAM(S): at 13:58

## 2018-08-13 RX ADMIN — Medication 4 UNIT(S): at 16:24

## 2018-08-13 RX ADMIN — Medication 4 UNIT(S): at 08:03

## 2018-08-13 RX ADMIN — OXYCODONE AND ACETAMINOPHEN 1 TABLET(S): 5; 325 TABLET ORAL at 11:15

## 2018-08-13 RX ADMIN — METHOCARBAMOL 750 MILLIGRAM(S): 500 TABLET, FILM COATED ORAL at 21:48

## 2018-08-13 RX ADMIN — OXYCODONE AND ACETAMINOPHEN 1 TABLET(S): 5; 325 TABLET ORAL at 18:11

## 2018-08-13 RX ADMIN — BRIMONIDINE TARTRATE 1 DROP(S): 2 SOLUTION/ DROPS OPHTHALMIC at 21:45

## 2018-08-13 RX ADMIN — METHADONE HYDROCHLORIDE 10 MILLIGRAM(S): 40 TABLET ORAL at 13:58

## 2018-08-13 RX ADMIN — GABAPENTIN 300 MILLIGRAM(S): 400 CAPSULE ORAL at 05:14

## 2018-08-13 RX ADMIN — CLOPIDOGREL BISULFATE 75 MILLIGRAM(S): 75 TABLET, FILM COATED ORAL at 11:15

## 2018-08-13 RX ADMIN — Medication 1 DROP(S): at 18:10

## 2018-08-13 RX ADMIN — Medication 4 UNIT(S): at 11:16

## 2018-08-13 RX ADMIN — Medication 60 MILLIGRAM(S): at 05:14

## 2018-08-13 RX ADMIN — BUPROPION HYDROCHLORIDE 150 MILLIGRAM(S): 150 TABLET, EXTENDED RELEASE ORAL at 11:15

## 2018-08-13 RX ADMIN — OXYCODONE AND ACETAMINOPHEN 1 TABLET(S): 5; 325 TABLET ORAL at 04:42

## 2018-08-13 RX ADMIN — LATANOPROST 1 DROP(S): 0.05 SOLUTION/ DROPS OPHTHALMIC; TOPICAL at 21:45

## 2018-08-13 RX ADMIN — APIXABAN 5 MILLIGRAM(S): 2.5 TABLET, FILM COATED ORAL at 17:01

## 2018-08-13 RX ADMIN — Medication 1 DROP(S): at 05:14

## 2018-08-13 RX ADMIN — METHADONE HYDROCHLORIDE 10 MILLIGRAM(S): 40 TABLET ORAL at 21:45

## 2018-08-13 RX ADMIN — GABAPENTIN 300 MILLIGRAM(S): 400 CAPSULE ORAL at 13:58

## 2018-08-13 RX ADMIN — SERTRALINE 100 MILLIGRAM(S): 25 TABLET, FILM COATED ORAL at 11:15

## 2018-08-13 RX ADMIN — PANTOPRAZOLE SODIUM 40 MILLIGRAM(S): 20 TABLET, DELAYED RELEASE ORAL at 08:03

## 2018-08-13 RX ADMIN — Medication 1 DROP(S): at 05:15

## 2018-08-13 RX ADMIN — OXYCODONE AND ACETAMINOPHEN 1 TABLET(S): 5; 325 TABLET ORAL at 03:51

## 2018-08-13 RX ADMIN — OXYCODONE AND ACETAMINOPHEN 1 TABLET(S): 5; 325 TABLET ORAL at 04:41

## 2018-08-13 RX ADMIN — LISINOPRIL 20 MILLIGRAM(S): 2.5 TABLET ORAL at 05:14

## 2018-08-13 RX ADMIN — OXYCODONE AND ACETAMINOPHEN 1 TABLET(S): 5; 325 TABLET ORAL at 20:16

## 2018-08-13 RX ADMIN — BRIMONIDINE TARTRATE 1 DROP(S): 2 SOLUTION/ DROPS OPHTHALMIC at 05:15

## 2018-08-13 RX ADMIN — INSULIN GLARGINE 50 UNIT(S): 100 INJECTION, SOLUTION SUBCUTANEOUS at 08:18

## 2018-08-13 RX ADMIN — BRIMONIDINE TARTRATE 1 DROP(S): 2 SOLUTION/ DROPS OPHTHALMIC at 13:58

## 2018-08-13 RX ADMIN — APIXABAN 5 MILLIGRAM(S): 2.5 TABLET, FILM COATED ORAL at 05:14

## 2018-08-13 RX ADMIN — SODIUM CHLORIDE 75 MILLILITER(S): 9 INJECTION INTRAMUSCULAR; INTRAVENOUS; SUBCUTANEOUS at 18:40

## 2018-08-13 NOTE — CONSULT NOTE ADULT - SUBJECTIVE AND OBJECTIVE BOX
Chief Complaint:    HPI:  a 67 year old man with DM, HTN, CAD s/p CABG, afib on eliquis, GERD, depression and hx of intrathecal pump that was removed complicated by CSF leak s/p repair is bought in by aid for unresponsiveness this morning. As per the patient and per ED note, patient was checked by the aid that found the patient weka and unresponsive, she checked his FS and was 400 so she brought him in.  the patient does not remember the episode but he is saying that he feels ok and he wants to go home.  to note patient has fentanyl patch 100mch in the middle of his back and he said that it is possible that he took some extra pain meds last night but he is not sure.    His pain is poorly controlled with the fentanyl and percocet. He states that he does not feel the medication. He states that he no longer has positional headaches. Rates pain a 10/10    in the ED, glucose 261, positive glucose in urine but negative ketones  ok=707/80  ox=874  rr=18  sat=97% on RA, as per provider note patient was oriented *4  WBC Count= 18.88-AST: 65 -ALT: 60-Alkaline Phosphatase: 117 (10 Aug 2018 16:09)      Allergies    penicillins (Other (U))    Intolerances        PAST MEDICAL & SURGICAL HISTORY:  CAD (coronary artery disease) of bypass graft: cabg 1995 3v  Glaucoma  Arthritis  GERD (gastroesophageal reflux disease)  HTN (hypertension)  Depression  DM (diabetes mellitus)  S/P lumbar laminectomy: with repair of CSF leak using dural graft 7/8/2018 - dr Crenshaw  Malfunction of intrathecal infusion pump: implanted initially in 1996  reimplanted 2006  removed 6/27/2018  Foot amputation status, right: partial foot amputation  tarsals and metatarsals of right foot  S/P laparoscopic cholecystectomy  CAD (coronary artery disease) of bypass graft      SOCIAL HISTORY:  Denies Smoking, Alcohol, or Drug Use    PAIN MEDICATIONS:  buPROPion XL . 150 milliGRAM(s) Oral daily  fentaNYL   Patch 100 MICROgram(s)/Hr 1 Patch Transdermal every 72 hours  gabapentin 300 milliGRAM(s) Oral three times a day  melatonin 5 milliGRAM(s) Oral at bedtime PRN  methocarbamol 750 milliGRAM(s) Oral three times a day PRN  oxyCODONE    5 mG/acetaminophen 325 mG 1 Tablet(s) Oral every 4 hours PRN  sertraline 100 milliGRAM(s) Oral daily    Heme:  apixaban 5 milliGRAM(s) Oral every 12 hours  clopidogrel Tablet 75 milliGRAM(s) Oral daily    Antibiotics:    Cardiovascular:  lisinopril 20 milliGRAM(s) Oral daily  NIFEdipine XL 60 milliGRAM(s) Oral daily    GI:  docusate sodium 100 milliGRAM(s) Oral three times a day  pantoprazole    Tablet 40 milliGRAM(s) Oral before breakfast  senna 2 Tablet(s) Oral at bedtime    Endocrine:  dextrose 40% Gel 15 Gram(s) Oral once PRN  dextrose 50% Injectable 12.5 Gram(s) IV Push once  dextrose 50% Injectable 25 Gram(s) IV Push once  dextrose 50% Injectable 25 Gram(s) IV Push once  glucagon  Injectable 1 milliGRAM(s) IntraMuscular once PRN  insulin glargine Injectable (LANTUS) 50 Unit(s) SubCutaneous every morning  insulin lispro (HumaLOG) corrective regimen sliding scale   SubCutaneous three times a day before meals  insulin lispro Injectable (HumaLOG) 4 Unit(s) SubCutaneous three times a day before meals    All Other Medications:  atropine 1% Ophthalmic Solution - Peds 1 Drop(s) Right EYE two times a day  brimonidine 0.2% Ophthalmic Solution 1 Drop(s) Right EYE three times a day  dextrose 5%. 1000 milliLiter(s) IV Continuous <Continuous>  latanoprost 0.005% Ophthalmic Solution 1 Drop(s) Both EYES at bedtime  sodium chloride 0.9%. 1000 milliLiter(s) IV Continuous <Continuous>  timolol 0.5% Solution 1 Drop(s) Right EYE every 12 hours      Vital Signs Last 24 Hrs  T(C): 36.7 (13 Aug 2018 07:05), Max: 36.7 (12 Aug 2018 15:55)  T(F): 98 (13 Aug 2018 07:05), Max: 98.1 (12 Aug 2018 15:55)  HR: 57 (13 Aug 2018 07:05) (57 - 83)  BP: 178/80 (13 Aug 2018 07:05) (139/74 - 224/91)  BP(mean): --  RR: 20 (13 Aug 2018 07:05) (20 - 20)  SpO2: 98% (13 Aug 2018 07:05) (98% - 100%)    PAIN SCORE:         SCALE USED: (1-10 VNRS)      PHYSICAL EXAM:    GENERAL: NAD, well-groomed, well-developed  HEAD:  Atraumatic, Normocephalic  EYES: EOMI, PERRLA, conjunctiva and sclera clear  ENMT: No tonsillar erythema, exudates, or enlargement; Moist mucous membranes, Good dentition, No lesions  NECK: Supple, No JVD, Normal thyroid  incisions well healed.   AAOx3  decreased ROM with pain        LABS:                          11.5   14.21 )-----------( 243      ( 12 Aug 2018 06:41 )             36.7     08-12    143  |  101  |  15  ----------------------------<  135<H>  4.3   |  29  |  0.9    Ca    9.5      12 Aug 2018 06:41  Mg     2.0     08-12          [x ]  [place]  Reviewed- Fentanyl 100 ug patch q 2 days #20 oxycodone 10

## 2018-08-13 NOTE — CONSULT NOTE ADULT - ASSESSMENT
1. Frank has chronic pain and his pain medications are not helping him. We discussed treatment options and the fact that at this point he is tolerant to opioids and that adding additional opioids would not likely help. He was willing to switch from his current meds to methadone for pain control.    DC Fentanyl patch  Start Methadone 10 mg PO TID  I would DC oxycodone for BTP a day after starting methadone as it takes a day for the methadone to equilibrate in his body secondary to the volume of distribution of the medication.   His medication may need to be adjusted after a couple days but I would not change the dose of methadone for 5-7 days. Do not discharge with percocet or fentanyl.  Will follow.

## 2018-08-13 NOTE — PROGRESS NOTE ADULT - ASSESSMENT
**Encephalopathy - resolved  -likely 2/2 oversedation on opioids. hx of chronic opioid use.-F/U UDS  -U/A negative with glucose 250.  Blood Cx -ve; UCx -ve      #) Pain Management  -Start on methadone 10mg q8hr PO as there is no clear benefit to increasing opioid dose further.  - D/C fentanyl patch. Will D/C percocet tmrw. Medication will need to be adjusted in 2 days.      **elevated LFTs - resolving  -will check acetaminophen level  -US showed hepatomegaly  -check INR, repeat Lfts          *Diarrhea  -3 episodes, unbloody, crampy diffuse abdominal pain.   -C.Diff -ve    **DM  - on Lantus 50/Lispro 4  -carb consistent diet    **HTN  -c/w lisinopril + nifedipine for now  -monitor BP    **Recent afib  -rate controlled now  -eliquis for anticoagulation    **CAD s/p CABG  -patient as per [facility] is on aspirin and plavix but not on eliquis although discharge papers noted for plavix and eliquis  -continue plavix for now --> patient should follow up with cardiologist in [place]    **depression  -c/w bupropion and zoloft      **GERD on Protonix    **DVT ppx: eliquis    **disposition: home with home care; he has home health aid 8hrs a day for 6 days, uses a walker. **Encephalopathy - resolved  -likely 2/2 oversedation on opioids. hx of chronic opioid use.-F/U UDS  -U/A negative with glucose 250.  Blood Cx -ve; UCx -ve      #) Pain Management  -Start on methadone 10mg q8hr PO as there is no clear benefit to increasing opioid dose further.  - D/C fentanyl patch. Will D/C percocet tmrw. Medication will need to be adjusted in 2 days.  Gabapentin increased to 800mg q8hr (pt's previous regimen)  -Pain team following      **elevated LFTs - resolving  -will check acetaminophen level  -US showed hepatomegaly  -check INR, repeat Lfts          *Diarrhea  -3 episodes, unbloody, crampy diffuse abdominal pain.   -C.Diff -ve    **DM  - on Lantus 50/Lispro 4  -carb consistent diet    **HTN  -c/w lisinopril + nifedipine for now  -monitor BP    **Recent afib  -rate controlled now  -eliquis for anticoagulation    **CAD s/p CABG  -patient as per [facility] is on aspirin and plavix but not on eliquis although discharge papers noted for plavix and eliquis  -continue plavix for now --> patient should follow up with cardiologist in [place]    **depression  -c/w bupropion and zoloft      **GERD on Protonix    **DVT ppx: eliquis    **disposition: home with home care; he has home health aid 8hrs a day for 6 days, uses a walker. **Encephalopathy - resolved  -likely 2/2 oversedation on opioids. hx of chronic opioid use.-F/U UDS  -U/A negative with glucose 250.  Blood Cx -ve; UCx -ve      #) Pain Management  -Start on methadone 10mg q8hr PO as there is no clear benefit to increasing opioid dose further.  - D/C fentanyl patch. Will D/C percocet tmrw. Medication will need to be adjusted in 2 days.  Gabapentin increased to 800mg q8hr (pt's previous regimen)  -Pain team following      **elevated LFTs - resolved  -US showed hepatomegaly  -No evidence of acute liver damage      *Diarrhea  -improving  -3 episodes, unbloody, crampy diffuse abdominal pain.   -C.Diff -ve    **DM  - on Lantus 50/Lispro 4  -carb consistent diet   -Monitor FS    **HTN  -c/w lisinopril + nifedipine for now  -monitor BP    **Recent afib  -rate controlled on   -eliquis for anticoagulation    **CAD s/p CABG  -patient as per [facility] is on aspirin and plavix but not on eliquis although discharge papers noted for plavix and eliquis  -continue plavix for now --> patient should follow up with cardiologist in [place]    **depression  -c/w bupropion and zoloft      **GERD on Protonix    **DVT ppx: eliquis    **disposition: home with home care; he has home health aid 8hrs a day for 6 days, uses a walker. 67M PMHx DM2, HTN, CAD s/p CABG, AFib on eliquis, GERD, depression, chronic back pain s/p intrathecal pump now removed due to csf leak here for opioid overdose/ encephalopathy now resolved.     # Metabolic encephalopathy - resolved  -likely 2/2 oversedation on opioids. hx of chronic opioid use.-F/U UDS  -U/A negative with glucose 250.  Blood Cx -ve; UCx -ve      # Chronic back pain on opiates   - pt evaluated by pain management-Start on methadone 10mg q8hr PO as there is no clear benefit to increasing opioid dose further.  - D/C fentanyl patch. Will D/C percocet tmrw. Medication will need to be adjusted in 2 days.  -Gabapentin increased to 800mg q8hr (pt's previous regimen)    # Elevated LFTs - resolved  -US showed hepatomegaly  -No evidence of acute liver damage    # Diarrhea  -improving  -3 episodes, unbloody, crampy diffuse abdominal pain.   -C.Diff -ve  - discontinue senna/colace    # DM type 2  - on Lantus 50/Lispro 4  -carb consistent diet   -Monitor FS    # HTN  -c/w lisinopril + nifedipine for now  -monitor BP    # H/o paroxysmal afib  -rate controlled   -eliquis for anticoagulation    # CAD s/p CABG  -patient as per [facility] center is on aspirin and plavix but not on eliquis although discharge papers noted for plavix and eliquis  -continue plavix for now --> patient should follow up with cardiologist in [place]    # depression  -c/w bupropion and zoloft    # GERD on Protonix    # DVT ppx: eliquis    # disposition: home with home care; he has home health aid 8hrs a day for 6 days, uses a walker.

## 2018-08-13 NOTE — PROGRESS NOTE ADULT - SUBJECTIVE AND OBJECTIVE BOX
PRIYANK WESLEY, male, 67y ([birth year]),   MRN-[id number]  Admit Date: 08-10-18 (3d)      Chief Complaint:  Patient is a 67y old  Male who presents with a chief complaint of episode of unresponsiveness. (10 Aug 2018 16:09)      Interval History:  No acute events overnight. Pt is complaining of severe pain down his spine. He is also complaining of restless due to his neuropathy and requesting increasing the gabapentin dosage to 800mg x3 days which is his old dosage.    Past Medical and Surgical History:  PAST MEDICAL & SURGICAL HISTORY:  CAD (coronary artery disease) of bypass graft: cabg 1995 3v  Glaucoma  Arthritis  GERD (gastroesophageal reflux disease)  HTN (hypertension)  Depression  DM (diabetes mellitus)  S/P lumbar laminectomy: with repair of CSF leak using dural graft 7/8/2018 - dr Crenshaw  Malfunction of intrathecal infusion pump: implanted initially in 1996  reimplanted 2006  removed 6/27/2018  Foot amputation status, right: partial foot amputation  tarsals and metatarsals of right foot  S/P laparoscopic cholecystectomy  CAD (coronary artery disease) of bypass graft      Current Medications:  MEDICATIONS  (STANDING):  apixaban 5 milliGRAM(s) Oral every 12 hours  atropine 1% Ophthalmic Solution - Peds 1 Drop(s) Right EYE two times a day  brimonidine 0.2% Ophthalmic Solution 1 Drop(s) Right EYE three times a day  buPROPion XL . 150 milliGRAM(s) Oral daily  clopidogrel Tablet 75 milliGRAM(s) Oral daily  dextrose 5%. 1000 milliLiter(s) (50 mL/Hr) IV Continuous <Continuous>  dextrose 50% Injectable 12.5 Gram(s) IV Push once  dextrose 50% Injectable 25 Gram(s) IV Push once  dextrose 50% Injectable 25 Gram(s) IV Push once  docusate sodium 100 milliGRAM(s) Oral three times a day  gabapentin 300 milliGRAM(s) Oral three times a day  insulin glargine Injectable (LANTUS) 50 Unit(s) SubCutaneous every morning  insulin lispro (HumaLOG) corrective regimen sliding scale   SubCutaneous three times a day before meals  insulin lispro Injectable (HumaLOG) 4 Unit(s) SubCutaneous three times a day before meals  latanoprost 0.005% Ophthalmic Solution 1 Drop(s) Both EYES at bedtime  lisinopril 20 milliGRAM(s) Oral daily  methadone    Tablet 10 milliGRAM(s) Oral three times a day  NIFEdipine XL 60 milliGRAM(s) Oral daily  pantoprazole    Tablet 40 milliGRAM(s) Oral before breakfast  senna 2 Tablet(s) Oral at bedtime  sertraline 100 milliGRAM(s) Oral daily  sodium chloride 0.9%. 1000 milliLiter(s) (75 mL/Hr) IV Continuous <Continuous>  timolol 0.5% Solution 1 Drop(s) Right EYE every 12 hours    MEDICATIONS  (PRN):  dextrose 40% Gel 15 Gram(s) Oral once PRN Blood Glucose LESS THAN 70 milliGRAM(s)/deciliter  glucagon  Injectable 1 milliGRAM(s) IntraMuscular once PRN Glucose LESS THAN 70 milligrams/deciliter  melatonin 5 milliGRAM(s) Oral at bedtime PRN Insomnia  methocarbamol 750 milliGRAM(s) Oral three times a day PRN spasm  oxyCODONE    5 mG/acetaminophen 325 mG 1 Tablet(s) Oral every 4 hours PRN Severe Pain (7 - 10)        Vital Signs:  T(F): 98 (08-13-18 @ 07:05), Max: 98.4 (08-11-18 @ 13:39)  HR: 57 (08-13-18 @ 07:05) (57 - 85)  BP: 178/80 (08-13-18 @ 07:05) (139/74 - 246/106)  RR: 20 (08-13-18 @ 07:05) (19 - 20)  SpO2: 98% (08-13-18 @ 07:05) (98% - 100%)  CAPILLARY BLOOD GLUCOSE  103 (13 Aug 2018 11:08)  101 (13 Aug 2018 07:05)  113 (12 Aug 2018 21:14)  110 (12 Aug 2018 16:37)  118 (12 Aug 2018 14:25)          Physical Exam:  General: In acute distress;  in acute pain. legs restless.  HEENT: Moist mucus membranes.   Cardio: Regular rate and rhythm, S1, S2,  Pulm: Clear to auscultation bilaterally. No wheezing, rales, or rhonchi.  Abdomen: Soft, non-tender, non-distended.   Extremities: No cyanosis or edema bilaterally. No calf tenderness to palpation.  Neuro: A&O x3. No focal deficits.     Labs and Imaging:  CBC Full  -  ( 12 Aug 2018 06:41 )  WBC Count : 14.21 K/uL  Hemoglobin : 11.5 g/dL  Hematocrit : 36.7 %  Platelet Count - Automated : 243 K/uL  Mean Cell Volume : 88.6 fL  Mean Cell Hemoglobin : 27.8 pg  Mean Cell Hemoglobin Concentration : 31.3 g/dL  Auto Neutrophil # : 9.74 K/uL  Auto Lymphocyte # : 2.99 K/uL  Auto Monocyte # : 1.27 K/uL  Auto Eosinophil # : 0.10 K/uL  Auto Basophil # : 0.04 K/uL  Auto Neutrophil % : 68.6 %  Auto Lymphocyte % : 21.0 %  Auto Monocyte % : 8.9 %  Auto Eosinophil % : 0.7 %  Auto Basophil % : 0.3 %    RDW:               Cardiac Enzymes:    Urinalysis:    Cultures:     (collected 08-10-18 @ 10:16)  Source: .Urine Clean Catch (Midstream)  Final Report:    <10,000 CFU/ml Normal Urogenital jared present     (collected 08-10-18 @ 10:16)  Source: .Blood Blood  Preliminary Report:    No growth to date.     (collected 08-10-18 @ 10:16)  Source: .Blood Blood  Preliminary Report:    No growth to date.        Home Medications:  Home Medications:  aspirin 81 mg oral delayed release tablet: 1 tab(s) orally once a day (10 Aug 2018 17:08)  atropine 1% ophthalmic ointment: 1 application to each affected eye 2 times a day right eye (10 Aug 2018 17:10)  benazepril 20 mg oral tablet: 1 tab(s) orally once a day (27 Jun 2018 08:04)  brimonidine 0.2% ophthalmic solution: 1 drop(s) to each affected eye 3 times a day in the right eye (10 Aug 2018 17:10)  buPROPion 150 mg/24 hours (XL) oral tablet, extended release: 1 tab(s) orally every 24 hours (10 Aug 2018 17:10)  clopidogrel 75 mg oral tablet: 1 tab(s) orally once a day (16 Jul 2018 17:36)  fentaNYL 100 mcg/hr transdermal film, extended release: 1 film(s) transdermal every 72 hours (27 Jun 2018 08:04)  ferrous sulfate 325 mg (65 mg elemental iron) oral tablet: 1 tab(s) orally 3 times a day (10 Aug 2018 17:12)  gabapentin 400 mg oral capsule: 1 cap(s) orally 3 times a day (10 Aug 2018 17:12)  latanoprost 0.005% ophthalmic solution: 1 drop(s) to each affected eye once a day (in the evening) both eyes (10 Aug 2018 17:13)  Lyrica 150 mg oral capsule: 1 cap(s) orally 2 times a day (27 Jun 2018 08:04)  Melatonin 5 mg oral tablet: 1 tab(s) orally once a day (at bedtime) (10 Aug 2018 17:13)  methocarbamol 750 mg oral tablet: 2 tab(s) orally 3 times a day (27 Jun 2018 08:04)  NIFEdipine 60 mg oral tablet, extended release: 1 tab(s) orally once a day (10 Aug 2018 17:14)  NovoLOG 100 units/mL injectable solution: 2 unit(s) injectable 3 times (27 Jun 2018 08:04)  pantoprazole 20 mg oral delayed release tablet: 1 tab(s) orally once a day (10 Aug 2018 17:14)  Senna Plus 50 mg-8.6 mg oral tablet: 2 tab(s) orally once a day (at bedtime) (10 Aug 2018 17:18)  timolol hemihydrate 0.5% ophthalmic solution: 1 drop(s) to each affected eye 2 times a day right eye (10 Aug 2018 17:18)  Vitamin D3 2000 intl units oral tablet: 1 tab(s) orally once a day (27 Jun 2018 08:04)  Zoloft 100 mg oral tablet: 1 tab(s) orally once a day (10 Aug 2018 17:18) PRIYANK WESLEY, male, 67y ([birth year]),   MRN-[id number]  Admit Date: 08-10-18 (3d)      Chief Complaint:  Patient is a 67y old  Male who presents with a chief complaint of episode of unresponsiveness. (10 Aug 2018 16:09)      Interval History:  No acute events overnight. Pt is complaining of severe pain down his spine. He is also complaining of restless due to his neuropathy and requesting increasing the gabapentin dosage to 800mg x3 days which is his old dosage.    Past Medical and Surgical History:  PAST MEDICAL & SURGICAL HISTORY:  CAD (coronary artery disease) of bypass graft: cabg 1995 3v  Glaucoma  Arthritis  GERD (gastroesophageal reflux disease)  HTN (hypertension)  Depression  DM (diabetes mellitus)  S/P lumbar laminectomy: with repair of CSF leak using dural graft 7/8/2018 - dr Crenshaw  Malfunction of intrathecal infusion pump: implanted initially in 1996  reimplanted 2006  removed 6/27/2018  Foot amputation status, right: partial foot amputation  tarsals and metatarsals of right foot  S/P laparoscopic cholecystectomy  CAD (coronary artery disease) of bypass graft      Current Medications:  MEDICATIONS  (STANDING):  apixaban 5 milliGRAM(s) Oral every 12 hours  atropine 1% Ophthalmic Solution - Peds 1 Drop(s) Right EYE two times a day  brimonidine 0.2% Ophthalmic Solution 1 Drop(s) Right EYE three times a day  buPROPion XL . 150 milliGRAM(s) Oral daily  clopidogrel Tablet 75 milliGRAM(s) Oral daily  dextrose 5%. 1000 milliLiter(s) (50 mL/Hr) IV Continuous <Continuous>  dextrose 50% Injectable 12.5 Gram(s) IV Push once  dextrose 50% Injectable 25 Gram(s) IV Push once  dextrose 50% Injectable 25 Gram(s) IV Push once  docusate sodium 100 milliGRAM(s) Oral three times a day  gabapentin 300 milliGRAM(s) Oral three times a day  insulin glargine Injectable (LANTUS) 50 Unit(s) SubCutaneous every morning  insulin lispro (HumaLOG) corrective regimen sliding scale   SubCutaneous three times a day before meals  insulin lispro Injectable (HumaLOG) 4 Unit(s) SubCutaneous three times a day before meals  latanoprost 0.005% Ophthalmic Solution 1 Drop(s) Both EYES at bedtime  lisinopril 20 milliGRAM(s) Oral daily  methadone    Tablet 10 milliGRAM(s) Oral three times a day  NIFEdipine XL 60 milliGRAM(s) Oral daily  pantoprazole    Tablet 40 milliGRAM(s) Oral before breakfast  senna 2 Tablet(s) Oral at bedtime  sertraline 100 milliGRAM(s) Oral daily  sodium chloride 0.9%. 1000 milliLiter(s) (75 mL/Hr) IV Continuous <Continuous>  timolol 0.5% Solution 1 Drop(s) Right EYE every 12 hours    MEDICATIONS  (PRN):  dextrose 40% Gel 15 Gram(s) Oral once PRN Blood Glucose LESS THAN 70 milliGRAM(s)/deciliter  glucagon  Injectable 1 milliGRAM(s) IntraMuscular once PRN Glucose LESS THAN 70 milligrams/deciliter  melatonin 5 milliGRAM(s) Oral at bedtime PRN Insomnia  methocarbamol 750 milliGRAM(s) Oral three times a day PRN spasm  oxyCODONE    5 mG/acetaminophen 325 mG 1 Tablet(s) Oral every 4 hours PRN Severe Pain (7 - 10)        Vital Signs:  T(F): 98 (08-13-18 @ 07:05), Max: 98.4 (08-11-18 @ 13:39)  HR: 57 (08-13-18 @ 07:05) (57 - 85)  BP: 178/80 (08-13-18 @ 07:05) (139/74 - 246/106)  RR: 20 (08-13-18 @ 07:05) (19 - 20)  SpO2: 98% (08-13-18 @ 07:05) (98% - 100%)  CAPILLARY BLOOD GLUCOSE  103 (13 Aug 2018 11:08)  101 (13 Aug 2018 07:05)  113 (12 Aug 2018 21:14)  110 (12 Aug 2018 16:37)  118 (12 Aug 2018 14:25)          Physical Exam:  General: Awake, alert .  HEENT: Moist mucus membranes.   Cardio: Regular rate and rhythm, S1, S2,  Pulm: Clear to auscultation bilaterally. No wheezing, rales, or rhonchi.  Abdomen: Soft, non-tender, non-distended.   Extremities: No cyanosis or edema bilaterally. No calf tenderness to palpation.  Neuro: A&O x3. No focal deficits.     Labs and Imaging:  CBC Full  -  ( 12 Aug 2018 06:41 )  WBC Count : 14.21 K/uL  Hemoglobin : 11.5 g/dL  Hematocrit : 36.7 %  Platelet Count - Automated : 243 K/uL  Mean Cell Volume : 88.6 fL  Mean Cell Hemoglobin : 27.8 pg  Mean Cell Hemoglobin Concentration : 31.3 g/dL  Auto Neutrophil # : 9.74 K/uL  Auto Lymphocyte # : 2.99 K/uL  Auto Monocyte # : 1.27 K/uL  Auto Eosinophil # : 0.10 K/uL  Auto Basophil # : 0.04 K/uL  Auto Neutrophil % : 68.6 %  Auto Lymphocyte % : 21.0 %  Auto Monocyte % : 8.9 %  Auto Eosinophil % : 0.7 %  Auto Basophil % : 0.3 %     (collected 08-10-18 @ 10:16)  Source: .Urine Clean Catch (Midstream)  Final Report:    <10,000 CFU/ml Normal Urogenital jared present     (collected 08-10-18 @ 10:16)  Source: .Blood Blood  Preliminary Report:    No growth to date.     (collected 08-10-18 @ 10:16)  Source: .Blood Blood  Preliminary Report:    No growth to date.        Home Medications:  Home Medications:  aspirin 81 mg oral delayed release tablet: 1 tab(s) orally once a day (10 Aug 2018 17:08)  atropine 1% ophthalmic ointment: 1 application to each affected eye 2 times a day right eye (10 Aug 2018 17:10)  benazepril 20 mg oral tablet: 1 tab(s) orally once a day (27 Jun 2018 08:04)  brimonidine 0.2% ophthalmic solution: 1 drop(s) to each affected eye 3 times a day in the right eye (10 Aug 2018 17:10)  buPROPion 150 mg/24 hours (XL) oral tablet, extended release: 1 tab(s) orally every 24 hours (10 Aug 2018 17:10)  clopidogrel 75 mg oral tablet: 1 tab(s) orally once a day (16 Jul 2018 17:36)  fentaNYL 100 mcg/hr transdermal film, extended release: 1 film(s) transdermal every 72 hours (27 Jun 2018 08:04)  ferrous sulfate 325 mg (65 mg elemental iron) oral tablet: 1 tab(s) orally 3 times a day (10 Aug 2018 17:12)  gabapentin 400 mg oral capsule: 1 cap(s) orally 3 times a day (10 Aug 2018 17:12)  latanoprost 0.005% ophthalmic solution: 1 drop(s) to each affected eye once a day (in the evening) both eyes (10 Aug 2018 17:13)  Lyrica 150 mg oral capsule: 1 cap(s) orally 2 times a day (27 Jun 2018 08:04)  Melatonin 5 mg oral tablet: 1 tab(s) orally once a day (at bedtime) (10 Aug 2018 17:13)  methocarbamol 750 mg oral tablet: 2 tab(s) orally 3 times a day (27 Jun 2018 08:04)  NIFEdipine 60 mg oral tablet, extended release: 1 tab(s) orally once a day (10 Aug 2018 17:14)  NovoLOG 100 units/mL injectable solution: 2 unit(s) injectable 3 times (27 Jun 2018 08:04)  pantoprazole 20 mg oral delayed release tablet: 1 tab(s) orally once a day (10 Aug 2018 17:14)  Senna Plus 50 mg-8.6 mg oral tablet: 2 tab(s) orally once a day (at bedtime) (10 Aug 2018 17:18)  timolol hemihydrate 0.5% ophthalmic solution: 1 drop(s) to each affected eye 2 times a day right eye (10 Aug 2018 17:18)  Vitamin D3 2000 intl units oral tablet: 1 tab(s) orally once a day (27 Jun 2018 08:04)  Zoloft 100 mg oral tablet: 1 tab(s) orally once a day (10 Aug 2018 17:18)

## 2018-08-14 ENCOUNTER — TRANSCRIPTION ENCOUNTER (OUTPATIENT)
Age: 68
End: 2018-08-14

## 2018-08-14 VITALS
TEMPERATURE: 98 F | HEART RATE: 54 BPM | SYSTOLIC BLOOD PRESSURE: 137 MMHG | RESPIRATION RATE: 20 BRPM | DIASTOLIC BLOOD PRESSURE: 68 MMHG | OXYGEN SATURATION: 96 %

## 2018-08-14 LAB
ALBUMIN SERPL ELPH-MCNC: 4 G/DL — SIGNIFICANT CHANGE UP (ref 3.5–5.2)
ALP SERPL-CCNC: 85 U/L — SIGNIFICANT CHANGE UP (ref 30–115)
ALT FLD-CCNC: 33 U/L — SIGNIFICANT CHANGE UP (ref 0–41)
ANION GAP SERPL CALC-SCNC: 15 MMOL/L — HIGH (ref 7–14)
AST SERPL-CCNC: 24 U/L — SIGNIFICANT CHANGE UP (ref 0–41)
BASOPHILS # BLD AUTO: 0.08 K/UL — SIGNIFICANT CHANGE UP (ref 0–0.2)
BASOPHILS NFR BLD AUTO: 0.6 % — SIGNIFICANT CHANGE UP (ref 0–1)
BILIRUB SERPL-MCNC: 0.8 MG/DL — SIGNIFICANT CHANGE UP (ref 0.2–1.2)
BUN SERPL-MCNC: 18 MG/DL — SIGNIFICANT CHANGE UP (ref 10–20)
CALCIUM SERPL-MCNC: 9.2 MG/DL — SIGNIFICANT CHANGE UP (ref 8.5–10.1)
CHLORIDE SERPL-SCNC: 99 MMOL/L — SIGNIFICANT CHANGE UP (ref 98–110)
CO2 SERPL-SCNC: 27 MMOL/L — SIGNIFICANT CHANGE UP (ref 17–32)
CREAT SERPL-MCNC: 1.1 MG/DL — SIGNIFICANT CHANGE UP (ref 0.7–1.5)
EOSINOPHIL # BLD AUTO: 0.12 K/UL — SIGNIFICANT CHANGE UP (ref 0–0.7)
EOSINOPHIL NFR BLD AUTO: 0.9 % — SIGNIFICANT CHANGE UP (ref 0–8)
GLUCOSE SERPL-MCNC: 99 MG/DL — SIGNIFICANT CHANGE UP (ref 70–99)
HCT VFR BLD CALC: 37.6 % — LOW (ref 42–52)
HGB BLD-MCNC: 12 G/DL — LOW (ref 14–18)
IMM GRANULOCYTES NFR BLD AUTO: 0.3 % — SIGNIFICANT CHANGE UP (ref 0.1–0.3)
LYMPHOCYTES # BLD AUTO: 28.6 % — SIGNIFICANT CHANGE UP (ref 20.5–51.1)
LYMPHOCYTES # BLD AUTO: 3.96 K/UL — HIGH (ref 1.2–3.4)
MCHC RBC-ENTMCNC: 28.2 PG — SIGNIFICANT CHANGE UP (ref 27–31)
MCHC RBC-ENTMCNC: 31.9 G/DL — LOW (ref 32–37)
MCV RBC AUTO: 88.3 FL — SIGNIFICANT CHANGE UP (ref 80–94)
MONOCYTES # BLD AUTO: 1.89 K/UL — HIGH (ref 0.1–0.6)
MONOCYTES NFR BLD AUTO: 13.7 % — HIGH (ref 1.7–9.3)
NEUTROPHILS # BLD AUTO: 7.75 K/UL — HIGH (ref 1.4–6.5)
NEUTROPHILS NFR BLD AUTO: 55.9 % — SIGNIFICANT CHANGE UP (ref 42.2–75.2)
NRBC # BLD: 0 /100 WBCS — SIGNIFICANT CHANGE UP (ref 0–0)
PLATELET # BLD AUTO: 232 K/UL — SIGNIFICANT CHANGE UP (ref 130–400)
POTASSIUM SERPL-MCNC: 4.2 MMOL/L — SIGNIFICANT CHANGE UP (ref 3.5–5)
POTASSIUM SERPL-SCNC: 4.2 MMOL/L — SIGNIFICANT CHANGE UP (ref 3.5–5)
PROT SERPL-MCNC: 6.8 G/DL — SIGNIFICANT CHANGE UP (ref 6–8)
RBC # BLD: 4.26 M/UL — LOW (ref 4.7–6.1)
RBC # FLD: 14.3 % — SIGNIFICANT CHANGE UP (ref 11.5–14.5)
SODIUM SERPL-SCNC: 141 MMOL/L — SIGNIFICANT CHANGE UP (ref 135–146)
TROPONIN T SERPL-MCNC: 0.2 NG/ML — CRITICAL HIGH
WBC # BLD: 13.84 K/UL — HIGH (ref 4.8–10.8)
WBC # FLD AUTO: 13.84 K/UL — HIGH (ref 4.8–10.8)

## 2018-08-14 RX ORDER — LANOLIN ALCOHOL/MO/W.PET/CERES
1 CREAM (GRAM) TOPICAL
Qty: 0 | Refills: 0 | DISCHARGE
Start: 2018-08-14

## 2018-08-14 RX ORDER — TIMOLOL 0.5 %
1 DROPS OPHTHALMIC (EYE)
Qty: 0 | Refills: 0 | COMMUNITY

## 2018-08-14 RX ORDER — BRIMONIDINE TARTRATE 2 MG/MG
1 SOLUTION/ DROPS OPHTHALMIC
Qty: 0 | Refills: 0 | DISCHARGE
Start: 2018-08-14

## 2018-08-14 RX ORDER — INSULIN GLARGINE 100 [IU]/ML
50 INJECTION, SOLUTION SUBCUTANEOUS
Qty: 0 | Refills: 0 | COMMUNITY

## 2018-08-14 RX ORDER — FENTANYL CITRATE 50 UG/ML
1 INJECTION INTRAVENOUS
Qty: 0 | Refills: 0 | COMMUNITY

## 2018-08-14 RX ORDER — TIMOLOL 0.5 %
1 DROPS OPHTHALMIC (EYE)
Qty: 0 | Refills: 0 | DISCHARGE
Start: 2018-08-14

## 2018-08-14 RX ORDER — GABAPENTIN 400 MG/1
1 CAPSULE ORAL
Qty: 0 | Refills: 0 | DISCHARGE
Start: 2018-08-14

## 2018-08-14 RX ORDER — NIFEDIPINE 30 MG
1 TABLET, EXTENDED RELEASE 24 HR ORAL
Qty: 0 | Refills: 0 | COMMUNITY

## 2018-08-14 RX ORDER — BENAZEPRIL HYDROCHLORIDE 40 MG/1
1 TABLET ORAL
Qty: 0 | Refills: 0 | COMMUNITY

## 2018-08-14 RX ORDER — GABAPENTIN 400 MG/1
1 CAPSULE ORAL
Qty: 0 | Refills: 0 | COMMUNITY

## 2018-08-14 RX ORDER — LISINOPRIL 2.5 MG/1
1 TABLET ORAL
Qty: 0 | Refills: 0 | DISCHARGE
Start: 2018-08-14

## 2018-08-14 RX ORDER — ATROPINE SULFATE 1 %
1 DROPS OPHTHALMIC (EYE)
Qty: 0 | Refills: 0 | COMMUNITY

## 2018-08-14 RX ORDER — INSULIN ASPART 100 [IU]/ML
2 INJECTION, SOLUTION SUBCUTANEOUS
Qty: 0 | Refills: 0 | COMMUNITY

## 2018-08-14 RX ORDER — METHADONE HYDROCHLORIDE 40 MG/1
1 TABLET ORAL
Qty: 0 | Refills: 0 | DISCHARGE
Start: 2018-08-14

## 2018-08-14 RX ORDER — SERTRALINE 25 MG/1
1 TABLET, FILM COATED ORAL
Qty: 0 | Refills: 0 | DISCHARGE
Start: 2018-08-14

## 2018-08-14 RX ORDER — METHOCARBAMOL 500 MG/1
1 TABLET, FILM COATED ORAL
Qty: 0 | Refills: 0 | DISCHARGE
Start: 2018-08-14

## 2018-08-14 RX ORDER — SERTRALINE 25 MG/1
1 TABLET, FILM COATED ORAL
Qty: 0 | Refills: 0 | COMMUNITY

## 2018-08-14 RX ORDER — BUPROPION HYDROCHLORIDE 150 MG/1
1 TABLET, EXTENDED RELEASE ORAL
Qty: 0 | Refills: 0 | COMMUNITY

## 2018-08-14 RX ORDER — BUPROPION HYDROCHLORIDE 150 MG/1
1 TABLET, EXTENDED RELEASE ORAL
Qty: 0 | Refills: 0 | DISCHARGE
Start: 2018-08-14

## 2018-08-14 RX ORDER — APIXABAN 2.5 MG/1
1 TABLET, FILM COATED ORAL
Qty: 0 | Refills: 0 | DISCHARGE
Start: 2018-08-14

## 2018-08-14 RX ORDER — LANOLIN ALCOHOL/MO/W.PET/CERES
1 CREAM (GRAM) TOPICAL
Qty: 0 | Refills: 0 | COMMUNITY

## 2018-08-14 RX ORDER — NIFEDIPINE 30 MG
1 TABLET, EXTENDED RELEASE 24 HR ORAL
Qty: 0 | Refills: 0 | DISCHARGE
Start: 2018-08-14

## 2018-08-14 RX ORDER — LATANOPROST 0.05 MG/ML
1 SOLUTION/ DROPS OPHTHALMIC; TOPICAL
Qty: 0 | Refills: 0 | COMMUNITY

## 2018-08-14 RX ORDER — BRIMONIDINE TARTRATE 2 MG/MG
1 SOLUTION/ DROPS OPHTHALMIC
Qty: 0 | Refills: 0 | COMMUNITY

## 2018-08-14 RX ORDER — LATANOPROST 0.05 MG/ML
1 SOLUTION/ DROPS OPHTHALMIC; TOPICAL
Qty: 0 | Refills: 0 | DISCHARGE
Start: 2018-08-14

## 2018-08-14 RX ORDER — ATROPINE SULFATE 1 %
1 DROPS OPHTHALMIC (EYE)
Qty: 0 | Refills: 0 | DISCHARGE
Start: 2018-08-14

## 2018-08-14 RX ORDER — METHOCARBAMOL 500 MG/1
2 TABLET, FILM COATED ORAL
Qty: 0 | Refills: 0 | COMMUNITY

## 2018-08-14 RX ADMIN — METHADONE HYDROCHLORIDE 10 MILLIGRAM(S): 40 TABLET ORAL at 14:04

## 2018-08-14 RX ADMIN — OXYCODONE AND ACETAMINOPHEN 1 TABLET(S): 5; 325 TABLET ORAL at 11:42

## 2018-08-14 RX ADMIN — Medication 1 DROP(S): at 06:19

## 2018-08-14 RX ADMIN — LISINOPRIL 20 MILLIGRAM(S): 2.5 TABLET ORAL at 06:18

## 2018-08-14 RX ADMIN — METHADONE HYDROCHLORIDE 10 MILLIGRAM(S): 40 TABLET ORAL at 06:19

## 2018-08-14 RX ADMIN — APIXABAN 5 MILLIGRAM(S): 2.5 TABLET, FILM COATED ORAL at 06:18

## 2018-08-14 RX ADMIN — Medication 60 MILLIGRAM(S): at 06:18

## 2018-08-14 RX ADMIN — GABAPENTIN 800 MILLIGRAM(S): 400 CAPSULE ORAL at 06:18

## 2018-08-14 RX ADMIN — GABAPENTIN 800 MILLIGRAM(S): 400 CAPSULE ORAL at 14:04

## 2018-08-14 RX ADMIN — OXYCODONE AND ACETAMINOPHEN 1 TABLET(S): 5; 325 TABLET ORAL at 02:06

## 2018-08-14 RX ADMIN — CLOPIDOGREL BISULFATE 75 MILLIGRAM(S): 75 TABLET, FILM COATED ORAL at 11:42

## 2018-08-14 RX ADMIN — Medication 5 MILLIGRAM(S): at 02:06

## 2018-08-14 RX ADMIN — INSULIN GLARGINE 50 UNIT(S): 100 INJECTION, SOLUTION SUBCUTANEOUS at 08:13

## 2018-08-14 RX ADMIN — Medication 4 UNIT(S): at 11:42

## 2018-08-14 RX ADMIN — OXYCODONE AND ACETAMINOPHEN 1 TABLET(S): 5; 325 TABLET ORAL at 06:18

## 2018-08-14 RX ADMIN — PANTOPRAZOLE SODIUM 40 MILLIGRAM(S): 20 TABLET, DELAYED RELEASE ORAL at 06:18

## 2018-08-14 RX ADMIN — BUPROPION HYDROCHLORIDE 150 MILLIGRAM(S): 150 TABLET, EXTENDED RELEASE ORAL at 11:42

## 2018-08-14 RX ADMIN — BRIMONIDINE TARTRATE 1 DROP(S): 2 SOLUTION/ DROPS OPHTHALMIC at 06:18

## 2018-08-14 RX ADMIN — BRIMONIDINE TARTRATE 1 DROP(S): 2 SOLUTION/ DROPS OPHTHALMIC at 14:04

## 2018-08-14 RX ADMIN — Medication 1 DROP(S): at 06:21

## 2018-08-14 RX ADMIN — SERTRALINE 100 MILLIGRAM(S): 25 TABLET, FILM COATED ORAL at 14:08

## 2018-08-14 RX ADMIN — Medication 4 UNIT(S): at 08:12

## 2018-08-14 NOTE — DISCHARGE NOTE ADULT - PATIENT PORTAL LINK FT
You can access the [facility] Patient Portal, offered by [facility], by registering with the following website: [email or web address]

## 2018-08-14 NOTE — DISCHARGE NOTE ADULT - PLAN OF CARE
resolution of symptoms take medications as prescribed and follow up with your PMD pain control take medications as prescribed and follow up with PMD and pain management take medications as prescribed and follow up with PMD within 2 weeks. Follow up with Dr. Velázquez (Pain management) within 5-7 days of discharge.

## 2018-08-14 NOTE — DISCHARGE NOTE ADULT - CARE PLAN
Principal Discharge DX:	Acute encephalopathy  Goal:	resolution of symptoms  Assessment and plan of treatment:	take medications as prescribed and follow up with your PMD  Secondary Diagnosis:	Overdose  Goal:	pain control  Assessment and plan of treatment:	take medications as prescribed and follow up with PMD and pain management Principal Discharge DX:	Acute encephalopathy  Goal:	resolution of symptoms  Assessment and plan of treatment:	take medications as prescribed and follow up with your PMD  Secondary Diagnosis:	Overdose  Goal:	pain control  Assessment and plan of treatment:	take medications as prescribed and follow up with PMD within 2 weeks. Follow up with Dr. Velázquez (Pain management) within 5-7 days of discharge.

## 2018-08-14 NOTE — DISCHARGE NOTE ADULT - HOSPITAL COURSE
67 year old man with DM, HTN, CAD s/p CABG, afib on eliquis, GERD, depression and hx of intrathecal pump that was removed complicated by CSF leak s/p repair is bought in by aid for unresponsiveness. As per the patient and per ED note, patient was checked by the aid that found the patient weak and unresponsive, she checked his FS and was 400 so she brought him in. the patient did not remember the episode.  after calling [facility] the patient follows usually, the nurse told me that they got report about patient having fentanyl patch in his mouth this morning, and she confirmed that patient should be off fentanyl. medications list faxed to us, a copy is in the chart and there are no fentanyl no percocet.  patient diagnosed with opiod overdose and encephalopathy. his opiod regimen has been adjusted per pain management recs. 67 year old man with DM, HTN, CAD s/p CABG, afib on eliquis, GERD, depression and hx of intrathecal pump that was removed complicated by CSF leak s/p repair is bought in by aid for unresponsiveness. As per the patient and per ED note, patient was checked by the aid that found the patient weak and unresponsive, she checked his FS and was 400 so she brought him in. the patient did not remember the episode.  after calling [facility] the patient follows usually, the nurse told me that they got report about patient having fentanyl patch in his mouth this morning, and she confirmed that patient should be off fentanyl.    # Metabolic encephalopathy - resolved  -likely 2/2 oversedation on opioids. hx of chronic opioid use.  -U/A negative with glucose 250.  Blood Cx -ve; UCx -ve      # Chronic back pain on opiates   - pt evaluated by pain management-Start on methadone 10mg q8hr PO as there is no clear benefit to increasing opioid dose further.  - D/C fentanyl patch.  D/C percocet.  -Gabapentin increased to 800mg q8hr (pt's previous regimen)    # Elevated LFTs - resolved  -US showed hepatomegaly  -No evidence of acute liver damage    # Diarrhea  -improved  -3 episodes, unbloody, crampy diffuse abdominal pain.   -C.Diff -ve  - start senna/colace again if pt complains of constipation    # DM type 2  -Monitor FS  -Home regimen    # HTN  -c/w home meds  -monitor BP    # H/o paroxysmal afib  -rate controlled   -eliquis for anticoagulation      # depression  -c/w bupropion and zoloft

## 2018-08-14 NOTE — DISCHARGE NOTE ADULT - CARE PROVIDER_API CALL
Glynn Velázquez), Anesthesiology; Pain Medicine  [address] [place] [address]  Phone: [phone]  Fax: [phone]

## 2018-08-14 NOTE — DISCHARGE NOTE ADULT - MEDICATION SUMMARY - MEDICATIONS TO TAKE
I will START or STAY ON the medications listed below when I get home from the hospital:    aspirin 81 mg oral delayed release tablet  -- 1 tab(s) by mouth once a day  -- Indication: For CAD    methadone 10 mg oral tablet  -- 1 tab(s) by mouth 3 times a day  -- Indication: For Chronic back pain    lisinopril 20 mg oral tablet  -- 1 tab(s) by mouth once a day  -- Indication: For HTN    apixaban 5 mg oral tablet  -- 1 tab(s) by mouth every 12 hours  -- Indication: For Afib    gabapentin 800 mg oral tablet  -- 1 tab(s) by mouth 3 times a day  -- Indication: For Neuropathy    sertraline 100 mg oral tablet  -- 1 tab(s) by mouth once a day  -- Indication: For Depression    NovoLOG 100 units/mL injectable solution  -- 2 unit(s) injectable 3 times  -- Indication: For DM    clopidogrel 75 mg oral tablet  -- 1 tab(s) by mouth once a day  -- Indication: For CAD    NIFEdipine 60 mg oral tablet, extended release  -- 1 tab(s) by mouth once a day  -- Indication: For HTN    ferrous sulfate 325 mg (65 mg elemental iron) oral tablet  -- 1 tab(s) by mouth 3 times a day  -- Indication: For HM    Senna Plus 50 mg-8.6 mg oral tablet  -- 2 tab(s) by mouth once a day (at bedtime)  -- Indication: For Constipation    methocarbamol 750 mg oral tablet  -- 1 tab(s) by mouth 3 times a day, As needed, spasm  -- Indication: For Muscle spasms    melatonin 5 mg oral tablet  -- 1 tab(s) by mouth once a day (at bedtime), As needed, Insomnia  -- Indication: For HM    atropine 1% ophthalmic solution  -- 1 drop(s) to each affected eye 2 times a day  -- Indication: For Hm    latanoprost 0.005% ophthalmic solution  -- 1 drop(s) to each affected eye once a day (at bedtime)  -- Indication: For HM    timolol maleate 0.5% ophthalmic solution  -- 1 drop(s) to each affected eye every 12 hours  -- Indication: For HM    brimonidine 0.2% ophthalmic solution  -- 1 drop(s) to each affected eye 3 times a day  -- Indication: For HM    pantoprazole 20 mg oral delayed release tablet  -- 1 tab(s) by mouth once a day  -- Indication: For GERD    buPROPion 150 mg/24 hours (XL) oral tablet, extended release  -- 1 tab(s) by mouth once a day  -- Indication: For Smoking cessation    Vitamin D3 2000 intl units oral tablet  -- 1 tab(s) by mouth once a day  -- Indication: For HM I will START or STAY ON the medications listed below when I get home from the hospital:    aspirin 81 mg oral delayed release tablet  -- 1 tab(s) by mouth once a day  -- Indication: For CAD    methadone 10 mg oral tablet  -- 1 tab(s) by mouth 3 times a day  -- Indication: For Chronic back pain    lisinopril 20 mg oral tablet  -- 1 tab(s) by mouth once a day  -- Indication: For HTN    apixaban 5 mg oral tablet  -- 1 tab(s) by mouth every 12 hours  -- Indication: For Afib    gabapentin 800 mg oral tablet  -- 1 tab(s) by mouth 3 times a day  -- Indication: For Neuropathy    sertraline 100 mg oral tablet  -- 1 tab(s) by mouth once a day  -- Indication: For Depression    Basaglar KwikPen  -- 50 unit(s) subcutaneous  -- Indication: For DM    NovoLOG 100 units/mL injectable solution  -- 2 unit(s) injectable 3 times  -- Indication: For DM    clopidogrel 75 mg oral tablet  -- 1 tab(s) by mouth once a day  -- Indication: For CAD    NIFEdipine 60 mg oral tablet, extended release  -- 1 tab(s) by mouth once a day  -- Indication: For HTN    ferrous sulfate 325 mg (65 mg elemental iron) oral tablet  -- 1 tab(s) by mouth 3 times a day  -- Indication: For HM    Senna Plus 50 mg-8.6 mg oral tablet  -- 2 tab(s) by mouth once a day (at bedtime)  -- Indication: For Constipation    methocarbamol 750 mg oral tablet  -- 1 tab(s) by mouth 3 times a day, As needed, spasm  -- Indication: For Muscle spasms    melatonin 5 mg oral tablet  -- 1 tab(s) by mouth once a day (at bedtime), As needed, Insomnia  -- Indication: For HM    atropine 1% ophthalmic solution  -- 1 drop(s) to each affected eye 2 times a day  -- Indication: For Hm    latanoprost 0.005% ophthalmic solution  -- 1 drop(s) to each affected eye once a day (at bedtime)  -- Indication: For HM    timolol maleate 0.5% ophthalmic solution  -- 1 drop(s) to each affected eye every 12 hours  -- Indication: For HM    brimonidine 0.2% ophthalmic solution  -- 1 drop(s) to each affected eye 3 times a day  -- Indication: For HM    pantoprazole 20 mg oral delayed release tablet  -- 1 tab(s) by mouth once a day  -- Indication: For GERD    buPROPion 150 mg/24 hours (XL) oral tablet, extended release  -- 1 tab(s) by mouth once a day  -- Indication: For Smoking cessation    Vitamin D3 2000 intl units oral tablet  -- 1 tab(s) by mouth once a day  -- Indication: For HM I will START or STAY ON the medications listed below when I get home from the hospital:    aspirin 81 mg oral delayed release tablet  -- 1 tab(s) by mouth once a day  -- Indication: For CAD    methadone 10 mg oral tablet  -- 1 tab(s) by mouth 3 times a day  -- Indication: For Chronic back pain    lisinopril 20 mg oral tablet  -- 1 tab(s) by mouth once a day  -- Indication: For HTN    apixaban 5 mg oral tablet  -- 1 tab(s) by mouth every 12 hours  -- Indication: For Afib    gabapentin 800 mg oral tablet  -- 1 tab(s) by mouth 3 times a day  -- Indication: For Neuropathy    sertraline 100 mg oral tablet  -- 1 tab(s) by mouth once a day  -- Indication: For Depression    Basaglar KwikPen  -- 50 unit(s) subcutaneous once a day (at bedtime)  -- Indication: For dm    NovoLOG 100 units/mL injectable solution  -- 2 unit(s) injectable 3 times a day (before meals)  -- Indication: For DM    clopidogrel 75 mg oral tablet  -- 1 tab(s) by mouth once a day  -- Indication: For CAD    NIFEdipine 60 mg oral tablet, extended release  -- 1 tab(s) by mouth once a day  -- Indication: For HTN    ferrous sulfate 325 mg (65 mg elemental iron) oral tablet  -- 1 tab(s) by mouth 3 times a day  -- Indication: For HM    Senna Plus 50 mg-8.6 mg oral tablet  -- 2 tab(s) by mouth once a day (at bedtime)  -- Indication: For Constipation    methocarbamol 750 mg oral tablet  -- 1 tab(s) by mouth 3 times a day, As needed, spasm  -- Indication: For Muscle spasms    melatonin 5 mg oral tablet  -- 1 tab(s) by mouth once a day (at bedtime), As needed, Insomnia  -- Indication: For HM    atropine 1% ophthalmic solution  -- 1 drop(s) to each affected eye 2 times a day  -- Indication: For Hm    latanoprost 0.005% ophthalmic solution  -- 1 drop(s) to each affected eye once a day (at bedtime)  -- Indication: For HM    timolol maleate 0.5% ophthalmic solution  -- 1 drop(s) to each affected eye every 12 hours  -- Indication: For HM    brimonidine 0.2% ophthalmic solution  -- 1 drop(s) to each affected eye 3 times a day  -- Indication: For HM    pantoprazole 20 mg oral delayed release tablet  -- 1 tab(s) by mouth once a day  -- Indication: For GERD    buPROPion 150 mg/24 hours (XL) oral tablet, extended release  -- 1 tab(s) by mouth once a day  -- Indication: For Smoking cessation    Vitamin D3 2000 intl units oral tablet  -- 1 tab(s) by mouth once a day  -- Indication: For HM

## 2018-08-15 LAB
CULTURE RESULTS: SIGNIFICANT CHANGE UP
CULTURE RESULTS: SIGNIFICANT CHANGE UP
SPECIMEN SOURCE: SIGNIFICANT CHANGE UP
SPECIMEN SOURCE: SIGNIFICANT CHANGE UP

## 2018-08-21 DIAGNOSIS — F32.9 MAJOR DEPRESSIVE DISORDER, SINGLE EPISODE, UNSPECIFIED: ICD-10-CM

## 2018-08-21 DIAGNOSIS — R53.1 WEAKNESS: ICD-10-CM
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
## 2018-08-21 DIAGNOSIS — Z89.431 ACQUIRED ABSENCE OF RIGHT FOOT: ICD-10-CM

## 2018-08-21 DIAGNOSIS — K21.9 GASTRO-ESOPHAGEAL REFLUX DISEASE WITHOUT ESOPHAGITIS: ICD-10-CM

## 2018-08-21 DIAGNOSIS — G93.41 METABOLIC ENCEPHALOPATHY: ICD-10-CM

## 2018-08-21 DIAGNOSIS — T40.2X1A POISONING BY OTHER OPIOIDS, ACCIDENTAL (UNINTENTIONAL), INITIAL ENCOUNTER: ICD-10-CM

## 2018-08-21 DIAGNOSIS — Z95.1 PRESENCE OF AORTOCORONARY BYPASS GRAFT: ICD-10-CM

## 2018-08-21 DIAGNOSIS — Z86.73 PERSONAL HISTORY OF TRANSIENT ISCHEMIC ATTACK (TIA), AND CEREBRAL INFARCTION WITHOUT RESIDUAL DEFICITS: ICD-10-CM

## 2018-08-21 DIAGNOSIS — Z79.01 LONG TERM (CURRENT) USE OF ANTICOAGULANTS: ICD-10-CM

## 2018-08-21 DIAGNOSIS — I10 ESSENTIAL (PRIMARY) HYPERTENSION: ICD-10-CM

## 2018-08-21 DIAGNOSIS — H54.413A BLINDNESS RIGHT EYE CATEGORY 3, NORMAL VISION LEFT EYE: ICD-10-CM

## 2018-08-21 DIAGNOSIS — R19.7 DIARRHEA, UNSPECIFIED: ICD-10-CM

## 2018-08-21 DIAGNOSIS — I25.10 ATHEROSCLEROTIC HEART DISEASE OF NATIVE CORONARY ARTERY WITHOUT ANGINA PECTORIS: ICD-10-CM
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
## 2018-08-21 DIAGNOSIS — M19.90 UNSPECIFIED OSTEOARTHRITIS, UNSPECIFIED SITE: ICD-10-CM

## 2018-08-21 DIAGNOSIS — G89.29 OTHER CHRONIC PAIN: ICD-10-CM

## 2018-08-21 DIAGNOSIS — J96.92 RESPIRATORY FAILURE, UNSPECIFIED WITH HYPERCAPNIA: ICD-10-CM

## 2018-08-21 DIAGNOSIS — E11.65 TYPE 2 DIABETES MELLITUS WITH HYPERGLYCEMIA: ICD-10-CM

## 2018-08-21 DIAGNOSIS — E87.2 ACIDOSIS: ICD-10-CM

## 2018-08-21 DIAGNOSIS — H40.9 UNSPECIFIED GLAUCOMA: ICD-10-CM

## 2018-08-21 DIAGNOSIS — I48.0 PAROXYSMAL ATRIAL FIBRILLATION: ICD-10-CM

## 2018-08-21 DIAGNOSIS — E11.40 TYPE 2 DIABETES MELLITUS WITH DIABETIC NEUROPATHY, UNSPECIFIED: ICD-10-CM

## 2018-08-21 DIAGNOSIS — F11.11 OPIOID ABUSE, IN REMISSION: ICD-10-CM

## 2018-08-21 DIAGNOSIS — M54.9 DORSALGIA, UNSPECIFIED: ICD-10-CM

## 2019-10-30 NOTE — DISCHARGE NOTE ADULT - NS AS DC STROKE DX YN
Behavioral Discharge Planning and Instructions    Summary: Per EMS, police were called to the patient's apartment after he was banging on other residents doors and harassing residents. After the second call to police, EMS was called to transport the patient here for evaluation. On EMS arrival, the patient's apartment was found to be in disarray, with a strobe light going off and a mattress on the floor of his apartment.      Main Diagnosis: Schizophrenia, Psychosis , Alcohol abuse, GERD, Morbid obesity , Dyslipidemia , Hypothyroidism     Major Treatments, Procedures and Findings: Stabilize with medications, connect with community programs.    Symptoms to Report: feeling more aggressive, increased confusion, losing more sleep, mood getting worse or thoughts of suicide    Lifestyle Adjustment: Take all medications as prescribed, meet with doctor/ medication provider, out patient therapist, , and [facility] worker as scheduled. Abstain from alcohol or any unprescribed drugs.    Psychiatry Follow-up:     [facility]     - Ruddy Henry cell [phone]  [address] [place] [address]  [phone]   Fax [phone]    ACT TEAM   [facility] [phone]    [facility] care coordinator Kira Caicedo [phone]  [facility] ride  Phone: [phone] or [phone]    Resources:   Crisis Intervention: 422.247.2374 or 817-324-2469 (TTY: 296.112.2442).  Call anytime for help.  National [place] on Mental Illness (www.mn.felicia.org): 331.119.5335 or 452-892-1633.  Alcoholics Anonymous (www.alcoholics-anonymous.org): Check your phone book for your local chapter.  Suicide Awareness Voices of Education (SAVE) (www.save.org): 877-080-FXAY (5903)  National Suicide Prevention Line (www.mentalhealthmn.org): 638-320-PECL (3084)  Mental Health Consumer/Survivor Network of [place] (www.mhcsn.net): 648.780.4250 or 892-892-4140    General Medication Instructions:   See your  "medication sheet(s) for instructions.   Take all medicines as directed.  Make no changes unless your doctor suggests them.   Go to all your doctor visits.  Be sure to have all your required lab tests. This way, your medicines can be refilled on time.  Do not use any drugs not prescribed by your doctor.  Avoid alcohol.    [place] Area:  [facility], Crisis stabilization [facility]- [phone]  [facility] Crisis Line: [phone]  [facility]: [phone]  Sexual Assault Program [facility]: [phone] or 1-626.590.9248  [place] Battered Women's Program: 1-305.198.1674 Ext: 279       Calls answered Mon-Fri-8:00 am--4:30 pm    [place]:  [facility]: [phone]  [facility] - [phone]  [facility] first call for help: [phone]  [facility] Crisis Center:  [phone]      [place] Area:  Warm Line: [phone]       Calls answered Tuesday--Saturday 4:00 pm--10:00 pm  [place] Crisis Line - [phone]  [place] Crisis Stabil[facility] [phone]    [place] Statewide:  [place] Crisis and Referral Services: 1-543.244.1744  National Suicide Prevention Lifeline: 3-432-259-TALK (4890)   - vxx1neeu- Text \"Life\" to 00417  First Call for Help: 2-1-1  [facility] Helpline- 2-333-FAIY-HELP      " no

## 2020-01-01 ENCOUNTER — LABORATORY RESULT (OUTPATIENT)
Age: 70
End: 2020-01-01

## 2020-01-01 ENCOUNTER — APPOINTMENT (OUTPATIENT)
Dept: UROLOGY | Facility: CLINIC | Age: 70
End: 2020-01-01

## 2020-01-01 ENCOUNTER — RESULT REVIEW (OUTPATIENT)
Age: 70
End: 2020-01-01

## 2020-01-01 ENCOUNTER — APPOINTMENT (OUTPATIENT)
Dept: UROLOGY | Facility: HOSPITAL | Age: 70
End: 2020-01-01

## 2020-01-01 ENCOUNTER — APPOINTMENT (OUTPATIENT)
Dept: UROLOGY | Facility: CLINIC | Age: 70
End: 2020-01-01
Payer: COMMERCIAL

## 2020-01-01 ENCOUNTER — OUTPATIENT (OUTPATIENT)
Dept: OUTPATIENT SERVICES | Facility: HOSPITAL | Age: 70
LOS: 1 days | Discharge: HOME | End: 2020-01-01
Payer: MEDICARE

## 2020-01-01 ENCOUNTER — OUTPATIENT (OUTPATIENT)
Dept: OUTPATIENT SERVICES | Facility: HOSPITAL | Age: 70
LOS: 1 days | Discharge: HOME | End: 2020-01-01

## 2020-01-01 ENCOUNTER — INPATIENT (INPATIENT)
Facility: HOSPITAL | Age: 70
LOS: 9 days | Discharge: HOME | End: 2020-09-21
Attending: HOSPITALIST | Admitting: HOSPITALIST
Payer: MEDICARE

## 2020-01-01 ENCOUNTER — TRANSCRIPTION ENCOUNTER (OUTPATIENT)
Age: 70
End: 2020-01-01

## 2020-01-01 ENCOUNTER — INPATIENT (INPATIENT)
Facility: HOSPITAL | Age: 70
LOS: 4 days | End: 2020-10-02
Attending: SURGERY | Admitting: SURGERY
Payer: MEDICARE

## 2020-01-01 VITALS
HEIGHT: 70 IN | OXYGEN SATURATION: 99 % | TEMPERATURE: 98 F | DIASTOLIC BLOOD PRESSURE: 83 MMHG | HEART RATE: 53 BPM | WEIGHT: 223.99 LBS | RESPIRATION RATE: 18 BRPM | SYSTOLIC BLOOD PRESSURE: 165 MMHG

## 2020-01-01 VITALS
WEIGHT: 179.9 LBS | RESPIRATION RATE: 20 BRPM | SYSTOLIC BLOOD PRESSURE: 163 MMHG | OXYGEN SATURATION: 100 % | DIASTOLIC BLOOD PRESSURE: 99 MMHG | TEMPERATURE: 97 F | HEIGHT: 70 IN | HEART RATE: 97 BPM

## 2020-01-01 VITALS — WEIGHT: 240 LBS | HEIGHT: 70 IN | BODY MASS INDEX: 34.36 KG/M2 | TEMPERATURE: 98 F

## 2020-01-01 VITALS
OXYGEN SATURATION: 97 % | HEIGHT: 70 IN | SYSTOLIC BLOOD PRESSURE: 170 MMHG | HEART RATE: 68 BPM | DIASTOLIC BLOOD PRESSURE: 71 MMHG | WEIGHT: 224.87 LBS | TEMPERATURE: 99 F | RESPIRATION RATE: 18 BRPM

## 2020-01-01 VITALS — OXYGEN SATURATION: 98 %

## 2020-01-01 VITALS
TEMPERATURE: 98 F | HEART RATE: 83 BPM | SYSTOLIC BLOOD PRESSURE: 170 MMHG | OXYGEN SATURATION: 99 % | DIASTOLIC BLOOD PRESSURE: 81 MMHG | RESPIRATION RATE: 20 BRPM

## 2020-01-01 VITALS — OXYGEN SATURATION: 98 % | HEART RATE: 77 BPM

## 2020-01-01 VITALS — RESPIRATION RATE: 18 BRPM | SYSTOLIC BLOOD PRESSURE: 185 MMHG | HEART RATE: 71 BPM | DIASTOLIC BLOOD PRESSURE: 86 MMHG

## 2020-01-01 VITALS — TEMPERATURE: 97.2 F

## 2020-01-01 DIAGNOSIS — D49.4 NEOPLASM OF UNSPECIFIED BEHAVIOR OF BLADDER: ICD-10-CM

## 2020-01-01 DIAGNOSIS — I25.810 ATHEROSCLEROSIS OF CORONARY ARTERY BYPASS GRAFT(S) WITHOUT ANGINA PECTORIS: Chronic | ICD-10-CM

## 2020-01-01 DIAGNOSIS — T85.610A BREAKDOWN (MECHANICAL) OF CRANIAL OR SPINAL INFUSION CATHETER, INITIAL ENCOUNTER: Chronic | ICD-10-CM

## 2020-01-01 DIAGNOSIS — Z89.431 ACQUIRED ABSENCE OF RIGHT FOOT: Chronic | ICD-10-CM

## 2020-01-01 DIAGNOSIS — E11.9 TYPE 2 DIABETES MELLITUS W/OUT COMPLICATIONS: ICD-10-CM

## 2020-01-01 DIAGNOSIS — R31.0 GROSS HEMATURIA: ICD-10-CM

## 2020-01-01 DIAGNOSIS — Z11.59 ENCOUNTER FOR SCREENING FOR OTHER VIRAL DISEASES: ICD-10-CM

## 2020-01-01 DIAGNOSIS — K76.0 FATTY (CHANGE OF) LIVER, NOT ELSEWHERE CLASSIFIED: ICD-10-CM

## 2020-01-01 DIAGNOSIS — Z22.39 CARRIER OF OTHER SPECIFIED BACTERIAL DISEASES: ICD-10-CM

## 2020-01-01 DIAGNOSIS — Z90.49 ACQUIRED ABSENCE OF OTHER SPECIFIED PARTS OF DIGESTIVE TRACT: Chronic | ICD-10-CM

## 2020-01-01 DIAGNOSIS — Z98.890 OTHER SPECIFIED POSTPROCEDURAL STATES: Chronic | ICD-10-CM

## 2020-01-01 DIAGNOSIS — E11.9 TYPE 2 DIABETES MELLITUS WITHOUT COMPLICATIONS: ICD-10-CM

## 2020-01-01 DIAGNOSIS — I48.91 UNSPECIFIED ATRIAL FIBRILLATION: ICD-10-CM

## 2020-01-01 DIAGNOSIS — I10 ESSENTIAL (PRIMARY) HYPERTENSION: ICD-10-CM

## 2020-01-01 DIAGNOSIS — Z82.49 FAMILY HISTORY OF ISCHEMIC HEART DISEASE AND OTHER DISEASES OF THE CIRCULATORY SYSTEM: ICD-10-CM

## 2020-01-01 DIAGNOSIS — Z79.82 LONG TERM (CURRENT) USE OF ASPIRIN: ICD-10-CM

## 2020-01-01 DIAGNOSIS — Z01.818 ENCOUNTER FOR OTHER PREPROCEDURAL EXAMINATION: ICD-10-CM

## 2020-01-01 DIAGNOSIS — K21.9 GASTRO-ESOPHAGEAL REFLUX DISEASE WITHOUT ESOPHAGITIS: ICD-10-CM

## 2020-01-01 DIAGNOSIS — I25.10 ATHEROSCLEROTIC HEART DISEASE OF NATIVE CORONARY ARTERY WITHOUT ANGINA PECTORIS: ICD-10-CM

## 2020-01-01 DIAGNOSIS — L97.519 NON-PRESSURE CHRONIC ULCER OF OTHER PART OF RIGHT FOOT WITH UNSPECIFIED SEVERITY: ICD-10-CM

## 2020-01-01 DIAGNOSIS — Z88.0 ALLERGY STATUS TO PENICILLIN: ICD-10-CM

## 2020-01-01 DIAGNOSIS — E11.621 TYPE 2 DIABETES MELLITUS WITH FOOT ULCER: ICD-10-CM

## 2020-01-01 DIAGNOSIS — Z79.4 LONG TERM (CURRENT) USE OF INSULIN: ICD-10-CM

## 2020-01-01 DIAGNOSIS — Z78.9 OTHER SPECIFIED HEALTH STATUS: ICD-10-CM

## 2020-01-01 DIAGNOSIS — M19.90 UNSPECIFIED OSTEOARTHRITIS, UNSPECIFIED SITE: ICD-10-CM

## 2020-01-01 DIAGNOSIS — Z89.431 ACQUIRED ABSENCE OF RIGHT FOOT: ICD-10-CM

## 2020-01-01 DIAGNOSIS — I51.9 HEART DISEASE, UNSPECIFIED: ICD-10-CM

## 2020-01-01 DIAGNOSIS — N32.89 OTHER SPECIFIED DISORDERS OF BLADDER: ICD-10-CM

## 2020-01-01 DIAGNOSIS — Z90.49 ACQUIRED ABSENCE OF OTHER SPECIFIED PARTS OF DIGESTIVE TRACT: ICD-10-CM

## 2020-01-01 DIAGNOSIS — A04.72 ENTEROCOLITIS DUE TO CLOSTRIDIUM DIFFICILE, NOT SPECIFIED AS RECURRENT: ICD-10-CM

## 2020-01-01 DIAGNOSIS — C67.4 MALIGNANT NEOPLASM OF POSTERIOR WALL OF BLADDER: ICD-10-CM

## 2020-01-01 DIAGNOSIS — Z85.51 PERSONAL HISTORY OF MALIGNANT NEOPLASM OF BLADDER: ICD-10-CM

## 2020-01-01 DIAGNOSIS — G89.18 OTHER ACUTE POSTPROCEDURAL PAIN: ICD-10-CM

## 2020-01-01 DIAGNOSIS — R10.9 UNSPECIFIED ABDOMINAL PAIN: ICD-10-CM

## 2020-01-01 DIAGNOSIS — F32.9 MAJOR DEPRESSIVE DISORDER, SINGLE EPISODE, UNSPECIFIED: ICD-10-CM

## 2020-01-01 DIAGNOSIS — E83.42 HYPOMAGNESEMIA: ICD-10-CM

## 2020-01-01 DIAGNOSIS — Z90.6 ACQUIRED ABSENCE OF OTHER PARTS OF URINARY TRACT: ICD-10-CM

## 2020-01-01 DIAGNOSIS — E11.65 TYPE 2 DIABETES MELLITUS WITH HYPERGLYCEMIA: ICD-10-CM

## 2020-01-01 DIAGNOSIS — E11.42 TYPE 2 DIABETES MELLITUS WITH DIABETIC POLYNEUROPATHY: ICD-10-CM

## 2020-01-01 DIAGNOSIS — C67.9 MALIGNANT NEOPLASM OF BLADDER, UNSPECIFIED: ICD-10-CM

## 2020-01-01 DIAGNOSIS — E66.9 OBESITY, UNSPECIFIED: ICD-10-CM

## 2020-01-01 DIAGNOSIS — E11.39 TYPE 2 DIABETES MELLITUS WITH OTHER DIABETIC OPHTHALMIC COMPLICATION: ICD-10-CM

## 2020-01-01 DIAGNOSIS — D72.829 ELEVATED WHITE BLOOD CELL COUNT, UNSPECIFIED: ICD-10-CM

## 2020-01-01 DIAGNOSIS — E87.2 ACIDOSIS: ICD-10-CM

## 2020-01-01 DIAGNOSIS — H40.9 UNSPECIFIED GLAUCOMA: ICD-10-CM

## 2020-01-01 DIAGNOSIS — I48.20 CHRONIC ATRIAL FIBRILLATION, UNSPECIFIED: ICD-10-CM

## 2020-01-01 DIAGNOSIS — Z98.890 OTHER SPECIFIED POSTPROCEDURAL STATES: ICD-10-CM

## 2020-01-01 DIAGNOSIS — Z79.01 LONG TERM (CURRENT) USE OF ANTICOAGULANTS: ICD-10-CM

## 2020-01-01 DIAGNOSIS — D47.3 ESSENTIAL (HEMORRHAGIC) THROMBOCYTHEMIA: ICD-10-CM

## 2020-01-01 LAB
-  AMIKACIN: SIGNIFICANT CHANGE UP
-  AMOXICILLIN/CLAVULANIC ACID: SIGNIFICANT CHANGE UP
-  AMPICILLIN/SULBACTAM: SIGNIFICANT CHANGE UP
-  AMPICILLIN: SIGNIFICANT CHANGE UP
-  AZTREONAM: SIGNIFICANT CHANGE UP
-  CEFAZOLIN: SIGNIFICANT CHANGE UP
-  CEFEPIME: SIGNIFICANT CHANGE UP
-  CEFOTAXIME: SIGNIFICANT CHANGE UP
-  CEFOXITIN: SIGNIFICANT CHANGE UP
-  CEFTAZIDIME: SIGNIFICANT CHANGE UP
-  CEFTRIAXONE: SIGNIFICANT CHANGE UP
-  CEFUROXIME: SIGNIFICANT CHANGE UP
-  CIPROFLOXACIN: SIGNIFICANT CHANGE UP
-  COAGULASE NEGATIVE STAPHYLOCOCCUS: SIGNIFICANT CHANGE UP
-  ERTAPENEM: SIGNIFICANT CHANGE UP
-  GENTAMICIN: SIGNIFICANT CHANGE UP
-  IMIPENEM: SIGNIFICANT CHANGE UP
-  LEVOFLOXACIN: SIGNIFICANT CHANGE UP
-  MEROPENEM: SIGNIFICANT CHANGE UP
-  MINOCYCLINE: SIGNIFICANT CHANGE UP
-  NITROFURANTOIN: SIGNIFICANT CHANGE UP
-  PIPERACILLIN/TAZOBACTAM: SIGNIFICANT CHANGE UP
-  TIGECYCLINE: SIGNIFICANT CHANGE UP
-  TOBRAMYCIN: SIGNIFICANT CHANGE UP
-  TRIMETHOPRIM/SULFAMETHOXAZOLE: SIGNIFICANT CHANGE UP
A1C WITH ESTIMATED AVERAGE GLUCOSE RESULT: 6.6 % — HIGH (ref 4–5.6)
ALBUMIN SERPL ELPH-MCNC: 2 G/DL — LOW (ref 3.5–5.2)
ALBUMIN SERPL ELPH-MCNC: 2 G/DL — LOW (ref 3.5–5.2)
ALBUMIN SERPL ELPH-MCNC: 2.9 G/DL — LOW (ref 3.5–5.2)
ALBUMIN SERPL ELPH-MCNC: 3.8 G/DL — SIGNIFICANT CHANGE UP (ref 3.5–5.2)
ALBUMIN SERPL ELPH-MCNC: 3.9 G/DL — SIGNIFICANT CHANGE UP (ref 3.5–5.2)
ALBUMIN SERPL ELPH-MCNC: 4 G/DL — SIGNIFICANT CHANGE UP (ref 3.5–5.2)
ALBUMIN SERPL ELPH-MCNC: 4.1 G/DL — SIGNIFICANT CHANGE UP (ref 3.5–5.2)
ALBUMIN SERPL ELPH-MCNC: 4.1 G/DL — SIGNIFICANT CHANGE UP (ref 3.5–5.2)
ALBUMIN SERPL ELPH-MCNC: 4.4 G/DL — SIGNIFICANT CHANGE UP (ref 3.5–5.2)
ALBUMIN SERPL ELPH-MCNC: 4.6 G/DL — SIGNIFICANT CHANGE UP (ref 3.5–5.2)
ALP SERPL-CCNC: 112 U/L — SIGNIFICANT CHANGE UP (ref 30–115)
ALP SERPL-CCNC: 58 U/L — SIGNIFICANT CHANGE UP (ref 30–115)
ALP SERPL-CCNC: 61 U/L — SIGNIFICANT CHANGE UP (ref 30–115)
ALP SERPL-CCNC: 62 U/L — SIGNIFICANT CHANGE UP (ref 30–115)
ALP SERPL-CCNC: 63 U/L — SIGNIFICANT CHANGE UP (ref 30–115)
ALP SERPL-CCNC: 66 U/L — SIGNIFICANT CHANGE UP (ref 30–115)
ALP SERPL-CCNC: 67 U/L — SIGNIFICANT CHANGE UP (ref 30–115)
ALP SERPL-CCNC: 67 U/L — SIGNIFICANT CHANGE UP (ref 30–115)
ALP SERPL-CCNC: 70 U/L — SIGNIFICANT CHANGE UP (ref 30–115)
ALP SERPL-CCNC: 72 U/L — SIGNIFICANT CHANGE UP (ref 30–115)
ALP SERPL-CCNC: 75 U/L — SIGNIFICANT CHANGE UP (ref 30–115)
ALP SERPL-CCNC: 76 U/L — SIGNIFICANT CHANGE UP (ref 30–115)
ALP SERPL-CCNC: 87 U/L — SIGNIFICANT CHANGE UP (ref 30–115)
ALT FLD-CCNC: 13 U/L — SIGNIFICANT CHANGE UP (ref 0–41)
ALT FLD-CCNC: 14 U/L — SIGNIFICANT CHANGE UP (ref 0–41)
ALT FLD-CCNC: 14 U/L — SIGNIFICANT CHANGE UP (ref 0–41)
ALT FLD-CCNC: 15 U/L — SIGNIFICANT CHANGE UP (ref 0–41)
ALT FLD-CCNC: 15 U/L — SIGNIFICANT CHANGE UP (ref 0–41)
ALT FLD-CCNC: 17 U/L — SIGNIFICANT CHANGE UP (ref 0–41)
ALT FLD-CCNC: 2122 U/L — HIGH (ref 0–41)
ALT FLD-CCNC: 24 U/L — SIGNIFICANT CHANGE UP (ref 0–41)
ALT FLD-CCNC: 2507 U/L — HIGH (ref 0–41)
ALT FLD-CCNC: 80 U/L — HIGH (ref 0–41)
ANA TITR SER: NEGATIVE — SIGNIFICANT CHANGE UP
ANION GAP SERPL CALC-SCNC: 11 MMOL/L — SIGNIFICANT CHANGE UP (ref 7–14)
ANION GAP SERPL CALC-SCNC: 12 MMOL/L — SIGNIFICANT CHANGE UP (ref 7–14)
ANION GAP SERPL CALC-SCNC: 12 MMOL/L — SIGNIFICANT CHANGE UP (ref 7–14)
ANION GAP SERPL CALC-SCNC: 13 MMOL/L — SIGNIFICANT CHANGE UP (ref 7–14)
ANION GAP SERPL CALC-SCNC: 14 MMOL/L — SIGNIFICANT CHANGE UP (ref 7–14)
ANION GAP SERPL CALC-SCNC: 15 MMOL/L — HIGH (ref 7–14)
ANION GAP SERPL CALC-SCNC: 16 MMOL/L — HIGH (ref 7–14)
ANION GAP SERPL CALC-SCNC: 18 MMOL/L — HIGH (ref 7–14)
ANION GAP SERPL CALC-SCNC: 19 MMOL/L — HIGH (ref 7–14)
ANION GAP SERPL CALC-SCNC: 22 MMOL/L — HIGH (ref 7–14)
ANION GAP SERPL CALC-SCNC: 25 MMOL/L — HIGH (ref 7–14)
ANION GAP SERPL CALC-SCNC: 9 MMOL/L — SIGNIFICANT CHANGE UP (ref 7–14)
APPEARANCE UR: ABNORMAL
APPEARANCE UR: ABNORMAL
APPEARANCE UR: CLEAR — SIGNIFICANT CHANGE UP
APPEARANCE UR: CLEAR — SIGNIFICANT CHANGE UP
APTT BLD: 28 SEC — SIGNIFICANT CHANGE UP (ref 27–39.2)
APTT BLD: 29 SEC — SIGNIFICANT CHANGE UP (ref 27–39.2)
APTT BLD: 33.9 SEC — SIGNIFICANT CHANGE UP (ref 27–39.2)
APTT BLD: 34.9 SEC — SIGNIFICANT CHANGE UP (ref 27–39.2)
APTT BLD: 41.8 SEC — HIGH (ref 27–39.2)
APTT BLD: 42.9 SEC — HIGH (ref 27–39.2)
APTT BLD: 46.5 SEC — HIGH (ref 27–39.2)
AST SERPL-CCNC: 14 U/L — SIGNIFICANT CHANGE UP (ref 0–41)
AST SERPL-CCNC: 15 U/L — SIGNIFICANT CHANGE UP (ref 0–41)
AST SERPL-CCNC: 15 U/L — SIGNIFICANT CHANGE UP (ref 0–41)
AST SERPL-CCNC: 17 U/L — SIGNIFICANT CHANGE UP (ref 0–41)
AST SERPL-CCNC: 18 U/L — SIGNIFICANT CHANGE UP (ref 0–41)
AST SERPL-CCNC: 19 U/L — SIGNIFICANT CHANGE UP (ref 0–41)
AST SERPL-CCNC: 20 U/L — SIGNIFICANT CHANGE UP (ref 0–41)
AST SERPL-CCNC: 20 U/L — SIGNIFICANT CHANGE UP (ref 0–41)
AST SERPL-CCNC: 23 U/L — SIGNIFICANT CHANGE UP (ref 0–41)
AST SERPL-CCNC: 30 U/L — SIGNIFICANT CHANGE UP (ref 0–41)
AST SERPL-CCNC: 47 U/L — HIGH (ref 0–41)
AST SERPL-CCNC: 6703 U/L — HIGH (ref 0–41)
AST SERPL-CCNC: >7000 U/L — HIGH (ref 0–41)
BACTERIA # UR AUTO: ABNORMAL
BACTERIA # UR AUTO: NEGATIVE — SIGNIFICANT CHANGE UP
BASE EXCESS BLDA CALC-SCNC: -1.2 MMOL/L — SIGNIFICANT CHANGE UP (ref -2–2)
BASE EXCESS BLDV CALC-SCNC: 2.8 MMOL/L — HIGH (ref -2–2)
BASOPHILS # BLD AUTO: 0 K/UL — SIGNIFICANT CHANGE UP (ref 0–0.2)
BASOPHILS # BLD AUTO: 0.05 K/UL — SIGNIFICANT CHANGE UP (ref 0–0.2)
BASOPHILS # BLD AUTO: 0.07 K/UL — SIGNIFICANT CHANGE UP (ref 0–0.2)
BASOPHILS # BLD AUTO: 0.08 K/UL — SIGNIFICANT CHANGE UP (ref 0–0.2)
BASOPHILS # BLD AUTO: 0.08 K/UL — SIGNIFICANT CHANGE UP (ref 0–0.2)
BASOPHILS # BLD AUTO: 0.09 K/UL — SIGNIFICANT CHANGE UP (ref 0–0.2)
BASOPHILS # BLD AUTO: 0.1 K/UL — SIGNIFICANT CHANGE UP (ref 0–0.2)
BASOPHILS # BLD AUTO: 0.1 K/UL — SIGNIFICANT CHANGE UP (ref 0–0.2)
BASOPHILS # BLD AUTO: 0.11 K/UL — SIGNIFICANT CHANGE UP (ref 0–0.2)
BASOPHILS # BLD AUTO: 0.12 K/UL — SIGNIFICANT CHANGE UP (ref 0–0.2)
BASOPHILS # BLD AUTO: 0.13 K/UL — SIGNIFICANT CHANGE UP (ref 0–0.2)
BASOPHILS # BLD AUTO: 0.13 K/UL — SIGNIFICANT CHANGE UP (ref 0–0.2)
BASOPHILS # BLD AUTO: 0.14 K/UL — SIGNIFICANT CHANGE UP (ref 0–0.2)
BASOPHILS # BLD AUTO: 0.15 K/UL — SIGNIFICANT CHANGE UP (ref 0–0.2)
BASOPHILS NFR BLD AUTO: 0 % — SIGNIFICANT CHANGE UP (ref 0–1)
BASOPHILS NFR BLD AUTO: 0.2 % — SIGNIFICANT CHANGE UP (ref 0–1)
BASOPHILS NFR BLD AUTO: 0.3 % — SIGNIFICANT CHANGE UP (ref 0–1)
BASOPHILS NFR BLD AUTO: 0.3 % — SIGNIFICANT CHANGE UP (ref 0–1)
BASOPHILS NFR BLD AUTO: 0.4 % — SIGNIFICANT CHANGE UP (ref 0–1)
BASOPHILS NFR BLD AUTO: 0.6 % — SIGNIFICANT CHANGE UP (ref 0–1)
BASOPHILS NFR BLD AUTO: 0.7 % — SIGNIFICANT CHANGE UP (ref 0–1)
BASOPHILS NFR BLD AUTO: 0.7 % — SIGNIFICANT CHANGE UP (ref 0–1)
BASOPHILS NFR BLD AUTO: 0.8 % — SIGNIFICANT CHANGE UP (ref 0–1)
BASOPHILS NFR BLD AUTO: 0.8 % — SIGNIFICANT CHANGE UP (ref 0–1)
BILIRUB DIRECT SERPL-MCNC: 0.3 MG/DL — HIGH (ref 0–0.2)
BILIRUB DIRECT SERPL-MCNC: 0.4 MG/DL — HIGH (ref 0–0.2)
BILIRUB DIRECT SERPL-MCNC: <0.2 MG/DL — SIGNIFICANT CHANGE UP (ref 0–0.2)
BILIRUB INDIRECT FLD-MCNC: 0.3 MG/DL — SIGNIFICANT CHANGE UP (ref 0.2–1.2)
BILIRUB INDIRECT FLD-MCNC: 0.3 MG/DL — SIGNIFICANT CHANGE UP (ref 0.2–1.2)
BILIRUB INDIRECT FLD-MCNC: >0.2 MG/DL — SIGNIFICANT CHANGE UP (ref 0.2–1.2)
BILIRUB SERPL-MCNC: 0.3 MG/DL — SIGNIFICANT CHANGE UP (ref 0.2–1.2)
BILIRUB SERPL-MCNC: 0.4 MG/DL — SIGNIFICANT CHANGE UP (ref 0.2–1.2)
BILIRUB SERPL-MCNC: 0.5 MG/DL — SIGNIFICANT CHANGE UP (ref 0.2–1.2)
BILIRUB SERPL-MCNC: 0.6 MG/DL — SIGNIFICANT CHANGE UP (ref 0.2–1.2)
BILIRUB SERPL-MCNC: 0.6 MG/DL — SIGNIFICANT CHANGE UP (ref 0.2–1.2)
BILIRUB SERPL-MCNC: 0.7 MG/DL — SIGNIFICANT CHANGE UP (ref 0.2–1.2)
BILIRUB SERPL-MCNC: 0.8 MG/DL — SIGNIFICANT CHANGE UP (ref 0.2–1.2)
BILIRUB SERPL-MCNC: 1.6 MG/DL — HIGH (ref 0.2–1.2)
BILIRUB UR-MCNC: ABNORMAL
BILIRUB UR-MCNC: NEGATIVE — SIGNIFICANT CHANGE UP
BLD GP AB SCN SERPL QL: SIGNIFICANT CHANGE UP
BUN SERPL-MCNC: 10 MG/DL — SIGNIFICANT CHANGE UP (ref 10–20)
BUN SERPL-MCNC: 14 MG/DL — SIGNIFICANT CHANGE UP (ref 10–20)
BUN SERPL-MCNC: 14 MG/DL — SIGNIFICANT CHANGE UP (ref 10–20)
BUN SERPL-MCNC: 15 MG/DL — SIGNIFICANT CHANGE UP (ref 10–20)
BUN SERPL-MCNC: 15 MG/DL — SIGNIFICANT CHANGE UP (ref 10–20)
BUN SERPL-MCNC: 16 MG/DL — SIGNIFICANT CHANGE UP (ref 10–20)
BUN SERPL-MCNC: 16 MG/DL — SIGNIFICANT CHANGE UP (ref 10–20)
BUN SERPL-MCNC: 20 MG/DL — SIGNIFICANT CHANGE UP (ref 10–20)
BUN SERPL-MCNC: 21 MG/DL — HIGH (ref 10–20)
BUN SERPL-MCNC: 21 MG/DL — HIGH (ref 10–20)
BUN SERPL-MCNC: 23 MG/DL — HIGH (ref 10–20)
BUN SERPL-MCNC: 25 MG/DL — HIGH (ref 10–20)
BUN SERPL-MCNC: 26 MG/DL — HIGH (ref 10–20)
BUN SERPL-MCNC: 34 MG/DL — HIGH (ref 10–20)
BUN SERPL-MCNC: 38 MG/DL — HIGH (ref 10–20)
BUN SERPL-MCNC: 39 MG/DL — HIGH (ref 10–20)
BUN SERPL-MCNC: 8 MG/DL — LOW (ref 10–20)
BUN SERPL-MCNC: 9 MG/DL — LOW (ref 10–20)
C DIFF BY PCR RESULT: NEGATIVE — SIGNIFICANT CHANGE UP
C DIFF BY PCR RESULT: POSITIVE
C DIFF TOX GENS STL QL NAA+PROBE: SIGNIFICANT CHANGE UP
C DIFF TOX GENS STL QL NAA+PROBE: SIGNIFICANT CHANGE UP
CA-I SERPL-SCNC: 1.16 MMOL/L — SIGNIFICANT CHANGE UP (ref 1.12–1.3)
CALCIUM SERPL-MCNC: 10 MG/DL — SIGNIFICANT CHANGE UP (ref 8.5–10.1)
CALCIUM SERPL-MCNC: 10 MG/DL — SIGNIFICANT CHANGE UP (ref 8.5–10.1)
CALCIUM SERPL-MCNC: 10.3 MG/DL — HIGH (ref 8.5–10.1)
CALCIUM SERPL-MCNC: 11 MG/DL — HIGH (ref 8.5–10.1)
CALCIUM SERPL-MCNC: 8 MG/DL — LOW (ref 8.5–10.1)
CALCIUM SERPL-MCNC: 8.1 MG/DL — LOW (ref 8.5–10.1)
CALCIUM SERPL-MCNC: 8.6 MG/DL — SIGNIFICANT CHANGE UP (ref 8.5–10.1)
CALCIUM SERPL-MCNC: 8.8 MG/DL — SIGNIFICANT CHANGE UP (ref 8.5–10.1)
CALCIUM SERPL-MCNC: 8.9 MG/DL — SIGNIFICANT CHANGE UP (ref 8.5–10.1)
CALCIUM SERPL-MCNC: 8.9 MG/DL — SIGNIFICANT CHANGE UP (ref 8.5–10.1)
CALCIUM SERPL-MCNC: 9.2 MG/DL — SIGNIFICANT CHANGE UP (ref 8.5–10.1)
CALCIUM SERPL-MCNC: 9.2 MG/DL — SIGNIFICANT CHANGE UP (ref 8.5–10.1)
CALCIUM SERPL-MCNC: 9.4 MG/DL — SIGNIFICANT CHANGE UP (ref 8.5–10.1)
CALCIUM SERPL-MCNC: 9.5 MG/DL — SIGNIFICANT CHANGE UP (ref 8.5–10.1)
CALCIUM SERPL-MCNC: 9.8 MG/DL — SIGNIFICANT CHANGE UP (ref 8.5–10.1)
CHLORIDE SERPL-SCNC: 100 MMOL/L — SIGNIFICANT CHANGE UP (ref 98–110)
CHLORIDE SERPL-SCNC: 101 MMOL/L — SIGNIFICANT CHANGE UP (ref 98–110)
CHLORIDE SERPL-SCNC: 102 MMOL/L — SIGNIFICANT CHANGE UP (ref 98–110)
CHLORIDE SERPL-SCNC: 102 MMOL/L — SIGNIFICANT CHANGE UP (ref 98–110)
CHLORIDE SERPL-SCNC: 104 MMOL/L — SIGNIFICANT CHANGE UP (ref 98–110)
CHLORIDE SERPL-SCNC: 104 MMOL/L — SIGNIFICANT CHANGE UP (ref 98–110)
CHLORIDE SERPL-SCNC: 86 MMOL/L — LOW (ref 98–110)
CHLORIDE SERPL-SCNC: 90 MMOL/L — LOW (ref 98–110)
CHLORIDE SERPL-SCNC: 94 MMOL/L — LOW (ref 98–110)
CHLORIDE SERPL-SCNC: 96 MMOL/L — LOW (ref 98–110)
CHLORIDE SERPL-SCNC: 97 MMOL/L — LOW (ref 98–110)
CHLORIDE SERPL-SCNC: 97 MMOL/L — LOW (ref 98–110)
CHLORIDE SERPL-SCNC: 98 MMOL/L — SIGNIFICANT CHANGE UP (ref 98–110)
CHLORIDE SERPL-SCNC: 99 MMOL/L — SIGNIFICANT CHANGE UP (ref 98–110)
CHLORIDE SERPL-SCNC: 99 MMOL/L — SIGNIFICANT CHANGE UP (ref 98–110)
CHOLEST SERPL-MCNC: 108 MG/DL — SIGNIFICANT CHANGE UP (ref 100–200)
CHROM ANALY INTERPHASE BLD FISH-IMP: SIGNIFICANT CHANGE UP
CK MB CFR SERPL CALC: 1.8 NG/ML — SIGNIFICANT CHANGE UP (ref 0.6–6.3)
CK MB CFR SERPL CALC: 1.9 NG/ML — SIGNIFICANT CHANGE UP (ref 0.6–6.3)
CK MB CFR SERPL CALC: 2 NG/ML — SIGNIFICANT CHANGE UP (ref 0.6–6.3)
CK MB CFR SERPL CALC: 2.7 NG/ML — SIGNIFICANT CHANGE UP (ref 0.6–6.3)
CK MB CFR SERPL CALC: 2.9 NG/ML — SIGNIFICANT CHANGE UP (ref 0.6–6.3)
CK MB CFR SERPL CALC: 6.8 NG/ML — HIGH (ref 0.6–6.3)
CK SERPL-CCNC: 262 U/L — HIGH (ref 0–225)
CK SERPL-CCNC: 276 U/L — HIGH (ref 0–225)
CK SERPL-CCNC: 341 U/L — HIGH (ref 0–225)
CK SERPL-CCNC: 63 U/L — SIGNIFICANT CHANGE UP (ref 0–225)
CK SERPL-CCNC: 70 U/L — SIGNIFICANT CHANGE UP (ref 0–225)
CK SERPL-CCNC: 969 U/L — HIGH (ref 0–225)
CO2 SERPL-SCNC: 17 MMOL/L — SIGNIFICANT CHANGE UP (ref 17–32)
CO2 SERPL-SCNC: 19 MMOL/L — SIGNIFICANT CHANGE UP (ref 17–32)
CO2 SERPL-SCNC: 21 MMOL/L — SIGNIFICANT CHANGE UP (ref 17–32)
CO2 SERPL-SCNC: 21 MMOL/L — SIGNIFICANT CHANGE UP (ref 17–32)
CO2 SERPL-SCNC: 23 MMOL/L — SIGNIFICANT CHANGE UP (ref 17–32)
CO2 SERPL-SCNC: 23 MMOL/L — SIGNIFICANT CHANGE UP (ref 17–32)
CO2 SERPL-SCNC: 24 MMOL/L — SIGNIFICANT CHANGE UP (ref 17–32)
CO2 SERPL-SCNC: 24 MMOL/L — SIGNIFICANT CHANGE UP (ref 17–32)
CO2 SERPL-SCNC: 25 MMOL/L — SIGNIFICANT CHANGE UP (ref 17–32)
CO2 SERPL-SCNC: 25 MMOL/L — SIGNIFICANT CHANGE UP (ref 17–32)
CO2 SERPL-SCNC: 26 MMOL/L — SIGNIFICANT CHANGE UP (ref 17–32)
CO2 SERPL-SCNC: 27 MMOL/L — SIGNIFICANT CHANGE UP (ref 17–32)
CO2 SERPL-SCNC: 28 MMOL/L — SIGNIFICANT CHANGE UP (ref 17–32)
CO2 SERPL-SCNC: 30 MMOL/L — SIGNIFICANT CHANGE UP (ref 17–32)
CO2 SERPL-SCNC: 31 MMOL/L — SIGNIFICANT CHANGE UP (ref 17–32)
COLOR SPEC: YELLOW — SIGNIFICANT CHANGE UP
CREAT SERPL-MCNC: 0.7 MG/DL — SIGNIFICANT CHANGE UP (ref 0.7–1.5)
CREAT SERPL-MCNC: 0.8 MG/DL — SIGNIFICANT CHANGE UP (ref 0.7–1.5)
CREAT SERPL-MCNC: 0.9 MG/DL — SIGNIFICANT CHANGE UP (ref 0.7–1.5)
CREAT SERPL-MCNC: 1 MG/DL — SIGNIFICANT CHANGE UP (ref 0.7–1.5)
CREAT SERPL-MCNC: 1.1 MG/DL — SIGNIFICANT CHANGE UP (ref 0.7–1.5)
CREAT SERPL-MCNC: 1.4 MG/DL — SIGNIFICANT CHANGE UP (ref 0.7–1.5)
CREAT SERPL-MCNC: 1.4 MG/DL — SIGNIFICANT CHANGE UP (ref 0.7–1.5)
CREAT SERPL-MCNC: 1.8 MG/DL — HIGH (ref 0.7–1.5)
CRP SERPL-MCNC: 1.46 MG/DL — HIGH (ref 0–0.4)
CULTURE RESULTS: SIGNIFICANT CHANGE UP
DIFF PNL FLD: ABNORMAL
EOSINOPHIL # BLD AUTO: 0 K/UL — SIGNIFICANT CHANGE UP (ref 0–0.7)
EOSINOPHIL # BLD AUTO: 0.03 K/UL — SIGNIFICANT CHANGE UP (ref 0–0.7)
EOSINOPHIL # BLD AUTO: 0.06 K/UL — SIGNIFICANT CHANGE UP (ref 0–0.7)
EOSINOPHIL # BLD AUTO: 0.09 K/UL — SIGNIFICANT CHANGE UP (ref 0–0.7)
EOSINOPHIL # BLD AUTO: 0.12 K/UL — SIGNIFICANT CHANGE UP (ref 0–0.7)
EOSINOPHIL # BLD AUTO: 0.13 K/UL — SIGNIFICANT CHANGE UP (ref 0–0.7)
EOSINOPHIL # BLD AUTO: 0.15 K/UL — SIGNIFICANT CHANGE UP (ref 0–0.7)
EOSINOPHIL # BLD AUTO: 0.16 K/UL — SIGNIFICANT CHANGE UP (ref 0–0.7)
EOSINOPHIL # BLD AUTO: 0.16 K/UL — SIGNIFICANT CHANGE UP (ref 0–0.7)
EOSINOPHIL # BLD AUTO: 0.25 K/UL — SIGNIFICANT CHANGE UP (ref 0–0.7)
EOSINOPHIL NFR BLD AUTO: 0 % — SIGNIFICANT CHANGE UP (ref 0–8)
EOSINOPHIL NFR BLD AUTO: 0.1 % — SIGNIFICANT CHANGE UP (ref 0–8)
EOSINOPHIL NFR BLD AUTO: 0.3 % — SIGNIFICANT CHANGE UP (ref 0–8)
EOSINOPHIL NFR BLD AUTO: 0.5 % — SIGNIFICANT CHANGE UP (ref 0–8)
EOSINOPHIL NFR BLD AUTO: 0.6 % — SIGNIFICANT CHANGE UP (ref 0–8)
EOSINOPHIL NFR BLD AUTO: 0.8 % — SIGNIFICANT CHANGE UP (ref 0–8)
EOSINOPHIL NFR BLD AUTO: 0.9 % — SIGNIFICANT CHANGE UP (ref 0–8)
EOSINOPHIL NFR BLD AUTO: 1.6 % — SIGNIFICANT CHANGE UP (ref 0–8)
EPI CELLS # UR: 0 /HPF — SIGNIFICANT CHANGE UP (ref 0–5)
ESTIMATED AVERAGE GLUCOSE: 143 MG/DL — HIGH (ref 68–114)
GAS PNL BLDA: SIGNIFICANT CHANGE UP
GAS PNL BLDV: 135 MMOL/L — LOW (ref 136–145)
GAS PNL BLDV: SIGNIFICANT CHANGE UP
GIANT PLATELETS BLD QL SMEAR: PRESENT — SIGNIFICANT CHANGE UP
GLUCOSE BLDC GLUCOMTR-MCNC: 101 MG/DL — HIGH (ref 70–99)
GLUCOSE BLDC GLUCOMTR-MCNC: 106 MG/DL — HIGH (ref 70–99)
GLUCOSE BLDC GLUCOMTR-MCNC: 106 MG/DL — HIGH (ref 70–99)
GLUCOSE BLDC GLUCOMTR-MCNC: 108 MG/DL — HIGH (ref 70–99)
GLUCOSE BLDC GLUCOMTR-MCNC: 108 MG/DL — HIGH (ref 70–99)
GLUCOSE BLDC GLUCOMTR-MCNC: 110 MG/DL — HIGH (ref 70–99)
GLUCOSE BLDC GLUCOMTR-MCNC: 111 MG/DL — HIGH (ref 70–99)
GLUCOSE BLDC GLUCOMTR-MCNC: 112 MG/DL — HIGH (ref 70–99)
GLUCOSE BLDC GLUCOMTR-MCNC: 116 MG/DL — HIGH (ref 70–99)
GLUCOSE BLDC GLUCOMTR-MCNC: 121 MG/DL — HIGH (ref 70–99)
GLUCOSE BLDC GLUCOMTR-MCNC: 121 MG/DL — HIGH (ref 70–99)
GLUCOSE BLDC GLUCOMTR-MCNC: 125 MG/DL — HIGH (ref 70–99)
GLUCOSE BLDC GLUCOMTR-MCNC: 125 MG/DL — HIGH (ref 70–99)
GLUCOSE BLDC GLUCOMTR-MCNC: 127 MG/DL — HIGH (ref 70–99)
GLUCOSE BLDC GLUCOMTR-MCNC: 128 MG/DL — HIGH (ref 70–99)
GLUCOSE BLDC GLUCOMTR-MCNC: 134 MG/DL — HIGH (ref 70–99)
GLUCOSE BLDC GLUCOMTR-MCNC: 134 MG/DL — HIGH (ref 70–99)
GLUCOSE BLDC GLUCOMTR-MCNC: 138 MG/DL — HIGH (ref 70–99)
GLUCOSE BLDC GLUCOMTR-MCNC: 139 MG/DL — HIGH (ref 70–99)
GLUCOSE BLDC GLUCOMTR-MCNC: 139 MG/DL — HIGH (ref 70–99)
GLUCOSE BLDC GLUCOMTR-MCNC: 140 MG/DL — HIGH (ref 70–99)
GLUCOSE BLDC GLUCOMTR-MCNC: 140 MG/DL — HIGH (ref 70–99)
GLUCOSE BLDC GLUCOMTR-MCNC: 142 MG/DL — HIGH (ref 70–99)
GLUCOSE BLDC GLUCOMTR-MCNC: 142 MG/DL — HIGH (ref 70–99)
GLUCOSE BLDC GLUCOMTR-MCNC: 144 MG/DL — HIGH (ref 70–99)
GLUCOSE BLDC GLUCOMTR-MCNC: 147 MG/DL — HIGH (ref 70–99)
GLUCOSE BLDC GLUCOMTR-MCNC: 148 MG/DL — HIGH (ref 70–99)
GLUCOSE BLDC GLUCOMTR-MCNC: 149 MG/DL — HIGH (ref 70–99)
GLUCOSE BLDC GLUCOMTR-MCNC: 149 MG/DL — HIGH (ref 70–99)
GLUCOSE BLDC GLUCOMTR-MCNC: 150 MG/DL — HIGH (ref 70–99)
GLUCOSE BLDC GLUCOMTR-MCNC: 153 MG/DL — HIGH (ref 70–99)
GLUCOSE BLDC GLUCOMTR-MCNC: 156 MG/DL — HIGH (ref 70–99)
GLUCOSE BLDC GLUCOMTR-MCNC: 158 MG/DL — HIGH (ref 70–99)
GLUCOSE BLDC GLUCOMTR-MCNC: 159 MG/DL — HIGH (ref 70–99)
GLUCOSE BLDC GLUCOMTR-MCNC: 160 MG/DL — HIGH (ref 70–99)
GLUCOSE BLDC GLUCOMTR-MCNC: 163 MG/DL — HIGH (ref 70–99)
GLUCOSE BLDC GLUCOMTR-MCNC: 165 MG/DL — HIGH (ref 70–99)
GLUCOSE BLDC GLUCOMTR-MCNC: 166 MG/DL — HIGH (ref 70–99)
GLUCOSE BLDC GLUCOMTR-MCNC: 167 MG/DL — HIGH (ref 70–99)
GLUCOSE BLDC GLUCOMTR-MCNC: 169 MG/DL — HIGH (ref 70–99)
GLUCOSE BLDC GLUCOMTR-MCNC: 172 MG/DL — HIGH (ref 70–99)
GLUCOSE BLDC GLUCOMTR-MCNC: 174 MG/DL — HIGH (ref 70–99)
GLUCOSE BLDC GLUCOMTR-MCNC: 175 MG/DL — HIGH (ref 70–99)
GLUCOSE BLDC GLUCOMTR-MCNC: 177 MG/DL — HIGH (ref 70–99)
GLUCOSE BLDC GLUCOMTR-MCNC: 179 MG/DL — HIGH (ref 70–99)
GLUCOSE BLDC GLUCOMTR-MCNC: 180 MG/DL — HIGH (ref 70–99)
GLUCOSE BLDC GLUCOMTR-MCNC: 180 MG/DL — HIGH (ref 70–99)
GLUCOSE BLDC GLUCOMTR-MCNC: 181 MG/DL — HIGH (ref 70–99)
GLUCOSE BLDC GLUCOMTR-MCNC: 184 MG/DL — HIGH (ref 70–99)
GLUCOSE BLDC GLUCOMTR-MCNC: 187 MG/DL — HIGH (ref 70–99)
GLUCOSE BLDC GLUCOMTR-MCNC: 191 MG/DL — HIGH (ref 70–99)
GLUCOSE BLDC GLUCOMTR-MCNC: 221 MG/DL — HIGH (ref 70–99)
GLUCOSE BLDC GLUCOMTR-MCNC: 232 MG/DL — HIGH (ref 70–99)
GLUCOSE BLDC GLUCOMTR-MCNC: 318 MG/DL — HIGH (ref 70–99)
GLUCOSE BLDC GLUCOMTR-MCNC: 84 MG/DL — SIGNIFICANT CHANGE UP (ref 70–99)
GLUCOSE BLDC GLUCOMTR-MCNC: 85 MG/DL — SIGNIFICANT CHANGE UP (ref 70–99)
GLUCOSE BLDC GLUCOMTR-MCNC: 86 MG/DL — SIGNIFICANT CHANGE UP (ref 70–99)
GLUCOSE BLDC GLUCOMTR-MCNC: 87 MG/DL — SIGNIFICANT CHANGE UP (ref 70–99)
GLUCOSE BLDC GLUCOMTR-MCNC: 91 MG/DL — SIGNIFICANT CHANGE UP (ref 70–99)
GLUCOSE SERPL-MCNC: 105 MG/DL — HIGH (ref 70–99)
GLUCOSE SERPL-MCNC: 119 MG/DL — HIGH (ref 70–99)
GLUCOSE SERPL-MCNC: 121 MG/DL — HIGH (ref 70–99)
GLUCOSE SERPL-MCNC: 124 MG/DL — HIGH (ref 70–99)
GLUCOSE SERPL-MCNC: 130 MG/DL — HIGH (ref 70–99)
GLUCOSE SERPL-MCNC: 133 MG/DL — HIGH (ref 70–99)
GLUCOSE SERPL-MCNC: 140 MG/DL — HIGH (ref 70–99)
GLUCOSE SERPL-MCNC: 143 MG/DL — HIGH (ref 70–99)
GLUCOSE SERPL-MCNC: 150 MG/DL — HIGH (ref 70–99)
GLUCOSE SERPL-MCNC: 152 MG/DL — HIGH (ref 70–99)
GLUCOSE SERPL-MCNC: 157 MG/DL — HIGH (ref 70–99)
GLUCOSE SERPL-MCNC: 161 MG/DL — HIGH (ref 70–99)
GLUCOSE SERPL-MCNC: 169 MG/DL — HIGH (ref 70–99)
GLUCOSE SERPL-MCNC: 172 MG/DL — HIGH (ref 70–99)
GLUCOSE SERPL-MCNC: 186 MG/DL — HIGH (ref 70–99)
GLUCOSE SERPL-MCNC: 195 MG/DL — HIGH (ref 70–99)
GLUCOSE SERPL-MCNC: 230 MG/DL — HIGH (ref 70–99)
GLUCOSE SERPL-MCNC: 78 MG/DL — SIGNIFICANT CHANGE UP (ref 70–99)
GLUCOSE SERPL-MCNC: 90 MG/DL — SIGNIFICANT CHANGE UP (ref 70–99)
GLUCOSE SERPL-MCNC: 98 MG/DL — SIGNIFICANT CHANGE UP (ref 70–99)
GLUCOSE SERPL-MCNC: 99 MG/DL — SIGNIFICANT CHANGE UP (ref 70–99)
GLUCOSE UR QL: NEGATIVE — SIGNIFICANT CHANGE UP
GRAM STN FLD: SIGNIFICANT CHANGE UP
HCO3 BLDA-SCNC: 23 MMOL/L — SIGNIFICANT CHANGE UP (ref 23–27)
HCO3 BLDV-SCNC: 27 MMOL/L — SIGNIFICANT CHANGE UP (ref 22–29)
HCT VFR BLD CALC: 28.8 % — LOW (ref 42–52)
HCT VFR BLD CALC: 28.9 % — LOW (ref 42–52)
HCT VFR BLD CALC: 29.1 % — LOW (ref 42–52)
HCT VFR BLD CALC: 32.9 % — LOW (ref 42–52)
HCT VFR BLD CALC: 33.8 % — LOW (ref 42–52)
HCT VFR BLD CALC: 34.2 % — LOW (ref 42–52)
HCT VFR BLD CALC: 34.8 % — LOW (ref 42–52)
HCT VFR BLD CALC: 35 % — LOW (ref 42–52)
HCT VFR BLD CALC: 35.9 % — LOW (ref 42–52)
HCT VFR BLD CALC: 37.9 % — LOW (ref 42–52)
HCT VFR BLD CALC: 38.2 % — LOW (ref 42–52)
HCT VFR BLD CALC: 38.6 % — LOW (ref 42–52)
HCT VFR BLD CALC: 39.1 % — LOW (ref 42–52)
HCT VFR BLD CALC: 39.1 % — LOW (ref 42–52)
HCT VFR BLD CALC: 39.8 % — LOW (ref 42–52)
HCT VFR BLD CALC: 40.1 % — LOW (ref 42–52)
HCT VFR BLD CALC: 40.4 % — LOW (ref 42–52)
HCT VFR BLD CALC: 41.5 % — LOW (ref 42–52)
HCT VFR BLD CALC: 43.8 % — SIGNIFICANT CHANGE UP (ref 42–52)
HCT VFR BLD CALC: 44.4 % — SIGNIFICANT CHANGE UP (ref 42–52)
HCT VFR BLD CALC: 45.2 % — SIGNIFICANT CHANGE UP (ref 42–52)
HCT VFR BLDA CALC: 40.6 % — SIGNIFICANT CHANGE UP (ref 34–44)
HCV AB S/CO SERPL IA: 0.03 COI — SIGNIFICANT CHANGE UP
HCV AB SERPL-IMP: SIGNIFICANT CHANGE UP
HDLC SERPL-MCNC: 35 MG/DL — LOW
HGB BLD CALC-MCNC: 13.3 G/DL — LOW (ref 14–18)
HGB BLD-MCNC: 10.5 G/DL — LOW (ref 14–18)
HGB BLD-MCNC: 10.6 G/DL — LOW (ref 14–18)
HGB BLD-MCNC: 10.7 G/DL — LOW (ref 14–18)
HGB BLD-MCNC: 10.9 G/DL — LOW (ref 14–18)
HGB BLD-MCNC: 11.2 G/DL — LOW (ref 14–18)
HGB BLD-MCNC: 11.6 G/DL — LOW (ref 14–18)
HGB BLD-MCNC: 11.8 G/DL — LOW (ref 14–18)
HGB BLD-MCNC: 11.9 G/DL — LOW (ref 14–18)
HGB BLD-MCNC: 12.1 G/DL — LOW (ref 14–18)
HGB BLD-MCNC: 12.3 G/DL — LOW (ref 14–18)
HGB BLD-MCNC: 12.3 G/DL — LOW (ref 14–18)
HGB BLD-MCNC: 12.4 G/DL — LOW (ref 14–18)
HGB BLD-MCNC: 12.7 G/DL — LOW (ref 14–18)
HGB BLD-MCNC: 12.8 G/DL — LOW (ref 14–18)
HGB BLD-MCNC: 13.1 G/DL — LOW (ref 14–18)
HGB BLD-MCNC: 13.9 G/DL — LOW (ref 14–18)
HGB BLD-MCNC: 14.1 G/DL — SIGNIFICANT CHANGE UP (ref 14–18)
HGB BLD-MCNC: 14.2 G/DL — SIGNIFICANT CHANGE UP (ref 14–18)
HGB BLD-MCNC: 8.8 G/DL — LOW (ref 14–18)
HGB BLD-MCNC: 9 G/DL — LOW (ref 14–18)
HGB BLD-MCNC: 9.1 G/DL — LOW (ref 14–18)
HYALINE CASTS # UR AUTO: 0 /LPF — SIGNIFICANT CHANGE UP (ref 0–7)
HYALINE CASTS # UR AUTO: 10 /LPF — HIGH (ref 0–7)
HYALINE CASTS # UR AUTO: 17 /LPF — HIGH (ref 0–7)
HYALINE CASTS # UR AUTO: 3 /LPF — SIGNIFICANT CHANGE UP (ref 0–7)
IMM GRANULOCYTES NFR BLD AUTO: 0.4 % — HIGH (ref 0.1–0.3)
IMM GRANULOCYTES NFR BLD AUTO: 0.5 % — HIGH (ref 0.1–0.3)
IMM GRANULOCYTES NFR BLD AUTO: 0.6 % — HIGH (ref 0.1–0.3)
IMM GRANULOCYTES NFR BLD AUTO: 0.8 % — HIGH (ref 0.1–0.3)
IMM GRANULOCYTES NFR BLD AUTO: 1 % — HIGH (ref 0.1–0.3)
IMM GRANULOCYTES NFR BLD AUTO: 1.2 % — HIGH (ref 0.1–0.3)
IMM GRANULOCYTES NFR BLD AUTO: 2.9 % — HIGH (ref 0.1–0.3)
IMM GRANULOCYTES NFR BLD AUTO: 3.2 % — HIGH (ref 0.1–0.3)
IMM GRANULOCYTES NFR BLD AUTO: 4.3 % — HIGH (ref 0.1–0.3)
INR BLD: 1 RATIO — SIGNIFICANT CHANGE UP (ref 0.65–1.3)
INR BLD: 1.22 RATIO — SIGNIFICANT CHANGE UP (ref 0.65–1.3)
INR BLD: 1.74 RATIO — HIGH (ref 0.65–1.3)
INR BLD: 1.94 RATIO — HIGH (ref 0.65–1.3)
INR BLD: 2.28 RATIO — HIGH (ref 0.65–1.3)
INR BLD: 2.43 RATIO — HIGH (ref 0.65–1.3)
INR BLD: 2.73 RATIO — HIGH (ref 0.65–1.3)
KETONES UR-MCNC: ABNORMAL
KETONES UR-MCNC: NEGATIVE — SIGNIFICANT CHANGE UP
KETONES UR-MCNC: NEGATIVE — SIGNIFICANT CHANGE UP
KETONES UR-MCNC: SIGNIFICANT CHANGE UP
LACTATE BLDV-MCNC: 1.8 MMOL/L — HIGH (ref 0.5–1.6)
LACTATE SERPL-SCNC: 1.1 MMOL/L — SIGNIFICANT CHANGE UP (ref 0.7–2)
LACTATE SERPL-SCNC: 1.3 MMOL/L — SIGNIFICANT CHANGE UP (ref 0.7–2)
LACTATE SERPL-SCNC: 1.4 MMOL/L — SIGNIFICANT CHANGE UP (ref 0.7–2)
LACTATE SERPL-SCNC: 2.4 MMOL/L — HIGH (ref 0.7–2)
LACTATE SERPL-SCNC: 6.4 MMOL/L — CRITICAL HIGH (ref 0.7–2)
LACTATE SERPL-SCNC: 6.6 MMOL/L — CRITICAL HIGH (ref 0.7–2)
LACTATE SERPL-SCNC: 8.3 MMOL/L — CRITICAL HIGH (ref 0.7–2)
LEUKOCYTE ESTERASE UR-ACNC: ABNORMAL
LEUKOCYTE ESTERASE UR-ACNC: NEGATIVE — SIGNIFICANT CHANGE UP
LIDOCAIN IGE QN: 20 U/L — SIGNIFICANT CHANGE UP (ref 7–60)
LIDOCAIN IGE QN: 40 U/L — SIGNIFICANT CHANGE UP (ref 7–60)
LIPID PNL WITH DIRECT LDL SERPL: 50 MG/DL — SIGNIFICANT CHANGE UP (ref 4–129)
LYMPHOCYTES # BLD AUTO: 0.48 K/UL — LOW (ref 1.2–3.4)
LYMPHOCYTES # BLD AUTO: 1.27 K/UL — SIGNIFICANT CHANGE UP (ref 1.2–3.4)
LYMPHOCYTES # BLD AUTO: 1.64 K/UL — SIGNIFICANT CHANGE UP (ref 1.2–3.4)
LYMPHOCYTES # BLD AUTO: 1.8 % — LOW (ref 20.5–51.1)
LYMPHOCYTES # BLD AUTO: 11.3 % — LOW (ref 20.5–51.1)
LYMPHOCYTES # BLD AUTO: 15 % — LOW (ref 20.5–51.1)
LYMPHOCYTES # BLD AUTO: 15.9 % — LOW (ref 20.5–51.1)
LYMPHOCYTES # BLD AUTO: 18.6 % — LOW (ref 20.5–51.1)
LYMPHOCYTES # BLD AUTO: 18.9 % — LOW (ref 20.5–51.1)
LYMPHOCYTES # BLD AUTO: 19.2 % — LOW (ref 20.5–51.1)
LYMPHOCYTES # BLD AUTO: 19.7 % — LOW (ref 20.5–51.1)
LYMPHOCYTES # BLD AUTO: 2.18 K/UL — SIGNIFICANT CHANGE UP (ref 1.2–3.4)
LYMPHOCYTES # BLD AUTO: 2.77 K/UL — SIGNIFICANT CHANGE UP (ref 1.2–3.4)
LYMPHOCYTES # BLD AUTO: 2.97 K/UL — SIGNIFICANT CHANGE UP (ref 1.2–3.4)
LYMPHOCYTES # BLD AUTO: 21.4 % — SIGNIFICANT CHANGE UP (ref 20.5–51.1)
LYMPHOCYTES # BLD AUTO: 22.3 % — SIGNIFICANT CHANGE UP (ref 20.5–51.1)
LYMPHOCYTES # BLD AUTO: 23.3 % — SIGNIFICANT CHANGE UP (ref 20.5–51.1)
LYMPHOCYTES # BLD AUTO: 3.12 K/UL — SIGNIFICANT CHANGE UP (ref 1.2–3.4)
LYMPHOCYTES # BLD AUTO: 3.22 K/UL — SIGNIFICANT CHANGE UP (ref 1.2–3.4)
LYMPHOCYTES # BLD AUTO: 3.62 K/UL — HIGH (ref 1.2–3.4)
LYMPHOCYTES # BLD AUTO: 3.65 K/UL — HIGH (ref 1.2–3.4)
LYMPHOCYTES # BLD AUTO: 4 K/UL — HIGH (ref 1.2–3.4)
LYMPHOCYTES # BLD AUTO: 4.07 K/UL — HIGH (ref 1.2–3.4)
LYMPHOCYTES # BLD AUTO: 4.1 % — LOW (ref 20.5–51.1)
LYMPHOCYTES # BLD AUTO: 4.18 K/UL — HIGH (ref 1.2–3.4)
LYMPHOCYTES # BLD AUTO: 4.82 K/UL — HIGH (ref 1.2–3.4)
LYMPHOCYTES # BLD AUTO: 7.3 % — LOW (ref 20.5–51.1)
LYMPHOCYTES # BLD AUTO: 9.9 % — LOW (ref 20.5–51.1)
MAGNESIUM SERPL-MCNC: 1.5 MG/DL — LOW (ref 1.8–2.4)
MAGNESIUM SERPL-MCNC: 1.7 MG/DL — LOW (ref 1.8–2.4)
MAGNESIUM SERPL-MCNC: 1.9 MG/DL — SIGNIFICANT CHANGE UP (ref 1.8–2.4)
MAGNESIUM SERPL-MCNC: 1.9 MG/DL — SIGNIFICANT CHANGE UP (ref 1.8–2.4)
MAGNESIUM SERPL-MCNC: 2 MG/DL — SIGNIFICANT CHANGE UP (ref 1.8–2.4)
MAGNESIUM SERPL-MCNC: 2.1 MG/DL — SIGNIFICANT CHANGE UP (ref 1.8–2.4)
MAGNESIUM SERPL-MCNC: 2.2 MG/DL — SIGNIFICANT CHANGE UP (ref 1.8–2.4)
MAGNESIUM SERPL-MCNC: 2.2 MG/DL — SIGNIFICANT CHANGE UP (ref 1.8–2.4)
MANUAL SMEAR VERIFICATION: SIGNIFICANT CHANGE UP
MCHC RBC-ENTMCNC: 27 PG — SIGNIFICANT CHANGE UP (ref 27–31)
MCHC RBC-ENTMCNC: 27.1 PG — SIGNIFICANT CHANGE UP (ref 27–31)
MCHC RBC-ENTMCNC: 27.1 PG — SIGNIFICANT CHANGE UP (ref 27–31)
MCHC RBC-ENTMCNC: 27.2 PG — SIGNIFICANT CHANGE UP (ref 27–31)
MCHC RBC-ENTMCNC: 27.2 PG — SIGNIFICANT CHANGE UP (ref 27–31)
MCHC RBC-ENTMCNC: 27.3 PG — SIGNIFICANT CHANGE UP (ref 27–31)
MCHC RBC-ENTMCNC: 27.4 PG — SIGNIFICANT CHANGE UP (ref 27–31)
MCHC RBC-ENTMCNC: 27.7 PG — SIGNIFICANT CHANGE UP (ref 27–31)
MCHC RBC-ENTMCNC: 27.7 PG — SIGNIFICANT CHANGE UP (ref 27–31)
MCHC RBC-ENTMCNC: 27.8 PG — SIGNIFICANT CHANGE UP (ref 27–31)
MCHC RBC-ENTMCNC: 27.9 PG — SIGNIFICANT CHANGE UP (ref 27–31)
MCHC RBC-ENTMCNC: 29.7 PG — SIGNIFICANT CHANGE UP (ref 27–31)
MCHC RBC-ENTMCNC: 30.6 G/DL — LOW (ref 32–37)
MCHC RBC-ENTMCNC: 30.7 G/DL — LOW (ref 32–37)
MCHC RBC-ENTMCNC: 30.8 G/DL — LOW (ref 32–37)
MCHC RBC-ENTMCNC: 30.9 G/DL — LOW (ref 32–37)
MCHC RBC-ENTMCNC: 31.1 G/DL — LOW (ref 32–37)
MCHC RBC-ENTMCNC: 31.1 G/DL — LOW (ref 32–37)
MCHC RBC-ENTMCNC: 31.2 G/DL — LOW (ref 32–37)
MCHC RBC-ENTMCNC: 31.3 G/DL — LOW (ref 32–37)
MCHC RBC-ENTMCNC: 31.5 G/DL — LOW (ref 32–37)
MCHC RBC-ENTMCNC: 31.6 G/DL — LOW (ref 32–37)
MCHC RBC-ENTMCNC: 31.7 G/DL — LOW (ref 32–37)
MCHC RBC-ENTMCNC: 32 G/DL — SIGNIFICANT CHANGE UP (ref 32–37)
MCHC RBC-ENTMCNC: 32 G/DL — SIGNIFICANT CHANGE UP (ref 32–37)
MCHC RBC-ENTMCNC: 32.2 G/DL — SIGNIFICANT CHANGE UP (ref 32–37)
MCHC RBC-ENTMCNC: 32.2 G/DL — SIGNIFICANT CHANGE UP (ref 32–37)
MCHC RBC-ENTMCNC: 32.3 G/DL — SIGNIFICANT CHANGE UP (ref 32–37)
MCV RBC AUTO: 85.5 FL — SIGNIFICANT CHANGE UP (ref 80–94)
MCV RBC AUTO: 85.8 FL — SIGNIFICANT CHANGE UP (ref 80–94)
MCV RBC AUTO: 85.9 FL — SIGNIFICANT CHANGE UP (ref 80–94)
MCV RBC AUTO: 85.9 FL — SIGNIFICANT CHANGE UP (ref 80–94)
MCV RBC AUTO: 86.4 FL — SIGNIFICANT CHANGE UP (ref 80–94)
MCV RBC AUTO: 86.4 FL — SIGNIFICANT CHANGE UP (ref 80–94)
MCV RBC AUTO: 86.5 FL — SIGNIFICANT CHANGE UP (ref 80–94)
MCV RBC AUTO: 87.1 FL — SIGNIFICANT CHANGE UP (ref 80–94)
MCV RBC AUTO: 87.4 FL — SIGNIFICANT CHANGE UP (ref 80–94)
MCV RBC AUTO: 87.6 FL — SIGNIFICANT CHANGE UP (ref 80–94)
MCV RBC AUTO: 87.7 FL — SIGNIFICANT CHANGE UP (ref 80–94)
MCV RBC AUTO: 87.9 FL — SIGNIFICANT CHANGE UP (ref 80–94)
MCV RBC AUTO: 88 FL — SIGNIFICANT CHANGE UP (ref 80–94)
MCV RBC AUTO: 88.1 FL — SIGNIFICANT CHANGE UP (ref 80–94)
MCV RBC AUTO: 88.1 FL — SIGNIFICANT CHANGE UP (ref 80–94)
MCV RBC AUTO: 88.5 FL — SIGNIFICANT CHANGE UP (ref 80–94)
MCV RBC AUTO: 88.7 FL — SIGNIFICANT CHANGE UP (ref 80–94)
MCV RBC AUTO: 89.4 FL — SIGNIFICANT CHANGE UP (ref 80–94)
MCV RBC AUTO: 89.6 FL — SIGNIFICANT CHANGE UP (ref 80–94)
MCV RBC AUTO: 90.8 FL — SIGNIFICANT CHANGE UP (ref 80–94)
MCV RBC AUTO: 92.2 FL — SIGNIFICANT CHANGE UP (ref 80–94)
METHOD TYPE: SIGNIFICANT CHANGE UP
MONOCYTES # BLD AUTO: 0.73 K/UL — HIGH (ref 0.1–0.6)
MONOCYTES # BLD AUTO: 1.05 K/UL — HIGH (ref 0.1–0.6)
MONOCYTES # BLD AUTO: 1.23 K/UL — HIGH (ref 0.1–0.6)
MONOCYTES # BLD AUTO: 1.26 K/UL — HIGH (ref 0.1–0.6)
MONOCYTES # BLD AUTO: 1.33 K/UL — HIGH (ref 0.1–0.6)
MONOCYTES # BLD AUTO: 1.43 K/UL — HIGH (ref 0.1–0.6)
MONOCYTES # BLD AUTO: 1.59 K/UL — HIGH (ref 0.1–0.6)
MONOCYTES # BLD AUTO: 1.63 K/UL — HIGH (ref 0.1–0.6)
MONOCYTES # BLD AUTO: 1.64 K/UL — HIGH (ref 0.1–0.6)
MONOCYTES # BLD AUTO: 1.65 K/UL — HIGH (ref 0.1–0.6)
MONOCYTES # BLD AUTO: 1.72 K/UL — HIGH (ref 0.1–0.6)
MONOCYTES # BLD AUTO: 1.9 K/UL — HIGH (ref 0.1–0.6)
MONOCYTES # BLD AUTO: 2.37 K/UL — HIGH (ref 0.1–0.6)
MONOCYTES # BLD AUTO: 3.41 K/UL — HIGH (ref 0.1–0.6)
MONOCYTES NFR BLD AUTO: 10.6 % — HIGH (ref 1.7–9.3)
MONOCYTES NFR BLD AUTO: 13.9 % — HIGH (ref 1.7–9.3)
MONOCYTES NFR BLD AUTO: 3.3 % — SIGNIFICANT CHANGE UP (ref 1.7–9.3)
MONOCYTES NFR BLD AUTO: 4.7 % — SIGNIFICANT CHANGE UP (ref 1.7–9.3)
MONOCYTES NFR BLD AUTO: 5.3 % — SIGNIFICANT CHANGE UP (ref 1.7–9.3)
MONOCYTES NFR BLD AUTO: 6.7 % — SIGNIFICANT CHANGE UP (ref 1.7–9.3)
MONOCYTES NFR BLD AUTO: 7 % — SIGNIFICANT CHANGE UP (ref 1.7–9.3)
MONOCYTES NFR BLD AUTO: 7.2 % — SIGNIFICANT CHANGE UP (ref 1.7–9.3)
MONOCYTES NFR BLD AUTO: 7.5 % — SIGNIFICANT CHANGE UP (ref 1.7–9.3)
MONOCYTES NFR BLD AUTO: 7.8 % — SIGNIFICANT CHANGE UP (ref 1.7–9.3)
MONOCYTES NFR BLD AUTO: 7.8 % — SIGNIFICANT CHANGE UP (ref 1.7–9.3)
MONOCYTES NFR BLD AUTO: 8.1 % — SIGNIFICANT CHANGE UP (ref 1.7–9.3)
MONOCYTES NFR BLD AUTO: 8.5 % — SIGNIFICANT CHANGE UP (ref 1.7–9.3)
MONOCYTES NFR BLD AUTO: 8.8 % — SIGNIFICANT CHANGE UP (ref 1.7–9.3)
NEUTROPHILS # BLD AUTO: 10.12 K/UL — HIGH (ref 1.4–6.5)
NEUTROPHILS # BLD AUTO: 11.25 K/UL — HIGH (ref 1.4–6.5)
NEUTROPHILS # BLD AUTO: 11.49 K/UL — HIGH (ref 1.4–6.5)
NEUTROPHILS # BLD AUTO: 12.28 K/UL — HIGH (ref 1.4–6.5)
NEUTROPHILS # BLD AUTO: 13.71 K/UL — HIGH (ref 1.4–6.5)
NEUTROPHILS # BLD AUTO: 14.5 K/UL — HIGH (ref 1.4–6.5)
NEUTROPHILS # BLD AUTO: 15.6 K/UL — HIGH (ref 1.4–6.5)
NEUTROPHILS # BLD AUTO: 17.43 K/UL — HIGH (ref 1.4–6.5)
NEUTROPHILS # BLD AUTO: 18.22 K/UL — HIGH (ref 1.4–6.5)
NEUTROPHILS # BLD AUTO: 18.23 K/UL — HIGH (ref 1.4–6.5)
NEUTROPHILS # BLD AUTO: 18.3 K/UL — HIGH (ref 1.4–6.5)
NEUTROPHILS # BLD AUTO: 18.33 K/UL — HIGH (ref 1.4–6.5)
NEUTROPHILS # BLD AUTO: 24.16 K/UL — HIGH (ref 1.4–6.5)
NEUTROPHILS # BLD AUTO: 26.53 K/UL — HIGH (ref 1.4–6.5)
NEUTROPHILS NFR BLD AUTO: 67 % — SIGNIFICANT CHANGE UP (ref 42.2–75.2)
NEUTROPHILS NFR BLD AUTO: 67.3 % — SIGNIFICANT CHANGE UP (ref 42.2–75.2)
NEUTROPHILS NFR BLD AUTO: 67.5 % — SIGNIFICANT CHANGE UP (ref 42.2–75.2)
NEUTROPHILS NFR BLD AUTO: 67.8 % — SIGNIFICANT CHANGE UP (ref 42.2–75.2)
NEUTROPHILS NFR BLD AUTO: 70.3 % — SIGNIFICANT CHANGE UP (ref 42.2–75.2)
NEUTROPHILS NFR BLD AUTO: 71.2 % — SIGNIFICANT CHANGE UP (ref 42.2–75.2)
NEUTROPHILS NFR BLD AUTO: 71.5 % — SIGNIFICANT CHANGE UP (ref 42.2–75.2)
NEUTROPHILS NFR BLD AUTO: 71.7 % — SIGNIFICANT CHANGE UP (ref 42.2–75.2)
NEUTROPHILS NFR BLD AUTO: 74.1 % — SIGNIFICANT CHANGE UP (ref 42.2–75.2)
NEUTROPHILS NFR BLD AUTO: 75.5 % — HIGH (ref 42.2–75.2)
NEUTROPHILS NFR BLD AUTO: 81.4 % — HIGH (ref 42.2–75.2)
NEUTROPHILS NFR BLD AUTO: 83.2 % — HIGH (ref 42.2–75.2)
NEUTROPHILS NFR BLD AUTO: 86 % — HIGH (ref 42.2–75.2)
NEUTROPHILS NFR BLD AUTO: 89.9 % — HIGH (ref 42.2–75.2)
NEUTS BAND # BLD: 7 % — HIGH (ref 0–6)
NITRITE UR-MCNC: NEGATIVE — SIGNIFICANT CHANGE UP
NRBC # BLD: 0 /100 WBCS — SIGNIFICANT CHANGE UP (ref 0–0)
NT-PROBNP SERPL-SCNC: 4283 PG/ML — HIGH (ref 0–300)
NT-PROBNP SERPL-SCNC: 4702 PG/ML — HIGH (ref 0–300)
ORGANISM # SPEC MICROSCOPIC CNT: SIGNIFICANT CHANGE UP
PCO2 BLDA: 38 MMHG — SIGNIFICANT CHANGE UP (ref 38–42)
PCO2 BLDV: 38 MMHG — LOW (ref 41–51)
PH BLDA: 7.4 — SIGNIFICANT CHANGE UP (ref 7.38–7.42)
PH BLDV: 7.46 — HIGH (ref 7.26–7.43)
PH UR: 6 — SIGNIFICANT CHANGE UP (ref 5–8)
PHOSPHATE SERPL-MCNC: 2.6 MG/DL — SIGNIFICANT CHANGE UP (ref 2.1–4.9)
PHOSPHATE SERPL-MCNC: 3 MG/DL — SIGNIFICANT CHANGE UP (ref 2.1–4.9)
PHOSPHATE SERPL-MCNC: 3.1 MG/DL — SIGNIFICANT CHANGE UP (ref 2.1–4.9)
PHOSPHATE SERPL-MCNC: 3.2 MG/DL — SIGNIFICANT CHANGE UP (ref 2.1–4.9)
PHOSPHATE SERPL-MCNC: 3.3 MG/DL — SIGNIFICANT CHANGE UP (ref 2.1–4.9)
PHOSPHATE SERPL-MCNC: 3.9 MG/DL — SIGNIFICANT CHANGE UP (ref 2.1–4.9)
PHOSPHATE SERPL-MCNC: 5.9 MG/DL — HIGH (ref 2.1–4.9)
PHOSPHATE SERPL-MCNC: 6.1 MG/DL — HIGH (ref 2.1–4.9)
PLAT MORPH BLD: NORMAL — SIGNIFICANT CHANGE UP
PLATELET # BLD AUTO: 248 K/UL — SIGNIFICANT CHANGE UP (ref 130–400)
PLATELET # BLD AUTO: 250 K/UL — SIGNIFICANT CHANGE UP (ref 130–400)
PLATELET # BLD AUTO: 254 K/UL — SIGNIFICANT CHANGE UP (ref 130–400)
PLATELET # BLD AUTO: 255 K/UL — SIGNIFICANT CHANGE UP (ref 130–400)
PLATELET # BLD AUTO: 282 K/UL — SIGNIFICANT CHANGE UP (ref 130–400)
PLATELET # BLD AUTO: 295 K/UL — SIGNIFICANT CHANGE UP (ref 130–400)
PLATELET # BLD AUTO: 322 K/UL — SIGNIFICANT CHANGE UP (ref 130–400)
PLATELET # BLD AUTO: 325 K/UL — SIGNIFICANT CHANGE UP (ref 130–400)
PLATELET # BLD AUTO: 333 K/UL — SIGNIFICANT CHANGE UP (ref 130–400)
PLATELET # BLD AUTO: 337 K/UL — SIGNIFICANT CHANGE UP (ref 130–400)
PLATELET # BLD AUTO: 358 K/UL — SIGNIFICANT CHANGE UP (ref 130–400)
PLATELET # BLD AUTO: 362 K/UL — SIGNIFICANT CHANGE UP (ref 130–400)
PLATELET # BLD AUTO: 367 K/UL — SIGNIFICANT CHANGE UP (ref 130–400)
PLATELET # BLD AUTO: 371 K/UL — SIGNIFICANT CHANGE UP (ref 130–400)
PLATELET # BLD AUTO: 377 K/UL — SIGNIFICANT CHANGE UP (ref 130–400)
PLATELET # BLD AUTO: 387 K/UL — SIGNIFICANT CHANGE UP (ref 130–400)
PLATELET # BLD AUTO: 392 K/UL — SIGNIFICANT CHANGE UP (ref 130–400)
PLATELET # BLD AUTO: 406 K/UL — HIGH (ref 130–400)
PLATELET # BLD AUTO: 419 K/UL — HIGH (ref 130–400)
PLATELET # BLD AUTO: 421 K/UL — HIGH (ref 130–400)
PLATELET # BLD AUTO: 595 K/UL — HIGH (ref 130–400)
PO2 BLDA: 112 MMHG — HIGH (ref 78–95)
PO2 BLDV: 40 MMHG — SIGNIFICANT CHANGE UP (ref 20–40)
POTASSIUM BLDV-SCNC: 4.9 MMOL/L — SIGNIFICANT CHANGE UP (ref 3.3–5.6)
POTASSIUM SERPL-MCNC: 3 MMOL/L — LOW (ref 3.5–5)
POTASSIUM SERPL-MCNC: 3.2 MMOL/L — LOW (ref 3.5–5)
POTASSIUM SERPL-MCNC: 3.4 MMOL/L — LOW (ref 3.5–5)
POTASSIUM SERPL-MCNC: 3.5 MMOL/L — SIGNIFICANT CHANGE UP (ref 3.5–5)
POTASSIUM SERPL-MCNC: 3.7 MMOL/L — SIGNIFICANT CHANGE UP (ref 3.5–5)
POTASSIUM SERPL-MCNC: 3.8 MMOL/L — SIGNIFICANT CHANGE UP (ref 3.5–5)
POTASSIUM SERPL-MCNC: 3.8 MMOL/L — SIGNIFICANT CHANGE UP (ref 3.5–5)
POTASSIUM SERPL-MCNC: 3.9 MMOL/L — SIGNIFICANT CHANGE UP (ref 3.5–5)
POTASSIUM SERPL-MCNC: 3.9 MMOL/L — SIGNIFICANT CHANGE UP (ref 3.5–5)
POTASSIUM SERPL-MCNC: 4.2 MMOL/L — SIGNIFICANT CHANGE UP (ref 3.5–5)
POTASSIUM SERPL-MCNC: 4.3 MMOL/L — SIGNIFICANT CHANGE UP (ref 3.5–5)
POTASSIUM SERPL-MCNC: 4.3 MMOL/L — SIGNIFICANT CHANGE UP (ref 3.5–5)
POTASSIUM SERPL-MCNC: 4.4 MMOL/L — SIGNIFICANT CHANGE UP (ref 3.5–5)
POTASSIUM SERPL-MCNC: 4.8 MMOL/L — SIGNIFICANT CHANGE UP (ref 3.5–5)
POTASSIUM SERPL-MCNC: 4.9 MMOL/L — SIGNIFICANT CHANGE UP (ref 3.5–5)
POTASSIUM SERPL-MCNC: 5.6 MMOL/L — HIGH (ref 3.5–5)
POTASSIUM SERPL-MCNC: 5.9 MMOL/L — HIGH (ref 3.5–5)
POTASSIUM SERPL-SCNC: 3 MMOL/L — LOW (ref 3.5–5)
POTASSIUM SERPL-SCNC: 3.2 MMOL/L — LOW (ref 3.5–5)
POTASSIUM SERPL-SCNC: 3.4 MMOL/L — LOW (ref 3.5–5)
POTASSIUM SERPL-SCNC: 3.5 MMOL/L — SIGNIFICANT CHANGE UP (ref 3.5–5)
POTASSIUM SERPL-SCNC: 3.7 MMOL/L — SIGNIFICANT CHANGE UP (ref 3.5–5)
POTASSIUM SERPL-SCNC: 3.8 MMOL/L — SIGNIFICANT CHANGE UP (ref 3.5–5)
POTASSIUM SERPL-SCNC: 3.8 MMOL/L — SIGNIFICANT CHANGE UP (ref 3.5–5)
POTASSIUM SERPL-SCNC: 3.9 MMOL/L — SIGNIFICANT CHANGE UP (ref 3.5–5)
POTASSIUM SERPL-SCNC: 3.9 MMOL/L — SIGNIFICANT CHANGE UP (ref 3.5–5)
POTASSIUM SERPL-SCNC: 4.2 MMOL/L — SIGNIFICANT CHANGE UP (ref 3.5–5)
POTASSIUM SERPL-SCNC: 4.3 MMOL/L — SIGNIFICANT CHANGE UP (ref 3.5–5)
POTASSIUM SERPL-SCNC: 4.3 MMOL/L — SIGNIFICANT CHANGE UP (ref 3.5–5)
POTASSIUM SERPL-SCNC: 4.4 MMOL/L — SIGNIFICANT CHANGE UP (ref 3.5–5)
POTASSIUM SERPL-SCNC: 4.8 MMOL/L — SIGNIFICANT CHANGE UP (ref 3.5–5)
POTASSIUM SERPL-SCNC: 4.9 MMOL/L — SIGNIFICANT CHANGE UP (ref 3.5–5)
POTASSIUM SERPL-SCNC: 5.6 MMOL/L — HIGH (ref 3.5–5)
POTASSIUM SERPL-SCNC: 5.9 MMOL/L — HIGH (ref 3.5–5)
PROT SERPL-MCNC: 4 G/DL — LOW (ref 6–8)
PROT SERPL-MCNC: 4.2 G/DL — LOW (ref 6–8)
PROT SERPL-MCNC: 5.7 G/DL — LOW (ref 6–8)
PROT SERPL-MCNC: 6.3 G/DL — SIGNIFICANT CHANGE UP (ref 6–8)
PROT SERPL-MCNC: 6.4 G/DL — SIGNIFICANT CHANGE UP (ref 6–8)
PROT SERPL-MCNC: 6.6 G/DL — SIGNIFICANT CHANGE UP (ref 6–8)
PROT SERPL-MCNC: 6.6 G/DL — SIGNIFICANT CHANGE UP (ref 6–8)
PROT SERPL-MCNC: 6.7 G/DL — SIGNIFICANT CHANGE UP (ref 6–8)
PROT SERPL-MCNC: 6.7 G/DL — SIGNIFICANT CHANGE UP (ref 6–8)
PROT SERPL-MCNC: 6.8 G/DL — SIGNIFICANT CHANGE UP (ref 6–8)
PROT SERPL-MCNC: 6.9 G/DL — SIGNIFICANT CHANGE UP (ref 6–8)
PROT SERPL-MCNC: 7.2 G/DL — SIGNIFICANT CHANGE UP (ref 6–8)
PROT SERPL-MCNC: 8.4 G/DL — HIGH (ref 6–8)
PROT UR-MCNC: ABNORMAL
PROTHROM AB SERPL-ACNC: 11.5 SEC — SIGNIFICANT CHANGE UP (ref 9.95–12.87)
PROTHROM AB SERPL-ACNC: 14 SEC — HIGH (ref 9.95–12.87)
PROTHROM AB SERPL-ACNC: 20 SEC — HIGH (ref 9.95–12.87)
PROTHROM AB SERPL-ACNC: 22.3 SEC — HIGH (ref 9.95–12.87)
PROTHROM AB SERPL-ACNC: 26.2 SEC — HIGH (ref 9.95–12.87)
PROTHROM AB SERPL-ACNC: 28 SEC — HIGH (ref 9.95–12.87)
PROTHROM AB SERPL-ACNC: 31.4 SEC — HIGH (ref 9.95–12.87)
RBC # BLD: 3.22 M/UL — LOW (ref 4.7–6.1)
RBC # BLD: 3.28 M/UL — LOW (ref 4.7–6.1)
RBC # BLD: 3.32 M/UL — LOW (ref 4.7–6.1)
RBC # BLD: 3.81 M/UL — LOW (ref 4.7–6.1)
RBC # BLD: 3.88 M/UL — LOW (ref 4.7–6.1)
RBC # BLD: 3.94 M/UL — LOW (ref 4.7–6.1)
RBC # BLD: 4.02 M/UL — LOW (ref 4.7–6.1)
RBC # BLD: 4.03 M/UL — LOW (ref 4.7–6.1)
RBC # BLD: 4.18 M/UL — LOW (ref 4.7–6.1)
RBC # BLD: 4.23 M/UL — LOW (ref 4.7–6.1)
RBC # BLD: 4.34 M/UL — LOW (ref 4.7–6.1)
RBC # BLD: 4.36 M/UL — LOW (ref 4.7–6.1)
RBC # BLD: 4.41 M/UL — LOW (ref 4.7–6.1)
RBC # BLD: 4.52 M/UL — LOW (ref 4.7–6.1)
RBC # BLD: 4.53 M/UL — LOW (ref 4.7–6.1)
RBC # BLD: 4.58 M/UL — LOW (ref 4.7–6.1)
RBC # BLD: 4.62 M/UL — LOW (ref 4.7–6.1)
RBC # BLD: 4.75 M/UL — SIGNIFICANT CHANGE UP (ref 4.7–6.1)
RBC # BLD: 4.83 M/UL — SIGNIFICANT CHANGE UP (ref 4.7–6.1)
RBC # BLD: 4.98 M/UL — SIGNIFICANT CHANGE UP (ref 4.7–6.1)
RBC # BLD: 5.19 M/UL — SIGNIFICANT CHANGE UP (ref 4.7–6.1)
RBC # FLD: 13.1 % — SIGNIFICANT CHANGE UP (ref 11.5–14.5)
RBC # FLD: 13.2 % — SIGNIFICANT CHANGE UP (ref 11.5–14.5)
RBC # FLD: 13.6 % — SIGNIFICANT CHANGE UP (ref 11.5–14.5)
RBC # FLD: 13.6 % — SIGNIFICANT CHANGE UP (ref 11.5–14.5)
RBC # FLD: 13.7 % — SIGNIFICANT CHANGE UP (ref 11.5–14.5)
RBC # FLD: 13.9 % — SIGNIFICANT CHANGE UP (ref 11.5–14.5)
RBC # FLD: 13.9 % — SIGNIFICANT CHANGE UP (ref 11.5–14.5)
RBC # FLD: 14 % — SIGNIFICANT CHANGE UP (ref 11.5–14.5)
RBC # FLD: 14 % — SIGNIFICANT CHANGE UP (ref 11.5–14.5)
RBC # FLD: 14.3 % — SIGNIFICANT CHANGE UP (ref 11.5–14.5)
RBC # FLD: 14.5 % — SIGNIFICANT CHANGE UP (ref 11.5–14.5)
RBC # FLD: 14.6 % — HIGH (ref 11.5–14.5)
RBC BLD AUTO: NORMAL — SIGNIFICANT CHANGE UP
RBC CASTS # UR COMP ASSIST: 2 /HPF — SIGNIFICANT CHANGE UP (ref 0–4)
RBC CASTS # UR COMP ASSIST: 3 /HPF — SIGNIFICANT CHANGE UP (ref 0–4)
RBC CASTS # UR COMP ASSIST: 7 /HPF — HIGH (ref 0–4)
RBC CASTS # UR COMP ASSIST: 8 /HPF — HIGH (ref 0–4)
RHEUMATOID FACT SERPL-ACNC: <10 IU/ML — SIGNIFICANT CHANGE UP (ref 0–13)
RHEUMATOID FACT SERPL-ACNC: <10 IU/ML — SIGNIFICANT CHANGE UP (ref 0–13)
SAO2 % BLDA: 98 % — SIGNIFICANT CHANGE UP (ref 94–98)
SAO2 % BLDV: 76 % — SIGNIFICANT CHANGE UP
SARS-COV-2 RNA SPEC QL NAA+PROBE: SIGNIFICANT CHANGE UP
SARS-COV-2 RNA SPEC QL NAA+PROBE: SIGNIFICANT CHANGE UP
SODIUM SERPL-SCNC: 132 MMOL/L — LOW (ref 135–146)
SODIUM SERPL-SCNC: 135 MMOL/L — SIGNIFICANT CHANGE UP (ref 135–146)
SODIUM SERPL-SCNC: 136 MMOL/L — SIGNIFICANT CHANGE UP (ref 135–146)
SODIUM SERPL-SCNC: 137 MMOL/L — SIGNIFICANT CHANGE UP (ref 135–146)
SODIUM SERPL-SCNC: 138 MMOL/L — SIGNIFICANT CHANGE UP (ref 135–146)
SODIUM SERPL-SCNC: 138 MMOL/L — SIGNIFICANT CHANGE UP (ref 135–146)
SODIUM SERPL-SCNC: 139 MMOL/L — SIGNIFICANT CHANGE UP (ref 135–146)
SODIUM SERPL-SCNC: 140 MMOL/L — SIGNIFICANT CHANGE UP (ref 135–146)
SODIUM SERPL-SCNC: 141 MMOL/L — SIGNIFICANT CHANGE UP (ref 135–146)
SODIUM SERPL-SCNC: 142 MMOL/L — SIGNIFICANT CHANGE UP (ref 135–146)
SODIUM SERPL-SCNC: 142 MMOL/L — SIGNIFICANT CHANGE UP (ref 135–146)
SODIUM SERPL-SCNC: 143 MMOL/L — SIGNIFICANT CHANGE UP (ref 135–146)
SP GR SPEC: 1.02 — SIGNIFICANT CHANGE UP (ref 1.01–1.02)
SP GR SPEC: 1.02 — SIGNIFICANT CHANGE UP (ref 1.01–1.03)
SP GR SPEC: >1.05 (ref 1.01–1.02)
SP GR SPEC: >1.05 (ref 1.01–1.03)
SPECIMEN SOURCE: SIGNIFICANT CHANGE UP
SURGICAL PATHOLOGY STUDY: SIGNIFICANT CHANGE UP
TOTAL CHOLESTEROL/HDL RATIO MEASUREMENT: 3.1 RATIO — LOW (ref 4–5.5)
TRIGL SERPL-MCNC: 210 MG/DL — HIGH (ref 10–149)
TROPONIN T SERPL-MCNC: <0.01 NG/ML — SIGNIFICANT CHANGE UP
TSH SERPL-MCNC: 0.41 UIU/ML — SIGNIFICANT CHANGE UP (ref 0.27–4.2)
UROBILINOGEN FLD QL: SIGNIFICANT CHANGE UP
VANCOMYCIN FLD-MCNC: <4 UG/ML — LOW (ref 5–10)
WBC # BLD: 15.04 K/UL — HIGH (ref 4.8–10.8)
WBC # BLD: 15.31 K/UL — HIGH (ref 4.8–10.8)
WBC # BLD: 15.7 K/UL — HIGH (ref 4.8–10.8)
WBC # BLD: 16.98 K/UL — HIGH (ref 4.8–10.8)
WBC # BLD: 17.14 K/UL — HIGH (ref 4.8–10.8)
WBC # BLD: 18.22 K/UL — HIGH (ref 4.8–10.8)
WBC # BLD: 19.49 K/UL — HIGH (ref 4.8–10.8)
WBC # BLD: 19.58 K/UL — HIGH (ref 4.8–10.8)
WBC # BLD: 19.65 K/UL — HIGH (ref 4.8–10.8)
WBC # BLD: 19.89 K/UL — HIGH (ref 4.8–10.8)
WBC # BLD: 20.27 K/UL — HIGH (ref 4.8–10.8)
WBC # BLD: 21.81 K/UL — HIGH (ref 4.8–10.8)
WBC # BLD: 21.85 K/UL — HIGH (ref 4.8–10.8)
WBC # BLD: 21.93 K/UL — HIGH (ref 4.8–10.8)
WBC # BLD: 22.4 K/UL — HIGH (ref 4.8–10.8)
WBC # BLD: 24.49 K/UL — HIGH (ref 4.8–10.8)
WBC # BLD: 24.5 K/UL — HIGH (ref 4.8–10.8)
WBC # BLD: 26.87 K/UL — HIGH (ref 4.8–10.8)
WBC # BLD: 30.4 K/UL — HIGH (ref 4.8–10.8)
WBC # BLD: 30.84 K/UL — HIGH (ref 4.8–10.8)
WBC # BLD: 5.21 K/UL — SIGNIFICANT CHANGE UP (ref 4.8–10.8)
WBC # FLD AUTO: 15.04 K/UL — HIGH (ref 4.8–10.8)
WBC # FLD AUTO: 15.31 K/UL — HIGH (ref 4.8–10.8)
WBC # FLD AUTO: 15.7 K/UL — HIGH (ref 4.8–10.8)
WBC # FLD AUTO: 16.98 K/UL — HIGH (ref 4.8–10.8)
WBC # FLD AUTO: 17.14 K/UL — HIGH (ref 4.8–10.8)
WBC # FLD AUTO: 18.22 K/UL — HIGH (ref 4.8–10.8)
WBC # FLD AUTO: 19.49 K/UL — HIGH (ref 4.8–10.8)
WBC # FLD AUTO: 19.58 K/UL — HIGH (ref 4.8–10.8)
WBC # FLD AUTO: 19.65 K/UL — HIGH (ref 4.8–10.8)
WBC # FLD AUTO: 19.89 K/UL — HIGH (ref 4.8–10.8)
WBC # FLD AUTO: 20.27 K/UL — HIGH (ref 4.8–10.8)
WBC # FLD AUTO: 21.81 K/UL — HIGH (ref 4.8–10.8)
WBC # FLD AUTO: 21.85 K/UL — HIGH (ref 4.8–10.8)
WBC # FLD AUTO: 21.93 K/UL — HIGH (ref 4.8–10.8)
WBC # FLD AUTO: 22.4 K/UL — HIGH (ref 4.8–10.8)
WBC # FLD AUTO: 24.49 K/UL — HIGH (ref 4.8–10.8)
WBC # FLD AUTO: 24.5 K/UL — HIGH (ref 4.8–10.8)
WBC # FLD AUTO: 26.87 K/UL — HIGH (ref 4.8–10.8)
WBC # FLD AUTO: 30.4 K/UL — HIGH (ref 4.8–10.8)
WBC # FLD AUTO: 30.84 K/UL — HIGH (ref 4.8–10.8)
WBC # FLD AUTO: 5.21 K/UL — SIGNIFICANT CHANGE UP (ref 4.8–10.8)
WBC UR QL: 141 /HPF — HIGH (ref 0–5)
WBC UR QL: 144 /HPF — HIGH (ref 0–5)
WBC UR QL: 4 /HPF — SIGNIFICANT CHANGE UP (ref 0–5)
WBC UR QL: 43 /HPF — HIGH (ref 0–5)

## 2020-01-01 PROCEDURE — 97606 NEG PRS WND THER DME>50 SQCM: CPT

## 2020-01-01 PROCEDURE — 71045 X-RAY EXAM CHEST 1 VIEW: CPT | Mod: 26

## 2020-01-01 PROCEDURE — 74176 CT ABD & PELVIS W/O CONTRAST: CPT | Mod: 26

## 2020-01-01 PROCEDURE — 99221 1ST HOSP IP/OBS SF/LOW 40: CPT

## 2020-01-01 PROCEDURE — 88189 FLOWCYTOMETRY/READ 16 & >: CPT

## 2020-01-01 PROCEDURE — 99203 OFFICE O/P NEW LOW 30 MIN: CPT | Mod: 25

## 2020-01-01 PROCEDURE — 99233 SBSQ HOSP IP/OBS HIGH 50: CPT

## 2020-01-01 PROCEDURE — 76937 US GUIDE VASCULAR ACCESS: CPT | Mod: 26

## 2020-01-01 PROCEDURE — 49002 REOPENING OF ABDOMEN: CPT | Mod: 58

## 2020-01-01 PROCEDURE — 99223 1ST HOSP IP/OBS HIGH 75: CPT

## 2020-01-01 PROCEDURE — 74177 CT ABD & PELVIS W/CONTRAST: CPT | Mod: 26

## 2020-01-01 PROCEDURE — 88307 TISSUE EXAM BY PATHOLOGIST: CPT | Mod: 26

## 2020-01-01 PROCEDURE — 71045 X-RAY EXAM CHEST 1 VIEW: CPT | Mod: 26,77

## 2020-01-01 PROCEDURE — 93010 ELECTROCARDIOGRAM REPORT: CPT | Mod: 76

## 2020-01-01 PROCEDURE — 99285 EMERGENCY DEPT VISIT HI MDM: CPT | Mod: 25

## 2020-01-01 PROCEDURE — 99497 ADVNCD CARE PLAN 30 MIN: CPT | Mod: 25

## 2020-01-01 PROCEDURE — 74018 RADEX ABDOMEN 1 VIEW: CPT | Mod: 26

## 2020-01-01 PROCEDURE — 49000 EXPLORATION OF ABDOMEN: CPT

## 2020-01-01 PROCEDURE — 99214 OFFICE O/P EST MOD 30 MIN: CPT

## 2020-01-01 PROCEDURE — 99222 1ST HOSP IP/OBS MODERATE 55: CPT

## 2020-01-01 PROCEDURE — 52235 CYSTOSCOPY AND TREATMENT: CPT

## 2020-01-01 PROCEDURE — 74174 CTA ABD&PLVS W/CONTRAST: CPT | Mod: 26

## 2020-01-01 PROCEDURE — 36000 PLACE NEEDLE IN VEIN: CPT

## 2020-01-01 PROCEDURE — 99213 OFFICE O/P EST LOW 20 MIN: CPT

## 2020-01-01 PROCEDURE — 73718 MRI LOWER EXTREMITY W/O DYE: CPT | Mod: 26,RT

## 2020-01-01 PROCEDURE — 99292 CRITICAL CARE ADDL 30 MIN: CPT | Mod: 24

## 2020-01-01 PROCEDURE — 74021 RADEX ABDOMEN 3+ VIEWS: CPT | Mod: 26

## 2020-01-01 PROCEDURE — 76705 ECHO EXAM OF ABDOMEN: CPT | Mod: 26

## 2020-01-01 PROCEDURE — 99239 HOSP IP/OBS DSCHRG MGMT >30: CPT

## 2020-01-01 PROCEDURE — 99233 SBSQ HOSP IP/OBS HIGH 50: CPT | Mod: 57

## 2020-01-01 PROCEDURE — 71046 X-RAY EXAM CHEST 2 VIEWS: CPT | Mod: 26

## 2020-01-01 PROCEDURE — 43753 TX GASTRO INTUB W/ASP: CPT

## 2020-01-01 PROCEDURE — 93010 ELECTROCARDIOGRAM REPORT: CPT

## 2020-01-01 PROCEDURE — 93306 TTE W/DOPPLER COMPLETE: CPT | Mod: 26

## 2020-01-01 PROCEDURE — 71045 X-RAY EXAM CHEST 1 VIEW: CPT | Mod: 26,76

## 2020-01-01 PROCEDURE — 99232 SBSQ HOSP IP/OBS MODERATE 35: CPT | Mod: GC

## 2020-01-01 PROCEDURE — 52000 CYSTOURETHROSCOPY: CPT

## 2020-01-01 PROCEDURE — 73630 X-RAY EXAM OF FOOT: CPT | Mod: 26,RT

## 2020-01-01 PROCEDURE — 99291 CRITICAL CARE FIRST HOUR: CPT | Mod: 24

## 2020-01-01 RX ORDER — CEFTRIAXONE 500 MG/1
1000 INJECTION, POWDER, FOR SOLUTION INTRAMUSCULAR; INTRAVENOUS ONCE
Refills: 0 | Status: DISCONTINUED | OUTPATIENT
Start: 2020-01-01 | End: 2020-01-01

## 2020-01-01 RX ORDER — FENTANYL CITRATE 50 UG/ML
50 INJECTION INTRAVENOUS ONCE
Refills: 0 | Status: DISCONTINUED | OUTPATIENT
Start: 2020-01-01 | End: 2020-01-01

## 2020-01-01 RX ORDER — INSULIN HUMAN 100 [IU]/ML
10 INJECTION, SOLUTION SUBCUTANEOUS ONCE
Refills: 0 | Status: COMPLETED | OUTPATIENT
Start: 2020-01-01 | End: 2020-01-01

## 2020-01-01 RX ORDER — PHENAZOPYRIDINE HCL 100 MG
100 TABLET ORAL ONCE
Refills: 0 | Status: DISCONTINUED | OUTPATIENT
Start: 2020-01-01 | End: 2020-01-01

## 2020-01-01 RX ORDER — ACETAMINOPHEN 500 MG
1000 TABLET ORAL ONCE
Refills: 0 | Status: DISCONTINUED | OUTPATIENT
Start: 2020-01-01 | End: 2020-01-01

## 2020-01-01 RX ORDER — LABETALOL HCL 100 MG
5 TABLET ORAL EVERY 6 HOURS
Refills: 0 | Status: DISCONTINUED | OUTPATIENT
Start: 2020-01-01 | End: 2020-01-01

## 2020-01-01 RX ORDER — SODIUM CHLORIDE 9 MG/ML
500 INJECTION, SOLUTION INTRAVENOUS ONCE
Refills: 0 | Status: COMPLETED | OUTPATIENT
Start: 2020-01-01 | End: 2020-01-01

## 2020-01-01 RX ORDER — KETOROLAC TROMETHAMINE 30 MG/ML
30 SYRINGE (ML) INJECTION ONCE
Refills: 0 | Status: DISCONTINUED | OUTPATIENT
Start: 2020-01-01 | End: 2020-01-01

## 2020-01-01 RX ORDER — SODIUM CHLORIDE 9 MG/ML
1000 INJECTION INTRAMUSCULAR; INTRAVENOUS; SUBCUTANEOUS
Refills: 0 | Status: DISCONTINUED | OUTPATIENT
Start: 2020-01-01 | End: 2020-01-01

## 2020-01-01 RX ORDER — SODIUM CHLORIDE 9 MG/ML
1000 INJECTION, SOLUTION INTRAVENOUS ONCE
Refills: 0 | Status: DISCONTINUED | OUTPATIENT
Start: 2020-01-01 | End: 2020-01-01

## 2020-01-01 RX ORDER — CHLORHEXIDINE GLUCONATE 213 G/1000ML
15 SOLUTION TOPICAL EVERY 12 HOURS
Refills: 0 | Status: DISCONTINUED | OUTPATIENT
Start: 2020-01-01 | End: 2020-01-01

## 2020-01-01 RX ORDER — MORPHINE SULFATE 50 MG/1
2 CAPSULE, EXTENDED RELEASE ORAL ONCE
Refills: 0 | Status: DISCONTINUED | OUTPATIENT
Start: 2020-01-01 | End: 2020-01-01

## 2020-01-01 RX ORDER — METOPROLOL TARTRATE 50 MG
5 TABLET ORAL EVERY 6 HOURS
Refills: 0 | Status: DISCONTINUED | OUTPATIENT
Start: 2020-01-01 | End: 2020-01-01

## 2020-01-01 RX ORDER — NOREPINEPHRINE BITARTRATE/D5W 8 MG/250ML
0.05 PLASTIC BAG, INJECTION (ML) INTRAVENOUS
Qty: 8 | Refills: 0 | Status: DISCONTINUED | OUTPATIENT
Start: 2020-01-01 | End: 2020-01-01

## 2020-01-01 RX ORDER — DEXTROSE 50 % IN WATER 50 %
50 SYRINGE (ML) INTRAVENOUS ONCE
Refills: 0 | Status: COMPLETED | OUTPATIENT
Start: 2020-01-01 | End: 2020-01-01

## 2020-01-01 RX ORDER — MORPHINE SULFATE 50 MG/1
4 CAPSULE, EXTENDED RELEASE ORAL ONCE
Refills: 0 | Status: DISCONTINUED | OUTPATIENT
Start: 2020-01-01 | End: 2020-01-01

## 2020-01-01 RX ORDER — DIATRIZOATE MEGLUMINE 180 MG/ML
30 INJECTION, SOLUTION INTRAVESICAL ONCE
Refills: 0 | Status: DISCONTINUED | OUTPATIENT
Start: 2020-01-01 | End: 2020-01-01

## 2020-01-01 RX ORDER — ENOXAPARIN SODIUM 100 MG/ML
81 INJECTION SUBCUTANEOUS EVERY 12 HOURS
Refills: 0 | Status: DISCONTINUED | OUTPATIENT
Start: 2020-01-01 | End: 2020-01-01

## 2020-01-01 RX ORDER — PANTOPRAZOLE SODIUM 20 MG/1
1 TABLET, DELAYED RELEASE ORAL
Qty: 0 | Refills: 0 | DISCHARGE

## 2020-01-01 RX ORDER — HYDROMORPHONE HYDROCHLORIDE 2 MG/ML
0.5 INJECTION INTRAMUSCULAR; INTRAVENOUS; SUBCUTANEOUS
Refills: 0 | Status: DISCONTINUED | OUTPATIENT
Start: 2020-01-01 | End: 2020-01-01

## 2020-01-01 RX ORDER — CHLORHEXIDINE GLUCONATE 213 G/1000ML
1 SOLUTION TOPICAL
Refills: 0 | Status: DISCONTINUED | OUTPATIENT
Start: 2020-01-01 | End: 2020-01-01

## 2020-01-01 RX ORDER — LISINOPRIL 2.5 MG/1
20 TABLET ORAL ONCE
Refills: 0 | Status: COMPLETED | OUTPATIENT
Start: 2020-01-01 | End: 2020-01-01

## 2020-01-01 RX ORDER — ONDANSETRON 8 MG/1
4 TABLET, FILM COATED ORAL ONCE
Refills: 0 | Status: COMPLETED | OUTPATIENT
Start: 2020-01-01 | End: 2020-01-01

## 2020-01-01 RX ORDER — DEXTROSE 50 % IN WATER 50 %
12.5 SYRINGE (ML) INTRAVENOUS ONCE
Refills: 0 | Status: DISCONTINUED | OUTPATIENT
Start: 2020-01-01 | End: 2020-01-01

## 2020-01-01 RX ORDER — TRAMADOL HYDROCHLORIDE 50 MG/1
50 TABLET ORAL EVERY 6 HOURS
Refills: 0 | Status: DISCONTINUED | OUTPATIENT
Start: 2020-01-01 | End: 2020-01-01

## 2020-01-01 RX ORDER — KRILL/OM-3/DHA/EPA/PHOSPHO/AST 1000-230MG
81 CAPSULE ORAL
Refills: 0 | Status: ACTIVE | COMMUNITY

## 2020-01-01 RX ORDER — ROPINIROLE 8 MG/1
0.5 TABLET, FILM COATED, EXTENDED RELEASE ORAL AT BEDTIME
Refills: 0 | Status: DISCONTINUED | OUTPATIENT
Start: 2020-01-01 | End: 2020-01-01

## 2020-01-01 RX ORDER — MORPHINE SULFATE 50 MG/1
1 CAPSULE, EXTENDED RELEASE ORAL ONCE
Refills: 0 | Status: DISCONTINUED | OUTPATIENT
Start: 2020-01-01 | End: 2020-01-01

## 2020-01-01 RX ORDER — TRAMADOL HYDROCHLORIDE 50 MG/1
25 TABLET ORAL ONCE
Refills: 0 | Status: DISCONTINUED | OUTPATIENT
Start: 2020-01-01 | End: 2020-01-01

## 2020-01-01 RX ORDER — FERROUS SULFATE 325(65) MG
325 TABLET ORAL DAILY
Refills: 0 | Status: DISCONTINUED | OUTPATIENT
Start: 2020-01-01 | End: 2020-01-01

## 2020-01-01 RX ORDER — LISINOPRIL 2.5 MG/1
20 TABLET ORAL DAILY
Refills: 0 | Status: DISCONTINUED | OUTPATIENT
Start: 2020-01-01 | End: 2020-01-01

## 2020-01-01 RX ORDER — LABETALOL HCL 100 MG
250 TABLET ORAL EVERY 12 HOURS
Refills: 0 | Status: DISCONTINUED | OUTPATIENT
Start: 2020-01-01 | End: 2020-01-01

## 2020-01-01 RX ORDER — INSULIN LISPRO 100/ML
VIAL (ML) SUBCUTANEOUS
Refills: 0 | Status: DISCONTINUED | OUTPATIENT
Start: 2020-01-01 | End: 2020-01-01

## 2020-01-01 RX ORDER — CIPROFLOXACIN LACTATE 400MG/40ML
400 VIAL (ML) INTRAVENOUS ONCE
Refills: 0 | Status: COMPLETED | OUTPATIENT
Start: 2020-01-01 | End: 2020-01-01

## 2020-01-01 RX ORDER — LABETALOL HCL 100 MG
200 TABLET ORAL EVERY 12 HOURS
Refills: 0 | Status: DISCONTINUED | OUTPATIENT
Start: 2020-01-01 | End: 2020-01-01

## 2020-01-01 RX ORDER — CEFTRIAXONE 500 MG/1
1000 INJECTION, POWDER, FOR SOLUTION INTRAMUSCULAR; INTRAVENOUS EVERY 24 HOURS
Refills: 0 | Status: DISCONTINUED | OUTPATIENT
Start: 2020-01-01 | End: 2020-01-01

## 2020-01-01 RX ORDER — ATORVASTATIN CALCIUM 80 MG/1
10 TABLET, FILM COATED ORAL AT BEDTIME
Refills: 0 | Status: DISCONTINUED | OUTPATIENT
Start: 2020-01-01 | End: 2020-01-01

## 2020-01-01 RX ORDER — ACETAMINOPHEN 500 MG
650 TABLET ORAL EVERY 6 HOURS
Refills: 0 | Status: DISCONTINUED | OUTPATIENT
Start: 2020-01-01 | End: 2020-01-01

## 2020-01-01 RX ORDER — GABAPENTIN 400 MG/1
150 CAPSULE ORAL EVERY 8 HOURS
Refills: 0 | Status: DISCONTINUED | OUTPATIENT
Start: 2020-01-01 | End: 2020-01-01

## 2020-01-01 RX ORDER — FIDAXOMICIN 200 MG/5ML
200 GRANULE, FOR SUSPENSION ORAL
Refills: 0 | Status: DISCONTINUED | OUTPATIENT
Start: 2020-01-01 | End: 2020-01-01

## 2020-01-01 RX ORDER — VANCOMYCIN HCL 1 G
125 VIAL (EA) INTRAVENOUS EVERY 6 HOURS
Refills: 0 | Status: DISCONTINUED | OUTPATIENT
Start: 2020-01-01 | End: 2020-01-01

## 2020-01-01 RX ORDER — PANTOPRAZOLE SODIUM 20 MG/1
40 TABLET, DELAYED RELEASE ORAL DAILY
Refills: 0 | Status: DISCONTINUED | OUTPATIENT
Start: 2020-01-01 | End: 2020-01-01

## 2020-01-01 RX ORDER — SODIUM CHLORIDE 9 MG/ML
1000 INJECTION, SOLUTION INTRAVENOUS ONCE
Refills: 0 | Status: COMPLETED | OUTPATIENT
Start: 2020-01-01 | End: 2020-01-01

## 2020-01-01 RX ORDER — HYDRALAZINE HCL 50 MG
10 TABLET ORAL ONCE
Refills: 0 | Status: COMPLETED | OUTPATIENT
Start: 2020-01-01 | End: 2020-01-01

## 2020-01-01 RX ORDER — INSULIN GLARGINE 100 [IU]/ML
40 INJECTION, SOLUTION SUBCUTANEOUS
Qty: 0 | Refills: 0 | DISCHARGE

## 2020-01-01 RX ORDER — FAMOTIDINE 10 MG/ML
20 INJECTION INTRAVENOUS DAILY
Refills: 0 | Status: DISCONTINUED | OUTPATIENT
Start: 2020-01-01 | End: 2020-01-01

## 2020-01-01 RX ORDER — PANTOPRAZOLE SODIUM 20 MG/1
40 TABLET, DELAYED RELEASE ORAL
Refills: 0 | Status: DISCONTINUED | OUTPATIENT
Start: 2020-01-01 | End: 2020-01-01

## 2020-01-01 RX ORDER — LABETALOL HCL 100 MG
200 TABLET ORAL ONCE
Refills: 0 | Status: COMPLETED | OUTPATIENT
Start: 2020-01-01 | End: 2020-01-01

## 2020-01-01 RX ORDER — NIFEDIPINE 30 MG
90 TABLET, EXTENDED RELEASE 24 HR ORAL DAILY
Refills: 0 | Status: DISCONTINUED | OUTPATIENT
Start: 2020-01-01 | End: 2020-01-01

## 2020-01-01 RX ORDER — BRIMONIDINE TARTRATE 2 MG/MG
1 SOLUTION/ DROPS OPHTHALMIC EVERY 12 HOURS
Refills: 0 | Status: DISCONTINUED | OUTPATIENT
Start: 2020-01-01 | End: 2020-01-01

## 2020-01-01 RX ORDER — CEFTRIAXONE 500 MG/1
INJECTION, POWDER, FOR SOLUTION INTRAMUSCULAR; INTRAVENOUS
Refills: 0 | Status: DISCONTINUED | OUTPATIENT
Start: 2020-01-01 | End: 2020-01-01

## 2020-01-01 RX ORDER — AZTREONAM 2 G
1 VIAL (EA) INJECTION
Qty: 6 | Refills: 0
Start: 2020-01-01 | End: 2020-01-01

## 2020-01-01 RX ORDER — BRIMONIDINE TARTRATE, TIMOLOL MALEATE 2; 5 MG/ML; MG/ML
1 SOLUTION/ DROPS OPHTHALMIC
Qty: 0 | Refills: 0 | DISCHARGE

## 2020-01-01 RX ORDER — GLUCAGON INJECTION, SOLUTION 0.5 MG/.1ML
1 INJECTION, SOLUTION SUBCUTANEOUS ONCE
Refills: 0 | Status: DISCONTINUED | OUTPATIENT
Start: 2020-01-01 | End: 2020-01-01

## 2020-01-01 RX ORDER — MIRTAZAPINE 45 MG/1
7.5 TABLET, ORALLY DISINTEGRATING ORAL AT BEDTIME
Refills: 0 | Status: DISCONTINUED | OUTPATIENT
Start: 2020-01-01 | End: 2020-01-01

## 2020-01-01 RX ORDER — MIRTAZAPINE 45 MG/1
1 TABLET, ORALLY DISINTEGRATING ORAL
Qty: 0 | Refills: 0 | DISCHARGE
Start: 2020-01-01

## 2020-01-01 RX ORDER — INSULIN HUMAN 100 [IU]/ML
INJECTION, SOLUTION SUBCUTANEOUS EVERY 4 HOURS
Refills: 0 | Status: DISCONTINUED | OUTPATIENT
Start: 2020-01-01 | End: 2020-01-01

## 2020-01-01 RX ORDER — HEPARIN SODIUM 5000 [USP'U]/ML
1500 INJECTION INTRAVENOUS; SUBCUTANEOUS
Qty: 25000 | Refills: 0 | Status: DISCONTINUED | OUTPATIENT
Start: 2020-01-01 | End: 2020-01-01

## 2020-01-01 RX ORDER — SODIUM CHLORIDE 9 MG/ML
1000 INJECTION, SOLUTION INTRAVENOUS
Refills: 0 | Status: DISCONTINUED | OUTPATIENT
Start: 2020-01-01 | End: 2020-01-01

## 2020-01-01 RX ORDER — GABAPENTIN 400 MG/1
200 CAPSULE ORAL EVERY 8 HOURS
Refills: 0 | Status: DISCONTINUED | OUTPATIENT
Start: 2020-01-01 | End: 2020-01-01

## 2020-01-01 RX ORDER — LABETALOL HCL 100 MG
1 TABLET ORAL
Qty: 60 | Refills: 0
Start: 2020-01-01 | End: 2020-10-19

## 2020-01-01 RX ORDER — CEFEPIME 1 G/1
1000 INJECTION, POWDER, FOR SOLUTION INTRAMUSCULAR; INTRAVENOUS EVERY 12 HOURS
Refills: 0 | Status: DISCONTINUED | OUTPATIENT
Start: 2020-01-01 | End: 2020-01-01

## 2020-01-01 RX ORDER — CHOLECALCIFEROL (VITAMIN D3) 125 MCG
5000 CAPSULE ORAL DAILY
Refills: 0 | Status: DISCONTINUED | OUTPATIENT
Start: 2020-01-01 | End: 2020-01-01

## 2020-01-01 RX ORDER — FENTANYL CITRATE 50 UG/ML
0.5 INJECTION INTRAVENOUS
Qty: 2500 | Refills: 0 | Status: DISCONTINUED | OUTPATIENT
Start: 2020-01-01 | End: 2020-01-01

## 2020-01-01 RX ORDER — MEROPENEM 1 G/30ML
1000 INJECTION INTRAVENOUS EVERY 8 HOURS
Refills: 0 | Status: DISCONTINUED | OUTPATIENT
Start: 2020-01-01 | End: 2020-01-01

## 2020-01-01 RX ORDER — POTASSIUM CHLORIDE 20 MEQ
20 PACKET (EA) ORAL ONCE
Refills: 0 | Status: COMPLETED | OUTPATIENT
Start: 2020-01-01 | End: 2020-01-01

## 2020-01-01 RX ORDER — CALCIUM GLUCONATE 100 MG/ML
1 VIAL (ML) INTRAVENOUS ONCE
Refills: 0 | Status: COMPLETED | OUTPATIENT
Start: 2020-01-01 | End: 2020-01-01

## 2020-01-01 RX ORDER — CHLORHEXIDINE GLUCONATE 213 G/1000ML
1 SOLUTION TOPICAL ONCE
Refills: 0 | Status: DISCONTINUED | OUTPATIENT
Start: 2020-01-01 | End: 2020-01-01

## 2020-01-01 RX ORDER — MORPHINE SULFATE 50 MG/1
8 CAPSULE, EXTENDED RELEASE ORAL ONCE
Refills: 0 | Status: DISCONTINUED | OUTPATIENT
Start: 2020-01-01 | End: 2020-01-01

## 2020-01-01 RX ORDER — APIXABAN 2.5 MG/1
5 TABLET, FILM COATED ORAL EVERY 12 HOURS
Refills: 0 | Status: DISCONTINUED | OUTPATIENT
Start: 2020-01-01 | End: 2020-01-01

## 2020-01-01 RX ORDER — SODIUM CHLORIDE 9 MG/ML
2500 INJECTION, SOLUTION INTRAVENOUS ONCE
Refills: 0 | Status: COMPLETED | OUTPATIENT
Start: 2020-01-01 | End: 2020-01-01

## 2020-01-01 RX ORDER — ERTAPENEM SODIUM 1 G/1
INJECTION, POWDER, LYOPHILIZED, FOR SOLUTION INTRAMUSCULAR; INTRAVENOUS
Refills: 0 | Status: DISCONTINUED | OUTPATIENT
Start: 2020-01-01 | End: 2020-01-01

## 2020-01-01 RX ORDER — SERTRALINE 25 MG/1
100 TABLET, FILM COATED ORAL DAILY
Refills: 0 | Status: DISCONTINUED | OUTPATIENT
Start: 2020-01-01 | End: 2020-01-01

## 2020-01-01 RX ORDER — BUPROPION HYDROCHLORIDE 150 MG/1
150 TABLET, EXTENDED RELEASE ORAL DAILY
Refills: 0 | Status: DISCONTINUED | OUTPATIENT
Start: 2020-01-01 | End: 2020-01-01

## 2020-01-01 RX ORDER — LISINOPRIL 2.5 MG/1
40 TABLET ORAL DAILY
Refills: 0 | Status: DISCONTINUED | OUTPATIENT
Start: 2020-01-01 | End: 2020-01-01

## 2020-01-01 RX ORDER — LATANOPROST 0.05 MG/ML
1 SOLUTION/ DROPS OPHTHALMIC; TOPICAL AT BEDTIME
Refills: 0 | Status: DISCONTINUED | OUTPATIENT
Start: 2020-01-01 | End: 2020-01-01

## 2020-01-01 RX ORDER — LABETALOL HCL 100 MG
10 TABLET ORAL EVERY 6 HOURS
Refills: 0 | Status: DISCONTINUED | OUTPATIENT
Start: 2020-01-01 | End: 2020-01-01

## 2020-01-01 RX ORDER — HYDROMORPHONE HYDROCHLORIDE 2 MG/ML
0.5 INJECTION INTRAMUSCULAR; INTRAVENOUS; SUBCUTANEOUS EVERY 4 HOURS
Refills: 0 | Status: DISCONTINUED | OUTPATIENT
Start: 2020-01-01 | End: 2020-01-01

## 2020-01-01 RX ORDER — SERTRALINE 25 MG/1
150 TABLET, FILM COATED ORAL DAILY
Refills: 0 | Status: DISCONTINUED | OUTPATIENT
Start: 2020-01-01 | End: 2020-01-01

## 2020-01-01 RX ORDER — HYDROMORPHONE HYDROCHLORIDE 2 MG/ML
0.5 INJECTION INTRAMUSCULAR; INTRAVENOUS; SUBCUTANEOUS ONCE
Refills: 0 | Status: DISCONTINUED | OUTPATIENT
Start: 2020-01-01 | End: 2020-01-01

## 2020-01-01 RX ORDER — APIXABAN 5 MG/1
TABLET, FILM COATED ORAL
Refills: 0 | Status: ACTIVE | COMMUNITY

## 2020-01-01 RX ORDER — OXYCODONE HYDROCHLORIDE 5 MG/1
5 TABLET ORAL ONCE
Refills: 0 | Status: DISCONTINUED | OUTPATIENT
Start: 2020-01-01 | End: 2020-01-01

## 2020-01-01 RX ORDER — LANOLIN ALCOHOL/MO/W.PET/CERES
5 CREAM (GRAM) TOPICAL AT BEDTIME
Refills: 0 | Status: DISCONTINUED | OUTPATIENT
Start: 2020-01-01 | End: 2020-01-01

## 2020-01-01 RX ORDER — CHLORHEXIDINE GLUCONATE 213 G/1000ML
1 SOLUTION TOPICAL ONCE
Refills: 0 | Status: COMPLETED | OUTPATIENT
Start: 2020-01-01 | End: 2020-01-01

## 2020-01-01 RX ORDER — VANCOMYCIN HCL 1 G
1250 VIAL (EA) INTRAVENOUS EVERY 12 HOURS
Refills: 0 | Status: DISCONTINUED | OUTPATIENT
Start: 2020-01-01 | End: 2020-01-01

## 2020-01-01 RX ORDER — CHOLECALCIFEROL (VITAMIN D3) 125 MCG
1 CAPSULE ORAL
Qty: 0 | Refills: 0 | DISCHARGE

## 2020-01-01 RX ORDER — TIMOLOL 0.5 %
1 DROPS OPHTHALMIC (EYE) EVERY 12 HOURS
Refills: 0 | Status: DISCONTINUED | OUTPATIENT
Start: 2020-01-01 | End: 2020-01-01

## 2020-01-01 RX ORDER — ASPIRIN/CALCIUM CARB/MAGNESIUM 324 MG
1 TABLET ORAL
Qty: 0 | Refills: 0 | DISCHARGE

## 2020-01-01 RX ORDER — SODIUM CHLORIDE 9 MG/ML
1000 INJECTION INTRAMUSCULAR; INTRAVENOUS; SUBCUTANEOUS ONCE
Refills: 0 | Status: COMPLETED | OUTPATIENT
Start: 2020-01-01 | End: 2020-01-01

## 2020-01-01 RX ORDER — MEROPENEM 1 G/30ML
1000 INJECTION INTRAVENOUS ONCE
Refills: 0 | Status: COMPLETED | OUTPATIENT
Start: 2020-01-01 | End: 2020-01-01

## 2020-01-01 RX ORDER — CLEVIDIPINE BUTYRATE 50MG/100ML
1 VIAL (ML) INTRAVENOUS
Qty: 25 | Refills: 0 | Status: DISCONTINUED | OUTPATIENT
Start: 2020-01-01 | End: 2020-01-01

## 2020-01-01 RX ORDER — HYDROMORPHONE HYDROCHLORIDE 2 MG/ML
0.25 INJECTION INTRAMUSCULAR; INTRAVENOUS; SUBCUTANEOUS EVERY 4 HOURS
Refills: 0 | Status: DISCONTINUED | OUTPATIENT
Start: 2020-01-01 | End: 2020-01-01

## 2020-01-01 RX ORDER — LABETALOL HCL 100 MG
10 TABLET ORAL ONCE
Refills: 0 | Status: DISCONTINUED | OUTPATIENT
Start: 2020-01-01 | End: 2020-01-01

## 2020-01-01 RX ORDER — FENTANYL CITRATE 50 UG/ML
25 INJECTION INTRAVENOUS EVERY 4 HOURS
Refills: 0 | Status: DISCONTINUED | OUTPATIENT
Start: 2020-01-01 | End: 2020-01-01

## 2020-01-01 RX ORDER — POTASSIUM PHOSPHATE, MONOBASIC POTASSIUM PHOSPHATE, DIBASIC 236; 224 MG/ML; MG/ML
15 INJECTION, SOLUTION INTRAVENOUS ONCE
Refills: 0 | Status: COMPLETED | OUTPATIENT
Start: 2020-01-01 | End: 2020-01-01

## 2020-01-01 RX ORDER — MAGNESIUM SULFATE 500 MG/ML
2 VIAL (ML) INJECTION ONCE
Refills: 0 | Status: COMPLETED | OUTPATIENT
Start: 2020-01-01 | End: 2020-01-01

## 2020-01-01 RX ORDER — CEFEPIME 1 G/1
2000 INJECTION, POWDER, FOR SOLUTION INTRAMUSCULAR; INTRAVENOUS ONCE
Refills: 0 | Status: COMPLETED | OUTPATIENT
Start: 2020-01-01 | End: 2020-01-01

## 2020-01-01 RX ORDER — BUPROPION HYDROCHLORIDE 75 MG/1
TABLET, FILM COATED ORAL
Refills: 0 | Status: ACTIVE | COMMUNITY

## 2020-01-01 RX ORDER — NOREPINEPHRINE BITARTRATE/D5W 8 MG/250ML
0.05 PLASTIC BAG, INJECTION (ML) INTRAVENOUS
Qty: 16 | Refills: 0 | Status: DISCONTINUED | OUTPATIENT
Start: 2020-01-01 | End: 2020-01-01

## 2020-01-01 RX ORDER — KETOROLAC TROMETHAMINE 30 MG/ML
15 SYRINGE (ML) INJECTION ONCE
Refills: 0 | Status: DISCONTINUED | OUTPATIENT
Start: 2020-01-01 | End: 2020-01-01

## 2020-01-01 RX ORDER — LATANOPROST 0.05 MG/ML
1 SOLUTION/ DROPS OPHTHALMIC; TOPICAL
Qty: 0 | Refills: 0 | DISCHARGE

## 2020-01-01 RX ORDER — ERTAPENEM SODIUM 1 G/1
1000 INJECTION, POWDER, LYOPHILIZED, FOR SOLUTION INTRAMUSCULAR; INTRAVENOUS ONCE
Refills: 0 | Status: DISCONTINUED | OUTPATIENT
Start: 2020-01-01 | End: 2020-01-01

## 2020-01-01 RX ORDER — KETOROLAC TROMETHAMINE 30 MG/ML
15 SYRINGE (ML) INJECTION EVERY 6 HOURS
Refills: 0 | Status: DISCONTINUED | OUTPATIENT
Start: 2020-01-01 | End: 2020-01-01

## 2020-01-01 RX ORDER — TRAMADOL HYDROCHLORIDE 50 MG/1
50 TABLET ORAL EVERY 8 HOURS
Refills: 0 | Status: DISCONTINUED | OUTPATIENT
Start: 2020-01-01 | End: 2020-01-01

## 2020-01-01 RX ORDER — IOHEXOL 300 MG/ML
30 INJECTION, SOLUTION INTRAVENOUS ONCE
Refills: 0 | Status: COMPLETED | OUTPATIENT
Start: 2020-01-01 | End: 2020-01-01

## 2020-01-01 RX ORDER — MORPHINE SULFATE 50 MG/1
2 CAPSULE, EXTENDED RELEASE ORAL EVERY 8 HOURS
Refills: 0 | Status: DISCONTINUED | OUTPATIENT
Start: 2020-01-01 | End: 2020-01-01

## 2020-01-01 RX ORDER — METRONIDAZOLE 500 MG
500 TABLET ORAL ONCE
Refills: 0 | Status: COMPLETED | OUTPATIENT
Start: 2020-01-01 | End: 2020-01-01

## 2020-01-01 RX ORDER — ONDANSETRON 8 MG/1
4 TABLET, FILM COATED ORAL EVERY 6 HOURS
Refills: 0 | Status: DISCONTINUED | OUTPATIENT
Start: 2020-01-01 | End: 2020-01-01

## 2020-01-01 RX ORDER — INSULIN ASPART 100 [IU]/ML
2 INJECTION, SOLUTION SUBCUTANEOUS
Qty: 0 | Refills: 0 | DISCHARGE

## 2020-01-01 RX ORDER — ONDANSETRON 8 MG/1
4 TABLET, FILM COATED ORAL ONCE
Refills: 0 | Status: DISCONTINUED | OUTPATIENT
Start: 2020-01-01 | End: 2020-01-01

## 2020-01-01 RX ORDER — MORPHINE SULFATE 50 MG/1
6 CAPSULE, EXTENDED RELEASE ORAL ONCE
Refills: 0 | Status: DISCONTINUED | OUTPATIENT
Start: 2020-01-01 | End: 2020-01-01

## 2020-01-01 RX ORDER — INSULIN GLARGINE 100 [IU]/ML
50 INJECTION, SOLUTION SUBCUTANEOUS
Qty: 0 | Refills: 0 | DISCHARGE

## 2020-01-01 RX ORDER — DEXTROSE 50 % IN WATER 50 %
25 SYRINGE (ML) INTRAVENOUS ONCE
Refills: 0 | Status: DISCONTINUED | OUTPATIENT
Start: 2020-01-01 | End: 2020-01-01

## 2020-01-01 RX ORDER — NIFEDIPINE 30 MG
30 TABLET, EXTENDED RELEASE 24 HR ORAL ONCE
Refills: 0 | Status: COMPLETED | OUTPATIENT
Start: 2020-01-01 | End: 2020-01-01

## 2020-01-01 RX ORDER — METOPROLOL TARTRATE 50 MG
25 TABLET ORAL DAILY
Refills: 0 | Status: ACTIVE | OUTPATIENT
Start: 2020-01-01 | End: 2021-08-11

## 2020-01-01 RX ORDER — ACETAMINOPHEN 500 MG
2 TABLET ORAL
Qty: 0 | Refills: 0 | DISCHARGE
Start: 2020-01-01

## 2020-01-01 RX ORDER — LABETALOL HCL 100 MG
10 TABLET ORAL ONCE
Refills: 0 | Status: COMPLETED | OUTPATIENT
Start: 2020-01-01 | End: 2020-01-01

## 2020-01-01 RX ORDER — INSULIN ASPART 100 [IU]/ML
INJECTION, SOLUTION INTRAVENOUS; SUBCUTANEOUS
Refills: 0 | Status: ACTIVE | COMMUNITY

## 2020-01-01 RX ORDER — SENNA PLUS 8.6 MG/1
2 TABLET ORAL AT BEDTIME
Refills: 0 | Status: DISCONTINUED | OUTPATIENT
Start: 2020-01-01 | End: 2020-01-01

## 2020-01-01 RX ORDER — FIDAXOMICIN 200 MG/5ML
1 GRANULE, FOR SUSPENSION ORAL
Qty: 14 | Refills: 0
Start: 2020-01-01 | End: 2020-01-01

## 2020-01-01 RX ORDER — MEROPENEM 1 G/30ML
INJECTION INTRAVENOUS
Refills: 0 | Status: DISCONTINUED | OUTPATIENT
Start: 2020-01-01 | End: 2020-01-01

## 2020-01-01 RX ORDER — CLOPIDOGREL BISULFATE 75 MG/1
1 TABLET, FILM COATED ORAL
Qty: 0 | Refills: 0 | DISCHARGE

## 2020-01-01 RX ORDER — HEPARIN SODIUM 5000 [USP'U]/ML
1700 INJECTION INTRAVENOUS; SUBCUTANEOUS
Qty: 25000 | Refills: 0 | Status: DISCONTINUED | OUTPATIENT
Start: 2020-01-01 | End: 2020-01-01

## 2020-01-01 RX ORDER — DEXMEDETOMIDINE HYDROCHLORIDE IN 0.9% SODIUM CHLORIDE 4 UG/ML
0.2 INJECTION INTRAVENOUS
Qty: 200 | Refills: 0 | Status: DISCONTINUED | OUTPATIENT
Start: 2020-01-01 | End: 2020-01-01

## 2020-01-01 RX ORDER — FERROUS SULFATE 325(65) MG
1 TABLET ORAL
Qty: 30 | Refills: 0
Start: 2020-01-01 | End: 2020-10-19

## 2020-01-01 RX ORDER — NIFEDIPINE 30 MG
30 TABLET, EXTENDED RELEASE 24 HR ORAL ONCE
Refills: 0 | Status: DISCONTINUED | OUTPATIENT
Start: 2020-01-01 | End: 2020-01-01

## 2020-01-01 RX ORDER — NOREPINEPHRINE BITARTRATE/D5W 8 MG/250ML
0.04 PLASTIC BAG, INJECTION (ML) INTRAVENOUS
Qty: 8 | Refills: 0 | Status: DISCONTINUED | OUTPATIENT
Start: 2020-01-01 | End: 2020-01-01

## 2020-01-01 RX ORDER — HEPARIN SODIUM 5000 [USP'U]/ML
5000 INJECTION INTRAVENOUS; SUBCUTANEOUS EVERY 8 HOURS
Refills: 0 | Status: DISCONTINUED | OUTPATIENT
Start: 2020-01-01 | End: 2020-01-01

## 2020-01-01 RX ORDER — SENNOSIDES/DOCUSATE SODIUM 8.6MG-50MG
2 TABLET ORAL
Qty: 0 | Refills: 0 | DISCHARGE

## 2020-01-01 RX ORDER — TRAMADOL HYDROCHLORIDE 50 MG/1
1 TABLET ORAL
Qty: 15 | Refills: 0
Start: 2020-01-01 | End: 2020-01-01

## 2020-01-01 RX ORDER — FERROUS SULFATE 325(65) MG
1 TABLET ORAL
Qty: 0 | Refills: 0 | DISCHARGE

## 2020-01-01 RX ORDER — LABETALOL HCL 100 MG
20 TABLET ORAL EVERY 6 HOURS
Refills: 0 | Status: DISCONTINUED | OUTPATIENT
Start: 2020-01-01 | End: 2020-01-01

## 2020-01-01 RX ORDER — INFLUENZA VIRUS VACCINE 15; 15; 15; 15 UG/.5ML; UG/.5ML; UG/.5ML; UG/.5ML
0.5 SUSPENSION INTRAMUSCULAR ONCE
Refills: 0 | Status: DISCONTINUED | OUTPATIENT
Start: 2020-01-01 | End: 2020-01-01

## 2020-01-01 RX ORDER — MORPHINE SULFATE 50 MG/1
2 CAPSULE, EXTENDED RELEASE ORAL EVERY 4 HOURS
Refills: 0 | Status: DISCONTINUED | OUTPATIENT
Start: 2020-01-01 | End: 2020-01-01

## 2020-01-01 RX ORDER — LABETALOL HCL 100 MG
100 TABLET ORAL ONCE
Refills: 0 | Status: COMPLETED | OUTPATIENT
Start: 2020-01-01 | End: 2020-01-01

## 2020-01-01 RX ORDER — BENZOCAINE 10 %
1 GEL (GRAM) MUCOUS MEMBRANE EVERY 4 HOURS
Refills: 0 | Status: DISCONTINUED | OUTPATIENT
Start: 2020-01-01 | End: 2020-01-01

## 2020-01-01 RX ORDER — POTASSIUM CHLORIDE 20 MEQ
20 PACKET (EA) ORAL
Refills: 0 | Status: COMPLETED | OUTPATIENT
Start: 2020-01-01 | End: 2020-01-01

## 2020-01-01 RX ORDER — PHENAZOPYRIDINE HCL 100 MG
1 TABLET ORAL
Qty: 21 | Refills: 0
Start: 2020-01-01 | End: 2020-01-01

## 2020-01-01 RX ORDER — MORPHINE SULFATE 50 MG/1
2 CAPSULE, EXTENDED RELEASE ORAL ONCE
Refills: 0 | Status: COMPLETED | OUTPATIENT
Start: 2020-01-01 | End: 2020-01-01

## 2020-01-01 RX ORDER — NIFEDIPINE 30 MG
60 TABLET, EXTENDED RELEASE 24 HR ORAL DAILY
Refills: 0 | Status: DISCONTINUED | OUTPATIENT
Start: 2020-01-01 | End: 2020-01-01

## 2020-01-01 RX ORDER — CEFTRIAXONE 500 MG/1
1000 INJECTION, POWDER, FOR SOLUTION INTRAMUSCULAR; INTRAVENOUS ONCE
Refills: 0 | Status: COMPLETED | OUTPATIENT
Start: 2020-01-01 | End: 2020-01-01

## 2020-01-01 RX ORDER — BUPROPION HYDROCHLORIDE 150 MG/1
300 TABLET, EXTENDED RELEASE ORAL DAILY
Refills: 0 | Status: DISCONTINUED | OUTPATIENT
Start: 2020-01-01 | End: 2020-01-01

## 2020-01-01 RX ADMIN — DEXMEDETOMIDINE HYDROCHLORIDE IN 0.9% SODIUM CHLORIDE 4.9 MICROGRAM(S)/KG/HR: 4 INJECTION INTRAVENOUS at 17:14

## 2020-01-01 RX ADMIN — HEPARIN SODIUM 15 UNIT(S)/HR: 5000 INJECTION INTRAVENOUS; SUBCUTANEOUS at 15:31

## 2020-01-01 RX ADMIN — TRAMADOL HYDROCHLORIDE 50 MILLIGRAM(S): 50 TABLET ORAL at 13:13

## 2020-01-01 RX ADMIN — Medication 5000 UNIT(S): at 15:51

## 2020-01-01 RX ADMIN — ENOXAPARIN SODIUM 81 MILLIGRAM(S): 100 INJECTION SUBCUTANEOUS at 05:34

## 2020-01-01 RX ADMIN — SERTRALINE 150 MILLIGRAM(S): 25 TABLET, FILM COATED ORAL at 11:08

## 2020-01-01 RX ADMIN — BRIMONIDINE TARTRATE 1 DROP(S): 2 SOLUTION/ DROPS OPHTHALMIC at 17:21

## 2020-01-01 RX ADMIN — BRIMONIDINE TARTRATE 1 DROP(S): 2 SOLUTION/ DROPS OPHTHALMIC at 18:15

## 2020-01-01 RX ADMIN — LISINOPRIL 40 MILLIGRAM(S): 2.5 TABLET ORAL at 06:54

## 2020-01-01 RX ADMIN — SODIUM CHLORIDE 125 MILLILITER(S): 9 INJECTION INTRAMUSCULAR; INTRAVENOUS; SUBCUTANEOUS at 14:38

## 2020-01-01 RX ADMIN — ATORVASTATIN CALCIUM 10 MILLIGRAM(S): 80 TABLET, FILM COATED ORAL at 21:16

## 2020-01-01 RX ADMIN — ONDANSETRON 4 MILLIGRAM(S): 8 TABLET, FILM COATED ORAL at 09:10

## 2020-01-01 RX ADMIN — DEXMEDETOMIDINE HYDROCHLORIDE IN 0.9% SODIUM CHLORIDE 4.9 MICROGRAM(S)/KG/HR: 4 INJECTION INTRAVENOUS at 09:40

## 2020-01-01 RX ADMIN — Medication 650 MILLIGRAM(S): at 12:59

## 2020-01-01 RX ADMIN — FAMOTIDINE 104 MILLIGRAM(S): 10 INJECTION INTRAVENOUS at 15:50

## 2020-01-01 RX ADMIN — ONDANSETRON 4 MILLIGRAM(S): 8 TABLET, FILM COATED ORAL at 13:19

## 2020-01-01 RX ADMIN — Medication 325 MILLIGRAM(S): at 11:08

## 2020-01-01 RX ADMIN — HYDROMORPHONE HYDROCHLORIDE 0.25 MILLIGRAM(S): 2 INJECTION INTRAMUSCULAR; INTRAVENOUS; SUBCUTANEOUS at 12:25

## 2020-01-01 RX ADMIN — BUPROPION HYDROCHLORIDE 300 MILLIGRAM(S): 150 TABLET, EXTENDED RELEASE ORAL at 11:02

## 2020-01-01 RX ADMIN — Medication 1.25 MILLIGRAM(S): at 11:02

## 2020-01-01 RX ADMIN — Medication 5000 UNIT(S): at 11:58

## 2020-01-01 RX ADMIN — Medication 200 MILLIGRAM(S): at 17:27

## 2020-01-01 RX ADMIN — DEXMEDETOMIDINE HYDROCHLORIDE IN 0.9% SODIUM CHLORIDE 4.9 MICROGRAM(S)/KG/HR: 4 INJECTION INTRAVENOUS at 22:34

## 2020-01-01 RX ADMIN — Medication 200 MILLIGRAM(S): at 10:00

## 2020-01-01 RX ADMIN — TRAMADOL HYDROCHLORIDE 50 MILLIGRAM(S): 50 TABLET ORAL at 13:43

## 2020-01-01 RX ADMIN — Medication 90 MILLIGRAM(S): at 05:00

## 2020-01-01 RX ADMIN — Medication 166.67 MILLIGRAM(S): at 17:31

## 2020-01-01 RX ADMIN — GABAPENTIN 200 MILLIGRAM(S): 400 CAPSULE ORAL at 21:14

## 2020-01-01 RX ADMIN — APIXABAN 5 MILLIGRAM(S): 2.5 TABLET, FILM COATED ORAL at 17:31

## 2020-01-01 RX ADMIN — SODIUM CHLORIDE 1000 MILLILITER(S): 9 INJECTION INTRAMUSCULAR; INTRAVENOUS; SUBCUTANEOUS at 09:11

## 2020-01-01 RX ADMIN — SODIUM CHLORIDE 75 MILLILITER(S): 9 INJECTION INTRAMUSCULAR; INTRAVENOUS; SUBCUTANEOUS at 09:49

## 2020-01-01 RX ADMIN — FIDAXOMICIN 200 MILLIGRAM(S): 200 GRANULE, FOR SUSPENSION ORAL at 06:40

## 2020-01-01 RX ADMIN — Medication 5 MILLIGRAM(S): at 21:33

## 2020-01-01 RX ADMIN — CEFTRIAXONE 100 MILLIGRAM(S): 500 INJECTION, POWDER, FOR SOLUTION INTRAMUSCULAR; INTRAVENOUS at 06:38

## 2020-01-01 RX ADMIN — Medication 10 MILLIGRAM(S): at 05:48

## 2020-01-01 RX ADMIN — CHLORHEXIDINE GLUCONATE 1 APPLICATION(S): 213 SOLUTION TOPICAL at 05:35

## 2020-01-01 RX ADMIN — SENNA PLUS 2 TABLET(S): 8.6 TABLET ORAL at 21:39

## 2020-01-01 RX ADMIN — Medication 650 MILLIGRAM(S): at 22:00

## 2020-01-01 RX ADMIN — Medication 20 MILLIGRAM(S): at 12:25

## 2020-01-01 RX ADMIN — HYDROMORPHONE HYDROCHLORIDE 0.25 MILLIGRAM(S): 2 INJECTION INTRAMUSCULAR; INTRAVENOUS; SUBCUTANEOUS at 21:15

## 2020-01-01 RX ADMIN — MORPHINE SULFATE 2 MILLIGRAM(S): 50 CAPSULE, EXTENDED RELEASE ORAL at 17:39

## 2020-01-01 RX ADMIN — MORPHINE SULFATE 2 MILLIGRAM(S): 50 CAPSULE, EXTENDED RELEASE ORAL at 14:44

## 2020-01-01 RX ADMIN — HYDROMORPHONE HYDROCHLORIDE 0.5 MILLIGRAM(S): 2 INJECTION INTRAMUSCULAR; INTRAVENOUS; SUBCUTANEOUS at 09:26

## 2020-01-01 RX ADMIN — APIXABAN 5 MILLIGRAM(S): 2.5 TABLET, FILM COATED ORAL at 18:20

## 2020-01-01 RX ADMIN — MORPHINE SULFATE 2 MILLIGRAM(S): 50 CAPSULE, EXTENDED RELEASE ORAL at 12:30

## 2020-01-01 RX ADMIN — SERTRALINE 150 MILLIGRAM(S): 25 TABLET, FILM COATED ORAL at 11:09

## 2020-01-01 RX ADMIN — Medication 5 MILLIGRAM(S): at 21:38

## 2020-01-01 RX ADMIN — ATORVASTATIN CALCIUM 10 MILLIGRAM(S): 80 TABLET, FILM COATED ORAL at 21:35

## 2020-01-01 RX ADMIN — Medication 650 MILLIGRAM(S): at 17:20

## 2020-01-01 RX ADMIN — MORPHINE SULFATE 2 MILLIGRAM(S): 50 CAPSULE, EXTENDED RELEASE ORAL at 22:44

## 2020-01-01 RX ADMIN — Medication 15 MILLIGRAM(S): at 13:35

## 2020-01-01 RX ADMIN — Medication 5000 UNIT(S): at 11:15

## 2020-01-01 RX ADMIN — ATORVASTATIN CALCIUM 10 MILLIGRAM(S): 80 TABLET, FILM COATED ORAL at 21:27

## 2020-01-01 RX ADMIN — HYDROMORPHONE HYDROCHLORIDE 0.5 MILLIGRAM(S): 2 INJECTION INTRAMUSCULAR; INTRAVENOUS; SUBCUTANEOUS at 18:45

## 2020-01-01 RX ADMIN — CHLORHEXIDINE GLUCONATE 1 APPLICATION(S): 213 SOLUTION TOPICAL at 05:07

## 2020-01-01 RX ADMIN — CHLORHEXIDINE GLUCONATE 1 APPLICATION(S): 213 SOLUTION TOPICAL at 05:44

## 2020-01-01 RX ADMIN — FIDAXOMICIN 200 MILLIGRAM(S): 200 GRANULE, FOR SUSPENSION ORAL at 06:51

## 2020-01-01 RX ADMIN — Medication 200 MILLIGRAM(S): at 18:16

## 2020-01-01 RX ADMIN — APIXABAN 5 MILLIGRAM(S): 2.5 TABLET, FILM COATED ORAL at 17:13

## 2020-01-01 RX ADMIN — Medication 25 MILLIGRAM(S): at 05:02

## 2020-01-01 RX ADMIN — MEROPENEM 100 MILLIGRAM(S): 1 INJECTION INTRAVENOUS at 05:25

## 2020-01-01 RX ADMIN — Medication 125 MILLIGRAM(S): at 05:12

## 2020-01-01 RX ADMIN — FENTANYL CITRATE 50 MICROGRAM(S): 50 INJECTION INTRAVENOUS at 21:30

## 2020-01-01 RX ADMIN — FAMOTIDINE 20 MILLIGRAM(S): 10 INJECTION INTRAVENOUS at 11:09

## 2020-01-01 RX ADMIN — FIDAXOMICIN 200 MILLIGRAM(S): 200 GRANULE, FOR SUSPENSION ORAL at 17:13

## 2020-01-01 RX ADMIN — SODIUM CHLORIDE 120 MILLILITER(S): 9 INJECTION, SOLUTION INTRAVENOUS at 09:34

## 2020-01-01 RX ADMIN — Medication 650 MILLIGRAM(S): at 15:08

## 2020-01-01 RX ADMIN — CHLORHEXIDINE GLUCONATE 1 APPLICATION(S): 213 SOLUTION TOPICAL at 05:49

## 2020-01-01 RX ADMIN — Medication 650 MILLIGRAM(S): at 21:42

## 2020-01-01 RX ADMIN — MORPHINE SULFATE 2 MILLIGRAM(S): 50 CAPSULE, EXTENDED RELEASE ORAL at 14:48

## 2020-01-01 RX ADMIN — BUPROPION HYDROCHLORIDE 300 MILLIGRAM(S): 150 TABLET, EXTENDED RELEASE ORAL at 11:44

## 2020-01-01 RX ADMIN — Medication 325 MILLIGRAM(S): at 13:04

## 2020-01-01 RX ADMIN — INSULIN HUMAN 2: 100 INJECTION, SOLUTION SUBCUTANEOUS at 10:23

## 2020-01-01 RX ADMIN — MORPHINE SULFATE 2 MILLIGRAM(S): 50 CAPSULE, EXTENDED RELEASE ORAL at 16:13

## 2020-01-01 RX ADMIN — FAMOTIDINE 20 MILLIGRAM(S): 10 INJECTION INTRAVENOUS at 11:16

## 2020-01-01 RX ADMIN — MORPHINE SULFATE 2 MILLIGRAM(S): 50 CAPSULE, EXTENDED RELEASE ORAL at 18:25

## 2020-01-01 RX ADMIN — FENTANYL CITRATE 4.9 MICROGRAM(S)/KG/HR: 50 INJECTION INTRAVENOUS at 22:35

## 2020-01-01 RX ADMIN — CEFEPIME 100 MILLIGRAM(S): 1 INJECTION, POWDER, FOR SOLUTION INTRAMUSCULAR; INTRAVENOUS at 17:31

## 2020-01-01 RX ADMIN — LATANOPROST 1 DROP(S): 0.05 SOLUTION/ DROPS OPHTHALMIC; TOPICAL at 23:54

## 2020-01-01 RX ADMIN — MORPHINE SULFATE 2 MILLIGRAM(S): 50 CAPSULE, EXTENDED RELEASE ORAL at 18:49

## 2020-01-01 RX ADMIN — Medication 125 MILLIGRAM(S): at 12:32

## 2020-01-01 RX ADMIN — HYDROMORPHONE HYDROCHLORIDE 0.5 MILLIGRAM(S): 2 INJECTION INTRAMUSCULAR; INTRAVENOUS; SUBCUTANEOUS at 20:23

## 2020-01-01 RX ADMIN — BRIMONIDINE TARTRATE 1 DROP(S): 2 SOLUTION/ DROPS OPHTHALMIC at 17:23

## 2020-01-01 RX ADMIN — MORPHINE SULFATE 2 MILLIGRAM(S): 50 CAPSULE, EXTENDED RELEASE ORAL at 21:26

## 2020-01-01 RX ADMIN — MIRTAZAPINE 7.5 MILLIGRAM(S): 45 TABLET, ORALLY DISINTEGRATING ORAL at 21:27

## 2020-01-01 RX ADMIN — MORPHINE SULFATE 2 MILLIGRAM(S): 50 CAPSULE, EXTENDED RELEASE ORAL at 15:18

## 2020-01-01 RX ADMIN — BUPROPION HYDROCHLORIDE 300 MILLIGRAM(S): 150 TABLET, EXTENDED RELEASE ORAL at 11:14

## 2020-01-01 RX ADMIN — ATORVASTATIN CALCIUM 10 MILLIGRAM(S): 80 TABLET, FILM COATED ORAL at 21:32

## 2020-01-01 RX ADMIN — Medication 5 MILLIGRAM(S): at 21:40

## 2020-01-01 RX ADMIN — Medication 60 MILLIGRAM(S): at 05:11

## 2020-01-01 RX ADMIN — Medication 5000 UNIT(S): at 11:09

## 2020-01-01 RX ADMIN — MORPHINE SULFATE 2 MILLIGRAM(S): 50 CAPSULE, EXTENDED RELEASE ORAL at 12:02

## 2020-01-01 RX ADMIN — Medication 325 MILLIGRAM(S): at 11:06

## 2020-01-01 RX ADMIN — MORPHINE SULFATE 4 MILLIGRAM(S): 50 CAPSULE, EXTENDED RELEASE ORAL at 09:23

## 2020-01-01 RX ADMIN — MORPHINE SULFATE 2 MILLIGRAM(S): 50 CAPSULE, EXTENDED RELEASE ORAL at 15:48

## 2020-01-01 RX ADMIN — LISINOPRIL 40 MILLIGRAM(S): 2.5 TABLET ORAL at 05:36

## 2020-01-01 RX ADMIN — HYDROMORPHONE HYDROCHLORIDE 0.5 MILLIGRAM(S): 2 INJECTION INTRAMUSCULAR; INTRAVENOUS; SUBCUTANEOUS at 17:53

## 2020-01-01 RX ADMIN — Medication 650 MILLIGRAM(S): at 23:45

## 2020-01-01 RX ADMIN — APIXABAN 5 MILLIGRAM(S): 2.5 TABLET, FILM COATED ORAL at 05:01

## 2020-01-01 RX ADMIN — HYDROMORPHONE HYDROCHLORIDE 0.5 MILLIGRAM(S): 2 INJECTION INTRAMUSCULAR; INTRAVENOUS; SUBCUTANEOUS at 22:47

## 2020-01-01 RX ADMIN — MORPHINE SULFATE 2 MILLIGRAM(S): 50 CAPSULE, EXTENDED RELEASE ORAL at 21:02

## 2020-01-01 RX ADMIN — Medication 25 MILLIGRAM(S): at 05:26

## 2020-01-01 RX ADMIN — MORPHINE SULFATE 2 MILLIGRAM(S): 50 CAPSULE, EXTENDED RELEASE ORAL at 01:30

## 2020-01-01 RX ADMIN — SODIUM CHLORIDE 2500 MILLILITER(S): 9 INJECTION, SOLUTION INTRAVENOUS at 13:19

## 2020-01-01 RX ADMIN — ENOXAPARIN SODIUM 81 MILLIGRAM(S): 100 INJECTION SUBCUTANEOUS at 05:10

## 2020-01-01 RX ADMIN — MORPHINE SULFATE 2 MILLIGRAM(S): 50 CAPSULE, EXTENDED RELEASE ORAL at 02:16

## 2020-01-01 RX ADMIN — Medication 10 MILLIGRAM(S): at 17:59

## 2020-01-01 RX ADMIN — APIXABAN 5 MILLIGRAM(S): 2.5 TABLET, FILM COATED ORAL at 17:07

## 2020-01-01 RX ADMIN — FENTANYL CITRATE 50 MICROGRAM(S): 50 INJECTION INTRAVENOUS at 21:00

## 2020-01-01 RX ADMIN — LISINOPRIL 20 MILLIGRAM(S): 2.5 TABLET ORAL at 15:52

## 2020-01-01 RX ADMIN — MIRTAZAPINE 7.5 MILLIGRAM(S): 45 TABLET, ORALLY DISINTEGRATING ORAL at 21:28

## 2020-01-01 RX ADMIN — SODIUM CHLORIDE 1000 MILLILITER(S): 9 INJECTION, SOLUTION INTRAVENOUS at 01:16

## 2020-01-01 RX ADMIN — ROPINIROLE 0.5 MILLIGRAM(S): 8 TABLET, FILM COATED, EXTENDED RELEASE ORAL at 21:33

## 2020-01-01 RX ADMIN — APIXABAN 5 MILLIGRAM(S): 2.5 TABLET, FILM COATED ORAL at 17:10

## 2020-01-01 RX ADMIN — TRAMADOL HYDROCHLORIDE 25 MILLIGRAM(S): 50 TABLET ORAL at 23:23

## 2020-01-01 RX ADMIN — MIRTAZAPINE 7.5 MILLIGRAM(S): 45 TABLET, ORALLY DISINTEGRATING ORAL at 21:38

## 2020-01-01 RX ADMIN — HYDROMORPHONE HYDROCHLORIDE 0.5 MILLIGRAM(S): 2 INJECTION INTRAMUSCULAR; INTRAVENOUS; SUBCUTANEOUS at 20:50

## 2020-01-01 RX ADMIN — LATANOPROST 1 DROP(S): 0.05 SOLUTION/ DROPS OPHTHALMIC; TOPICAL at 21:27

## 2020-01-01 RX ADMIN — FAMOTIDINE 20 MILLIGRAM(S): 10 INJECTION INTRAVENOUS at 12:33

## 2020-01-01 RX ADMIN — TRAMADOL HYDROCHLORIDE 25 MILLIGRAM(S): 50 TABLET ORAL at 23:53

## 2020-01-01 RX ADMIN — MORPHINE SULFATE 2 MILLIGRAM(S): 50 CAPSULE, EXTENDED RELEASE ORAL at 11:18

## 2020-01-01 RX ADMIN — CEFEPIME 100 MILLIGRAM(S): 1 INJECTION, POWDER, FOR SOLUTION INTRAMUSCULAR; INTRAVENOUS at 20:00

## 2020-01-01 RX ADMIN — LISINOPRIL 20 MILLIGRAM(S): 2.5 TABLET ORAL at 05:26

## 2020-01-01 RX ADMIN — Medication 650 MILLIGRAM(S): at 23:31

## 2020-01-01 RX ADMIN — HYDROMORPHONE HYDROCHLORIDE 0.5 MILLIGRAM(S): 2 INJECTION INTRAMUSCULAR; INTRAVENOUS; SUBCUTANEOUS at 15:50

## 2020-01-01 RX ADMIN — HYDROMORPHONE HYDROCHLORIDE 0.5 MILLIGRAM(S): 2 INJECTION INTRAMUSCULAR; INTRAVENOUS; SUBCUTANEOUS at 05:11

## 2020-01-01 RX ADMIN — MORPHINE SULFATE 4 MILLIGRAM(S): 50 CAPSULE, EXTENDED RELEASE ORAL at 14:35

## 2020-01-01 RX ADMIN — MORPHINE SULFATE 2 MILLIGRAM(S): 50 CAPSULE, EXTENDED RELEASE ORAL at 15:00

## 2020-01-01 RX ADMIN — SODIUM CHLORIDE 3000 MILLILITER(S): 9 INJECTION, SOLUTION INTRAVENOUS at 21:00

## 2020-01-01 RX ADMIN — Medication 650 MILLIGRAM(S): at 05:11

## 2020-01-01 RX ADMIN — LISINOPRIL 20 MILLIGRAM(S): 2.5 TABLET ORAL at 05:15

## 2020-01-01 RX ADMIN — Medication 50 MILLIEQUIVALENT(S): at 08:38

## 2020-01-01 RX ADMIN — SODIUM CHLORIDE 1000 MILLILITER(S): 9 INJECTION, SOLUTION INTRAVENOUS at 22:36

## 2020-01-01 RX ADMIN — Medication 15 MILLIGRAM(S): at 13:23

## 2020-01-01 RX ADMIN — HEPARIN SODIUM 15 UNIT(S)/HR: 5000 INJECTION INTRAVENOUS; SUBCUTANEOUS at 22:35

## 2020-01-01 RX ADMIN — Medication 30 MILLILITER(S): at 11:16

## 2020-01-01 RX ADMIN — Medication 200 MILLIGRAM(S): at 17:26

## 2020-01-01 RX ADMIN — SODIUM CHLORIDE 75 MILLILITER(S): 9 INJECTION INTRAMUSCULAR; INTRAVENOUS; SUBCUTANEOUS at 13:14

## 2020-01-01 RX ADMIN — Medication 90 MILLIGRAM(S): at 05:07

## 2020-01-01 RX ADMIN — MORPHINE SULFATE 2 MILLIGRAM(S): 50 CAPSULE, EXTENDED RELEASE ORAL at 11:56

## 2020-01-01 RX ADMIN — Medication 200 MILLIGRAM(S): at 20:48

## 2020-01-01 RX ADMIN — Medication 30 MILLILITER(S): at 12:58

## 2020-01-01 RX ADMIN — GABAPENTIN 200 MILLIGRAM(S): 400 CAPSULE ORAL at 05:49

## 2020-01-01 RX ADMIN — Medication 60 MILLIGRAM(S): at 05:03

## 2020-01-01 RX ADMIN — IOHEXOL 30 MILLILITER(S): 300 INJECTION, SOLUTION INTRAVENOUS at 09:14

## 2020-01-01 RX ADMIN — MORPHINE SULFATE 2 MILLIGRAM(S): 50 CAPSULE, EXTENDED RELEASE ORAL at 10:15

## 2020-01-01 RX ADMIN — HYDROMORPHONE HYDROCHLORIDE 0.5 MILLIGRAM(S): 2 INJECTION INTRAMUSCULAR; INTRAVENOUS; SUBCUTANEOUS at 03:21

## 2020-01-01 RX ADMIN — GABAPENTIN 200 MILLIGRAM(S): 400 CAPSULE ORAL at 05:36

## 2020-01-01 RX ADMIN — Medication 50 MILLILITER(S): at 01:15

## 2020-01-01 RX ADMIN — SODIUM CHLORIDE 1000 MILLILITER(S): 9 INJECTION INTRAMUSCULAR; INTRAVENOUS; SUBCUTANEOUS at 10:11

## 2020-01-01 RX ADMIN — BRIMONIDINE TARTRATE 1 DROP(S): 2 SOLUTION/ DROPS OPHTHALMIC at 05:57

## 2020-01-01 RX ADMIN — FIDAXOMICIN 200 MILLIGRAM(S): 200 GRANULE, FOR SUSPENSION ORAL at 05:44

## 2020-01-01 RX ADMIN — BUPROPION HYDROCHLORIDE 300 MILLIGRAM(S): 150 TABLET, EXTENDED RELEASE ORAL at 11:09

## 2020-01-01 RX ADMIN — MORPHINE SULFATE 2 MILLIGRAM(S): 50 CAPSULE, EXTENDED RELEASE ORAL at 10:19

## 2020-01-01 RX ADMIN — MORPHINE SULFATE 2 MILLIGRAM(S): 50 CAPSULE, EXTENDED RELEASE ORAL at 21:48

## 2020-01-01 RX ADMIN — ROPINIROLE 0.5 MILLIGRAM(S): 8 TABLET, FILM COATED, EXTENDED RELEASE ORAL at 22:18

## 2020-01-01 RX ADMIN — FENTANYL CITRATE 4.9 MICROGRAM(S)/KG/HR: 50 INJECTION INTRAVENOUS at 09:18

## 2020-01-01 RX ADMIN — MORPHINE SULFATE 2 MILLIGRAM(S): 50 CAPSULE, EXTENDED RELEASE ORAL at 16:07

## 2020-01-01 RX ADMIN — Medication 10 MILLIGRAM(S): at 02:34

## 2020-01-01 RX ADMIN — SERTRALINE 150 MILLIGRAM(S): 25 TABLET, FILM COATED ORAL at 14:54

## 2020-01-01 RX ADMIN — MORPHINE SULFATE 2 MILLIGRAM(S): 50 CAPSULE, EXTENDED RELEASE ORAL at 19:05

## 2020-01-01 RX ADMIN — Medication 5000 UNIT(S): at 11:08

## 2020-01-01 RX ADMIN — LATANOPROST 1 DROP(S): 0.05 SOLUTION/ DROPS OPHTHALMIC; TOPICAL at 21:09

## 2020-01-01 RX ADMIN — SERTRALINE 150 MILLIGRAM(S): 25 TABLET, FILM COATED ORAL at 11:43

## 2020-01-01 RX ADMIN — SERTRALINE 150 MILLIGRAM(S): 25 TABLET, FILM COATED ORAL at 13:03

## 2020-01-01 RX ADMIN — Medication 325 MILLIGRAM(S): at 11:16

## 2020-01-01 RX ADMIN — Medication 50 MILLIEQUIVALENT(S): at 06:35

## 2020-01-01 RX ADMIN — ATORVASTATIN CALCIUM 10 MILLIGRAM(S): 80 TABLET, FILM COATED ORAL at 21:38

## 2020-01-01 RX ADMIN — FIDAXOMICIN 200 MILLIGRAM(S): 200 GRANULE, FOR SUSPENSION ORAL at 17:26

## 2020-01-01 RX ADMIN — Medication 25 MILLIGRAM(S): at 05:09

## 2020-01-01 RX ADMIN — ENOXAPARIN SODIUM 81 MILLIGRAM(S): 100 INJECTION SUBCUTANEOUS at 17:23

## 2020-01-01 RX ADMIN — Medication 25 MILLIGRAM(S): at 15:52

## 2020-01-01 RX ADMIN — Medication 100 MILLIGRAM(S): at 00:40

## 2020-01-01 RX ADMIN — FIDAXOMICIN 200 MILLIGRAM(S): 200 GRANULE, FOR SUSPENSION ORAL at 17:52

## 2020-01-01 RX ADMIN — PANTOPRAZOLE SODIUM 40 MILLIGRAM(S): 20 TABLET, DELAYED RELEASE ORAL at 11:18

## 2020-01-01 RX ADMIN — Medication 20 MILLIGRAM(S): at 05:11

## 2020-01-01 RX ADMIN — Medication 25 MILLIGRAM(S): at 05:15

## 2020-01-01 RX ADMIN — Medication 100 GRAM(S): at 06:28

## 2020-01-01 RX ADMIN — Medication 166.67 MILLIGRAM(S): at 18:19

## 2020-01-01 RX ADMIN — Medication 60 MILLIGRAM(S): at 18:15

## 2020-01-01 RX ADMIN — ONDANSETRON 4 MILLIGRAM(S): 8 TABLET, FILM COATED ORAL at 00:38

## 2020-01-01 RX ADMIN — Medication 5000 UNIT(S): at 11:30

## 2020-01-01 RX ADMIN — Medication 325 MILLIGRAM(S): at 11:55

## 2020-01-01 RX ADMIN — Medication 1 DROP(S): at 17:24

## 2020-01-01 RX ADMIN — ONDANSETRON 4 MILLIGRAM(S): 8 TABLET, FILM COATED ORAL at 15:33

## 2020-01-01 RX ADMIN — MORPHINE SULFATE 2 MILLIGRAM(S): 50 CAPSULE, EXTENDED RELEASE ORAL at 09:46

## 2020-01-01 RX ADMIN — MORPHINE SULFATE 2 MILLIGRAM(S): 50 CAPSULE, EXTENDED RELEASE ORAL at 01:11

## 2020-01-01 RX ADMIN — SERTRALINE 150 MILLIGRAM(S): 25 TABLET, FILM COATED ORAL at 11:55

## 2020-01-01 RX ADMIN — SODIUM CHLORIDE 50 MILLILITER(S): 9 INJECTION, SOLUTION INTRAVENOUS at 11:08

## 2020-01-01 RX ADMIN — ONDANSETRON 4 MILLIGRAM(S): 8 TABLET, FILM COATED ORAL at 14:16

## 2020-01-01 RX ADMIN — TRAMADOL HYDROCHLORIDE 50 MILLIGRAM(S): 50 TABLET ORAL at 09:42

## 2020-01-01 RX ADMIN — SODIUM CHLORIDE 125 MILLILITER(S): 9 INJECTION INTRAMUSCULAR; INTRAVENOUS; SUBCUTANEOUS at 18:23

## 2020-01-01 RX ADMIN — BUPROPION HYDROCHLORIDE 150 MILLIGRAM(S): 150 TABLET, EXTENDED RELEASE ORAL at 17:22

## 2020-01-01 RX ADMIN — MORPHINE SULFATE 4 MILLIGRAM(S): 50 CAPSULE, EXTENDED RELEASE ORAL at 09:08

## 2020-01-01 RX ADMIN — SODIUM CHLORIDE 75 MILLILITER(S): 9 INJECTION INTRAMUSCULAR; INTRAVENOUS; SUBCUTANEOUS at 01:54

## 2020-01-01 RX ADMIN — Medication 50 GRAM(S): at 06:36

## 2020-01-01 RX ADMIN — POTASSIUM PHOSPHATE, MONOBASIC POTASSIUM PHOSPHATE, DIBASIC 63.75 MILLIMOLE(S): 236; 224 INJECTION, SOLUTION INTRAVENOUS at 04:41

## 2020-01-01 RX ADMIN — Medication 62.5 MILLIMOLE(S): at 04:04

## 2020-01-01 RX ADMIN — HYDROMORPHONE HYDROCHLORIDE 0.5 MILLIGRAM(S): 2 INJECTION INTRAMUSCULAR; INTRAVENOUS; SUBCUTANEOUS at 06:38

## 2020-01-01 RX ADMIN — Medication 30 MILLILITER(S): at 15:16

## 2020-01-01 RX ADMIN — Medication 200 MILLIGRAM(S): at 16:44

## 2020-01-01 RX ADMIN — MORPHINE SULFATE 2 MILLIGRAM(S): 50 CAPSULE, EXTENDED RELEASE ORAL at 02:01

## 2020-01-01 RX ADMIN — TRAMADOL HYDROCHLORIDE 50 MILLIGRAM(S): 50 TABLET ORAL at 21:56

## 2020-01-01 RX ADMIN — GABAPENTIN 200 MILLIGRAM(S): 400 CAPSULE ORAL at 13:03

## 2020-01-01 RX ADMIN — Medication 200 MILLIGRAM(S): at 05:36

## 2020-01-01 RX ADMIN — MEROPENEM 100 MILLIGRAM(S): 1 INJECTION INTRAVENOUS at 12:01

## 2020-01-01 RX ADMIN — Medication 125 MILLIGRAM(S): at 23:31

## 2020-01-01 RX ADMIN — Medication 325 MILLIGRAM(S): at 11:43

## 2020-01-01 RX ADMIN — SODIUM CHLORIDE 1000 MILLILITER(S): 9 INJECTION INTRAMUSCULAR; INTRAVENOUS; SUBCUTANEOUS at 05:50

## 2020-01-01 RX ADMIN — FAMOTIDINE 20 MILLIGRAM(S): 10 INJECTION INTRAVENOUS at 11:03

## 2020-01-01 RX ADMIN — APIXABAN 5 MILLIGRAM(S): 2.5 TABLET, FILM COATED ORAL at 18:16

## 2020-01-01 RX ADMIN — Medication 1.25 MILLIGRAM(S): at 06:48

## 2020-01-01 RX ADMIN — GABAPENTIN 200 MILLIGRAM(S): 400 CAPSULE ORAL at 22:18

## 2020-01-01 RX ADMIN — APIXABAN 5 MILLIGRAM(S): 2.5 TABLET, FILM COATED ORAL at 17:27

## 2020-01-01 RX ADMIN — BUPROPION HYDROCHLORIDE 300 MILLIGRAM(S): 150 TABLET, EXTENDED RELEASE ORAL at 11:30

## 2020-01-01 RX ADMIN — MORPHINE SULFATE 2 MILLIGRAM(S): 50 CAPSULE, EXTENDED RELEASE ORAL at 21:15

## 2020-01-01 RX ADMIN — GABAPENTIN 200 MILLIGRAM(S): 400 CAPSULE ORAL at 14:17

## 2020-01-01 RX ADMIN — Medication 650 MILLIGRAM(S): at 22:30

## 2020-01-01 RX ADMIN — GABAPENTIN 200 MILLIGRAM(S): 400 CAPSULE ORAL at 21:33

## 2020-01-01 RX ADMIN — TRAMADOL HYDROCHLORIDE 50 MILLIGRAM(S): 50 TABLET ORAL at 09:12

## 2020-01-01 RX ADMIN — MORPHINE SULFATE 2 MILLIGRAM(S): 50 CAPSULE, EXTENDED RELEASE ORAL at 15:52

## 2020-01-01 RX ADMIN — FAMOTIDINE 20 MILLIGRAM(S): 10 INJECTION INTRAVENOUS at 09:45

## 2020-01-01 RX ADMIN — Medication 2.5 MILLIGRAM(S): at 11:03

## 2020-01-01 RX ADMIN — HYDROMORPHONE HYDROCHLORIDE 0.25 MILLIGRAM(S): 2 INJECTION INTRAMUSCULAR; INTRAVENOUS; SUBCUTANEOUS at 12:40

## 2020-01-01 RX ADMIN — Medication 1 DROP(S): at 17:44

## 2020-01-01 RX ADMIN — HYDROMORPHONE HYDROCHLORIDE 0.5 MILLIGRAM(S): 2 INJECTION INTRAMUSCULAR; INTRAVENOUS; SUBCUTANEOUS at 23:39

## 2020-01-01 RX ADMIN — HYDROMORPHONE HYDROCHLORIDE 0.5 MILLIGRAM(S): 2 INJECTION INTRAMUSCULAR; INTRAVENOUS; SUBCUTANEOUS at 01:28

## 2020-01-01 RX ADMIN — BUPROPION HYDROCHLORIDE 300 MILLIGRAM(S): 150 TABLET, EXTENDED RELEASE ORAL at 11:06

## 2020-01-01 RX ADMIN — FAMOTIDINE 20 MILLIGRAM(S): 10 INJECTION INTRAVENOUS at 14:04

## 2020-01-01 RX ADMIN — CHLORHEXIDINE GLUCONATE 1 APPLICATION(S): 213 SOLUTION TOPICAL at 17:44

## 2020-01-01 RX ADMIN — Medication 90 MILLIGRAM(S): at 05:50

## 2020-01-01 RX ADMIN — SERTRALINE 150 MILLIGRAM(S): 25 TABLET, FILM COATED ORAL at 11:16

## 2020-01-01 RX ADMIN — APIXABAN 5 MILLIGRAM(S): 2.5 TABLET, FILM COATED ORAL at 05:00

## 2020-01-01 RX ADMIN — MORPHINE SULFATE 2 MILLIGRAM(S): 50 CAPSULE, EXTENDED RELEASE ORAL at 21:17

## 2020-01-01 RX ADMIN — CEFEPIME 100 MILLIGRAM(S): 1 INJECTION, POWDER, FOR SOLUTION INTRAMUSCULAR; INTRAVENOUS at 18:20

## 2020-01-01 RX ADMIN — LISINOPRIL 40 MILLIGRAM(S): 2.5 TABLET ORAL at 05:44

## 2020-01-01 RX ADMIN — Medication 10 MILLIGRAM(S): at 20:45

## 2020-01-01 RX ADMIN — Medication 50 MILLILITER(S): at 06:29

## 2020-01-01 RX ADMIN — APIXABAN 5 MILLIGRAM(S): 2.5 TABLET, FILM COATED ORAL at 05:36

## 2020-01-01 RX ADMIN — Medication 10 MILLIGRAM(S): at 04:18

## 2020-01-01 RX ADMIN — MORPHINE SULFATE 2 MILLIGRAM(S): 50 CAPSULE, EXTENDED RELEASE ORAL at 07:04

## 2020-01-01 RX ADMIN — Medication 125 MILLIGRAM(S): at 18:17

## 2020-01-01 RX ADMIN — Medication 5000 UNIT(S): at 11:03

## 2020-01-01 RX ADMIN — Medication 60 MILLIGRAM(S): at 15:52

## 2020-01-01 RX ADMIN — CHLORHEXIDINE GLUCONATE 1 APPLICATION(S): 213 SOLUTION TOPICAL at 11:56

## 2020-01-01 RX ADMIN — Medication 250 MILLIGRAM(S): at 05:12

## 2020-01-01 RX ADMIN — APIXABAN 5 MILLIGRAM(S): 2.5 TABLET, FILM COATED ORAL at 05:07

## 2020-01-01 RX ADMIN — Medication 5000 UNIT(S): at 13:04

## 2020-01-01 RX ADMIN — SODIUM CHLORIDE 125 MILLILITER(S): 9 INJECTION INTRAMUSCULAR; INTRAVENOUS; SUBCUTANEOUS at 11:18

## 2020-01-01 RX ADMIN — FIDAXOMICIN 200 MILLIGRAM(S): 200 GRANULE, FOR SUSPENSION ORAL at 18:16

## 2020-01-01 RX ADMIN — BRIMONIDINE TARTRATE 1 DROP(S): 2 SOLUTION/ DROPS OPHTHALMIC at 05:33

## 2020-01-01 RX ADMIN — ATORVASTATIN CALCIUM 10 MILLIGRAM(S): 80 TABLET, FILM COATED ORAL at 21:13

## 2020-01-01 RX ADMIN — ROPINIROLE 0.5 MILLIGRAM(S): 8 TABLET, FILM COATED, EXTENDED RELEASE ORAL at 21:15

## 2020-01-01 RX ADMIN — OXYCODONE HYDROCHLORIDE 5 MILLIGRAM(S): 5 TABLET ORAL at 08:27

## 2020-01-01 RX ADMIN — Medication 90 MILLIGRAM(S): at 05:37

## 2020-01-01 RX ADMIN — MIRTAZAPINE 7.5 MILLIGRAM(S): 45 TABLET, ORALLY DISINTEGRATING ORAL at 22:17

## 2020-01-01 RX ADMIN — Medication 650 MILLIGRAM(S): at 12:30

## 2020-01-01 RX ADMIN — MORPHINE SULFATE 2 MILLIGRAM(S): 50 CAPSULE, EXTENDED RELEASE ORAL at 22:47

## 2020-01-01 RX ADMIN — SERTRALINE 150 MILLIGRAM(S): 25 TABLET, FILM COATED ORAL at 11:06

## 2020-01-01 RX ADMIN — ENOXAPARIN SODIUM 81 MILLIGRAM(S): 100 INJECTION SUBCUTANEOUS at 18:16

## 2020-01-01 RX ADMIN — Medication 650 MILLIGRAM(S): at 21:38

## 2020-01-01 RX ADMIN — Medication 200 MILLIGRAM(S): at 18:15

## 2020-01-01 RX ADMIN — GABAPENTIN 200 MILLIGRAM(S): 400 CAPSULE ORAL at 21:38

## 2020-01-01 RX ADMIN — APIXABAN 5 MILLIGRAM(S): 2.5 TABLET, FILM COATED ORAL at 05:49

## 2020-01-01 RX ADMIN — Medication 90 MILLIGRAM(S): at 05:44

## 2020-01-01 RX ADMIN — Medication 50 MILLIEQUIVALENT(S): at 15:31

## 2020-01-01 RX ADMIN — MORPHINE SULFATE 2 MILLIGRAM(S): 50 CAPSULE, EXTENDED RELEASE ORAL at 11:17

## 2020-01-01 RX ADMIN — LISINOPRIL 20 MILLIGRAM(S): 2.5 TABLET ORAL at 05:08

## 2020-01-01 RX ADMIN — Medication 5 MILLIGRAM(S): at 00:01

## 2020-01-01 RX ADMIN — MORPHINE SULFATE 2 MILLIGRAM(S): 50 CAPSULE, EXTENDED RELEASE ORAL at 10:50

## 2020-01-01 RX ADMIN — Medication 5 MILLIGRAM(S): at 06:48

## 2020-01-01 RX ADMIN — BRIMONIDINE TARTRATE 1 DROP(S): 2 SOLUTION/ DROPS OPHTHALMIC at 17:44

## 2020-01-01 RX ADMIN — ROPINIROLE 0.5 MILLIGRAM(S): 8 TABLET, FILM COATED, EXTENDED RELEASE ORAL at 21:17

## 2020-01-01 RX ADMIN — Medication 5 MILLIGRAM(S): at 21:27

## 2020-01-01 RX ADMIN — MIRTAZAPINE 7.5 MILLIGRAM(S): 45 TABLET, ORALLY DISINTEGRATING ORAL at 21:35

## 2020-01-01 RX ADMIN — PANTOPRAZOLE SODIUM 40 MILLIGRAM(S): 20 TABLET, DELAYED RELEASE ORAL at 05:11

## 2020-01-01 RX ADMIN — ROPINIROLE 0.5 MILLIGRAM(S): 8 TABLET, FILM COATED, EXTENDED RELEASE ORAL at 21:19

## 2020-01-01 RX ADMIN — HYDROMORPHONE HYDROCHLORIDE 0.5 MILLIGRAM(S): 2 INJECTION INTRAMUSCULAR; INTRAVENOUS; SUBCUTANEOUS at 09:40

## 2020-01-01 RX ADMIN — Medication 60 MILLIGRAM(S): at 05:26

## 2020-01-01 RX ADMIN — SODIUM CHLORIDE 1000 MILLILITER(S): 9 INJECTION INTRAMUSCULAR; INTRAVENOUS; SUBCUTANEOUS at 08:51

## 2020-01-01 RX ADMIN — GABAPENTIN 200 MILLIGRAM(S): 400 CAPSULE ORAL at 21:19

## 2020-01-01 RX ADMIN — TRAMADOL HYDROCHLORIDE 50 MILLIGRAM(S): 50 TABLET ORAL at 21:26

## 2020-01-01 RX ADMIN — Medication 5000 UNIT(S): at 11:45

## 2020-01-01 RX ADMIN — BRIMONIDINE TARTRATE 1 DROP(S): 2 SOLUTION/ DROPS OPHTHALMIC at 05:26

## 2020-01-01 RX ADMIN — MORPHINE SULFATE 2 MILLIGRAM(S): 50 CAPSULE, EXTENDED RELEASE ORAL at 21:41

## 2020-01-01 RX ADMIN — Medication 166.67 MILLIGRAM(S): at 05:01

## 2020-01-01 RX ADMIN — MORPHINE SULFATE 2 MILLIGRAM(S): 50 CAPSULE, EXTENDED RELEASE ORAL at 11:49

## 2020-01-01 RX ADMIN — Medication 1 DROP(S): at 05:58

## 2020-01-01 RX ADMIN — MORPHINE SULFATE 2 MILLIGRAM(S): 50 CAPSULE, EXTENDED RELEASE ORAL at 05:56

## 2020-01-01 RX ADMIN — Medication 650 MILLIGRAM(S): at 13:30

## 2020-01-01 RX ADMIN — MORPHINE SULFATE 1 MILLIGRAM(S): 50 CAPSULE, EXTENDED RELEASE ORAL at 15:18

## 2020-01-01 RX ADMIN — Medication 325 MILLIGRAM(S): at 11:03

## 2020-01-01 RX ADMIN — Medication 20 MILLIEQUIVALENT(S): at 10:22

## 2020-01-01 RX ADMIN — FAMOTIDINE 104 MILLIGRAM(S): 10 INJECTION INTRAVENOUS at 09:12

## 2020-01-01 RX ADMIN — Medication 5 MILLIGRAM(S): at 22:17

## 2020-01-01 RX ADMIN — GABAPENTIN 200 MILLIGRAM(S): 400 CAPSULE ORAL at 15:17

## 2020-01-01 RX ADMIN — HYDROMORPHONE HYDROCHLORIDE 0.25 MILLIGRAM(S): 2 INJECTION INTRAMUSCULAR; INTRAVENOUS; SUBCUTANEOUS at 20:49

## 2020-01-01 RX ADMIN — SERTRALINE 150 MILLIGRAM(S): 25 TABLET, FILM COATED ORAL at 11:30

## 2020-01-01 RX ADMIN — CEFEPIME 100 MILLIGRAM(S): 1 INJECTION, POWDER, FOR SOLUTION INTRAMUSCULAR; INTRAVENOUS at 05:01

## 2020-01-01 RX ADMIN — Medication 5 MILLIGRAM(S): at 12:11

## 2020-01-01 RX ADMIN — ENOXAPARIN SODIUM 81 MILLIGRAM(S): 100 INJECTION SUBCUTANEOUS at 06:51

## 2020-01-01 RX ADMIN — LISINOPRIL 20 MILLIGRAM(S): 2.5 TABLET ORAL at 10:22

## 2020-01-01 RX ADMIN — LISINOPRIL 40 MILLIGRAM(S): 2.5 TABLET ORAL at 05:50

## 2020-01-01 RX ADMIN — MORPHINE SULFATE 2 MILLIGRAM(S): 50 CAPSULE, EXTENDED RELEASE ORAL at 21:33

## 2020-01-01 RX ADMIN — SODIUM CHLORIDE 75 MILLILITER(S): 9 INJECTION INTRAMUSCULAR; INTRAVENOUS; SUBCUTANEOUS at 17:09

## 2020-01-01 RX ADMIN — FIDAXOMICIN 200 MILLIGRAM(S): 200 GRANULE, FOR SUSPENSION ORAL at 08:53

## 2020-01-01 RX ADMIN — Medication 1 DROP(S): at 05:27

## 2020-01-01 RX ADMIN — APIXABAN 5 MILLIGRAM(S): 2.5 TABLET, FILM COATED ORAL at 05:43

## 2020-01-01 RX ADMIN — CEFTRIAXONE 100 MILLIGRAM(S): 500 INJECTION, POWDER, FOR SOLUTION INTRAMUSCULAR; INTRAVENOUS at 05:13

## 2020-01-01 RX ADMIN — Medication 5 MILLIGRAM(S): at 21:14

## 2020-01-01 RX ADMIN — FIDAXOMICIN 200 MILLIGRAM(S): 200 GRANULE, FOR SUSPENSION ORAL at 05:36

## 2020-01-01 RX ADMIN — SODIUM CHLORIDE 120 MILLILITER(S): 9 INJECTION, SOLUTION INTRAVENOUS at 22:35

## 2020-01-01 RX ADMIN — APIXABAN 5 MILLIGRAM(S): 2.5 TABLET, FILM COATED ORAL at 05:15

## 2020-01-01 RX ADMIN — IOHEXOL 30 MILLILITER(S): 300 INJECTION, SOLUTION INTRAVENOUS at 21:00

## 2020-01-01 RX ADMIN — Medication 0: at 11:29

## 2020-01-01 RX ADMIN — FAMOTIDINE 20 MILLIGRAM(S): 10 INJECTION INTRAVENOUS at 11:06

## 2020-01-01 RX ADMIN — Medication 30 MILLIGRAM(S): at 18:55

## 2020-01-01 RX ADMIN — Medication 650 MILLIGRAM(S): at 05:13

## 2020-01-01 RX ADMIN — LISINOPRIL 40 MILLIGRAM(S): 2.5 TABLET ORAL at 05:00

## 2020-01-01 RX ADMIN — SERTRALINE 150 MILLIGRAM(S): 25 TABLET, FILM COATED ORAL at 11:03

## 2020-01-01 RX ADMIN — ATORVASTATIN CALCIUM 10 MILLIGRAM(S): 80 TABLET, FILM COATED ORAL at 21:19

## 2020-01-01 RX ADMIN — Medication 10 MILLIGRAM(S): at 00:19

## 2020-01-01 RX ADMIN — SERTRALINE 100 MILLIGRAM(S): 25 TABLET, FILM COATED ORAL at 17:24

## 2020-01-01 RX ADMIN — Medication 100 GRAM(S): at 01:15

## 2020-01-01 RX ADMIN — ROPINIROLE 0.5 MILLIGRAM(S): 8 TABLET, FILM COATED, EXTENDED RELEASE ORAL at 21:29

## 2020-01-01 RX ADMIN — MORPHINE SULFATE 4 MILLIGRAM(S): 50 CAPSULE, EXTENDED RELEASE ORAL at 15:00

## 2020-01-01 RX ADMIN — MORPHINE SULFATE 2 MILLIGRAM(S): 50 CAPSULE, EXTENDED RELEASE ORAL at 21:00

## 2020-01-01 RX ADMIN — LATANOPROST 1 DROP(S): 0.05 SOLUTION/ DROPS OPHTHALMIC; TOPICAL at 23:30

## 2020-01-01 RX ADMIN — TRAMADOL HYDROCHLORIDE 50 MILLIGRAM(S): 50 TABLET ORAL at 17:26

## 2020-01-01 RX ADMIN — Medication 5 MILLIGRAM(S): at 19:10

## 2020-01-01 RX ADMIN — MORPHINE SULFATE 2 MILLIGRAM(S): 50 CAPSULE, EXTENDED RELEASE ORAL at 15:50

## 2020-01-01 RX ADMIN — MORPHINE SULFATE 2 MILLIGRAM(S): 50 CAPSULE, EXTENDED RELEASE ORAL at 05:41

## 2020-01-01 RX ADMIN — ONDANSETRON 4 MILLIGRAM(S): 8 TABLET, FILM COATED ORAL at 18:23

## 2020-01-01 RX ADMIN — Medication 1 DROP(S): at 05:33

## 2020-01-01 RX ADMIN — Medication 100 MILLIGRAM(S): at 20:48

## 2020-01-01 RX ADMIN — Medication 50 MILLIEQUIVALENT(S): at 17:43

## 2020-01-01 RX ADMIN — ENOXAPARIN SODIUM 81 MILLIGRAM(S): 100 INJECTION SUBCUTANEOUS at 06:37

## 2020-01-01 RX ADMIN — HYDROMORPHONE HYDROCHLORIDE 0.5 MILLIGRAM(S): 2 INJECTION INTRAMUSCULAR; INTRAVENOUS; SUBCUTANEOUS at 14:31

## 2020-01-01 RX ADMIN — MORPHINE SULFATE 2 MILLIGRAM(S): 50 CAPSULE, EXTENDED RELEASE ORAL at 04:00

## 2020-01-01 RX ADMIN — Medication 5000 UNIT(S): at 11:06

## 2020-01-01 RX ADMIN — Medication 5 MILLIGRAM(S): at 21:19

## 2020-01-01 RX ADMIN — Medication 50 GRAM(S): at 13:03

## 2020-01-01 RX ADMIN — BUPROPION HYDROCHLORIDE 300 MILLIGRAM(S): 150 TABLET, EXTENDED RELEASE ORAL at 13:04

## 2020-01-01 RX ADMIN — PANTOPRAZOLE SODIUM 40 MILLIGRAM(S): 20 TABLET, DELAYED RELEASE ORAL at 11:06

## 2020-01-01 RX ADMIN — Medication 50 MILLIEQUIVALENT(S): at 10:59

## 2020-01-01 RX ADMIN — GABAPENTIN 200 MILLIGRAM(S): 400 CAPSULE ORAL at 12:58

## 2020-01-01 RX ADMIN — Medication 1.25 MILLIGRAM(S): at 05:07

## 2020-01-01 RX ADMIN — GABAPENTIN 200 MILLIGRAM(S): 400 CAPSULE ORAL at 05:00

## 2020-01-01 RX ADMIN — MORPHINE SULFATE 2 MILLIGRAM(S): 50 CAPSULE, EXTENDED RELEASE ORAL at 14:32

## 2020-01-01 RX ADMIN — MIRTAZAPINE 7.5 MILLIGRAM(S): 45 TABLET, ORALLY DISINTEGRATING ORAL at 21:15

## 2020-01-01 RX ADMIN — PANTOPRAZOLE SODIUM 40 MILLIGRAM(S): 20 TABLET, DELAYED RELEASE ORAL at 11:03

## 2020-01-01 RX ADMIN — Medication 30 MILLILITER(S): at 11:56

## 2020-01-01 RX ADMIN — ROPINIROLE 0.5 MILLIGRAM(S): 8 TABLET, FILM COATED, EXTENDED RELEASE ORAL at 21:28

## 2020-01-01 RX ADMIN — PANTOPRAZOLE SODIUM 40 MILLIGRAM(S): 20 TABLET, DELAYED RELEASE ORAL at 05:34

## 2020-01-01 RX ADMIN — ROPINIROLE 0.5 MILLIGRAM(S): 8 TABLET, FILM COATED, EXTENDED RELEASE ORAL at 21:39

## 2020-01-01 RX ADMIN — LISINOPRIL 20 MILLIGRAM(S): 2.5 TABLET ORAL at 05:03

## 2020-01-01 RX ADMIN — Medication 25 GRAM(S): at 11:16

## 2020-01-01 RX ADMIN — TRAMADOL HYDROCHLORIDE 50 MILLIGRAM(S): 50 TABLET ORAL at 18:19

## 2020-01-01 RX ADMIN — Medication 1 DROP(S): at 05:13

## 2020-01-01 RX ADMIN — MORPHINE SULFATE 6 MILLIGRAM(S): 50 CAPSULE, EXTENDED RELEASE ORAL at 13:19

## 2020-01-01 RX ADMIN — Medication 30 MILLIGRAM(S): at 17:07

## 2020-01-01 RX ADMIN — Medication 200 MILLIGRAM(S): at 05:43

## 2020-01-01 RX ADMIN — GABAPENTIN 200 MILLIGRAM(S): 400 CAPSULE ORAL at 05:07

## 2020-01-01 RX ADMIN — Medication 650 MILLIGRAM(S): at 14:31

## 2020-01-01 RX ADMIN — MIRTAZAPINE 7.5 MILLIGRAM(S): 45 TABLET, ORALLY DISINTEGRATING ORAL at 21:16

## 2020-01-01 RX ADMIN — GABAPENTIN 200 MILLIGRAM(S): 400 CAPSULE ORAL at 21:27

## 2020-01-01 RX ADMIN — BUPROPION HYDROCHLORIDE 300 MILLIGRAM(S): 150 TABLET, EXTENDED RELEASE ORAL at 11:55

## 2020-01-01 RX ADMIN — FAMOTIDINE 20 MILLIGRAM(S): 10 INJECTION INTRAVENOUS at 11:43

## 2020-01-01 RX ADMIN — HEPARIN SODIUM 17 UNIT(S)/HR: 5000 INJECTION INTRAVENOUS; SUBCUTANEOUS at 00:14

## 2020-01-01 RX ADMIN — MORPHINE SULFATE 2 MILLIGRAM(S): 50 CAPSULE, EXTENDED RELEASE ORAL at 12:04

## 2020-01-01 RX ADMIN — GABAPENTIN 200 MILLIGRAM(S): 400 CAPSULE ORAL at 05:43

## 2020-01-01 RX ADMIN — Medication 1.25 MILLIGRAM(S): at 00:17

## 2020-01-01 RX ADMIN — INSULIN HUMAN 10 UNIT(S): 100 INJECTION, SOLUTION SUBCUTANEOUS at 06:28

## 2020-01-01 RX ADMIN — Medication 25 MILLIGRAM(S): at 06:54

## 2020-01-01 RX ADMIN — SODIUM CHLORIDE 120 MILLILITER(S): 9 INJECTION, SOLUTION INTRAVENOUS at 06:36

## 2020-01-01 RX ADMIN — APIXABAN 5 MILLIGRAM(S): 2.5 TABLET, FILM COATED ORAL at 17:26

## 2020-01-01 RX ADMIN — Medication 650 MILLIGRAM(S): at 05:04

## 2020-01-01 RX ADMIN — BUPROPION HYDROCHLORIDE 300 MILLIGRAM(S): 150 TABLET, EXTENDED RELEASE ORAL at 11:08

## 2020-01-01 RX ADMIN — CHLORHEXIDINE GLUCONATE 1 APPLICATION(S): 213 SOLUTION TOPICAL at 05:00

## 2020-01-01 RX ADMIN — Medication 650 MILLIGRAM(S): at 18:14

## 2020-01-01 RX ADMIN — MORPHINE SULFATE 2 MILLIGRAM(S): 50 CAPSULE, EXTENDED RELEASE ORAL at 03:41

## 2020-01-01 RX ADMIN — Medication 1 DROP(S): at 18:17

## 2020-01-01 RX ADMIN — HEPARIN SODIUM 15 UNIT(S)/HR: 5000 INJECTION INTRAVENOUS; SUBCUTANEOUS at 15:30

## 2020-01-01 RX ADMIN — Medication 200 MILLIGRAM(S): at 05:49

## 2020-01-01 RX ADMIN — MORPHINE SULFATE 2 MILLIGRAM(S): 50 CAPSULE, EXTENDED RELEASE ORAL at 16:15

## 2020-01-01 RX ADMIN — Medication 650 MILLIGRAM(S): at 18:47

## 2020-01-01 RX ADMIN — MIRTAZAPINE 7.5 MILLIGRAM(S): 45 TABLET, ORALLY DISINTEGRATING ORAL at 21:33

## 2020-01-01 RX ADMIN — ATORVASTATIN CALCIUM 10 MILLIGRAM(S): 80 TABLET, FILM COATED ORAL at 22:17

## 2020-01-01 RX ADMIN — ROPINIROLE 0.5 MILLIGRAM(S): 8 TABLET, FILM COATED, EXTENDED RELEASE ORAL at 21:35

## 2020-01-01 RX ADMIN — MEROPENEM 100 MILLIGRAM(S): 1 INJECTION INTRAVENOUS at 22:37

## 2020-01-01 RX ADMIN — INSULIN HUMAN 10 UNIT(S): 100 INJECTION, SOLUTION SUBCUTANEOUS at 01:15

## 2020-01-01 RX ADMIN — HYDROMORPHONE HYDROCHLORIDE 0.25 MILLIGRAM(S): 2 INJECTION INTRAMUSCULAR; INTRAVENOUS; SUBCUTANEOUS at 11:16

## 2020-01-01 RX ADMIN — Medication 325 MILLIGRAM(S): at 11:09

## 2020-01-01 RX ADMIN — SODIUM CHLORIDE 1000 MILLILITER(S): 9 INJECTION, SOLUTION INTRAVENOUS at 05:25

## 2020-01-01 RX ADMIN — MIRTAZAPINE 7.5 MILLIGRAM(S): 45 TABLET, ORALLY DISINTEGRATING ORAL at 21:19

## 2020-01-01 RX ADMIN — Medication 20 MILLIGRAM(S): at 20:48

## 2020-01-01 RX ADMIN — Medication 1 DROP(S): at 17:21

## 2020-01-01 RX ADMIN — BRIMONIDINE TARTRATE 1 DROP(S): 2 SOLUTION/ DROPS OPHTHALMIC at 05:12

## 2020-01-01 RX ADMIN — Medication 200 MILLIGRAM(S): at 05:00

## 2020-01-01 RX ADMIN — APIXABAN 5 MILLIGRAM(S): 2.5 TABLET, FILM COATED ORAL at 06:54

## 2020-01-01 RX ADMIN — Medication 50 MILLIEQUIVALENT(S): at 04:10

## 2020-01-01 RX ADMIN — CEFTRIAXONE 100 MILLIGRAM(S): 500 INJECTION, POWDER, FOR SOLUTION INTRAMUSCULAR; INTRAVENOUS at 06:51

## 2020-01-01 RX ADMIN — Medication 50 MILLIEQUIVALENT(S): at 12:15

## 2020-01-01 RX ADMIN — MORPHINE SULFATE 2 MILLIGRAM(S): 50 CAPSULE, EXTENDED RELEASE ORAL at 06:49

## 2020-01-01 RX ADMIN — MORPHINE SULFATE 2 MILLIGRAM(S): 50 CAPSULE, EXTENDED RELEASE ORAL at 11:47

## 2020-01-01 RX ADMIN — MORPHINE SULFATE 1 MILLIGRAM(S): 50 CAPSULE, EXTENDED RELEASE ORAL at 15:48

## 2020-01-01 RX ADMIN — APIXABAN 5 MILLIGRAM(S): 2.5 TABLET, FILM COATED ORAL at 05:26

## 2020-01-01 RX ADMIN — ENOXAPARIN SODIUM 81 MILLIGRAM(S): 100 INJECTION SUBCUTANEOUS at 17:22

## 2020-01-01 RX ADMIN — Medication 250 MILLIGRAM(S): at 17:21

## 2020-01-01 RX ADMIN — Medication 325 MILLIGRAM(S): at 11:30

## 2020-01-01 RX ADMIN — OXYCODONE HYDROCHLORIDE 5 MILLIGRAM(S): 5 TABLET ORAL at 19:03

## 2020-07-17 PROBLEM — Z00.00 ENCOUNTER FOR PREVENTIVE HEALTH EXAMINATION: Status: ACTIVE | Noted: 2020-01-01

## 2020-07-17 PROBLEM — R31.0 GROSS HEMATURIA: Status: ACTIVE | Noted: 2020-01-01

## 2020-07-17 PROBLEM — E11.9 DIABETES: Status: ACTIVE | Noted: 2020-01-01

## 2020-07-17 PROBLEM — I51.9 HEART DISEASE: Status: ACTIVE | Noted: 2020-01-01

## 2020-07-17 PROBLEM — Z78.9 NON-SMOKER: Status: ACTIVE | Noted: 2020-01-01

## 2020-07-17 PROBLEM — Z82.49 FAMILY HISTORY OF CARDIAC DISORDER: Status: ACTIVE | Noted: 2020-01-01

## 2020-07-17 PROBLEM — N32.89 BLADDER MASS: Status: ACTIVE | Noted: 2020-01-01

## 2020-07-17 NOTE — ASSESSMENT
[FreeTextEntry1] : This is a 69 year male who was referred to me for bladder mass\par conversiation had with Dr Stephens -- had painless hematuria on 3 separate occasions starting a few weeks ago. no pain to the penis.  no signs of infection\par outside medical records from [facility] reviewed:\par \par July 2020\par CT: 1.1cm filling defect in bladder \par labs:\par Hbg 12.8\par Cr 1.2\par \par on cysto there was a 1.5cm bladder mass in the floor of the right side behind UO by about 3cm

## 2020-07-17 NOTE — HISTORY OF PRESENT ILLNESS
[FreeTextEntry1] : This is a 69 year male who was referred to me for bladder mass\par conversiation had with Dr Stephens -- had painless hematuria on 3 separate occasions starting a few weeks ago. no pain to the penis.  no signs of infection\par outside medical records from [facility] reviewed:\par \par July 2020\par CT: 1.1cm filling defect in bladder \par labs:\par Hbg 12.8\par Cr 1.2\par \par on cysto there was a 1.5cm bladder mass in the floor of the right side behind UO by about 3cm

## 2020-07-17 NOTE — PHYSICAL EXAM
[General Appearance - Well Developed] : well developed [General Appearance - Well Nourished] : well nourished [Normal Appearance] : normal appearance [Well Groomed] : well groomed [Edema] : no peripheral edema [General Appearance - In No Acute Distress] : no acute distress [Respiration, Rhythm And Depth] : normal respiratory rhythm and effort [Abdomen Soft] : soft [Exaggerated Use Of Accessory Muscles For Inspiration] : no accessory muscle use [Abdomen Tenderness] : non-tender [Testes Mass (___cm)] : there were no testicular masses [Scrotum] : the scrotum was normal [Urethral Meatus] : meatus normal [Normal Station and Gait] : the gait and station were normal for the patient's age [No Focal Deficits] : no focal deficits [Skin Color & Pigmentation] : normal skin color and pigmentation [] : no rash [Mood] : the mood was normal [Oriented To Time, Place, And Person] : oriented to person, place, and time [Affect] : the affect was normal [Not Anxious] : not anxious

## 2020-07-24 NOTE — HISTORY OF PRESENT ILLNESS
[Urinary Incontinence] : no urinary incontinence [Urinary Retention] : no urinary retention [FreeTextEntry1] : 69-year-old with bladder tumor, history of hematuria. He was on eliquis for A. fib but stopped it because of the hematuria. He also stopped his aspirin and is no longer taking it. [Abdominal Pain] : no abdominal pain [Dysuria] : no dysuria [Flank Pain] : no flank pain

## 2020-07-24 NOTE — ASSESSMENT
[FreeTextEntry1] : Solitary bladder tumor for TURBT. Risks benefits and alternatives fully discussed with patient including bleeding, infection, need for postop catheter, perforation. Also discussed anesthetic risk.

## 2020-07-24 NOTE — REVIEW OF SYSTEMS
[Shortness Of Breath] : no shortness of breath [Feeling Poorly] : not feeling poorly [Chest Pain] : no chest pain [Cough] : no cough [Abdominal Pain] : no abdominal pain [Confused] : no confusion

## 2020-07-24 NOTE — PHYSICAL EXAM
[General Appearance - In No Acute Distress] : no acute distress [] : no respiratory distress [Oriented To Time, Place, And Person] : oriented to person, place, and time [Respiration, Rhythm And Depth] : normal respiratory rhythm and effort [Not Anxious] : not anxious [FreeTextEntry1] : Uses a wheelchair

## 2020-07-31 NOTE — H&P PST ADULT - NSICDXPASTSURGICALHX_GEN_ALL_CORE_FT
PAST SURGICAL HISTORY:  CAD (coronary artery disease) of bypass graft     Foot amputation status, right partial foot amputation  tarsals and metatarsals of right foot    Malfunction of intrathecal infusion pump implanted initially in 1996  reimplanted 2006  removed 6/27/2018    S/P laparoscopic cholecystectomy     S/P lumbar laminectomy with repair of CSF leak using dural graft 7/8/2018 - dr Crenshaw

## 2020-07-31 NOTE — H&P PST ADULT - HISTORY OF PRESENT ILLNESS
69 year old male here for above, noted + hematuria approx 1 month ago, had further testing + bladder tumor, pt has some frequent urination, some urgency, some incontinence (not every time, but several times per week)  pt denies cp, sob, terry or palpitations 69 year old male here for above, noted + hematuria approx 1 month ago, had further testing + bladder tumor, pt has some frequent urination, some urgency, some incontinence (not every time, but several times per week)  pt denies cp, sob, terry or palpitations  pt has some urinary frequency, no urgency or burning 69 year old male here for above, noted + hematuria approx 1 month ago, had further testing + bladder tumor, pt has some frequent urination, some urgency, some incontinence (not every time, but several times per week)  pt denies cp, sob, terry or palpitations (pt denies any cardiac s/s)  pt has some urinary frequency, no urgency or burning  ex sukhwinder cannot do stairs secondary to back pain, neuropathy and toe amputations

## 2020-07-31 NOTE — H&P PST ADULT - NSICDXFAMILYHX_GEN_ALL_CORE_FT
FAMILY HISTORY:  Father  Still living? No  CAD (coronary artery disease), Age at diagnosis: Age Unknown

## 2020-07-31 NOTE — H&P PST ADULT - ATTENDING COMMENTS
For cysto, turbt.  risks, benefits, alternatives discussed including bleeding, infection, catheter, perforation. Pt stopped anticoagulants

## 2020-07-31 NOTE — H&P PST ADULT - NSICDXPASTMEDICALHX_GEN_ALL_CORE_FT
PAST MEDICAL HISTORY:  Arthritis     Bladder tumor     CAD (coronary artery disease) of bypass graft cabg 1995 3v    Depression     DM (diabetes mellitus)     GERD (gastroesophageal reflux disease)     Glaucoma     Hematuria     History of diabetic neuropathy     HTN (hypertension) PAST MEDICAL HISTORY:  Afib     Arthritis     Bladder tumor     CAD (coronary artery disease) of bypass graft cabg 1995 3v    Depression     DM (diabetes mellitus)     GERD (gastroesophageal reflux disease)     Glaucoma     Hematuria     History of diabetic neuropathy     HTN (hypertension) PAST MEDICAL HISTORY:  Afib rate controlled currently    Arthritis     Bladder tumor     CAD (coronary artery disease) of bypass graft cabg 1995 3v    Depression     DM (diabetes mellitus)     GERD (gastroesophageal reflux disease)     Glaucoma     Hematuria     History of diabetic neuropathy     HTN (hypertension)

## 2020-08-03 PROBLEM — Z86.39 PERSONAL HISTORY OF OTHER ENDOCRINE, NUTRITIONAL AND METABOLIC DISEASE: Chronic | Status: ACTIVE | Noted: 2020-01-01

## 2020-08-03 PROBLEM — R31.9 HEMATURIA, UNSPECIFIED: Chronic | Status: ACTIVE | Noted: 2020-01-01

## 2020-08-03 PROBLEM — D49.4 NEOPLASM OF UNSPECIFIED BEHAVIOR OF BLADDER: Chronic | Status: ACTIVE | Noted: 2020-01-01

## 2020-08-03 PROBLEM — I48.91 UNSPECIFIED ATRIAL FIBRILLATION: Chronic | Status: ACTIVE | Noted: 2020-01-01

## 2020-08-26 NOTE — ASU PATIENT PROFILE, ADULT - PMH
Afib  rate controlled currently  Arthritis    Bladder tumor    CAD (coronary artery disease) of bypass graft  cabg 1995 3v  Depression    DM (diabetes mellitus)    GERD (gastroesophageal reflux disease)    Glaucoma    Hematuria    History of diabetic neuropathy    HTN (hypertension)

## 2020-08-27 NOTE — BRIEF OPERATIVE NOTE - NSICDXBRIEFPROCEDURE_GEN_ALL_CORE_FT
PROCEDURES:  Cystoscopy with fulguration and resection of bladder tumor 27-Aug-2020 14:39:04  Vera Jo

## 2020-08-27 NOTE — ASU DISCHARGE PLAN (ADULT/PEDIATRIC) - CARE PROVIDER_API CALL
Vera Jo  UROLOGY  [address] [place] [address], [facility] [address]  [place] [address]  Phone: [phone]  Fax: [phone]  Follow Up Time: 1 week

## 2020-09-02 PROBLEM — C67.9 BLADDER CANCER: Status: ACTIVE | Noted: 2020-01-01

## 2020-09-02 NOTE — PHYSICAL EXAM
[General Appearance - In No Acute Distress] : no acute distress [Abdomen Soft] : soft [Abdomen Tenderness] : non-tender [Urethral Meatus] : meatus normal [Scrotum] : the scrotum was normal [Respiration, Rhythm And Depth] : normal respiratory rhythm and effort [Edema] : no peripheral edema [] : no respiratory distress [Affect] : the affect was normal [Oriented To Time, Place, And Person] : oriented to person, place, and time [Mood] : the mood was normal [Not Anxious] : not anxious [FreeTextEntry1] : Uses wheelchair

## 2020-09-02 NOTE — HISTORY OF PRESENT ILLNESS
[FreeTextEntry1] : Status post TURBT 6 days ago. Urine remains clear via catheter. Estrada removed for trial of void. Past shows high grade T1 disease. No flank pain, no abdominal pain, no fever, no testicular pain

## 2020-09-02 NOTE — REVIEW OF SYSTEMS
[Feeling Poorly] : not feeling poorly [Feeling Tired] : not feeling tired [Red Eyes] : eyes not red [Sore Throat] : no sore throat [Nasal Discharge] : no nasal discharge [Cough] : no cough [Chest Pain] : no chest pain [Diarrhea] : no diarrhea [Constipation] : no constipation [Dysuria] : no dysuria [Skin Wound] : no skin wound [Confused] : no confusion [Change In Personality] : no personality change [Easy Bleeding] : no tendency for easy bleeding

## 2020-09-02 NOTE — ASSESSMENT
[FreeTextEntry1] : Discussed fully with the patient his pathology along with his wife. Recommend intravesical gemcitabine followed by cystoscopy again. Risks benefits alternatives fully discussed. Catheter was removed and will follow up in 2 weeks to assess voiding and arrange intravesical treatments

## 2020-09-11 NOTE — ED ADULT NURSE NOTE - OBJECTIVE STATEMENT
Pt. came to ED for c/o abdominal pain since bladder surgery 2 weeks ago that got worse 1 week ago.  C/o n/v/d for 4 days. Denies urinary symptoms, cp, sob, fever, or chills.

## 2020-09-11 NOTE — CONSULT NOTE ADULT - SUBJECTIVE AND OBJECTIVE BOX
UROLOGY:      HPI:  70 yo M HTN, DM, gerd Afib on Plavix/ASA/Eliquis, CAD s/p CABG, Bladder CA s/p TURBT (8/27/2020), BIB aide presents to ED with lower abdominal pain. Patient seen and examined at bedside. Patient reports he having progressively worse abdominal pain, sharp, constant nonradiating, 8/10, for the past 4-5days. Associated with nausea, Non-bloody emesis, and persistent diarrhea. Patient admits to decrease PO intake due to pain. Per patient's aide at bedside, she states he had one episode of dark stool. Surgical Pathology reports urothelial carcinoma-invasive.  Patient denies any fever, chills, SOB, CP, dysuria, hematuria.     In ED, WBC 24.27, Lactate 1.8, Lactate patient received IVF, 4mg of Morphine IV push, and Zofran with minimal relief of symptoms.        [ x ] A 10 Point Review of Systems was negative except where noted  [    ] Due to altered mental status/intubation, subjective information was not able to be obtained from the patient. History was obtained to the extent possible from review of the chart and collateral sources of information.     PAST MEDICAL & SURGICAL HISTORY:  Afib: rate controlled currently  Hematuria  Bladder tumor  History of diabetic neuropathy  CAD (coronary artery disease) of bypass graft: cabg 1995 3v  Glaucoma  Arthritis  GERD (gastroesophageal reflux disease)  HTN (hypertension)  Depression  DM (diabetes mellitus)  S/P lumbar laminectomy: with repair of CSF leak using dural graft 7/8/2018 - dr Crenshaw  Malfunction of intrathecal infusion pump: implanted initially in 1996  reimplanted 2006  removed 6/27/2018  Foot amputation status, right: partial foot amputation  tarsals and metatarsals of right foot  S/P laparoscopic cholecystectomy  CAD (coronary artery disease) of bypass graft      MEDICATIONS  (STANDING):  famotidine  IVPB 20 milliGRAM(s) IV Intermittent daily  sodium chloride 0.9%. 1000 milliLiter(s) (125 mL/Hr) IV Continuous <Continuous>    MEDICATIONS  (PRN):      Allergies    No Known Allergies    Intolerances        SOCIAL HISTORY: No illicit drug use    FAMILY HISTORY:  CAD (coronary artery disease) (Father)      Vital Signs Last 24 Hrs  T(C): 36.8 (11 Sep 2020 08:16), Max: 36.8 (11 Sep 2020 08:16)  T(F): 98.2 (11 Sep 2020 08:16), Max: 98.2 (11 Sep 2020 08:16)  HR: 83 (11 Sep 2020 08:16) (83 - 83)  BP: 170/81 (11 Sep 2020 08:16) (170/81 - 170/81)  BP(mean): --  RR: 20 (11 Sep 2020 08:16) (20 - 20)  SpO2: 99% (11 Sep 2020 08:16) (99% - 99%)    Physical Exam:   Constitutional: NAD, well-developed  HEENT: EOMI  Neck: no pain  Back: No CVA tenderness b/l   Respiratory: No accessory respiratory muscle use  Abd: Soft, NT/ND  no organomegally  no hernia  :  Patient voids freely, circumcised male genitalia testis x 2 symmetrical, descended no meatal discharge noted,  +mild suprapubic tenderness   Extremities: no edema  Neurological: A/O x 3  Psychiatric: Normal mood, normal affect  Skin: No rashes    Labs                        13.1   24.27 )-----------( 330      ( 11 Sep 2020 08:58 )             41.7     11 Sep 2020 08:58    136    |  97     |  14     ----------------------------<  150    4.8     |  23     |  1.1      Ca    9.4        11 Sep 2020 08:58      Blood Gas Venous - Lactate (09.11.20 @ 08:38)    Blood Gas Venous - Lactate: 1.8 mmoL/L        I&O's Detail      RADIOLOGY/IMAGING: UROLOGY:      HPI:  68 yo M HTN, DM, gerd Afib on Plavix/ASA/Eliquis, CAD s/p CABG, Bladder CA s/p TURBT (8/27/2020), BIB aide presents to ED with lower abdominal pain. Patient seen and examined at bedside. Patient reports he having progressively worse abdominal pain, sharp, constant nonradiating, 8/10, for the past 4-5days. Associated with nausea, Non-bloody emesis, and persistent diarrhea. Patient admits to decrease PO intake due to pain. Per patient's aide at bedside, she states he had one episode of dark stool. Surgical Pathology reports urothelial carcinoma-invasive.  Patient denies any fever, chills, SOB, CP, dysuria, hematuria.     In ED, WBC 24.27, Lactate 1.8, Lactate patient received IVF, 4mg of Morphine IV push, and Zofran with minimal relief of symptoms.        [ x ] A 10 Point Review of Systems was negative except where noted    PAST MEDICAL & SURGICAL HISTORY:  Afib: rate controlled currently  Hematuria  Bladder tumor  History of diabetic neuropathy  CAD (coronary artery disease) of bypass graft: cabg 1995 3v  Glaucoma  Arthritis  GERD (gastroesophageal reflux disease)  HTN (hypertension)  Depression  DM (diabetes mellitus)  S/P lumbar laminectomy: with repair of CSF leak using dural graft 7/8/2018 - dr Crenshaw  Malfunction of intrathecal infusion pump: implanted initially in 1996  reimplanted 2006  removed 6/27/2018  Foot amputation status, right: partial foot amputation  tarsals and metatarsals of right foot  S/P laparoscopic cholecystectomy  CAD (coronary artery disease) of bypass graft      MEDICATIONS  (STANDING):  famotidine  IVPB 20 milliGRAM(s) IV Intermittent daily  sodium chloride 0.9%. 1000 milliLiter(s) (125 mL/Hr) IV Continuous <Continuous>    Allergies    No Known Allergies    SOCIAL HISTORY: No illicit drug use    FAMILY HISTORY:  CAD (coronary artery disease) (Father)      Vital Signs Last 24 Hrs  T(C): 36.8 (11 Sep 2020 08:16), Max: 36.8 (11 Sep 2020 08:16)  T(F): 98.2 (11 Sep 2020 08:16), Max: 98.2 (11 Sep 2020 08:16)  HR: 83 (11 Sep 2020 08:16) (83 - 83)  BP: 170/81 (11 Sep 2020 08:16) (170/81 - 170/81)  BP(mean): --  RR: 20 (11 Sep 2020 08:16) (20 - 20)  SpO2: 99% (11 Sep 2020 08:16) (99% - 99%)    Physical Exam:   Constitutional: NAD, well-developed  HEENT: EOMI  Neck: no pain  Back: No CVA tenderness b/l   Respiratory: No accessory respiratory muscle use  Abd: Soft, NT/ND  no organomegally  no hernia  :  Patient voids freely, circumcised male genitalia testis x 2 symmetrical, descended no meatal discharge noted,  +mild suprapubic tenderness   Extremities: no edema  Neurological: A/O x 3  Psychiatric: Normal mood, normal affect  Skin: No rashes    Labs                        13.1   24.27 )-----------( 330      ( 11 Sep 2020 08:58 )             41.7     11 Sep 2020 08:58    136    |  97     |  14     ----------------------------<  150    4.8     |  23     |  1.1      Ca    9.4        11 Sep 2020 08:58      Blood Gas Venous - Lactate (09.11.20 @ 08:38)    Blood Gas Venous - Lactate: 1.8 mmoL/L        I&O's Detail      RADIOLOGY/IMAGING:

## 2020-09-11 NOTE — ED PROVIDER NOTE - CARE PLAN
Principal Discharge DX:	Abdominal pain  Secondary Diagnosis:	Vomiting  Secondary Diagnosis:	Diarrhea  Secondary Diagnosis:	Leukocytosis Principal Discharge DX:	Abdominal pain  Secondary Diagnosis:	Vomiting  Secondary Diagnosis:	Diarrhea  Secondary Diagnosis:	Leukocytosis  Secondary Diagnosis:	UTI (urinary tract infection)

## 2020-09-11 NOTE — CONSULT NOTE ADULT - ATTENDING COMMENTS
I personally saw and examined the patient, reviewed the chart and available data. I discussed the situation with the patient and The subspecialty physicians assistant. I also reviewed and/or amended the note as necessary. I personally saw and examined the patient, reviewed the chart and available data. I discussed the situation with the patient and The subspecialty physicians assistant. I also reviewed and/or amended the note as necessary.    patient seen on the day in question. Note not reviewed and cosigned until now due to scheduling issues

## 2020-09-11 NOTE — H&P ADULT - ATTENDING COMMENTS
68 YO M with a PMH of bladder ca, DM, A-fib (Eliquis), and CABG who presents to the hospital with a c/o lower ABD pain for the past x 2 weeks. Associated with N/V/D that is non-black/bloody. Of note, the pt recently had TURBT performed by Dr. Jo and completed ABXs course. In the ED, seen  by urology, no acute surgical intervention, wants ID consult and C. diff rule out. CT-AP w/ IV contrast was negative. Cultures sent and pt started on IV ABX (Cefepime).     Physical exam shows pt in NAD. VSS, afebrile, not hypoxic on RA. A&Ox3. Non-focal neuro exam. Muscle strength/sensation intact. CTA B/L with no W/C/R. RRR, no M/G/R. ABD is soft and non-tender, normoactive BSs. LEs without swelling. No rashes. Labs and radiology as above.    Lower ABD pain + N/V/D s/p Turbt procedure, likely hemorrhagic cystitis, rule out C.diff; Sepsis not present on admission. FU cultures. C/w IV ABXs (Cefepime). PRN pain meds. PRN anti-emetics. Urology is following. ID consult.     Mild HAGMA from lactic acidosis. IVFs (LR). Repeat BMP/lactate in the AM.     Diabetes mellitus with hyperglycemia. A1c. FSs. Insulin PRN.     Afib, currently rate-controlled, on AC. Send Coag panel. Trend CBC. Hold AC for now.     HX of bladder CA and CABG. Restart home meds. DVT PPX. Inform PCP of pt's admission to hospital. My note supersedes the residents note.    ATTENDING TYESHA MENON MD SIGNED AND COMPLETED THE H&P ON 9/11/20 AT 0747

## 2020-09-11 NOTE — ED PROVIDER NOTE - PHYSICAL EXAMINATION
Gen: Alert, NAD, well appearing  Head: NC, AT, PERRL, EOMI, normal lids/conjunctiva  ENT: normal hearing, dry mucous membranes  Neck: +supple, no tenderness/meningismus,  Pulm: Bilateral BS, normal resp effort, no wheeze/stridor/retractions  CV: S1S2, irregular  Abd: soft, +tenderness over LUQ. No guarding or rebound  Mskel: no edema/erythema/cyanosis  Skin: no rash, warm/dry  Neuro: AAOx3, no sensory/motor deficits

## 2020-09-11 NOTE — CONSULT NOTE ADULT - PROBLEM SELECTOR RECOMMENDATION 9
Pt has been seen and examined by Dr Miranda  Abdominal pain secondary to C.difficile  No Acute Urological intervention at this time

## 2020-09-11 NOTE — ED PROVIDER NOTE - OBJECTIVE STATEMENT
68 yo M hx of bladder ca, DM, A-fib on Eliquis, CABG c/o abdominal pains and n/v/d. Patient s/p bladder sx 2 weeks ago with Dr. Jo. He finished course of abx after surgery. He has had lower abdominal discomfort since. 1 week ago he developed upper abdominal pains. Pain is constant, moderate, with no modifying factors. +n/v/d x 4 days. No urinary complaints. No fever/chills.

## 2020-09-11 NOTE — ED ADULT NURSE NOTE - INTERVENTIONS DEFINITIONS
Clinic Care Coordination Contact  Care Team Conversations    SW received referral from Yamileth Wu, of patient relations at [facility], requesting that this writer call and speak to the pt regarding transportation resources.    SW placed call to pt.  Introduced self, title and role.  Pt shared that he needs to be at [facility] for an appointment on 1-24-18 and does not have transportation to get there.  Pt shared that can come as far as [facility], but that is it.  SW shared that unfortunately, [place] does not offer transportation to [facility] and offered community resources such as [employer] transportation or [employer].  Pt shared he lives on a fixed income and is unable to afford to pay anyone to drive him to [facility].    SW asked the pt if he has asked his friend, neighbors, family, and even suggesting asking to barter resources with friends/family.  Pt was not interested, nor pleased to discuss this with the writer.    Unfortunately, this writer unable to assist at this time and pt is not interested in continuing with care coordination.    Sofya Unger  Social Work Care Coordinator  [facility]  [phone]        
Instruct patient to call for assistance/Physically safe environment: no spills, clutter or unnecessary equipment/[place] to call system/Stretcher in lowest position, wheels locked, appropriate side rails in place/Provide visual cue, wrist band, yellow gown, etc./Monitor gait and stability/Call bell, personal items and telephone within reach/Monitor for mental status changes and reorient to person, place, and time/Reinforce activity limits and safety measures with patient and family/Non-slip footwear when patient is off stretcher

## 2020-09-11 NOTE — CONSULT NOTE ADULT - ASSESSMENT
68 yo M HTN, DM, gerd Afib on Plavix/ASA/Eliquis, CAD s/p CABG, Bladder CA s/p TURBT (8/27/2020), BIB aide presents to ED with lower abdominal pain.     Plan   - F/u CT A/P   - Consider ID c/s for   - F/u UA and UCx   - Continue IVF  - Analgesics for pain control prn   - Antiemetics  - Will discuss with attending 68 yo M HTN, DM, gerd Afib on Plavix/ASA/Eliquis, CAD s/p CABG, Bladder CA s/p TURBT (8/27/2020), BIB aide presents to ED with lower abdominal pain.     Plan   - F/u CT A/P   - Consider ID c/s for   - F/u UA and UCx   - Continue IVF  - Analgesics for pain control prn   - Antiemetics  - Will discuss with attending     Amended Note 9/11/20 at 16:33     Case d/w and CT reviewed with Dr. Jo     - No acute urological intervention at this time   - F/U OP with Dr. Jo with 1 week   - Consider stool Cx to r/o C.diff   - D/w Dr. Jo 70 yo M HTN, DM, gerd Afib on Plavix/ASA/Eliquis, CAD s/p CABG, Bladder CA s/p TURBT (8/27/2020), BIB aide presents to ED with lower abdominal pain.     Plan   - F/u CT A/P   - Consider ID c/s for   - F/u UA and UCx   - Continue IVF  - Analgesics for pain control prn   - Antiemetics  - Will discuss with attending     Amended Note 9/11/20 at 16:33     Case d/w and CT reviewed with Dr. Jo     - No acute urological intervention at this time   - F/U OP with Dr. Jo with 1 week   - Consider stool Cx to r/o C.diff   - D/w Dr. Jo     < from: CT Abdomen and Pelvis w/ Oral Cont and w/ IV Cont (09.11.20 @ 14:59) >  EXAM:  CT ABDOMEN AND PELVIS OC IC            PROCEDURE DATE:  09/11/2020            INTERPRETATION:  CLINICAL STATEMENT: Abdominal pain    TECHNIQUE: Contiguous axial CT images were obtained from the lower chest to the pubic symphysis .  Oral contrast was given. 100 cc of Optiray 320 intravenous contrast was given no contrast was discarded Reformatted images in the coronal and sagittal planes were acquired.    COMPARISON CT: 7/15/2014    OTHER STUDIES USED FOR CORRELATION: None.    There is a 2 cm low density area in the right lobe of the liver contiguous to the gallbladder bed.Presumably representing focal fatty infiltration. Further evaluation with ultrasound suggested.  The spleen pancreas kidneys and adrenal glands appear normal.  There is no ascites.  There is no mesenteric retroperitoneal or pelvic lymphadenopathy.  The bowel pattern appears normal. There is no free air.  The bony structures appear intact.    IMPRESSION:  No acute process seen.  Low Density lesion right lobe liver further evaluation ultrasound suggested.      GRAYSON ZAMORANO M.D., ATTENDING RADIOLOGIST  This document has been electronically signed. Sep 11 2020  4:17PM        < end of copied text > 70 yo M HTN, DM, gerd Afib on Plavix/ASA/Eliquis, CAD s/p CABG, Bladder CA s/p TURBT (8/27/2020), BIB aide presents to ED with lower abdominal pain.     Plan   - F/u CT A/P   - Consider ID c/s for   - F/u UA and UCx   - Continue IVF  - Analgesics for pain control prn   - Antiemetics  - Will discuss with attending     Amended Note 9/11/20 at 16:33     Case d/w and CT reviewed with Dr. Jo     - No acute urological intervention at this time   - F/U OP with Dr. Jo with 1 week   - Consider stool Cx to r/o C.diff   - D/w Dr. Jo     < from: CT Abdomen and Pelvis w/ Oral Cont and w/ IV Cont (09.11.20 @ 14:59) >  EXAM:  CT ABDOMEN AND PELVIS OC IC            PROCEDURE DATE:  09/11/2020            INTERPRETATION:  CLINICAL STATEMENT: Abdominal pain    TECHNIQUE: Contiguous axial CT images were obtained from the lower chest to the pubic symphysis .  Oral contrast was given. 100 cc of Optiray 320 intravenous contrast was given no contrast was discarded Reformatted images in the coronal and sagittal planes were acquired.    COMPARISON CT: 7/15/2014    OTHER STUDIES USED FOR CORRELATION: None.    There is a 2 cm low density area in the right lobe of the liver contiguous to the gallbladder bed.Presumably representing focal fatty infiltration. Further evaluation with ultrasound suggested.  The spleen pancreas kidneys and adrenal glands appear normal.  There is no ascites.  There is no mesenteric retroperitoneal or pelvic lymphadenopathy.  The bowel pattern appears normal. There is no free air.  The bony structures appear intact.    IMPRESSION:  No acute process seen.  Low Density lesion right lobe liver further evaluation ultrasound suggested.      GRAYSON ZAMORANO M.D., ATTENDING RADIOLOGIST  This document has been electronically signed. Sep 11 2020  4:17PM        < end of copied text >    As for the patient when he came in [facility] on Saturday. Radiology study were reviewed and in fact by the time I came in the patient was diagnosed as C. diff we could explain is abdominal pain. There appears to be no acute urological issues I would recommend treating his acute G.I. infection follow-up with Dr. Jo as previously scheduled and if something else comes up please be . For now no further acute urological intervention

## 2020-09-11 NOTE — ED PROVIDER NOTE - CLINICAL SUMMARY MEDICAL DECISION MAKING FREE TEXT BOX
68 yo man with recent bladder surgery and completion of course of antibiotics.  Now with abd pain and diarrhea.  UA positive.  Will need admission for C. diff testing and workup for sepsis source.  IV cefepime given in ED due to pyuria.  No evidence of severe sepsis or septic shock.  Stable for medical floor admission.

## 2020-09-11 NOTE — ED PROVIDER NOTE - ATTENDING CONTRIBUTION TO CARE
I personally evaluated the patient. I reviewed the Resident’s or Physician Assistant’s note (as assigned above), and agree with the findings and plan except as documented in my note.   68 Y/O M HTN, DLD, DM, CAD, S/P CABG, AFIB ON ELIQUIS, I personally evaluated the patient. I reviewed the Resident’s or Physician Assistant’s note (as assigned above), and agree with the findings and plan except as documented in my note.   70 Y/O M HTN, DLD, DM, CAD, S/P CABG, AFIB ON ELIQUIS, S/P TURBT 2 WEEKS AGO WITH DR. ENRIQUEZ (PATH WITH UROTHELIAL CARCINOMA-INVASIVE), C/O ABD PAIN SINCE THE PROCEDURE WHICH HAS BEEN PROGRESSIVELY WORSENING, ACUTELY WORSENING X 5 DAYS. + NAUSEA, VOMITING AND DIARRHEA X 3 DAYS. PT REPORTS POOR PO INTAKE X 3 DAYS. DIARRHEA IS WATERY AND NONBLOODY AND IMPROVED SLIGHTLY WITH IMODIUM. PT LAST VOMITED THIS AM. PT REPORTS HE DID NOT DISCONTINUE ELIQUIS FOR THE PROCEDURE. NO FEVER. CHILLS. NO BACK PAIN. NORMAL URINATION. VITALS NOTED. ALERT OX3 MILD DISTRESS DUE TO ABD PAIN. NCAT PERRL. EOMI. OP NORMAL. MMM. NECK SUPPLE. LUNGS CLEAR B/L. S/P STERNOTOMY. RRR. S1S2. ABD- SOFT + DIFFUSE TENDERNESS. NO REBOUND OR GUARDING. BACK NONTENDER. NO CVAT B/L. NO LEG EDEMA. CN 2-12 INTACT. NEURO EXAM NONFOCAL.

## 2020-09-11 NOTE — ED PROVIDER NOTE - PROGRESS NOTE DETAILS
Urology WILMER aguilar PT SIGNED OUT TO DR. CARMONA, FOLLOW UP U/A, REASSESS AND DISPO. ELISA Berrios aware

## 2020-09-11 NOTE — ED PROVIDER NOTE - NS ED ROS FT
Review of Systems    Constitutional: (-) fever, (-) chills  Eyes/ENT: (-) blurry vision, (-) epistaxis, (-) sore throat  Cardiovascular: (-) chest pain, (-) syncope  Respiratory: (-) cough, (-) shortness of breath  Gastrointestinal: (+) pain, (+) nausea, (+) vomiting, (+) diarrhea  Musculoskeletal: (-) neck pain, (-) back pain, (-) body aches  Integumentary: (-) rash, (-) edema  Neurological: (-) headache, (-) altered mental status  Psychiatric: (-) hallucinations  Allergic/Immunologic: (-) pruritus

## 2020-09-12 NOTE — CONSULT NOTE ADULT - ATTENDING COMMENTS
pt seen on 9/14  chronic ulcer right plantar TMA stump  may benefit from a percutaneous achilles tenotomy (in-patient vs outpatient), once infection subsides   OM of amputation stump must first be ruled out-->please obtain right foot xray

## 2020-09-12 NOTE — CONSULT NOTE ADULT - SUBJECTIVE AND OBJECTIVE BOX
Podiatry Consult Note    Subjective:  PRIYANK WESLEY is a pleasant well-nourished, well developed 69y Male in no acute distress, alert awake, and oriented to person, place and time.   Patient is a 69y old  Male who presents with a chief complaint of. Podiatry consulted for ulcer to right foot. Patient denies any other pedal complaints. Patient sees a podiatrist on a regular basis.   HPI:      Past Medical History and Surgical History  PAST MEDICAL & SURGICAL HISTORY:  Afib  rate controlled currently    Hematuria    Bladder tumor    History of diabetic neuropathy    CAD (coronary artery disease) of bypass graft  cabg 1995 3v    Glaucoma    Arthritis    GERD (gastroesophageal reflux disease)    HTN (hypertension)    Depression    DM (diabetes mellitus)    S/P lumbar laminectomy  with repair of CSF leak using dural graft 7/8/2018 - dr Crenshaw    Malfunction of intrathecal infusion pump  implanted initially in 1996  reimplanted 2006  removed 6/27/2018    Foot amputation status, right  partial foot amputation  tarsals and metatarsals of right foot    S/P laparoscopic cholecystectomy    CAD (coronary artery disease) of bypass graft         Review of Systems:   Constitutional: No weakness, fevers or chills  Eyes / ENT: No visual changes; No vertigo or throat pain   Neck: No pain or stiffness  Respiratory: No cough, wheezing, hemoptysis; No shortness of breath  Cardiovascular: No chest pain or palpitations  Gastrointestinal: No abdominal or epigastric pain. No nausea, vomiting, or hematemesis; No diarrhea or constipation. No melena or hematochezia.  Genitourinary: No dysuria, frequency or hematuria  Neurological: No numbness or weakness  Skin:    Objective:  Vital Signs Last 24 Hrs  T(C): 36.6 (12 Sep 2020 12:26), Max: 36.6 (12 Sep 2020 12:26)  T(F): 97.9 (12 Sep 2020 12:26), Max: 97.9 (12 Sep 2020 12:26)  HR: 80 (12 Sep 2020 15:54) (75 - 84)  BP: 195/84 (12 Sep 2020 15:54) (141/64 - 204/84)  BP(mean): --  RR: 18 (12 Sep 2020 15:54) (18 - 18)  SpO2: 95% (11 Sep 2020 16:23) (95% - 95%)                        12.3   19.58 )-----------( 322      ( 12 Sep 2020 05:26 )             39.1                 09-12    139  |  100  |  14  ----------------------------<  140<H>  3.8   |  24  |  1.0    Ca    9.5      12 Sep 2020 05:26  Mg     1.7     09-12    TPro  6.6  /  Alb  3.8  /  TBili  0.4  /  DBili  x   /  AST  14  /  ALT  13  /  AlkPhos  72  09-12        Physical Exam - Lower Extremity Focused:   Derm:   Open Right Plantar Pressure Ulcer   Granular wound base  No maceration, no drainage, no malodor    Vascular: DP and PT Pulses Diminished; Foot is Warm to Warm to the touch   Neuro: Protective Sensation Diminished  MSK: No Pain On Palpation at Wound Site     Assessment:  DFU, Right  s/p TMA, Right    Plan:  Chart reviewed and Patient evaluated. All Questions and Concerns Addressed and Answered  Discussed diagnosis and treatment with patient  Applied Allevyn Pad to wound site  Local Wound Care; As Stated Above; Q24 Dressing changes  Patient is stable per podiatry standpoint  Patient can follow up as outpatient w/ Dr. Sal in clinic upon discharge   Discussed Plan w/ Attending    Podiatry        Podiatry Consult Note    Subjective:  PRIYANK WESLEY is a pleasant well-nourished, well developed 69y Male in no acute distress, alert awake, and oriented to person, place and time.   Patient is a 69y old  Male who presents with a chief complaint of. Podiatry consulted for ulcer to right foot. Patient denies any other pedal complaints. Patient sees a podiatrist on a regular basis.   HPI:      Past Medical History and Surgical History  PAST MEDICAL & SURGICAL HISTORY:  Afib  rate controlled currently    Hematuria    Bladder tumor    History of diabetic neuropathy    CAD (coronary artery disease) of bypass graft  cabg 1995 3v    Glaucoma    Arthritis    GERD (gastroesophageal reflux disease)    HTN (hypertension)    Depression    DM (diabetes mellitus)    S/P lumbar laminectomy  with repair of CSF leak using dural graft 7/8/2018 - dr Crenshaw    Malfunction of intrathecal infusion pump  implanted initially in 1996  reimplanted 2006  removed 6/27/2018    Foot amputation status, right  partial foot amputation  tarsals and metatarsals of right foot    S/P laparoscopic cholecystectomy    CAD (coronary artery disease) of bypass graft         Review of Systems:   Constitutional: No weakness, fevers or chills  Eyes / ENT: No visual changes; No vertigo or throat pain   Neck: No pain or stiffness  Respiratory: No cough, wheezing, hemoptysis; No shortness of breath  Cardiovascular: No chest pain or palpitations  Gastrointestinal: No abdominal or epigastric pain. No nausea, vomiting, or hematemesis; No diarrhea or constipation. No melena or hematochezia.  Genitourinary: No dysuria, frequency or hematuria  Neurological: No numbness or weakness  Skin:    Objective:  Vital Signs Last 24 Hrs  T(C): 36.6 (12 Sep 2020 12:26), Max: 36.6 (12 Sep 2020 12:26)  T(F): 97.9 (12 Sep 2020 12:26), Max: 97.9 (12 Sep 2020 12:26)  HR: 80 (12 Sep 2020 15:54) (75 - 84)  BP: 195/84 (12 Sep 2020 15:54) (141/64 - 204/84)  BP(mean): --  RR: 18 (12 Sep 2020 15:54) (18 - 18)  SpO2: 95% (11 Sep 2020 16:23) (95% - 95%)                        12.3   19.58 )-----------( 322      ( 12 Sep 2020 05:26 )             39.1                 09-12    139  |  100  |  14  ----------------------------<  140<H>  3.8   |  24  |  1.0    Ca    9.5      12 Sep 2020 05:26  Mg     1.7     09-12    TPro  6.6  /  Alb  3.8  /  TBili  0.4  /  DBili  x   /  AST  14  /  ALT  13  /  AlkPhos  72  09-12        Physical Exam - Lower Extremity Focused:   Derm:   Open Right Plantar Pressure Ulcer   Granular wound base  No maceration, no drainage, no malodor    Vascular: DP and PT Pulses Diminished; Foot is Warm to Warm to the touch   Neuro: Protective Sensation Diminished  MSK: No Pain On Palpation at Wound Site     Assessment:  DFU, Right  s/p TMA, Right    Plan:  Chart reviewed and Patient evaluated. All Questions and Concerns Addressed and Answered  Discussed diagnosis and treatment with patient  Applied Allevyn Pad to wound site  Local Wound Care; As Stated Above; Q24 Dressing changes  Patient can follow up as outpatient w/ Dr. Sal in clinic upon discharge     Podiatry

## 2020-09-12 NOTE — PROGRESS NOTE ADULT - SUBJECTIVE AND OBJECTIVE BOX
PRIYANK WESLEY 69y Male  MRN#: [id number]   Hospital Day: 1d    SUBJECTIVE  Patient is a 69y old Male who presents with a chief complaint of Currently admitted to medicine with the primary diagnosis of Abdominal pain      INTERVAL HPI AND OVERNIGHT EVENTS:  Patient was examined and seen at bedside. This morning he is resting comfortably in bed and reports no issues or overnight events.    REVIEW OF SYMPTOMS:  CONSTITUTIONAL: No weakness, fevers or chills; No headaches  EYES: No visual changes, eye pain, or discharge  ENT: No vertigo; No ear pain or change in hearing; No sore throat or difficulty swallowing  NECK: No pain or stiffness  RESPIRATORY: No cough, wheezing, or hemoptysis; No shortness of breath  CARDIOVASCULAR: No chest pain or palpitations  GASTROINTESTINAL:  abdominal  pain but no epigastric pain; No nausea, vomiting, or hematemesis; No melena or hematochezia, diarrhea ongoing   GENITOURINARY: No dysuria, frequency or hematuria  MUSCULOSKELETAL: No joint pain, no muscle pain, no weakness  NEUROLOGICAL: No numbness or weakness  SKIN: No itching or rashes    OBJECTIVE  PAST MEDICAL & SURGICAL HISTORY  Afib  rate controlled currently    Hematuria    Bladder tumor    History of diabetic neuropathy    CAD (coronary artery disease) of bypass graft  cabg 1995 3v    Glaucoma    Arthritis    GERD (gastroesophageal reflux disease)    HTN (hypertension)    Depression    DM (diabetes mellitus)    S/P lumbar laminectomy  with repair of CSF leak using dural graft 7/8/2018 - dr Crenshaw    Malfunction of intrathecal infusion pump  implanted initially in 1996  reimplanted 2006  removed 6/27/2018    Foot amputation status, right  partial foot amputation  tarsals and metatarsals of right foot    S/P laparoscopic cholecystectomy    CAD (coronary artery disease) of bypass graft      ALLERGIES:  No Known Allergies    MEDICATIONS:  STANDING MEDICATIONS  famotidine  IVPB 20 milliGRAM(s) IV Intermittent daily  influenza   Vaccine 0.5 milliLiter(s) IntraMuscular once  sodium chloride 0.9%. 1000 milliLiter(s) IV Continuous <Continuous>    PRN MEDICATIONS      VITAL SIGNS: Last 24 Hours  T(C): 36.2 (11 Sep 2020 16:23), Max: 36.2 (11 Sep 2020 16:23)  T(F): 97.1 (11 Sep 2020 16:23), Max: 97.1 (11 Sep 2020 16:23)  HR: 84 (11 Sep 2020 16:23) (84 - 84)  BP: 141/64 (11 Sep 2020 16:23) (141/64 - 141/64)  BP(mean): --  RR: 18 (11 Sep 2020 16:23) (18 - 18)  SpO2: 95% (11 Sep 2020 16:23) (95% - 95%)    LABS:                        12.3   19.58 )-----------( 322      ( 12 Sep 2020 05:26 )             39.1     09-12    139  |  100  |  14  ----------------------------<  140<H>  3.8   |  24  |  1.0    Ca    9.5      12 Sep 2020 05:26  Mg     1.7     09-12    TPro  6.6  /  Alb  3.8  /  TBili  0.4  /  DBili  x   /  AST  14  /  ALT  13  /  AlkPhos  72  09-12    PT/INR - ( 12 Sep 2020 05:26 )   PT: 14.00 sec;   INR: 1.22 ratio         PTT - ( 12 Sep 2020 05:26 )  PTT:28.0 sec  Urinalysis Basic - ( 11 Sep 2020 17:00 )    Color: Yellow / Appearance: Clear / SG: >1.050 / pH: x  Gluc: x / Ketone: Small  / Bili: Negative / Urobili: <2 mg/dL   Blood: x / Protein: 30 mg/dL / Nitrite: Negative   Leuk Esterase: Large / RBC: 2 /HPF /  /HPF   Sq Epi: x / Non Sq Epi: 0 /HPF / Bacteria: Many            CARDIAC MARKERS ( 11 Sep 2020 08:58 )  x     / <0.01 ng/mL / x     / x     / x          RADIOLOGY:  CT Abdomen and Pelvis w/ Oral Cont and w/ IV Cont (09.11.20 @ 14:59)  IMPRESSION:  No acute process seen.  Low Density lesion right lobe liver further evaluation ultrasound suggested.        PHYSICAL EXAM:  CONSTITUTIONAL: No acute distress, well-developed, well-groomed, AAOx3  HEAD: Atraumatic, normocephalic  EYES: EOM intact, PERRLA, conjunctiva and sclera clear  ENT: Supple, no masses, no thyromegaly, no bruits, no JVD; moist mucous membranes  PULMONARY: Clear to auscultation bilaterally; no wheezes, rales, or rhonchi  CARDIOVASCULAR: Regular rate and rhythm; no murmurs, rubs, or gallops  GASTROINTESTINAL: Soft, bowel sounds present, suprapubic tenderness   MUSCULOSKELETAL: 2+ peripheral pulses; no clubbing, no cyanosis, no edema  NEUROLOGY: non-focal  SKIN: Right chronic foot lesion     ASSESSMENT & PLAN  #70 YO M with a PMH of bladder ca, DM, A-fib (Eliquis), and CABG who presents to the hospital with a c/o lower ABD pain for the past x 2 weeks. Associated with N/V/D that is non-black/bloody. Of note, the pt recently had TURBT performed by Dr. Jo and completed ABXs course. In the ED, seen  by urology, no acute surgical intervention, wants ID consult and C. diff rule out. CT-AP w/ IV contrast was negative. Cultures sent and pt started on IV ABX (Cefepime).       #Abdominal Pain w/ NVD, s/p TURBT for bladder Ca:   - supra-pubic abdominal pain s/p TURBT and finished recent course of ABx   - Urology: No acute urological intervention at this time, F/U OP with Dr. Jo with 1 week   - C. Diff sample pending  - ID consult pending   - Mild HAGMA from lactic acidosis, gap now 15, repeat lactate pending     #Diarrhea  - hx of abx use for TURBT  - C. Diff sample pending     #Diabetes mellitus with hyperglycemia  - Pending A1C  - insulin PRN  - FSG AC qHS     #Afib  - currently rate-controlled  - hold elequis for now due to hematuria on presentation   - hemoglobin stable 12.3   - Hold AC for now, on elequis at home     #Right Chronic DFU  - podiatry consulted       #Liver Lesion   - found on CT Ab  - US liver ordered            PRIYANK WESLEY 69y Male  MRN#: [id number]   Hospital Day: 1d    SUBJECTIVE  Patient is a 69y old Male who presents with a chief complaint of Currently admitted to medicine with the primary diagnosis of Abdominal pain      INTERVAL HPI AND OVERNIGHT EVENTS:  Patient was examined and seen at bedside. This morning he is resting comfortably in bed and reports no issues or overnight events.    REVIEW OF SYMPTOMS:  CONSTITUTIONAL: No weakness, fevers or chills; No headaches  EYES: No visual changes, eye pain, or discharge  ENT: No vertigo; No ear pain or change in hearing; No sore throat or difficulty swallowing  NECK: No pain or stiffness  RESPIRATORY: No cough, wheezing, or hemoptysis; No shortness of breath  CARDIOVASCULAR: No chest pain or palpitations  GASTROINTESTINAL:  abdominal  pain but no epigastric pain; No nausea, vomiting, or hematemesis; No melena or hematochezia, diarrhea ongoing   GENITOURINARY: No dysuria, frequency or hematuria  MUSCULOSKELETAL: No joint pain, no muscle pain, no weakness  NEUROLOGICAL: No numbness or weakness  SKIN: No itching or rashes    OBJECTIVE  PAST MEDICAL & SURGICAL HISTORY  Afib  rate controlled currently    Hematuria    Bladder tumor    History of diabetic neuropathy    CAD (coronary artery disease) of bypass graft  cabg 1995 3v    Glaucoma    Arthritis    GERD (gastroesophageal reflux disease)    HTN (hypertension)    Depression    DM (diabetes mellitus)    S/P lumbar laminectomy  with repair of CSF leak using dural graft 7/8/2018 - dr Crenshaw    Malfunction of intrathecal infusion pump  implanted initially in 1996  reimplanted 2006  removed 6/27/2018    Foot amputation status, right  partial foot amputation  tarsals and metatarsals of right foot    S/P laparoscopic cholecystectomy    CAD (coronary artery disease) of bypass graft      ALLERGIES:  No Known Allergies    MEDICATIONS:  STANDING MEDICATIONS  famotidine  IVPB 20 milliGRAM(s) IV Intermittent daily  influenza   Vaccine 0.5 milliLiter(s) IntraMuscular once  sodium chloride 0.9%. 1000 milliLiter(s) IV Continuous <Continuous>    PRN MEDICATIONS      VITAL SIGNS: Last 24 Hours  T(C): 36.2 (11 Sep 2020 16:23), Max: 36.2 (11 Sep 2020 16:23)  T(F): 97.1 (11 Sep 2020 16:23), Max: 97.1 (11 Sep 2020 16:23)  HR: 84 (11 Sep 2020 16:23) (84 - 84)  BP: 141/64 (11 Sep 2020 16:23) (141/64 - 141/64)  BP(mean): --  RR: 18 (11 Sep 2020 16:23) (18 - 18)  SpO2: 95% (11 Sep 2020 16:23) (95% - 95%)    LABS:                        12.3   19.58 )-----------( 322      ( 12 Sep 2020 05:26 )             39.1     09-12    139  |  100  |  14  ----------------------------<  140<H>  3.8   |  24  |  1.0    Ca    9.5      12 Sep 2020 05:26  Mg     1.7     09-12    TPro  6.6  /  Alb  3.8  /  TBili  0.4  /  DBili  x   /  AST  14  /  ALT  13  /  AlkPhos  72  09-12    PT/INR - ( 12 Sep 2020 05:26 )   PT: 14.00 sec;   INR: 1.22 ratio         PTT - ( 12 Sep 2020 05:26 )  PTT:28.0 sec  Urinalysis Basic - ( 11 Sep 2020 17:00 )    Color: Yellow / Appearance: Clear / SG: >1.050 / pH: x  Gluc: x / Ketone: Small  / Bili: Negative / Urobili: <2 mg/dL   Blood: x / Protein: 30 mg/dL / Nitrite: Negative   Leuk Esterase: Large / RBC: 2 /HPF /  /HPF   Sq Epi: x / Non Sq Epi: 0 /HPF / Bacteria: Many            CARDIAC MARKERS ( 11 Sep 2020 08:58 )  x     / <0.01 ng/mL / x     / x     / x          RADIOLOGY:  CT Abdomen and Pelvis w/ Oral Cont and w/ IV Cont (09.11.20 @ 14:59)  IMPRESSION:  No acute process seen.  Low Density lesion right lobe liver further evaluation ultrasound suggested.        PHYSICAL EXAM:  CONSTITUTIONAL: No acute distress, well-developed, well-groomed, AAOx3  HEAD: Atraumatic, normocephalic  EYES: EOM intact, PERRLA, conjunctiva and sclera clear  ENT: Supple, no masses, no thyromegaly, no bruits, no JVD; moist mucous membranes  PULMONARY: Clear to auscultation bilaterally; no wheezes, rales, or rhonchi  CARDIOVASCULAR: Regular rate and rhythm; no murmurs, rubs, or gallops  GASTROINTESTINAL: Soft, bowel sounds present, suprapubic tenderness   MUSCULOSKELETAL: 2+ peripheral pulses; no clubbing, no cyanosis, no edema  NEUROLOGY: non-focal  SKIN: Right chronic foot lesion     ASSESSMENT & PLAN  #70 YO M with a PMH of bladder ca, DM, A-fib (Eliquis), and CABG who presents to the hospital with a c/o lower ABD pain for the past x 2 weeks. Associated with N/V/D that is non-black/bloody. Of note, the pt recently had TURBT performed by Dr. Jo and completed ABXs course. In the ED, seen  by urology, no acute surgical intervention, wants ID consult and C. diff rule out. CT-AP w/ IV contrast was negative. Cultures sent and pt started on IV ABX (Cefepime).       #Abdominal Pain w/ NVD, s/p TURBT for bladder Ca:   - supra-pubic abdominal pain s/p TURBT and finished recent course of ABx   - Urology: No acute urological intervention at this time, F/U OP with Dr. Jo with 1 week   - C. Diff sample pending  - ID consult pending   - Mild HAGMA from lactic acidosis, gap now 15, repeat lactate pending   - UA positive for  LE, blood, bacteria, and WBC     #Diarrhea  - hx of abx use for TURBT  - C. Diff sample pending     #Diabetes mellitus with hyperglycemia  - Pending A1C  - insulin PRN  - FSG AC qHS     #Afib  - currently rate-controlled  - hold elequis for now due to hematuria on presentation   - hemoglobin stable 12.3   - Hold AC for now, on elequis at home     #Right Chronic DFU  - podiatry consulted       #Liver Lesion   - found on CT Ab  - US liver ordered - per son he had some sort of scan in the past showing a lesion in the liver > 20 years ago but was told it was benign at the time           PRIYANK WESLEY 69y Male  MRN#: [id number]   Hospital Day: 1d    SUBJECTIVE  Patient is a 69y old Male who presents with a chief complaint of Currently admitted to medicine with the primary diagnosis of Abdominal pain      INTERVAL HPI AND OVERNIGHT EVENTS:  Patient was examined and seen at bedside. This morning he is resting comfortably in bed and reports no issues or overnight events.    REVIEW OF SYMPTOMS:  CONSTITUTIONAL: No weakness, fevers or chills; No headaches  EYES: No visual changes, eye pain, or discharge  ENT: No vertigo; No ear pain or change in hearing; No sore throat or difficulty swallowing  NECK: No pain or stiffness  RESPIRATORY: No cough, wheezing, or hemoptysis; No shortness of breath  CARDIOVASCULAR: No chest pain or palpitations  GASTROINTESTINAL:  abdominal  pain but no epigastric pain; No nausea, vomiting, or hematemesis; No melena or hematochezia, diarrhea ongoing   GENITOURINARY: No dysuria, frequency or hematuria  MUSCULOSKELETAL: No joint pain, no muscle pain, no weakness  NEUROLOGICAL: No numbness or weakness  SKIN: No itching or rashes    OBJECTIVE  PAST MEDICAL & SURGICAL HISTORY  Afib  rate controlled currently    Hematuria    Bladder tumor    History of diabetic neuropathy    CAD (coronary artery disease) of bypass graft  cabg 1995 3v    Glaucoma    Arthritis    GERD (gastroesophageal reflux disease)    HTN (hypertension)    Depression    DM (diabetes mellitus)    S/P lumbar laminectomy  with repair of CSF leak using dural graft 7/8/2018 - dr Crenshaw    Malfunction of intrathecal infusion pump  implanted initially in 1996  reimplanted 2006  removed 6/27/2018    Foot amputation status, right  partial foot amputation  tarsals and metatarsals of right foot    S/P laparoscopic cholecystectomy    CAD (coronary artery disease) of bypass graft      ALLERGIES:  No Known Allergies    MEDICATIONS:  STANDING MEDICATIONS  famotidine  IVPB 20 milliGRAM(s) IV Intermittent daily  influenza   Vaccine 0.5 milliLiter(s) IntraMuscular once  sodium chloride 0.9%. 1000 milliLiter(s) IV Continuous <Continuous>    PRN MEDICATIONS      VITAL SIGNS: Last 24 Hours  T(C): 36.2 (11 Sep 2020 16:23), Max: 36.2 (11 Sep 2020 16:23)  T(F): 97.1 (11 Sep 2020 16:23), Max: 97.1 (11 Sep 2020 16:23)  HR: 84 (11 Sep 2020 16:23) (84 - 84)  BP: 141/64 (11 Sep 2020 16:23) (141/64 - 141/64)  BP(mean): --  RR: 18 (11 Sep 2020 16:23) (18 - 18)  SpO2: 95% (11 Sep 2020 16:23) (95% - 95%)    LABS:                        12.3   19.58 )-----------( 322      ( 12 Sep 2020 05:26 )             39.1     09-12    139  |  100  |  14  ----------------------------<  140<H>  3.8   |  24  |  1.0    Ca    9.5      12 Sep 2020 05:26  Mg     1.7     09-12    TPro  6.6  /  Alb  3.8  /  TBili  0.4  /  DBili  x   /  AST  14  /  ALT  13  /  AlkPhos  72  09-12    PT/INR - ( 12 Sep 2020 05:26 )   PT: 14.00 sec;   INR: 1.22 ratio         PTT - ( 12 Sep 2020 05:26 )  PTT:28.0 sec  Urinalysis Basic - ( 11 Sep 2020 17:00 )    Color: Yellow / Appearance: Clear / SG: >1.050 / pH: x  Gluc: x / Ketone: Small  / Bili: Negative / Urobili: <2 mg/dL   Blood: x / Protein: 30 mg/dL / Nitrite: Negative   Leuk Esterase: Large / RBC: 2 /HPF /  /HPF   Sq Epi: x / Non Sq Epi: 0 /HPF / Bacteria: Many            CARDIAC MARKERS ( 11 Sep 2020 08:58 )  x     / <0.01 ng/mL / x     / x     / x          RADIOLOGY:  CT Abdomen and Pelvis w/ Oral Cont and w/ IV Cont (09.11.20 @ 14:59)  IMPRESSION:  No acute process seen.  Low Density lesion right lobe liver further evaluation ultrasound suggested.        PHYSICAL EXAM:  CONSTITUTIONAL: No acute distress, well-developed, well-groomed, AAOx3  HEAD: Atraumatic, normocephalic  EYES: EOM intact, PERRLA, conjunctiva and sclera clear  ENT: Supple, no masses, no thyromegaly, no bruits, no JVD; moist mucous membranes  PULMONARY: Clear to auscultation bilaterally; no wheezes, rales, or rhonchi  CARDIOVASCULAR: Regular rate and rhythm; no murmurs, rubs, or gallops  GASTROINTESTINAL: Soft, bowel sounds present, suprapubic tenderness   MUSCULOSKELETAL: 2+ peripheral pulses; no clubbing, no cyanosis, no edema  NEUROLOGY: non-focal  SKIN: Right chronic foot lesion     ASSESSMENT & PLAN  #70 YO M with a PMH of bladder ca, DM, A-fib (Eliquis), and CABG who presents to the hospital with a c/o lower ABD pain for the past x 2 weeks. Associated with N/V/D that is non-black/bloody. Of note, the pt recently had TURBT performed by Dr. Jo and completed ABXs course. In the ED, seen  by urology, no acute surgical intervention, wants ID consult and C. diff rule out. CT-AP w/ IV contrast was negative. Cultures sent and pt started on IV ABX (Cefepime).       #Abdominal Pain w/ NVD, s/p TURBT for bladder Ca:   - supra-pubic abdominal pain s/p TURBT and finished recent course of ABx   - Urology: No acute urological intervention at this time, F/U OP with Dr. Jo with 1 week   - C. Diff sample pending  - ID consult pending: cefepime 1g q 24 and Vanco for now   - Mild HAGMA from lactic acidosis, gap now 15, repeat lactate pending   - UA positive for  LE, blood, bacteria, and WBC       #Diarrhea  - hx of abx use for TURBT  - C. Diff sample pending     #Diabetes mellitus with hyperglycemia  - Pending A1C  - insulin PRN  - FSG AC qHS     #Afib  - currently rate-controlled  - hold elequis for now due to hematuria on presentation   - hemoglobin stable 12.3   - elequis restartes    #Right Chronic DFU  - podiatry consulted     #hypomagnesemia  - 1.7, given 2 g IVPB  - will recheck in AM     #Liver Lesion   - found on CT Ab  - US liver ordered - per son he had some sort of scan in the past showing a lesion in the liver > 20 years ago but was told it was benign at the time

## 2020-09-13 NOTE — PROGRESS NOTE ADULT - ASSESSMENT
70 YO M with a PMH of bladder ca, DM, A-fib (Eliquis), and CABG who presents to the hospital with a c/o lower ABD pain for the past x 2 weeks. Associated with N/V/D that is non-black/bloody. Of note, the pt recently had TURBT performed by Dr. Jo and completed ABXs course. In the ED, seen  by urology, no acute surgical intervention. CT-AP w/ IV contrast was negative. Cultures sent and pt started on IV ABX (Cefepime).     #suprapubic Pain w/ NVD, a few days after TURBT for bladder Ca: likely d/t   #Cystitis:  initially started Empiric cefepime 1g q 24 and Vanco for now (given h/o instrumentation). f/u cultures.  - Urology: No acute urological intervention at this time, F/U OP with Dr. oJ with 1 week     #C Diff colitis   - hx of abx use for TURBT  PO Vanco 125 q6  d/c vanc/cefepime.     #Diabetes mellitus with hyperglycemia  - Pending A1C  - insulin PRN  - FSG AC qHS     #Afib  - currently rate-controlled  - questionable reports of earlier hematuria on presentation. Patient denies. Eliquis was held for this reason earlier.  - hemoglobin stable 12.3. No current hematuria.  - eliquis restarted    #Right Chronic DFU/ s/p Rt TMA  daily dressing changes per podiatry.   outpatient podiatry f/u per usual     #Hypomagnesemia  replete prn    #Liver Lesion   - found on CT Ab  - US liver ordered - per son he had some sort of scan in the past showing a lesion in the liver > 20 years ago but was told it was benign at the time    Dispo: Active   DVT PPx: Eliquis .

## 2020-09-13 NOTE — PROGRESS NOTE ADULT - SUBJECTIVE AND OBJECTIVE BOX
S: No new events/complaints  Abdo pain, diarrhea+  no nausea      All other pertinent ROS negative.      09-12-20 @ 07:01  -  09-13-20 @ 07:00  --------------------------------------------------------  IN: 285 mL / OUT: 0 mL / NET: 285 mL    09-13-20 @ 07:01  -  09-13-20 @ 19:07  --------------------------------------------------------  IN: 600 mL / OUT: 700 mL / NET: -100 mL      Vital Signs Last 24 Hrs  T(C): 36.6 (13 Sep 2020 12:30), Max: 36.6 (13 Sep 2020 05:01)  T(F): 97.9 (13 Sep 2020 12:30), Max: 97.9 (13 Sep 2020 05:01)  HR: 72 (13 Sep 2020 12:30) (72 - 80)  BP: 150/67 (13 Sep 2020 12:30) (150/67 - 189/76)  BP(mean): --  RR: 18 (13 Sep 2020 12:30) (18 - 18)  SpO2: 98% (13 Sep 2020 07:40) (98% - 98%)  PHYSICAL EXAM:    Constitutional: NAD, awake and alert, well-developed  HEENT: PERR, EOMI, Normal Hearing, MMM  Neck: Soft and supple, No LAD, No JVD  Respiratory: Breath sounds are clear bilaterally, No wheezing, rales or rhonchi  Cardiovascular: S1 and S2, regular rate and rhythm, no Murmurs, gallops or rubs  Gastrointestinal: Bowel Sounds present, soft, nontender, nondistended, no guarding, no rebound  Extremities: No peripheral edema  foot dressing seen.       MEDICATIONS:  MEDICATIONS  (STANDING):  apixaban 5 milliGRAM(s) Oral every 12 hours  atorvastatin 10 milliGRAM(s) Oral at bedtime  buPROPion XL . 300 milliGRAM(s) Oral daily  cholecalciferol 5000 Unit(s) Oral daily  famotidine    Tablet 20 milliGRAM(s) Oral daily  ferrous    sulfate 325 milliGRAM(s) Oral daily  influenza   Vaccine 0.5 milliLiter(s) IntraMuscular once  lisinopril 20 milliGRAM(s) Oral daily  melatonin 5 milliGRAM(s) Oral at bedtime  metoprolol succinate ER 25 milliGRAM(s) Oral daily  mirtazapine 7.5 milliGRAM(s) Oral at bedtime  NIFEdipine XL 60 milliGRAM(s) Oral daily  rOPINIRole 0.5 milliGRAM(s) Oral at bedtime  senna 2 Tablet(s) Oral at bedtime  sertraline 150 milliGRAM(s) Oral daily  sodium chloride 0.9%. 1000 milliLiter(s) (75 mL/Hr) IV Continuous <Continuous>  vancomycin    Solution 125 milliGRAM(s) Oral every 6 hours      LABS: All Labs Reviewed:                        12.1   19.49 )-----------( 367      ( 13 Sep 2020 09:17 )             38.6     09-13    141  |  104  |  10  ----------------------------<  133<H>  3.7   |  23  |  0.9    Ca    9.8      13 Sep 2020 09:17  Mg     2.0     09-13    TPro  6.6  /  Alb  3.8  /  TBili  0.8  /  DBili  x   /  AST  17  /  ALT  13  /  AlkPhos  66  09-13    PT/INR - ( 12 Sep 2020 05:26 )   PT: 14.00 sec;   INR: 1.22 ratio         PTT - ( 12 Sep 2020 05:26 )  PTT:28.0 sec      Blood Culture: 09-12 @ 05:26  Organism --  Gram Stain Blood -- Gram Stain --  Specimen Source .Blood None  Culture-Blood --    09-11 @ 17:00  Organism --  Gram Stain Blood -- Gram Stain --  Specimen Source .Urine Clean Catch (Midstream)  Culture-Blood --    09-11 @ 10:27  Organism Blood Culture PCR  Gram Stain Blood -- Gram Stain   Growth in anaerobic bottle: Gram Positive Cocci in Clusters  Specimen Source .Blood Blood  Culture-Blood --        Radiology: reviewed

## 2020-09-13 NOTE — CONSULT NOTE ADULT - ASSESSMENT
ASSESSMENT  68 YO M with a PMH of bladder ca, DM, A-fib (Eliquis), and CABG who presents to the hospital with a c/o lower ABD pain for the past x 2 weeks, recent TURBT and antibiotics. Found to have Cdiff      IMPRESSION  #Cdiff diarrhea  #9/11 BCX 1/4 bottles coNS, contaminant   #Pyuria but asymptomatic  #Obesity BMI (kg/m2): 32.1  #DM     RECOMMENDATIONS  - PO vancomycin 125mg q6h   - D/C cefepime/vanc in the setting of cdiff  - coNS in bcx is a contaminant, can repeat BCX    If any questions, please call or send a message on [employer] Teams  Spectra [phone]

## 2020-09-13 NOTE — CONSULT NOTE ADULT - ASSESSMENT
ASSESSMENT  70 YO M with a PMH of bladder ca, DM, A-fib (Eliquis), and CABG who presents to the hospital with a c/o lower ABD pain for the past x 2 weeks, recent TURBT and antibiotics. Found to have Cdiff      IMPRESSION  #  Cdiff diarrhea  #9/11 BCX 1/4 bottles coNS, contaminant   #UA Blood: x / Protein: 30 mg/dL / Nitrite: Negative Leuk Esterase: Large / RBC: 2 /HPF /  /HPF Sq Epi: x / Non Sq Epi: 0 /HPF / Bacteria: Many  #Obesity BMI (kg/m2): 32.1  #DM     RECOMMENDATIONS  This is an incomplete consult note. All recommendations to follow after interview and examination of the patient.     If any questions, please call or send a message on [employer] Teams  Spectra [phone]

## 2020-09-13 NOTE — CONSULT NOTE ADULT - SUBJECTIVE AND OBJECTIVE BOX
PRIYANK WESLEY  69y, Male  Allergy: No Known Allergies      CHIEF COMPLAINT:     LOS  2d    HPI:  68 YO M with a PMH of bladder ca, DM, A-fib (Eliquis), and CABG who presents to the hospital with a c/o lower ABD pain for the past x 2 weeks. Associated with N/V/D that is non-black/bloody. Of note, the pt recently had TURBT performed by Dr. Jo and completed ABXs course. In the ED, seen  by urology, no acute surgical intervention. CT-AP w/ IV contrast was negative. Cultures sent and pt started on IV ABX (Cefepime).     PAST MEDICAL & SURGICAL HISTORY:  Afib  rate controlled currently    Hematuria    Bladder tumor    History of diabetic neuropathy    CAD (coronary artery disease) of bypass graft  cabg 1995 3v    Glaucoma    Arthritis    GERD (gastroesophageal reflux disease)    HTN (hypertension)    Depression    DM (diabetes mellitus)    S/P lumbar laminectomy  with repair of CSF leak using dural graft 7/8/2018 - dr Crenshaw    Malfunction of intrathecal infusion pump  implanted initially in 1996  reimplanted 2006  removed 6/27/2018    Foot amputation status, right  partial foot amputation  tarsals and metatarsals of right foot    S/P laparoscopic cholecystectomy    CAD (coronary artery disease) of bypass graft        FAMILY HISTORY  CAD (coronary artery disease) (Father)        SOCIAL HISTORY  Social History:  former smoker  no alcohol use (10 Aug 2018 16:09)        ROS  ***    VITALS:  T(F): 97.9, Max: 97.9 (09-12-20 @ 12:26)  HR: 73  BP: 189/76  RR: 18Vital Signs Last 24 Hrs  T(C): 36.6 (13 Sep 2020 05:01), Max: 36.6 (12 Sep 2020 12:26)  T(F): 97.9 (13 Sep 2020 05:01), Max: 97.9 (12 Sep 2020 12:26)  HR: 73 (13 Sep 2020 05:01) (73 - 82)  BP: 189/76 (13 Sep 2020 05:01) (155/68 - 204/84)  BP(mean): --  RR: 18 (13 Sep 2020 05:01) (18 - 18)  SpO2: 98% (13 Sep 2020 07:40) (98% - 98%)    PHYSICAL EXAM:  ***    TESTS & MEASUREMENTS:                        12.3   19.58 )-----------( 322      ( 12 Sep 2020 05:26 )             39.1     09-12    139  |  100  |  14  ----------------------------<  140<H>  3.8   |  24  |  1.0    Ca    9.5      12 Sep 2020 05:26  Mg     1.7     09-12    TPro  6.6  /  Alb  3.8  /  TBili  0.4  /  DBili  x   /  AST  14  /  ALT  13  /  AlkPhos  72  09-12      LIVER FUNCTIONS - ( 12 Sep 2020 05:26 )  Alb: 3.8 g/dL / Pro: 6.6 g/dL / ALK PHOS: 72 U/L / ALT: 13 U/L / AST: 14 U/L / GGT: x           Urinalysis Basic - ( 11 Sep 2020 17:00 )    Color: Yellow / Appearance: Clear / SG: >1.050 / pH: x  Gluc: x / Ketone: Small  / Bili: Negative / Urobili: <2 mg/dL   Blood: x / Protein: 30 mg/dL / Nitrite: Negative   Leuk Esterase: Large / RBC: 2 /HPF /  /HPF   Sq Epi: x / Non Sq Epi: 0 /HPF / Bacteria: Many        Culture - Blood (collected 09-11-20 @ 10:27)  Source: .Blood Blood  Gram Stain (09-12-20 @ 13:07):    Growth in anaerobic bottle: Gram Positive Cocci in Clusters  Preliminary Report (09-12-20 @ 13:08):    Growth in anaerobic bottle: Gram Positive Cocci in Clusters    "Due to technical problems, Proteus sp. will Not be reported as part of    the BCID panel until further notice" ***Blood Panel PCR results on this    specimen are available    approximately 3 hours after the Gram stain result.***    Gram stain, PCR, and/or culture results may not always    correspond due to difference in methodologies.    ************************************************************    This PCR assay was performed using [employer].    The following targets are tested for: Enterococcus,    vancomycin resistant enterococci, Listeria monocytogenes,    coagulase negative staphylococci, S. aureus,    methicillin resistant S. aureus, Streptococcus agalactiae    (Group B), S. pneumoniae, S. pyogenes (Group A),    Acinetobacter baumannii, Enterobacter cloacae, E. coli,    Klebsiella oxytoca, K. pneumoniae, Proteus sp.,    Serratia marcescens, Haemophilus influenzae,    Neisseria meningitidis, Pseudomonas aeruginosa, Candida    albicans, C. glabrata, C krusei, C parapsilosis,    C. tropicalis and the KPC resistance gene.  Organism: Blood Culture PCR (09-12-20 @ 14:28)  Organism: Blood Culture PCR (09-12-20 @ 14:28)      -  Coagulase negative Staphylococcus: Detec      Method Type: PCR    Culture - Blood (collected 09-11-20 @ 10:27)  Source: .Blood Blood  Preliminary Report (09-12-20 @ 18:01):    No growth to date.    Culture - Urine (collected 07-31-20 @ 18:37)  Source: .Urine Clean Catch (Midstream)  Final Report (08-01-20 @ 22:23):    <10,000 CFU/mL Normal Urogenital Lorena        Blood Gas Venous - Lactate: 1.8 mmoL/L (09-11-20 @ 08:38)      INFECTIOUS DISEASES TESTING  Clostridium difficile Toxin by PCR: RESULT INTERPRETATION:    Detected - Clostridium difficile toxin B detected by amplified DNA PCR .    C. difficile PCR test results should be interpreted only with  consideration of the patient's clinical situation and history.  This test  will detect the presence of toxigenic C. difficile.  However it cannot be  used as the sole criteria for the diagnosis of antibiotic associated  diarrhea, antibiotic associated colitis, or pseudomembranous colitis.  Colonization with C. difficile may exceed 20%in hospital patients, the  majority of whom are without Toxigenic Clostridium Difficile disease.  Testing is generally not recommended in children below the age of 1 year,  as up to half of healthy infants are asymptomatically colonized with C.  difficile.  In addition, C. difficile PCR testing cannot be used as a  "test of cure" as dead organism nucleic acids will persist and be  detected after treatment.  Successful treatment of C. difficile disease  is determined by resolution of clinical symptoms. Method: CDPCR  TOXIGENIC CLOST.DIFFICILE POSITIVE;  027-NAP1-B1 PRESUMPTIVE NEGATIVE.  By: Real-Time PCR (Polymerase Chain Reaction)  NOTE: This method detects the Toxin B gene (tCdB), the  binary toxin gene (CDT), and the single-base-pair  deletion at nucleotide 117 within the gene encoding  a negative regulator of toxin production (tcdC 117).  The combined presence of genes encoding Toxin B and  binary toxin and the tcdC 117 deletion has been  associated with hypervirulent C. difficile strain  known as 027/NAP1/B1, which has been associated with  severe disease outbreaks in healthcare facilities  worldwide. (09-12-20 @ 19:38)  COVID-19 PCR: NotDete (09-11-20 @ 17:50)      RADIOLOGY & ADDITIONAL TESTS:  I have personally reviewed the last Chest xray  CXR      CT  CT Abdomen and Pelvis w/ Oral Cont and w/ IV Cont:   EXAM:  CT ABDOMEN AND PELVIS OC IC            PROCEDURE DATE:  09/11/2020            INTERPRETATION:  CLINICAL STATEMENT: Abdominal pain    TECHNIQUE: Contiguous axial CT images were obtained from the lower chest to the pubic symphysis .  Oral contrast was given. 100 cc of Optiray 320 intravenous contrast was given no contrast was discarded Reformatted images in the coronal and sagittal planes were acquired.    COMPARISON CT: 7/15/2014    OTHER STUDIES USED FOR CORRELATION: None.    There is a 2 cm low density area in the right lobe of the liver contiguous to the gallbladder bed.Presumably representing focal fatty infiltration. Further evaluation with ultrasound suggested.  The spleen pancreas kidneys and adrenal glands appear normal.  There is no ascites.  There is no mesenteric retroperitoneal or pelvic lymphadenopathy.  The bowel pattern appears normal. There is no free air.  The bony structures appear intact.    IMPRESSION:  No acute process seen.  Low Density lesion right lobe liver further evaluation ultrasound suggested.                GRAYSON ZAMORANO M.D., ATTENDING RADIOLOGIST  This document has been electronically signed. Sep 11 2020  4:17PM             (09-11-20 @ 14:59)      CARDIOLOGY TESTING      MEDICATIONS  apixaban 5 Oral every 12 hours  atorvastatin 10 Oral at bedtime  buPROPion XL . 300 Oral daily  cefepime   IVPB 1000 IV Intermittent every 12 hours  cholecalciferol 5000 Oral daily  famotidine  IVPB 20 IV Intermittent daily  ferrous    sulfate 325 Oral daily  influenza   Vaccine 0.5 IntraMuscular once  lisinopril 20 Oral daily  metoprolol succinate ER 25 Oral daily  mirtazapine 7.5 Oral at bedtime  NIFEdipine XL 60 Oral daily  rOPINIRole 0.5 Oral at bedtime  senna 2 Oral at bedtime  sertraline 150 Oral daily  sodium chloride 0.9%. 1000 IV Continuous <Continuous>  vancomycin  IVPB 1250 IV Intermittent every 12 hours      Weight  Weight (kg): 101.6 (08-27-20 @ 09:52)    ANTIBIOTICS:  cefepime   IVPB 1000 milliGRAM(s) IV Intermittent every 12 hours  vancomycin  IVPB 1250 milliGRAM(s) IV Intermittent every 12 hours      ALLERGIES:  No Known Allergies

## 2020-09-13 NOTE — CONSULT NOTE ADULT - SUBJECTIVE AND OBJECTIVE BOX
PRIYANK NEUMANN  69y, Male  Allergy: No Known Allergies      CHIEF COMPLAINT:     LOS  2d    HPI:  70 YO M with a PMH of bladder ca, DM, A-fib (Eliquis), and CABG who presents to the hospital with a c/o lower ABD pain for the past x 2 weeks. Associated with N/V/D that is non-black/bloody. Of note, the pt recently had TURBT performed by Dr. Jo and completed ABXs course. In the ED, seen  by urology, no acute surgical intervention. CT-AP w/ IV contrast was negative. Cultures sent and pt started on IV ABX (Cefepime).   Denies any urinary symptoms    PAST MEDICAL & SURGICAL HISTORY:  Afib  rate controlled currently    Hematuria    Bladder tumor    History of diabetic neuropathy    CAD (coronary artery disease) of bypass graft  cabg 1995 3v    Glaucoma    Arthritis    GERD (gastroesophageal reflux disease)    HTN (hypertension)    Depression    DM (diabetes mellitus)    S/P lumbar laminectomy  with repair of CSF leak using dural graft 7/8/2018 - dr Crenshaw    Malfunction of intrathecal infusion pump  implanted initially in 1996  reimplanted 2006  removed 6/27/2018    Foot amputation status, right  partial foot amputation  tarsals and metatarsals of right foot    S/P laparoscopic cholecystectomy    CAD (coronary artery disease) of bypass graft        FAMILY HISTORY  CAD (coronary artery disease) (Father)        SOCIAL HISTORY  Social History:  former smoker  no alcohol use (10 Aug 2018 16:09)        ROS  General: Denies rigors, nightsweats  HEENT: Denies headache, rhinorrhea, sore throat, eye pain  CV: Denies CP, palpitations  PULM: Denies wheezing, hemoptysis  GI: as noted above   : Denies discharge, hematuria  MSK: Denies arthralgias, myalgias  SKIN: Denies rash, lesions  NEURO: Denies paresthesias, weakness  PSYCH: Denies depression, anxiety     VITALS:  T(F): 97.9, Max: 97.9 (09-12-20 @ 12:26)  HR: 73  BP: 189/76  RR: 18Vital Signs Last 24 Hrs  T(C): 36.6 (13 Sep 2020 05:01), Max: 36.6 (12 Sep 2020 12:26)  T(F): 97.9 (13 Sep 2020 05:01), Max: 97.9 (12 Sep 2020 12:26)  HR: 73 (13 Sep 2020 05:01) (73 - 82)  BP: 189/76 (13 Sep 2020 05:01) (155/68 - 204/84)  BP(mean): --  RR: 18 (13 Sep 2020 05:01) (18 - 18)  SpO2: 98% (13 Sep 2020 07:40) (98% - 98%)    PHYSICAL EXAM:  Gen: NAD, resting in bed  HEENT: Normocephalic, atraumatic  Neck: supple, no lymphadenopathy  CV: Regular rate & regular rhythm  Lungs: decreased BS at bases, no fremitus  Abdomen: Soft, BS present, Some LLQ tenderness to palpation , no suprapubic tenderness to palpation   Ext: Warm, well perfused  Neuro: non focal, awake  Skin: no rash, no erythema  Lines: no phlebitis     TESTS & MEASUREMENTS:                        12.3   19.58 )-----------( 322      ( 12 Sep 2020 05:26 )             39.1     09-12    139  |  100  |  14  ----------------------------<  140<H>  3.8   |  24  |  1.0    Ca    9.5      12 Sep 2020 05:26  Mg     1.7     09-12    TPro  6.6  /  Alb  3.8  /  TBili  0.4  /  DBili  x   /  AST  14  /  ALT  13  /  AlkPhos  72  09-12      LIVER FUNCTIONS - ( 12 Sep 2020 05:26 )  Alb: 3.8 g/dL / Pro: 6.6 g/dL / ALK PHOS: 72 U/L / ALT: 13 U/L / AST: 14 U/L / GGT: x           Urinalysis Basic - ( 11 Sep 2020 17:00 )    Color: Yellow / Appearance: Clear / SG: >1.050 / pH: x  Gluc: x / Ketone: Small  / Bili: Negative / Urobili: <2 mg/dL   Blood: x / Protein: 30 mg/dL / Nitrite: Negative   Leuk Esterase: Large / RBC: 2 /HPF /  /HPF   Sq Epi: x / Non Sq Epi: 0 /HPF / Bacteria: Many        Culture - Blood (collected 09-11-20 @ 10:27)  Source: .Blood Blood  Gram Stain (09-12-20 @ 13:07):    Growth in anaerobic bottle: Gram Positive Cocci in Clusters  Preliminary Report (09-12-20 @ 13:08):    Growth in anaerobic bottle: Gram Positive Cocci in Clusters    "Due to technical problems, Proteus sp. will Not be reported as part of    the BCID panel until further notice" ***Blood Panel PCR results on this    specimen are available    approximately 3 hours after the Gram stain result.***    Gram stain, PCR, and/or culture results may not always    correspond due to difference in methodologies.    ************************************************************    This PCR assay was performed using [employer].    The following targets are tested for: Enterococcus,    vancomycin resistant enterococci, Listeria monocytogenes,    coagulase negative staphylococci, S. aureus,    methicillin resistant S. aureus, Streptococcus agalactiae    (Group B), S. pneumoniae, S. pyogenes (Group A),    Acinetobacter baumannii, Enterobacter cloacae, E. coli,    Klebsiella oxytoca, K. pneumoniae, Proteus sp.,    Serratia marcescens, Haemophilus influenzae,    Neisseria meningitidis, Pseudomonas aeruginosa, Candida    albicans, C. glabrata, C krusei, C parapsilosis,    C. tropicalis and the KPC resistance gene.  Organism: Blood Culture PCR (09-12-20 @ 14:28)  Organism: Blood Culture PCR (09-12-20 @ 14:28)      -  Coagulase negative Staphylococcus: Detec      Method Type: PCR    Culture - Blood (collected 09-11-20 @ 10:27)  Source: .Blood Blood  Preliminary Report (09-12-20 @ 18:01):    No growth to date.    Culture - Urine (collected 07-31-20 @ 18:37)  Source: .Urine Clean Catch (Midstream)  Final Report (08-01-20 @ 22:23):    <10,000 CFU/mL Normal Urogenital Lorena        Blood Gas Venous - Lactate: 1.8 mmoL/L (09-11-20 @ 08:38)      INFECTIOUS DISEASES TESTING  Clostridium difficile Toxin by PCR: RESULT INTERPRETATION:    Detected - Clostridium difficile toxin B detected by amplified DNA PCR .    C. difficile PCR test results should be interpreted only with  consideration of the patient's clinical situation and history.  This test  will detect the presence of toxigenic C. difficile.  However it cannot be  used as the sole criteria for the diagnosis of antibiotic associated  diarrhea, antibiotic associated colitis, or pseudomembranous colitis.  Colonization with C. difficile may exceed 20%in hospital patients, the  majority of whom are without Toxigenic Clostridium Difficile disease.  Testing is generally not recommended in children below the age of 1 year,  as up to half of healthy infants are asymptomatically colonized with C.  difficile.  In addition, C. difficile PCR testing cannot be used as a  "test of cure" as dead organism nucleic acids will persist and be  detected after treatment.  Successful treatment of C. difficile disease  is determined by resolution of clinical symptoms. Method: CDPCR  TOXIGENIC CLOST.DIFFICILE POSITIVE;  027-NAP1-B1 PRESUMPTIVE NEGATIVE.  By: Real-Time PCR (Polymerase Chain Reaction)  NOTE: This method detects the Toxin B gene (tCdB), the  binary toxin gene (CDT), and the single-base-pair  deletion at nucleotide 117 within the gene encoding  a negative regulator of toxin production (tcdC 117).  The combined presence of genes encoding Toxin B and  binary toxin and the tcdC 117 deletion has been  associated with hypervirulent C. difficile strain  known as 027/NAP1/B1, which has been associated with  severe disease outbreaks in healthcare facilities  worldwide. (09-12-20 @ 19:38)  COVID-19 PCR: NotDetec (09-11-20 @ 17:50)      RADIOLOGY & ADDITIONAL TESTS:  I have personally reviewed the last Chest xray  CXR      CT  CT Abdomen and Pelvis w/ Oral Cont and w/ IV Cont:   EXAM:  CT ABDOMEN AND PELVIS OC IC            PROCEDURE DATE:  09/11/2020            INTERPRETATION:  CLINICAL STATEMENT: Abdominal pain    TECHNIQUE: Contiguous axial CT images were obtained from the lower chest to the pubic symphysis .  Oral contrast was given. 100 cc of Optiray 320 intravenous contrast was given no contrast was discarded Reformatted images in the coronal and sagittal planes were acquired.    COMPARISON CT: 7/15/2014    OTHER STUDIES USED FOR CORRELATION: None.    There is a 2 cm low density area in the right lobe of the liver contiguous to the gallbladder bed.Presumably representing focal fatty infiltration. Further evaluation with ultrasound suggested.  The spleen pancreas kidneys and adrenal glands appear normal.  There is no ascites.  There is no mesenteric retroperitoneal or pelvic lymphadenopathy.  The bowel pattern appears normal. There is no free air.  The bony structures appear intact.    IMPRESSION:  No acute process seen.  Low Density lesion right lobe liver further evaluation ultrasound suggested.                GRAYSON ZAMORANO M.D., ATTENDING RADIOLOGIST  This document has been electronically signed. Sep 11 2020  4:17PM             (09-11-20 @ 14:59)      CARDIOLOGY TESTING      MEDICATIONS  apixaban 5 Oral every 12 hours  atorvastatin 10 Oral at bedtime  buPROPion XL . 300 Oral daily  cefepime   IVPB 1000 IV Intermittent every 12 hours  cholecalciferol 5000 Oral daily  famotidine  IVPB 20 IV Intermittent daily  ferrous    sulfate 325 Oral daily  influenza   Vaccine 0.5 IntraMuscular once  lisinopril 20 Oral daily  metoprolol succinate ER 25 Oral daily  mirtazapine 7.5 Oral at bedtime  NIFEdipine XL 60 Oral daily  rOPINIRole 0.5 Oral at bedtime  senna 2 Oral at bedtime  sertraline 150 Oral daily  sodium chloride 0.9%. 1000 IV Continuous <Continuous>  vancomycin  IVPB 1250 IV Intermittent every 12 hours      Weight  Weight (kg): 101.6 (08-27-20 @ 09:52)    ANTIBIOTICS:  cefepime   IVPB 1000 milliGRAM(s) IV Intermittent every 12 hours  vancomycin  IVPB 1250 milliGRAM(s) IV Intermittent every 12 hours      ALLERGIES:  No Known Allergies

## 2020-09-14 NOTE — PROGRESS NOTE ADULT - SUBJECTIVE AND OBJECTIVE BOX
Patient is a 69y old Male who presents with a chief complaint of   No acute events overnight. Patient has no complaints this morning. Denies chest pain, SOB, N/V/D,    PAST MEDICAL & SURGICAL HISTORY  Afib - rate controlled currently  Hematuria  Bladder tumor  History of diabetic neuropathy  CAD (coronary artery disease) of bypass graft  cabg 1995 3v  Glaucoma  Arthritis  GERD (gastroesophageal reflux disease)  HTN (hypertension)  Depression  DM (diabetes mellitus)  S/P lumbar laminectomy  with repair of CSF leak using dural graft 7/8/2018 - dr Crenshaw    Malfunction of intrathecal infusion pump  implanted initially in 1996  reimplanted 2006  removed 6/27/2018    Foot amputation status, right  partial foot amputation  tarsals and metatarsals of right foot    S/P laparoscopic cholecystectomy    CAD (coronary artery disease) of bypass graft        PHYSICAL EXAM  Vital Signs Last 24 Hrs  T(C): 37 (14 Sep 2020 05:04), Max: 37.1 (13 Sep 2020 21:04)  T(F): 98.6 (14 Sep 2020 05:04), Max: 98.7 (13 Sep 2020 21:04)  HR: 85 (14 Sep 2020 05:04) (72 - 85)  BP: 197/82 (14 Sep 2020 05:04) (150/67 - 197/82)  BP(mean): --  RR: 18 (14 Sep 2020 05:04) (18 - 18)  SpO2: --    I&O's Summary    13 Sep 2020 07:01  -  14 Sep 2020 07:00  --------------------------------------------------------  IN: 600 mL / OUT: 2200 mL / NET: -1600 mL    14 Sep 2020 07:01  -  14 Sep 2020 10:20  --------------------------------------------------------  IN: 210 mL / OUT: 100 mL / NET: 110 mL      CONSTITUTIONAL: No acute distress, well-developed, well-groomed, AAOx3  HEAD: Atraumatic, normocephalic  EYES: EOM intact, PERRLA, conjunctiva and sclera clear  ENT: Supple, no masses, no thyromegaly, no bruits, no JVD; moist mucous membranes  PULMONARY: Clear to auscultation bilaterally; no wheezes, rales, or rhonchi  CARDIOVASCULAR: Regular rate and rhythm; no murmurs, rubs, or gallops  GASTROINTESTINAL: Soft, bowel sounds present, suprapubic tenderness   MUSCULOSKELETAL: 2+ peripheral pulses; no clubbing, no cyanosis, no edema  NEUROLOGY: non-focal  SKIN: Right chronic foot lesion       LABS                        12.7   15.04 )-----------( 377      ( 14 Sep 2020 06:24 )             40.1     Hemoglobin: 12.7 g/dL (09-14 @ 06:24)  Hemoglobin: 12.1 g/dL (09-13 @ 09:17)  Hemoglobin: 12.3 g/dL (09-12 @ 05:26)  Hemoglobin: 13.1 g/dL (09-11 @ 08:58)    CBC Full  -  ( 14 Sep 2020 06:24 )  WBC Count : 15.04 K/uL  RBC Count : 4.58 M/uL  Hemoglobin : 12.7 g/dL  Hematocrit : 40.1 %  Platelet Count - Automated : 377 K/uL  Mean Cell Volume : 87.6 fL  Mean Cell Hemoglobin : 27.7 pg  Mean Cell Hemoglobin Concentration : 31.7 g/dL  Auto Neutrophil # : 10.12 K/uL  Auto Lymphocyte # : 3.22 K/uL  Auto Monocyte # : 1.33 K/uL  Auto Eosinophil # : 0.12 K/uL  Auto Basophil # : 0.10 K/uL  Auto Neutrophil % : 67.3 %  Auto Lymphocyte % : 21.4 %  Auto Monocyte % : 8.8 %  Auto Eosinophil % : 0.8 %  Auto Basophil % : 0.7 %    09-14    140  |  99  |  9<L>  ----------------------------<  121<H>  3.5   |  25  |  0.9    Ca    9.4      14 Sep 2020 06:24  Mg     1.9     09-14    TPro  6.4  /  Alb  3.9  /  TBili  0.4  /  DBili  x   /  AST  17  /  ALT  13  /  AlkPhos  67  09-14    Creatinine Trend: 0.9<--, 0.9<--, 1.0<--, 1.1<--  LIVER FUNCTIONS - ( 14 Sep 2020 06:24 )  Alb: 3.9 g/dL / Pro: 6.4 g/dL / ALK PHOS: 67 U/L / ALT: 13 U/L / AST: 17 U/L / GGT: x             MICROBIOLOGY    Culture - Blood (collected 12 Sep 2020 18:14)  Source: .Blood None  Preliminary Report (14 Sep 2020 01:02):    No growth to date.    Culture - Blood (collected 12 Sep 2020 05:26)  Source: .Blood None  Preliminary Report (13 Sep 2020 12:01):    No growth to date.    Culture - Urine (collected 11 Sep 2020 17:00)  Source: .Urine Clean Catch (Midstream)  Preliminary Report (13 Sep 2020 19:49):    >100,000 CFU/ml Klebsiella pneumoniae    Culture - Blood (collected 11 Sep 2020 10:27)  Source: .Blood Blood  Gram Stain (12 Sep 2020 13:07):    Growth in anaerobic bottle: Gram Positive Cocci in Clusters  Final Report (13 Sep 2020 10:30):    Growth in anaerobic bottle: Staphylococcus hominis    Coag Negative Staphylococcus    Single set isolate, possible contaminant. Contact    Microbiology if susceptibility testing clinically    indicated.    "Due to technical problems, Proteus sp. will Not be reported as part of    the BCID panel until further notice" ***Blood Panel PCR results on this    specimen are available    approximately 3 hours after the Gram stain result.***    Gram stain, PCR, and/or culture results may not always    correspond due to difference in methodologies.    ************************************************************    This PCR assay was performed using [employer].    The following targets are tested for: Enterococcus,    vancomycin resistant enterococci, Listeria monocytogenes,    coagulase negative staphylococci, S. aureus,    methicillin resistant S. aureus, Streptococcus agalactiae    (Group B), S. pneumoniae, S. pyogenes (Group A),    Acinetobacter baumannii, Enterobacter cloacae, E. coli,    Klebsiella oxytoca, K. pneumoniae, Proteus sp.,    Serratia marcescens, Haemophilus influenzae,    Neisseria meningitidis, Pseudomonas aeruginosa, Candida    albicans, C. glabrata, C krusei, C parapsilosis,    C. tropicalis and the KPC resistance gene.  Organism: Blood Culture PCR (13 Sep 2020 10:30)  Organism: Blood Culture PCR (13 Sep 2020 10:30)    Culture - Blood (collected 11 Sep 2020 10:27)  Source: .Blood Blood  Preliminary Report (12 Sep 2020 18:01):    No growth to date.

## 2020-09-14 NOTE — PROGRESS NOTE ADULT - SUBJECTIVE AND OBJECTIVE BOX
PRIYANK WESLEY  69y, Male    All available historical data reviewed    OVERNIGHT EVENTS:  no fevers  has back pain  one BM last night    ROS:  General: Denies rigors, nightsweats  HEENT: Denies headache, rhinorrhea, sore throat, eye pain  CV: Denies CP, palpitations  PULM: Denies wheezing, hemoptysis  GI: Denies hematemesis, hematochezia, melena  : Denies discharge, hematuria  MSK: Denies arthralgias, myalgias  SKIN: Denies rash, lesions  NEURO: Denies paresthesias, weakness  PSYCH: Denies depression, anxiety    VITALS:  T(F): 99.4, Max: 99.4 (09-14-20 @ 14:14)  HR: 80  BP: 189/82  RR: 18Vital Signs Last 24 Hrs  T(C): 37.4 (14 Sep 2020 14:14), Max: 37.4 (14 Sep 2020 14:14)  T(F): 99.4 (14 Sep 2020 14:14), Max: 99.4 (14 Sep 2020 14:14)  HR: 80 (14 Sep 2020 14:14) (74 - 85)  BP: 189/82 (14 Sep 2020 14:14) (165/77 - 197/82)  BP(mean): --  RR: 18 (14 Sep 2020 14:14) (18 - 18)  SpO2: --    TESTS & MEASUREMENTS:                        12.7   15.04 )-----------( 377      ( 14 Sep 2020 06:24 )             40.1     09-14    140  |  99  |  9<L>  ----------------------------<  121<H>  3.5   |  25  |  0.9    Ca    9.4      14 Sep 2020 06:24  Mg     1.9     09-14    TPro  6.4  /  Alb  3.9  /  TBili  0.4  /  DBili  x   /  AST  17  /  ALT  13  /  AlkPhos  67  09-14    LIVER FUNCTIONS - ( 14 Sep 2020 06:24 )  Alb: 3.9 g/dL / Pro: 6.4 g/dL / ALK PHOS: 67 U/L / ALT: 13 U/L / AST: 17 U/L / GGT: x             Culture - Blood (collected 09-12-20 @ 18:14)  Source: .Blood None  Preliminary Report (09-14-20 @ 01:02):    No growth to date.    Culture - Blood (collected 09-12-20 @ 05:26)  Source: .Blood None  Preliminary Report (09-13-20 @ 12:01):    No growth to date.    Culture - Urine (collected 09-11-20 @ 17:00)  Source: .Urine Clean Catch (Midstream)  Preliminary Report (09-13-20 @ 19:49):    >100,000 CFU/ml Klebsiella pneumoniae    Culture - Blood (collected 09-11-20 @ 10:27)  Source: .Blood Blood  Gram Stain (09-12-20 @ 13:07):    Growth in anaerobic bottle: Gram Positive Cocci in Clusters  Final Report (09-13-20 @ 10:30):    Growth in anaerobic bottle: Staphylococcus hominis    Coag Negative Staphylococcus    Single set isolate, possible contaminant. Contact    Microbiology if susceptibility testing clinically    indicated.    "Due to technical problems, Proteus sp. will Not be reported as part of    the BCID panel until further notice" ***Blood Panel PCR results on this    specimen are available    approximately 3 hours after the Gram stain result.***    Gram stain, PCR, and/or culture results may not always    correspond due to difference in methodologies.    ************************************************************    This PCR assay was performed using [employer].    The following targets are tested for: Enterococcus,    vancomycin resistant enterococci, Listeria monocytogenes,    coagulase negative staphylococci, S. aureus,    methicillin resistant S. aureus, Streptococcus agalactiae    (Group B), S. pneumoniae, S. pyogenes (Group A),    Acinetobacter baumannii, Enterobacter cloacae, E. coli,    Klebsiella oxytoca, K. pneumoniae, Proteus sp.,    Serratia marcescens, Haemophilus influenzae,    Neisseria meningitidis, Pseudomonas aeruginosa, Candida    albicans, C. glabrata, C krusei, C parapsilosis,    C. tropicalis and the KPC resistance gene.  Organism: Blood Culture PCR (09-13-20 @ 10:30)  Organism: Blood Culture PCR (09-13-20 @ 10:30)      -  Coagulase negative Staphylococcus: Detec      Method Type: PCR    Culture - Blood (collected 09-11-20 @ 10:27)  Source: .Blood Blood  Preliminary Report (09-12-20 @ 18:01):    No growth to date.            RADIOLOGY & ADDITIONAL TESTS:  Personal review of radiological diagnostics performed  Echo and EKG results noted when applicable.     MEDICATIONS:  acetaminophen   Tablet .. 650 milliGRAM(s) Oral every 6 hours PRN  aluminum hydroxide/magnesium hydroxide/simethicone Suspension 30 milliLiter(s) Oral every 4 hours PRN  apixaban 5 milliGRAM(s) Oral every 12 hours  atorvastatin 10 milliGRAM(s) Oral at bedtime  buPROPion XL . 300 milliGRAM(s) Oral daily  chlorhexidine 4% Liquid 1 Application(s) Topical <User Schedule>  cholecalciferol 5000 Unit(s) Oral daily  famotidine    Tablet 20 milliGRAM(s) Oral daily  ferrous    sulfate 325 milliGRAM(s) Oral daily  gabapentin 200 milliGRAM(s) Oral every 8 hours  influenza   Vaccine 0.5 milliLiter(s) IntraMuscular once  lisinopril 20 milliGRAM(s) Oral daily  melatonin 5 milliGRAM(s) Oral at bedtime  metoprolol succinate ER 25 milliGRAM(s) Oral daily  mirtazapine 7.5 milliGRAM(s) Oral at bedtime  morphine  - Injectable 2 milliGRAM(s) IV Push every 8 hours PRN  NIFEdipine XL 30 milliGRAM(s) Oral once  rOPINIRole 0.5 milliGRAM(s) Oral at bedtime  senna 2 Tablet(s) Oral at bedtime  sertraline 150 milliGRAM(s) Oral daily  sodium chloride 0.9%. 1000 milliLiter(s) IV Continuous <Continuous>  vancomycin    Solution 125 milliGRAM(s) Oral every 6 hours      ANTIBIOTICS:  vancomycin    Solution 125 milliGRAM(s) Oral every 6 hours

## 2020-09-14 NOTE — PROGRESS NOTE ADULT - ASSESSMENT
ASSESSMENT  70 YO M with a PMH of bladder ca, DM, A-fib (Eliquis), and CABG who presents to the hospital with a c/o lower ABD pain for the past x 2 weeks, recent TURBT and antibiotics. Found to have Cdiff      IMPRESSION  #Cdiff diarrhea  #9/11 BCX 1/4 bottles coNS, contaminant   #Pyuria but asymptomatic  #Obesity BMI (kg/m2): 32.1  #DM     RECOMMENDATIONS  - PO vancomycin 125mg q6h   - coNS in bcx is a contaminant, can repeat BCX    If any questions, please call or send a message on [employer]  Spectra [phone]

## 2020-09-14 NOTE — PROGRESS NOTE ADULT - SUBJECTIVE AND OBJECTIVE BOX
Podiatry Progress Note    Subjective:   PRIYANK WESLEY is a pleasant well-nourished, well developed 69y Male in no acute distress, alert awake, and oriented to person, place and time.  Patient is a 69y old  Male who presents with a chief complaint of left 2nd MPJ plantar ulcer  Seen by bedside; doing well; has podiatrist for routine care;     PAST MEDICAL & SURGICAL HISTORY:  Afib  rate controlled currently  Hematuria  Bladder tumor  History of diabetic neuropathy  CAD (coronary artery disease) of bypass graft  cabg 1995 3v  Glaucoma  Arthritis  GERD (gastroesophageal reflux disease)  HTN (hypertension)  Depression  DM (diabetes mellitus)  S/P lumbar laminectomy  with repair of CSF leak using dural graft 7/8/2018 - dr Crenshaw  Malfunction of intrathecal infusion pump  implanted initially in 1996  reimplanted 2006  removed 6/27/2018  Foot amputation status, right  partial foot amputation  tarsals and metatarsals of right foot  S/P laparoscopic cholecystectomy  CAD (coronary artery disease) of bypass graft    Objective:  Vital Signs Last 24 Hrs  T(C): 37 (14 Sep 2020 05:04), Max: 37.1 (13 Sep 2020 21:04)  T(F): 98.6 (14 Sep 2020 05:04), Max: 98.7 (13 Sep 2020 21:04)  HR: 85 (14 Sep 2020 05:04) (72 - 85)  BP: 197/82 (14 Sep 2020 05:04) (150/67 - 197/82)  BP(mean): --  RR: 18 (14 Sep 2020 05:04) (18 - 18)  SpO2: --                        12.7   15.04 )-----------( 377      ( 14 Sep 2020 06:24 )             40.1                 09-14    140  |  99  |  9<L>  ----------------------------<  121<H>  3.5   |  25  |  0.9    Ca    9.4      14 Sep 2020 06:24  Mg     1.9     09-14    TPro  6.4  /  Alb  3.9  /  TBili  0.4  /  DBili  x   /  AST  17  /  ALT  13  /  AlkPhos  67  09-14    Physical Exam - Lower Extremity Focused:   Derm:   Right 2nd plantar MPJ 1cm x 1cm ulcer; necrotic base; no probing to bone; no active bleed, no drainage, no malodor, no rubor;    Vascular: DP and PT Pulses Diminished;   Neuro: Protective Sensation Diminished, light touch sensation diminished  MSK: Right forefoot amputation, no digits intact, fully closed; Due to forefoot amputation, significant equinus noted;    Assessment:   Possible 2nd head of metatarsal osteomyelitis;   Significant equinus on right foot;    Plan:  Chart reviewed and Patient evaluated. All Questions and Concerns Addressed and Answered  Discussed diagnosis and treatment with patient  Wound Flushed w/ normal saline; dressed with betadine / DSD / Kerlix / light pressure ACE;  Local Wound Care; As Stated Above; q24  XR Imaging Right Foot Ordered;   Possible surgical debridement if osteomyelitis noted on right 2nd metatarsal head; will follow up with X-ray report;  If no osteomyelitis is noted on right 2nd metatarsal head, possible tendo-achilles lengthening procedure to decrease plantar pressure of forefoot; will follow up with X-ray report;  Request medical clearance;  Request pre-lab optimization and OR stratification;  Will update exact surgical procedure and schedule post reviewing Xray report;  Discussed Plan w/ attending;    Podiatry

## 2020-09-14 NOTE — PROGRESS NOTE ADULT - ASSESSMENT
70yo male with a PMH of bladder ca, DM, A-fib (Eliquis), and CABG who presented to the hospital with a c/o lower ABD pain for the past x 2 weeks. Associated with N/V/D that is non-black/bloody. Of note, the pt recently had TURBT performed by Dr. Jo and completed abx course. In the ED, pt was seen by urology, no acute surgical intervention. CT-AP w/ IV contrast was negative. Cultures sent and pt started on IV Cefepime.     Suprapubic Pain w/ NVD, a few days after TURBT for bladder Ca: likely 2/2 Cystitis  - Initially started Empiric cefepime 1g q 24 and Vanco (given h/o instrumentation)  - Now on just Vanc  - F/u cultures  - Urology: No acute urological intervention at this time, F/U OP with Dr. Jo with 1 week     #C Diff colitis   - hx of abx use for TURBT  PO Vanco 125 q6  d/c vanc/cefepime.     #Diabetes mellitus with hyperglycemia  - Pending A1C  - insulin PRN  - FSG AC qHS     #Afib  - currently rate-controlled  - questionable reports of earlier hematuria on presentation. Patient denies. Eliquis was held for this reason earlier.  - hemoglobin stable 12.3. No current hematuria.  - eliquis restarted    #Right Chronic DFU/ s/p Rt TMA  daily dressing changes per podiatry.   outpatient podiatry f/u per usual     #Hypomagnesemia  replete prn    #Liver Lesion   - found on CT Ab  - US liver ordered - per son he had some sort of scan in the past showing a lesion in the liver > 20 years ago but was told it was benign at the time    Dispo: Active   DVT PPx: Eliquis .    68yo male with a PMH of bladder ca, DM, A-fib (Eliquis), and CABG who presented to the hospital with a c/o lower ABD pain for the past x 2 weeks. Associated with N/V/D that is non-black/bloody. Of note, the pt recently had TURBT performed by Dr. Jo and completed abx course. In the ED, pt was seen by urology, no acute surgical intervention. CT-AP w/ IV contrast was negative. Cultures sent and pt started on IV Cefepime.     Suprapubic Pain w/ NVD, a few days after TURBT for bladder Ca: likely 2/2 Cystitis  - Initially started Empiric cefepime 1g q 24 and Vanco (given h/o instrumentation)  - Now on just Vanc 125mg q6  - Blood cx: coag negative Staph, likely contaminant  - Urology: No acute urological intervention at this time, f/u OP with Dr. Jo with 1 week     C Diff colitis   - Hx of abx use for TURBT  - PO Vanco 125 q6    Diabetes mellitus with hyperglycemia  - A1C 6.6  - insulin PRN  - FS AC qHS     Afib  - Currently rate-controlled  - Questionable reports of earlier hematuria on presentation. Patient denies. Eliquis was held for this reason earlier.  - Hemoglobin stable 12.7. No current hematuria.  - Eliquis restarted    Right Chronic DFU/ s/p Rt TMA  - Daily dressing changes per podiatry  - Outpatient podiatry f/u per usual     Hypomagnesemia  - Replete prn    Liver Lesion   - Found on CT Ab  - US liver ordered - per son he had some sort of scan in the past showing a lesion in the liver > 20 years ago but was told it was benign at the time    Misc  - Dispo: Active   - DVT PPx: Eliquis

## 2020-09-14 NOTE — PROVIDER CONTACT NOTE (OTHER) - SITUATION
MD notified of need for Podiatry consult and wound care order. MD to pass on the day team for evaluation.

## 2020-09-15 NOTE — CHART NOTE - NSCHARTNOTEFT_GEN_A_CORE
I was called and notified by the lab that the patient has a positive urine culture taken on the 09/11/2020. >100,000 CFU ok K. pneumoniae ESBL.   ID note states that the patient is asymptomatic ( Asymptomatic Bacteruria).   The patient is afebrile and hemodynamically unstable.   Since the patient is asymptomatic, and hemodynamically stable we will not start carbapenems at this moment.   Follow up vitals and symptomatology.

## 2020-09-15 NOTE — PROGRESS NOTE ADULT - ASSESSMENT
68yo male with a PMH of bladder ca, DM, A-fib (Eliquis), and CABG who presented to the hospital with a c/o lower ABD pain for the past x 2 weeks. Associated with N/V/D that is non-black/bloody. Of note, the pt recently had TURBT performed by Dr. Jo and completed abx course. In the ED, pt was seen by urology, no acute surgical intervention. CT-AP w/ IV contrast was negative. Cultures sent and pt started on IV Cefepime.     Suprapubic Pain w/ NVD, a few days after TURBT for bladder Ca: likely 2/2 Cystitis  - Initially started Empiric cefepime 1g q 24 and Vancomycin (given h/o instrumentation)  - Now on just Vancomycin 125mg q6  - Blood cx: coag negative Staph, likely contaminant  - Urology: No acute urological intervention at this time, f/u OP with Dr. Jo with 1 week     Possible Osteomyelitis of MPJ with chronic ulcer  - Podiatry consulted to right 2nd metatarsal-phalangeal joint (MPJ) ulcer  - XR Right Foot (9/14): Second and fourth metatarsal stump periosteal reaction suggestive of recurrent/residual osteomyelitis. Consider MRI  - Possible surgical debridement if osteomyelitis noted on right 2nd metatarsal head; will follow up with X-ray report;  - If no osteomyelitis is noted on right 2nd metatarsal head, possible tendo-achilles lengthening procedure to decrease plantar pressure of forefoot    C Diff colitis   - Hx of abx use for TURBT  - PO Vancomycin 125mg q6  - No more episodes of diarrhea    Hypertension  - Nifedipine increased to 90mg qd  - C/w Lisinopril 20mg qd and Metoprolol 25mg qd    Diabetes mellitus with peripheral neuropathy  - A1C 6.6  - Insulin PRN  - FS AC qHS   - Added gabapentin 200mg TID    Afib  - Currently rate-controlled  - Questionable reports of earlier hematuria on presentation. Patient denies. Eliquis was held for this reason earlier.  - Hemoglobin stable 12.7. No current hematuria.  - Eliquis restarted    Right Chronic DFU/ s/p Rt TMA  - Daily dressing changes per podiatry  - Outpatient podiatry f/u per usual     Hypomagnesemia  - Replete prn    Liver Lesion   - Found on CT Ab  - US liver ordered - per son he had some sort of scan in the past showing a lesion in the liver > 20 years ago but was told it was benign at the time    Misc  - Dispo: Active   - DVT PPx: Eliquis   70yo male with a PMH of bladder ca, DM, A-fib (Eliquis), and CABG who presented to the hospital with a c/o lower ABD pain for the past x 2 weeks. Associated with N/V/D that is non-black/bloody. Of note, the pt recently had TURBT performed by Dr. Jo and completed abx course. In the ED, pt was seen by urology, no acute surgical intervention. CT-AP w/ IV contrast was negative. Cultures sent and pt started on IV Cefepime.     Suprapubic Pain w/ NVD, a few days after TURBT for bladder Ca: likely 2/2 Cystitis  - Initially started Empiric cefepime 1g q 24 and Vancomycin (given h/o instrumentation)  - Now on just Vancomycin 125mg q6  - Blood cx: coag negative Staph, likely contaminant  - Urine cx (9/10): Klebsiella ESBL. Pt current asymptomatic. F/u ID recs  - Urology: No acute urological intervention at this time, f/u OP with Dr. Jo with 1 week     Possible Osteomyelitis of MPJ with chronic ulcer  - Podiatry consulted to right 2nd metatarsal-phalangeal joint (MPJ) ulcer  - XR Right Foot (9/14): Second and fourth metatarsal stump periosteal reaction suggestive of recurrent/residual osteomyelitis. Consider MRI  - Possible surgical debridement if osteomyelitis noted on right 2nd metatarsal head; will follow up with X-ray report;  - If no osteomyelitis is noted on right 2nd metatarsal head, possible tendo-achilles lengthening procedure to decrease plantar pressure of forefoot  - F/u Podiatry recs    C Diff colitis   - Hx of abx use for TURBT  - PO Vancomycin 125mg q6  - No more episodes of diarrhea    Hypertension  - Nifedipine increased to 90mg qd  - C/w Lisinopril 20mg qd and Metoprolol 25mg qd    Diabetes mellitus with peripheral neuropathy  - A1C 6.6  - Insulin PRN  - FS AC qHS   - Added gabapentin 200mg TID    Afib  - Currently rate-controlled  - Questionable reports of earlier hematuria on presentation. Patient denies. Eliquis was held for this reason earlier.  - Hemoglobin stable 12.7. No current hematuria.  - Eliquis restarted    Right Chronic DFU/ s/p Rt TMA  - Daily dressing changes per podiatry  - Outpatient podiatry f/u per usual     Hypomagnesemia  - Replete prn    Liver Lesion   - Found on CT Ab  - US liver ordered - per son he had some sort of scan in the past showing a lesion in the liver > 20 years ago but was told it was benign at the time    Misc  - Dispo: Active   - DVT PPx: Eliquis

## 2020-09-15 NOTE — CDI QUERY NOTE - NSCDIOTHERTXTBX_GEN_ALL_CORE_HH
______________________________________________________________________________________________________________________________________    69 M, who presents with Diagnosis: Likely Cystitis with recent TURBT  Clinical Indicator:  H/P:  Atrial fibrillation   Meds:  Eliquis    Based on your professional judgment and clinical indicator, please specify further the type of Patient's Atrial fibrillation.    [ ] Chronic  A Fib  [ ] Permanent A Fib  [ ] Other please specify and document  [ ] Unable to clinically determine    Thank you.

## 2020-09-15 NOTE — PROGRESS NOTE ADULT - SUBJECTIVE AND OBJECTIVE BOX
Podiatry Progress Note    Subjective:   PRIYANK WESLEY is a pleasant well-nourished, well developed 69y Male in no acute distress, alert awake, and oriented to person, place and time.  Patient is a 69y old  Male who presents with a chief complaint of Right 2nd MPJ plantar ulcer (14 Sep 2020 11:34)  Seen by bedside; previously discussed about possible surgical intervention regarding osteomyelitis and tendo-achilles lengthening; informed patient regarding benefits and risks of having surgery;     PAST MEDICAL & SURGICAL HISTORY:  Afib  rate controlled currently  Hematuria  Bladder tumor  History of diabetic neuropathy  CAD (coronary artery disease) of bypass graft  cabg 1995 3v  Glaucoma  Arthritis  GERD (gastroesophageal reflux disease)  HTN (hypertension)  Depression  DM (diabetes mellitus)  S/P lumbar laminectomy  with repair of CSF leak using dural graft 7/8/2018 - dr Crenshaw  Malfunction of intrathecal infusion pump  implanted initially in 1996  reimplanted 2006  removed 6/27/2018  Foot amputation status, right  partial foot amputation  tarsals and metatarsals of right foot  S/P laparoscopic cholecystectomy  CAD (coronary artery disease) of bypass graft    Objective:  Vital Signs Last 24 Hrs  T(C): 37.1 (15 Sep 2020 12:23), Max: 37.2 (14 Sep 2020 21:30)  T(F): 98.8 (15 Sep 2020 12:23), Max: 98.9 (14 Sep 2020 21:30)  HR: 89 (15 Sep 2020 12:23) (81 - 89)  BP: 172/84 (15 Sep 2020 12:23) (172/84 - 203/87)  BP(mean): --  RR: 18 (15 Sep 2020 12:23) (18 - 18)  SpO2: 99% (14 Sep 2020 21:30) (99% - 99%)                        12.4   18.22 )-----------( 358      ( 15 Sep 2020 05:39 )             39.1                 09-15    141  |  101  |  8<L>  ----------------------------<  161<H>  3.7   |  27  |  0.9    Ca    9.5      15 Sep 2020 05:39  Mg     1.9     09-14    TPro  6.8  /  Alb  3.9  /  TBili  0.4  /  DBili  x   /  AST  20  /  ALT  13  /  AlkPhos  63  09-15    Physical Exam - Lower Extremity Focused:   Derm:   Right 2nd MPJ plantar ulcer; stable; no active bleeding, no drainage, granular-fibrotic base, no malodor;   Vascular: DP and PT Pulses Diminished;   Neuro: Protective Sensation and light touch sensation diminished;  MSK: Right forefoot amputation on level of MPJ, 1-5 digits; fully healed    Assessment:   Right 2nd MPJ plantar ulcer; stable;    Plan:  Chart reviewed and Patient evaluated. All Questions and Concerns Addressed and Answered  Discussed diagnosis and treatment with patient  Wound Flushed w/ normal saline; dressed with betadine / DSD / Kerlix / no pressure ACE;  Local Wound Care; As Stated Above; q24  After discussion with patient; no surgical intervention for now;   Patient is stable from podiatry standpoint; good to be discharge;   Follow up with Dr. Sal at [facility] [address] [place] [address] for possible further surgical intervention for right foot;   Request ID consult for discharge antibiotic;   Discussed Plan w/ attending    Podiatry      Podiatry Progress Note    Subjective:   PRIYANK WESLEY is a pleasant well-nourished, well developed 69y Male in no acute distress, alert awake, and oriented to person, place and time.  Patient is a 69y old  Male who presents with a chief complaint of Right 2nd MPJ plantar ulcer (14 Sep 2020 11:34)  Seen by bedside; previously discussed about possible surgical intervention regarding osteomyelitis and tendo-achilles lengthening; informed patient regarding benefits and risks of having surgery;     PAST MEDICAL & SURGICAL HISTORY:  Afib  rate controlled currently  Hematuria  Bladder tumor  History of diabetic neuropathy  CAD (coronary artery disease) of bypass graft  cabg 1995 3v  Glaucoma  Arthritis  GERD (gastroesophageal reflux disease)  HTN (hypertension)  Depression  DM (diabetes mellitus)  S/P lumbar laminectomy  with repair of CSF leak using dural graft 7/8/2018 - dr Crenshaw  Malfunction of intrathecal infusion pump  implanted initially in 1996  reimplanted 2006  removed 6/27/2018  Foot amputation status, right  partial foot amputation  tarsals and metatarsals of right foot  S/P laparoscopic cholecystectomy  CAD (coronary artery disease) of bypass graft    Objective:  Vital Signs Last 24 Hrs  T(C): 37.1 (15 Sep 2020 12:23), Max: 37.2 (14 Sep 2020 21:30)  T(F): 98.8 (15 Sep 2020 12:23), Max: 98.9 (14 Sep 2020 21:30)  HR: 89 (15 Sep 2020 12:23) (81 - 89)  BP: 172/84 (15 Sep 2020 12:23) (172/84 - 203/87)  BP(mean): --  RR: 18 (15 Sep 2020 12:23) (18 - 18)  SpO2: 99% (14 Sep 2020 21:30) (99% - 99%)                        12.4   18.22 )-----------( 358      ( 15 Sep 2020 05:39 )             39.1                 09-15    141  |  101  |  8<L>  ----------------------------<  161<H>  3.7   |  27  |  0.9    Ca    9.5      15 Sep 2020 05:39  Mg     1.9     09-14    TPro  6.8  /  Alb  3.9  /  TBili  0.4  /  DBili  x   /  AST  20  /  ALT  13  /  AlkPhos  63  09-15    Physical Exam - Lower Extremity Focused:   Derm:   Right 2nd MPJ plantar ulcer; stable; no active bleeding, no drainage, granular-fibrotic base, no malodor;   Vascular: DP and PT Pulses Diminished;   Neuro: Protective Sensation and light touch sensation diminished;  MSK: Right forefoot amputation on level of MPJ, 1-5 digits; fully healed    Assessment:   Right 2nd MPJ plantar ulcer; stable;    Plan:  Chart reviewed and Patient evaluated. All Questions and Concerns Addressed and Answered  Discussed diagnosis and treatment with patient  Wound Flushed w/ normal saline; dressed with betadine / DSD / Kerlix / no pressure ACE;  Local Wound Care; As Stated Above; q24  After discussion with patient; no surgical intervention for now;   Follow up with Dr. Sal at [facility] [address] [place] [address] for possible further surgical intervention for right foot;   Request ID consult for discharge antibiotic;   Discussed Plan w/ attending    Podiatry

## 2020-09-15 NOTE — PROGRESS NOTE ADULT - SUBJECTIVE AND OBJECTIVE BOX
Patient is a 69y old Male who presents with a chief complaint of Right 2nd MPJ plantar ulcer (14 Sep 2020 11:34)    No acute events overnight. Patient reports decreased suprapubic pain. Denies chest pain, SOB, N/V/D,    PAST MEDICAL & SURGICAL HISTORY  Afib  rate controlled currently    Hematuria    Bladder tumor    History of diabetic neuropathy    CAD (coronary artery disease) of bypass graft  cabg 1995 3v    Glaucoma    Arthritis    GERD (gastroesophageal reflux disease)    HTN (hypertension)    Depression    DM (diabetes mellitus)    S/P lumbar laminectomy  with repair of CSF leak using dural graft 7/8/2018 - dr Crenshaw    Malfunction of intrathecal infusion pump  implanted initially in 1996  reimplanted 2006  removed 6/27/2018    Foot amputation status, right  partial foot amputation  tarsals and metatarsals of right foot    S/P laparoscopic cholecystectomy    CAD (coronary artery disease) of bypass graft        PHYSICAL EXAM  Vital Signs Last 24 Hrs  T(C): 36.8 (15 Sep 2020 05:00), Max: 37.4 (14 Sep 2020 14:14)  T(F): 98.3 (15 Sep 2020 05:00), Max: 99.4 (14 Sep 2020 14:14)  HR: 82 (15 Sep 2020 05:51) (80 - 84)  BP: 178/82 (15 Sep 2020 05:51) (178/82 - 203/87)  BP(mean): --  RR: 18 (15 Sep 2020 05:00) (18 - 18)  SpO2: 99% (14 Sep 2020 21:30) (99% - 99%)    I&O's Summary    14 Sep 2020 07:01  -  15 Sep 2020 07:00  --------------------------------------------------------  IN: 210 mL / OUT: 100 mL / NET: 110 mL      GENERAL: No acute distress, well-developed  HEAD:  Atraumatic, Normocephalic  ENT: PERRL, conjunctiva and sclera clear, neck supple, no JVD  CHEST/LUNG: Clear to auscultation bilaterally; No wheeze, equal breath sounds bilaterally, respirations nonlabored  HEART: Regular rate and rhythm; No murmurs, rubs, or gallops  ABDOMEN: Soft, mild suprapubic tenderness to palpation  EXTREMITIES:  Right foot wrapped in bandage  PSYCH: Nl behavior, nl affect  NEUROLOGY: AAOx3, non-focal, moves all extremities spontaneously      LABS                        12.4   18.22 )-----------( 358      ( 15 Sep 2020 05:39 )             39.1     Hemoglobin: 12.4 g/dL (09-15 @ 05:39)  Hemoglobin: 12.7 g/dL (09-14 @ 06:24)  Hemoglobin: 12.1 g/dL (09-13 @ 09:17)  Hemoglobin: 12.3 g/dL (09-12 @ 05:26)  Hemoglobin: 13.1 g/dL (09-11 @ 08:58)    CBC Full  -  ( 15 Sep 2020 05:39 )  WBC Count : 18.22 K/uL  RBC Count : 4.52 M/uL  Hemoglobin : 12.4 g/dL  Hematocrit : 39.1 %  Platelet Count - Automated : 358 K/uL  Mean Cell Volume : 86.5 fL  Mean Cell Hemoglobin : 27.4 pg  Mean Cell Hemoglobin Concentration : 31.7 g/dL  Auto Neutrophil # : 12.28 K/uL  Auto Lymphocyte # : 4.07 K/uL  Auto Monocyte # : 1.43 K/uL  Auto Eosinophil # : 0.15 K/uL  Auto Basophil # : 0.14 K/uL  Auto Neutrophil % : 67.5 %  Auto Lymphocyte % : 22.3 %  Auto Monocyte % : 7.8 %  Auto Eosinophil % : 0.8 %  Auto Basophil % : 0.8 %    09-15    141  |  101  |  8<L>  ----------------------------<  161<H>  3.7   |  27  |  0.9    Ca    9.5      15 Sep 2020 05:39  Mg     1.9     09-14    TPro  6.8  /  Alb  3.9  /  TBili  0.4  /  DBili  x   /  AST  20  /  ALT  13  /  AlkPhos  63  09-15    Creatinine Trend: 0.9<--, 0.9<--, 0.9<--, 1.0<--, 1.1<--  LIVER FUNCTIONS - ( 15 Sep 2020 05:39 )  Alb: 3.9 g/dL / Pro: 6.8 g/dL / ALK PHOS: 63 U/L / ALT: 13 U/L / AST: 20 U/L / GGT: x             MICROBIOLOGY    Culture - Blood (collected 12 Sep 2020 18:14)  Source: .Blood None  Preliminary Report (14 Sep 2020 01:02):    No growth to date.      IMAGING    Xray Foot AP + Lateral + Oblique, Right (09.14.20 @ 14:59)  Status post transmetatarsal amputation with persistent soft tissue ulcer at stump  Second and fourth metatarsal stump periosteal reaction suggestive of recurrent/residual osteomyelitis. Consider MRI of the forefoot to evaluate extent of disease.

## 2020-09-16 NOTE — DISCHARGE NOTE PROVIDER - HOSPITAL COURSE
70yo male with a PMH of bladder ca, DM, A-fib (Eliquis), and CABG who presented to the hospital with a c/o lower ABD pain for the past x 2 weeks. Associated with N/V/D that is non-black/bloody. Of note, the pt recently had TURBT performed by Dr. Jo and completed abx course. In the ED, patient was seen by urology who recommended no acute surgical intervention.     Physical exam shows pt in NAD. VSS, afebrile, not hypoxic on RA. A&Ox3. Non-focal neuro exam. Muscle strength/sensation intact. CTA B/L with no W/C/R. RRR, no M/G/R. ABD is soft and non-tender, normoactive BSs. LEs without swelling. No rashes. Labs and radiology as above. CT-AP w/ IV contrast was negative. ID was consulted and recommended blood cultures and treatment with Cefepime and Vancomycin.    Patient was admitted for Cystitis.    Lower ABD pain + N/V/D s/p Turbt procedure, likely hemorrhagic cystitis, rule out C.diff; Sepsis not present on admission. FU cultures. C/w IV ABXs (Cefepime). PRN pain meds. PRN anti-emetics. Urology is following. ID consult. Hospitalization course by problem's list:    Patient is a 68 y/o  Male with a PMH of bladder ca, DM, A-fib (Eliquis), and CABG who presents to the hospital with a c/o lower ABD pain for the past x 2 weeks. Associated with N/V/D that is non-black/bloody. Of note, the pt recently had TURBT performed by Dr. Jo and completed ABXs course. In the ED, seen  by urology, no acute surgical intervention. CT-AP w/ IV contrast was negative. Patient was admitted to medical unit with the following course of therapy:     #Acute suprapubic Pain after TURBT for bladder Ca:   #Cystitis:  initially started Empiric cefepime 1g q 24 and Vanco (given h/o instrumentation). But eventually took abx off per id d/t c diff colitis.  - Urology: No acute urological intervention at this time, F/U OP with Dr. Jo with 1 week   Urine culture grew Klebsiella ESBL which may be a colonizer as per ID  -  repeated urine cxs from 09/16- still with ESBL Klebsiella, f/up ID - still no IV abx. tx. - I have discussed abnormal urine cxs results with patient. Patient was instructed to notify MD if patient develops  symptoms.    #C Diff colitis   - hx of abx use for TURBT  - patient was initially started on PO Vanco 125 q6-  still had diarrhea episodes,   on 09/17/2020 - switched to Dificid 200 mg po twice daily - diarrhea has decreased.  -patient to be d/c home on PO Dificid tx.  d/gama vanc/cefepime.       # Persistent leukocytosis-/ thrombocytosis  - ID reporting that there is no active infection, besides C diff- will switch to dificid , pt. is not on any IV abx. at present time  -  ESR- elevated 38  - MRI of right foot- no OM  -consulted Hematology - recommended to review peripheral smear and obtain flow cytometry- f/up results  - f/up EL 2 mutation,   - lipid profile- elevated triglycerides  - f/up DYLAN  - normal  RF factor  - outpatient  CBC monitoring with Hematology f/up    #Diabetes mellitus with hyperglycemia  - Hg A1C- 6.6  - insulin PRN  - FSG AC qHS   -upon d/c home patient was resumed on home regimen tx.    #Right Chronic DFU/ s/p Rt TMA  Now TMA Stump Xr s/o Osteomyelitis. Podiatry recommended outpatient f/up for further management.   daily dressing changes per podiatry.   -MRI of foot - no MRI    #Chronic Afib  - currently rate-controlled  on eliquis    #Hypomagnesemia  replete prn    #Liver Lesion   - found on CT Ab  -per son he had some sort of scan in the past showing a lesion in the liver > 20 years ago but was told it was benign at the time  -US liver here could not visualize the node. Outpatient surveillance  Probable fatty liver noted.     #HTN- better controlled on new regimen  - lisinopril to 40 mg po once daily  - d/c metoprolol, started on po labetalol 200 mg po twice daily  -continue Nifedipine 90 mg po once daily  continue current medical regimen.

## 2020-09-16 NOTE — DISCHARGE NOTE PROVIDER - NSDCCPCAREPLAN_GEN_ALL_CORE_FT
PRINCIPAL DISCHARGE DIAGNOSIS  Diagnosis: Abdominal pain  Assessment and Plan of Treatment: continue pain regimen therapy, follow up with your family doctor in 1 week for further outpatient management      SECONDARY DISCHARGE DIAGNOSES  Diagnosis: UTI (urinary tract infection)  Assessment and Plan of Treatment: you had asymptomatic bacteriuria, klebsiella , please return to ER if you develop urological symptoms, since Klebsiella is only sensitive to Intravenous antibiotics therapy    Diagnosis: Leukocytosis  Assessment and Plan of Treatment: follow up with Hematology specialist post d/c home to f/up Francisco 2 mutation results and flow cytometry results. monitor CBC levels post d/c home    Diagnosis: Diarrhea  Assessment and Plan of Treatment: due to C. diff infection, please complete oral Dificid therapy at home.     PRINCIPAL DISCHARGE DIAGNOSIS  Diagnosis: Abdominal pain  Assessment and Plan of Treatment: continue pain regimen therapy, follow up with your family doctor in 1 week for further outpatient management      SECONDARY DISCHARGE DIAGNOSES  Diagnosis: Urinary bladder cancer  Assessment and Plan of Treatment: Please follow up with Urology specialist post d/c home for further outpatient management of recently dx. Urinary bladder cancer    Diagnosis: UTI (urinary tract infection)  Assessment and Plan of Treatment: you had asymptomatic bacteriuria, klebsiella , please return to ER if you develop urological symptoms, since Klebsiella is only sensitive to Intravenous antibiotics therapy    Diagnosis: Leukocytosis  Assessment and Plan of Treatment: follow up with Hematology specialist post d/c home to f/up Francisco 2 mutation results and flow cytometry results. monitor CBC levels post d/c home    Diagnosis: Diarrhea  Assessment and Plan of Treatment: due to C. diff infection, please complete oral Dificid therapy at home.

## 2020-09-16 NOTE — PROGRESS NOTE ADULT - ASSESSMENT
68yo male with a PMH of bladder ca, DM, A-fib (Eliquis), and CABG who presented to the hospital with a c/o lower ABD pain for the past x 2 weeks. Associated with N/V/D that is non-black/bloody. Of note, the pt recently had TURBT performed by Dr. Jo and completed abx course. In the ED, pt was seen by urology, no acute surgical intervention. CT-AP w/ IV contrast was negative. Cultures sent and pt started on IV Cefepime.       Suprapubic Pain w/ NVD, a few days after TURBT for bladder Ca: likely 2/2 Cystitis  - Initially started Empiric cefepime 1g q 24 and Vancomycin (given h/o instrumentation)  - Now on just Vancomycin 125mg q6 (C.diff, see below)  - Blood cx: coag negative Staph, likely contaminant  - Urine cx (9/10): Klebsiella ESBL. Pt current asymptomatic. F/u ID recs  - Pt with persistent leukocytosis, although somewhat downtrending. Will consider consulting Heme/Onc for peripheral smear  - Urology: No acute urological intervention at this time, f/u OP with Dr. Jo with 1 week     Osteomyelitis of MPJ with chronic ulcer  - XR Right Foot (9/14): Second and fourth metatarsal stump periosteal reaction suggestive of recurrent/residual osteomyelitis. Consider MRI  - Possible surgical debridement if osteomyelitis noted on right 2nd metatarsal head; will follow up with X-ray report;  - Per Podiatry, surgical intervention will be pursued outpatient    C Diff colitis   - Hx of abx use for TURBT  - PO Vancomycin 125mg q6 (started 9/12, 10-day course)  - No more episodes of diarrhea    Hypertension  - Nifedipine increased to 90mg qd (9/14)  - Lisinopril increased to 40mg qd (9/16)  - C/w Metoprolol 25mg qd, will consider switch to Labetalol if BP still not controlled    Diabetes mellitus with peripheral neuropathy  - A1C 6.6  - Insulin PRN  - FS AC qHS   - Added gabapentin 200mg TID    Chronic Afib  - Currently rate-controlled  - Questionable reports of earlier hematuria on presentation. Patient denies. Eliquis was held for this reason earlier.  - Hemoglobin stable 12.7. No current hematuria.  - Eliquis restarted    Right Chronic DFU/ s/p Rt TMA  - Daily dressing changes per podiatry  - Outpatient podiatry f/u per usual     Hypomagnesemia  - Resolved  - Replete prn    Liver Lesion   - Found on CT Ab  - US liver ordered - per son he had some sort of scan in the past showing a lesion in the liver > 20 years ago but was told it was benign at the time    Misc  - Dispo: Active   - DVT PPx: Eliquis

## 2020-09-16 NOTE — PROGRESS NOTE ADULT - SUBJECTIVE AND OBJECTIVE BOX
Patient is a 69y old Male who presents with a chief complaint of right 2nd MPJ plantar ulcer (15 Sep 2020 14:27)    Patient was hypertensive overnight, requiring two IV pushes of Hydralazine IV. Patient has no complaints this morning. Denies chest pain, SOB, N/V/D,    PAST MEDICAL & SURGICAL HISTORY  Afib  rate controlled currently    Hematuria    Bladder tumor    History of diabetic neuropathy    CAD (coronary artery disease) of bypass graft  cabg 1995 3v    Glaucoma    Arthritis    GERD (gastroesophageal reflux disease)    HTN (hypertension)    Depression    DM (diabetes mellitus)    S/P lumbar laminectomy  with repair of CSF leak using dural graft 7/8/2018 - dr Crenshaw    Malfunction of intrathecal infusion pump  implanted initially in 1996  reimplanted 2006  removed 6/27/2018    Foot amputation status, right  partial foot amputation  tarsals and metatarsals of right foot    S/P laparoscopic cholecystectomy    CAD (coronary artery disease) of bypass graft        PHYSICAL EXAM  Vital Signs Last 24 Hrs  T(C): 36.6 (16 Sep 2020 05:10), Max: 37.2 (15 Sep 2020 21:30)  T(F): 97.8 (16 Sep 2020 05:10), Max: 99 (15 Sep 2020 21:30)  HR: 85 (16 Sep 2020 06:05) (78 - 89)  BP: 167/86 (16 Sep 2020 06:05) (167/86 - 220/97)  BP(mean): --  RR: 18 (16 Sep 2020 05:10) (18 - 18)  SpO2: 98% (16 Sep 2020 08:24) (97% - 98%)    I&O's Summary    15 Sep 2020 07:01  -  16 Sep 2020 07:00  --------------------------------------------------------  IN: 460 mL / OUT: 150 mL / NET: 310 mL    16 Sep 2020 07:01  -  16 Sep 2020 09:49  --------------------------------------------------------  IN: 250 mL / OUT: 0 mL / NET: 250 mL      GENERAL: No acute distress, well-developed  HEAD:  Atraumatic, Normocephalic  ENT: PERRL, conjunctiva and sclera clear, neck supple, no JVD  CHEST/LUNG: Clear to auscultation bilaterally; No wheeze, equal breath sounds bilaterally, respirations nonlabored  HEART: Regular rate and rhythm; No murmurs, rubs, or gallops  ABDOMEN: Soft, mild suprapubic tenderness to palpation  EXTREMITIES:  Right foot wrapped in bandage  PSYCH: Nl behavior, nl affect  NEUROLOGY: AAOx3, non-focal, moves all extremities spontaneously      LABS                        13.1   17.14 )-----------( 337      ( 16 Sep 2020 07:08 )             41.5     Hemoglobin: 13.1 g/dL (09-16 @ 07:08)  Hemoglobin: 12.4 g/dL (09-15 @ 05:39)  Hemoglobin: 12.7 g/dL (09-14 @ 06:24)  Hemoglobin: 12.1 g/dL (09-13 @ 09:17)  Hemoglobin: 12.3 g/dL (09-12 @ 05:26)    CBC Full  -  ( 16 Sep 2020 07:08 )  WBC Count : 17.14 K/uL  RBC Count : 4.83 M/uL  Hemoglobin : 13.1 g/dL  Hematocrit : 41.5 %  Platelet Count - Automated : 337 K/uL  Mean Cell Volume : 85.9 fL  Mean Cell Hemoglobin : 27.1 pg  Mean Cell Hemoglobin Concentration : 31.6 g/dL  Auto Neutrophil # : 11.49 K/uL  Auto Lymphocyte # : 4.00 K/uL  Auto Monocyte # : 1.23 K/uL  Auto Eosinophil # : 0.16 K/uL  Auto Basophil # : 0.13 K/uL  Auto Neutrophil % : 67.0 %  Auto Lymphocyte % : 23.3 %  Auto Monocyte % : 7.2 %  Auto Eosinophil % : 0.9 %  Auto Basophil % : 0.8 %    09-16    137  |  98  |  15  ----------------------------<  186<H>  3.4<L>   |  24  |  0.9    Ca    9.8      16 Sep 2020 07:08    TPro  6.7  /  Alb  3.9  /  TBili  0.4  /  DBili  x   /  AST  18  /  ALT  13  /  AlkPhos  61  09-16    Creatinine Trend: 0.9<--, 0.9<--, 0.9<--, 0.9<--, 1.0<--, 1.1<--  LIVER FUNCTIONS - ( 16 Sep 2020 07:08 )  Alb: 3.9 g/dL / Pro: 6.7 g/dL / ALK PHOS: 61 U/L / ALT: 13 U/L / AST: 18 U/L / GGT: x

## 2020-09-16 NOTE — DISCHARGE NOTE PROVIDER - PROVIDER TOKENS
PROVIDER:[TOKEN:[[id number]],FOLLOWUP:[1 week]],PROVIDER:[TOKEN:[[id number]],FOLLOWUP:[1 week]],PROVIDER:[TOKEN:[[id number]],FOLLOWUP:[1 week]]

## 2020-09-16 NOTE — DISCHARGE NOTE PROVIDER - NSDCMRMEDTOKEN_GEN_ALL_CORE_FT
apixaban 5 mg oral tablet: 1 tab(s) orally every 12 hours  aspirin 81 mg oral delayed release tablet: 1 tab(s) orally once a day  Basaglar KwikPen: 40 unit(s) subcutaneous once a day (at bedtime)  buPROPion 150 mg/24 hours (XL) oral tablet, extended release: 1 tab(s) orally once a day  Combigan 0.2%-0.5% ophthalmic solution: 1 drop(s) to each affected eye every 12 hours  latanoprost 0.005% ophthalmic solution: 1 drop(s) to each affected eye once a day (in the evening)  lisinopril 20 mg oral tablet: 1 tab(s) orally once a day  melatonin 5 mg oral tablet: 1 tab(s) orally once a day (at bedtime), As needed, Insomnia  NIFEdipine 60 mg oral tablet, extended release: 1 tab(s) orally once a day  pantoprazole 20 mg oral delayed release tablet: 1 tab(s) orally once a day  Pyridium 100 mg oral tablet: 1 tab(s) orally 3 times a day   Senna Plus 50 mg-8.6 mg oral tablet: 2 tab(s) orally once a day (at bedtime)  sertraline 100 mg oral tablet: 1 tab(s) orally once a day  SMZ-TMP  mg-160 mg oral tablet: 1 tab(s) orally 2 times a day   timolol maleate 0.5% ophthalmic solution: 1 drop(s) to each affected eye every 12 hours  Vitamin D3 2000 intl units oral tablet: 1 tab(s) orally once a day   Actamin 325 mg oral tablet: 2 tab(s) orally every 6 hours, As needed, Mild Pain (1 - 3)  apixaban 5 mg oral tablet: 1 tab(s) orally every 12 hours  aspirin 81 mg oral delayed release tablet: 1 tab(s) orally once a day  Basaglar KwikPen: 40 unit(s) subcutaneous once a day (at bedtime)  buPROPion 150 mg/24 hours (XL) oral tablet, extended release: 1 tab(s) orally once a day  Combigan 0.2%-0.5% ophthalmic solution: 1 drop(s) to each affected eye every 12 hours  ferrous sulfate 325 mg (65 mg elemental iron) oral tablet: 1 tab(s) orally once a day  fidaxomicin 200 mg oral tablet: 1 tab(s) orally 2 times a day   labetalol 200 mg oral tablet: 1 tab(s) orally every 12 hours  latanoprost 0.005% ophthalmic solution: 1 drop(s) to each affected eye once a day (in the evening)  lisinopril 20 mg oral tablet: 1 tab(s) orally once a day  melatonin 5 mg oral tablet: 1 tab(s) orally once a day (at bedtime), As needed, Insomnia  mirtazapine 7.5 mg oral tablet: 1 tab(s) orally once a day (at bedtime)  NIFEdipine 60 mg oral tablet, extended release: 1 tab(s) orally once a day  pantoprazole 20 mg oral delayed release tablet: 1 tab(s) orally once a day  Pyridium 100 mg oral tablet: 1 tab(s) orally 3 times a day   sertraline 100 mg oral tablet: 1 tab(s) orally once a day  timolol maleate 0.5% ophthalmic solution: 1 drop(s) to each affected eye every 12 hours  traMADol 50 mg oral tablet: 1 tab(s) orally every 8 hours, As Needed -Severe pain MDD:3  Vitamin D3 2000 intl units oral tablet: 1 tab(s) orally once a day

## 2020-09-16 NOTE — PROGRESS NOTE ADULT - SUBJECTIVE AND OBJECTIVE BOX
PRIYANK WESLEY  [facility]            Patient was evaluated and examined  by bedside, c/o mild abdominal pain, no diarrhea, tolerating diet well                REVIEW OF SYSTEMS:  please see pertinent positives mentioned above, all other 12 ROS negative      T(C): , Max: 37.2 (09-15-20 @ 21:30)  HR: 89 (09-16-20 @ 13:02)  BP: 123/79 (09-16-20 @ 12:43)  RR: 17 (09-16-20 @ 12:43)  SpO2: 98% (09-16-20 @ 08:24)  CAPILLARY BLOOD GLUCOSE      POCT Blood Glucose.: 174 mg/dL (16 Sep 2020 12:40)  POCT Blood Glucose.: 140 mg/dL (16 Sep 2020 11:32)  POCT Blood Glucose.: 158 mg/dL (16 Sep 2020 07:36)      PHYSICAL EXAM:  General: NAD, AAOX3, patient is laying comfortably in bed, obese  HEENT: AT, NC, Supple, NO JVD, NO CB  Lungs: CTA B/L, no wheezing, no rhonchi  CVS: normal S1, S2, RRR, NO M/G/R  Abdomen: soft, bowel sounds present, mild tender in left lower /mid lower quadrant areas, non-distended  Extremities: no edema, no clubbing, no cyanosis, positive peripheral pulses b/l  Neuro: no acute focal neurological deficits  Skin: no rash, no ecchymosis      LAB  CBC  Date: 09-16-20 @ 07:08  Mean cell Oeoclavfrd68.1  Mean cell Hemoglobin Conc31.6  Mean cell Volum 85.9  Platelet count-Automate 337  RBC Count 4.83  Red Cell Distrib Width13.2  WBC Count17.14  % Albumin, Urine--  Hematocrit 41.5  Hemoglobin 13.1  CBC  Date: 09-15-20 @ 05:39  Mean cell Dfrevrjzrb85.4  Mean cell Hemoglobin Conc31.7  Mean cell Volum 86.5  Platelet count-Automate 358  RBC Count 4.52  Red Cell Distrib Width13.1  WBC Count18.22  % Albumin, Urine--  Hematocrit 39.1  Hemoglobin 12.4  CBC  Date: 09-14-20 @ 06:24  Mean cell Zhmyflzbfd13.7  Mean cell Hemoglobin Conc31.7  Mean cell Volum 87.6  Platelet count-Automate 377  RBC Count 4.58  Red Cell Distrib Width13.1  WBC Count15.04  % Albumin, Urine--  Hematocrit 40.1  Hemoglobin 12.7  CBC  Date: 09-13-20 @ 09:17  Mean cell Mbbcbaihck20.8  Mean cell Hemoglobin Conc31.3  Mean cell Volum 88.5  Platelet count-Automate 367  RBC Count 4.36  Red Cell Distrib Width13.1  WBC Count19.49  % Albumin, Urine--  Hematocrit 38.6  Hemoglobin 12.1  CBC  Date: 09-12-20 @ 05:26  Mean cell Onijdcdtjx54.9  Mean cell Hemoglobin Conc31.5  Mean cell Volum 88.7  Platelet count-Automate 322  RBC Count 4.41  Red Cell Distrib Width13.2  WBC Count19.58  % Albumin, Urine--  Hematocrit 39.1  Hemoglobin 12.3  CBC  Date: 09-11-20 @ 08:58  Mean cell Oxvgstsggm80.8  Mean cell Hemoglobin Conc31.4  Mean cell Volum 88.5  Platelet count-Automate 330  RBC Count 4.71  Red Cell Distrib Width13.2  WBC Count24.27  % Albumin, Urine--  Hematocrit 41.7  Hemoglobin 13.1    [facility]  09-16-20 @ 07:08  Blood Gas Arterial-Calcium,Ionized--  Blood Urea Nitrogen, Serum 15 mg/dL [10 - 20]  Carbon Dioxide, Serum24 mmol/L [17 - 32]  Chloride, Serum98 mmol/L [98 - 110]  Creatinie, Serum0.9 mg/dL [0.7 - 1.5]  Glucose, Mpqwt038 mg/dL<H> [70 - 99]  Potassium, Serum3.4 mmol/L<L> [3.5 - 5.0]  Sodium, Serum 137 mmol/L [135 - 146]  [facility]  09-15-20 @ 05:39  Blood Gas Arterial-Calcium,Ionized--  Blood Urea Nitrogen, Serum 8 mg/dL<L> [10 - 20]  Carbon Dioxide, Serum27 mmol/L [17 - 32]  Chloride, Cfnwb381 mmol/L [98 - 110]  Creatinie, Serum0.9 mg/dL [0.7 - 1.5]  Glucose, Giivm067 mg/dL<H> [70 - 99]  Potassium, Serum3.7 mmol/L [3.5 - 5.0]  Sodium, Serum 141 mmol/L [135 - 146]  [facility]  09-14-20 @ 06:24  Blood Gas Arterial-Calcium,Ionized--  Blood Urea Nitrogen, Serum 9 mg/dL<L> [10 - 20]  Carbon Dioxide, Serum25 mmol/L [17 - 32]  Chloride, Serum99 mmol/L [98 - 110]  Creatinie, Serum0.9 mg/dL [0.7 - 1.5]  Glucose, Ajyhz896 mg/dL<H> [70 - 99]  Potassium, Serum3.5 mmol/L [3.5 - 5.0]  Sodium, Serum 140 mmol/L [135 - 146]  [facility]  09-13-20 @ 09:17  Blood Gas Arterial-Calcium,Ionized--  Blood Urea Nitrogen, Serum 10 mg/dL [10 - 20]  Carbon Dioxide, Serum23 mmol/L [17 - 32]  Chloride, Ejnyu720 mmol/L [98 - 110]  Creatinie, Serum0.9 mg/dL [0.7 - 1.5]  Glucose, Hfwmz439 mg/dL<H> [70 - 99]  Potassium, Serum3.7 mmol/L [3.5 - 5.0]  Sodium, Serum 141 mmol/L [135 - 146]  [facility]  09-12-20 @ 05:26  Blood Gas Arterial-Calcium,Ionized--  Blood Urea Nitrogen, Serum 14 mg/dL [10 - 20]  Carbon Dioxide, Serum24 mmol/L [17 - 32]  Chloride, Ezqql014 mmol/L [98 - 110]  Creatinie, Serum1.0 mg/dL [0.7 - 1.5]  Glucose, Xykny665 mg/dL<H> [70 - 99]  Potassium, Serum3.8 mmol/L [3.5 - 5.0]  Sodium, Serum 139 mmol/L [135 - 146]              Microbiology:    Culture - Blood (collected 09-12-20 @ 18:14)  Source: .Blood None  Preliminary Report (09-14-20 @ 01:02):    No growth to date.    Culture - Blood (collected 09-12-20 @ 05:26)  Source: .Blood None  Preliminary Report (09-13-20 @ 12:01):    No growth to date.    Culture - Urine (collected 09-11-20 @ 17:00)  Source: .Urine Clean Catch (Midstream)  Final Report (09-14-20 @ 18:12):    >100,000 CFU/ml Klebsiella pneumoniae ESBL  Organism: Klebsiella pneumoniae ESBL (09-14-20 @ 18:12)  Organism: Klebsiella pneumoniae ESBL (09-14-20 @ 18:12)      -  Amikacin: S <=16      -  Amoxicillin/Clavulanic Acid: I 16/8      -  Ampicillin: R >16 These ampicillin results predict results for amoxicillin      -  Ampicillin/Sulbactam: I 16/8 Enterobacter, Citrobacter, and Serratia may develop resistance during prolonged therapy (3-4 days)      -  Aztreonam: R 16      -  Cefazolin: R >16 (MIC_CL_COM_ENTERIC_CEFAZU) For uncomplicated UTI with K. pneumoniae, E. coli, or P. mirablis: CECILIO <=16 is sensitive and CECILIO >=32 is resistant. This also predicts results for oral agents cefaclor, cefdinir, cefpodoxime, cefprozil, cefuroxime axetil, cephalexin and locarbef for uncomplicated UTI. Note that some isolates may be susceptible to these agents while testing resistant to cefazolin.      -  Cefepime: R >16      -  Cefotaxime: R >32      -  Cefoxitin: S <=8      -  Ceftazidime: R 8      -  Ceftriaxone: R >32 Enterobacter, Citrobacter, and Serratia may develop resistance during prolonged therapy      -  Cefuroxime: R >16      -  Ciprofloxacin: R >2      -  Ertapenem: S <=0.5      -  Gentamicin: R >8      -  Imipenem: S <=1      -  Levofloxacin: S <=0.5      -  Meropenem: S <=1      -  Minocycline: S <=4      -  Nitrofurantoin: I 64 Should not be used to treat pyelonephritis      -  Piperacillin/Tazobactam: S <=8      -  Tigecycline: S <=2      -  Tobramycin: R >8      -  Trimethoprim/Sulfamethoxazole: R >2/38      Method Type: CECILIO    Culture - Blood (collected 09-11-20 @ 10:27)  Source: .Blood Blood  Gram Stain (09-12-20 @ 13:07):    Growth in anaerobic bottle: Gram Positive Cocci in Clusters  Final Report (09-13-20 @ 10:30):    Growth in anaerobic bottle: Staphylococcus hominis    Coag Negative Staphylococcus    Single set isolate, possible contaminant. Contact    Microbiology if susceptibility testing clinically    indicated.    "Due to technical problems, Proteus sp. will Not be reported as part of    the BCID panel until further notice" ***Blood Panel PCR results on this    specimen are available    approximately 3 hours after the Gram stain result.***    Gram stain, PCR, and/or culture results may not always    correspond due to difference in methodologies.    ************************************************************    This PCR assay was performed using [employer].    The following targets are tested for: Enterococcus,    vancomycin resistant enterococci, Listeria monocytogenes,    coagulase negative staphylococci, S. aureus,    methicillin resistant S. aureus, Streptococcus agalactiae    (Group B), S. pneumoniae, S. pyogenes (Group A),    Acinetobacter baumannii, Enterobacter cloacae, E. coli,    Klebsiella oxytoca, K. pneumoniae, Proteus sp.,    Serratia marcescens, Haemophilus influenzae,    Neisseria meningitidis, Pseudomonas aeruginosa, Candida    albicans, C. glabrata, C krusei, C parapsilosis,    C. tropicalis and the KPC resistance gene.  Organism: Blood Culture PCR (09-13-20 @ 10:30)  Organism: Blood Culture PCR (09-13-20 @ 10:30)      -  Coagulase negative Staphylococcus: Detec      Method Type: PCR    Culture - Blood (collected 09-11-20 @ 10:27)  Source: .Blood Blood  Preliminary Report (09-12-20 @ 18:01):    No growth to date.      Medications:  acetaminophen   Tablet .. 650 milliGRAM(s) Oral every 6 hours PRN  aluminum hydroxide/magnesium hydroxide/simethicone Suspension 30 milliLiter(s) Oral every 4 hours PRN  apixaban 5 milliGRAM(s) Oral every 12 hours  atorvastatin 10 milliGRAM(s) Oral at bedtime  buPROPion XL . 300 milliGRAM(s) Oral daily  chlorhexidine 4% Liquid 1 Application(s) Topical <User Schedule>  cholecalciferol 5000 Unit(s) Oral daily  famotidine    Tablet 20 milliGRAM(s) Oral daily  ferrous    sulfate 325 milliGRAM(s) Oral daily  gabapentin 200 milliGRAM(s) Oral every 8 hours  influenza   Vaccine 0.5 milliLiter(s) IntraMuscular once  melatonin 5 milliGRAM(s) Oral at bedtime  metoprolol succinate ER 25 milliGRAM(s) Oral daily  mirtazapine 7.5 milliGRAM(s) Oral at bedtime  morphine  - Injectable 2 milliGRAM(s) IV Push every 8 hours PRN  NIFEdipine XL 90 milliGRAM(s) Oral daily  rOPINIRole 0.5 milliGRAM(s) Oral at bedtime  senna 2 Tablet(s) Oral at bedtime  sertraline 150 milliGRAM(s) Oral daily  vancomycin    Solution 125 milliGRAM(s) Oral every 6 hours        Assessment and Plan:  Patient is a 70 y/o  Male with a PMH of bladder ca, DM, A-fib (Eliquis), and CABG who presents to the hospital with a c/o lower ABD pain for the past x 2 weeks. Associated with N/V/D that is non-black/bloody. Of note, the pt recently had TURBT performed by Dr. Jo and completed ABXs course. In the ED, seen  by urology, no acute surgical intervention. CT-AP w/ IV contrast was negative. Patient was admitted to medical unit with the following course of therapy:     #Acute suprapubic Pain after TURBT for bladder Ca: likely due to acute UTI  #Cystitis:  initially started Empiric cefepime 1g q 24 and Vanco (given h/o instrumentation). But eventually took abx off per id d/t c diff colitis.  - Urology: No acute urological intervention at this time, F/U OP with Dr. Jo with 1 week   Urine culture grew Klebsiella ESBL which may be a colonizer as per ID?     #C Diff colitis   - hx of abx use for TURBT  PO Vanco 125 q6  d/gama vanc/cefepime.   -no further diarrhea episodes.    # Persistent leukocytosis- ID reporting that there is no active infection, besides C diff- on PO vanco , pt. is not on any IV abx. at present time  - obtain ESR, CRP  -? source is foot OM- obtain MRI of right foot  -consult Hematology to give us further etiology on persistent leukocytosis    #Diabetes mellitus with hyperglycemia  - Hg A1C- 6.6  - insulin PRN  - FSG AC qHS     #Right Chronic DFU/ s/p Rt TMA  Now TMA Stump Xr s/o Osteomyelitis. Podiatry recommended outpatient f/up for further management.   daily dressing changes per podiatry.   -since there is a persistent leukocytosis, i wound like to obtain MRI of right foot to r/o remaining acute OM.    #Chronic Afib  - currently rate-controlled  on eliquis    #Hypomagnesemia  replete prn    #Liver Lesion   - found on CT Ab  -per son he had some sort of scan in the past showing a lesion in the liver > 20 years ago but was told it was benign at the time  -US liver here could not visualize the node. Outpatient surveillance  Probable fatty liver noted.     #HTN- continue current medical regimen.     #Progress Note Handoff: obtain MRI of right foot to r/o acute OM, obtain ESR, CRP, consult Hematology to further give us possible diff on leukocytosis etiology, repeat urine analysis and urine cxs  Family discussion: medical plan of tx. d/w pt. by bedside  Disposition: Home__once medically stable

## 2020-09-16 NOTE — PROGRESS NOTE ADULT - SUBJECTIVE AND OBJECTIVE BOX
PRIYANK WESLEY  69y, Male  Allergy: No Known Allergies      LOS  5d    CHIEF COMPLAINT: diarrhea (16 Sep 2020 15:37)      INTERVAL EVENTS/HPI  - No acute events overnight, diarrhea improved  - T(F): , Max: 99 (09-15-20 @ 21:30)  - Denies any worsening symptoms  - Tolerating medication  - WBC Count: 17.14 (09-16-20 @ 07:08)  WBC Count: 18.22 (09-15-20 @ 05:39)  - Creatinine, Serum: 0.9 (09-16-20 @ 07:08)  Creatinine, Serum: 0.9 (09-15-20 @ 05:39)       ROS  General: Denies rigors, nightsweats  HEENT: Denies headache, rhinorrhea, sore throat, eye pain  CV: Denies CP, palpitations  PULM: Denies wheezing, hemoptysis  GI: Denies hematemesis, hematochezia, melena  : Denies discharge, hematuria  MSK: Denies arthralgias, myalgias  SKIN: Denies rash, lesions  NEURO: Denies paresthesias, weakness  PSYCH: Denies depression, anxiety    VITALS:  T(F): 98, Max: 99 (09-15-20 @ 21:30)  HR: 89  BP: 123/79  RR: 17Vital Signs Last 24 Hrs  T(C): 36.7 (16 Sep 2020 12:43), Max: 37.2 (15 Sep 2020 21:30)  T(F): 98 (16 Sep 2020 12:43), Max: 99 (15 Sep 2020 21:30)  HR: 89 (16 Sep 2020 13:02) (78 - 111)  BP: 123/79 (16 Sep 2020 12:43) (123/79 - 220/97)  BP(mean): --  RR: 17 (16 Sep 2020 12:43) (17 - 18)  SpO2: 98% (16 Sep 2020 08:24) (97% - 98%)    PHYSICAL EXAM:  Gen: NAD, resting in bed  HEENT: Normocephalic, atraumatic  Neck: supple, no lymphadenopathy  CV: Regular rate & regular rhythm  Lungs: decreased BS at bases, no fremitus  Abdomen: Soft, BS present  Ext: Warm, well perfused  Neuro: non focal, awake  Skin: no rash, no erythema  Lines: no phlebitis    FH: Non-contributory  Social Hx: Non-contributory    TESTS & MEASUREMENTS:                        13.1   17.14 )-----------( 337      ( 16 Sep 2020 07:08 )             41.5     09-16    137  |  98  |  15  ----------------------------<  186<H>  3.4<L>   |  24  |  0.9    Ca    9.8      16 Sep 2020 07:08    TPro  6.7  /  Alb  3.9  /  TBili  0.4  /  DBili  x   /  AST  18  /  ALT  13  /  AlkPhos  61  09-16    eGFR if Non African American: 88 mL/min/1.73M2 (09-16-20 @ 07:08)  eGFR if : 102 mL/min/1.73M2 (09-16-20 @ 07:08)    LIVER FUNCTIONS - ( 16 Sep 2020 07:08 )  Alb: 3.9 g/dL / Pro: 6.7 g/dL / ALK PHOS: 61 U/L / ALT: 13 U/L / AST: 18 U/L / GGT: x               Culture - Blood (collected 09-12-20 @ 18:14)  Source: .Blood None  Preliminary Report (09-14-20 @ 01:02):    No growth to date.    Culture - Blood (collected 09-12-20 @ 05:26)  Source: .Blood None  Preliminary Report (09-13-20 @ 12:01):    No growth to date.    Culture - Urine (collected 09-11-20 @ 17:00)  Source: .Urine Clean Catch (Midstream)  Final Report (09-14-20 @ 18:12):    >100,000 CFU/ml Klebsiella pneumoniae ESBL  Organism: Klebsiella pneumoniae ESBL (09-14-20 @ 18:12)  Organism: Klebsiella pneumoniae ESBL (09-14-20 @ 18:12)      -  Amikacin: S <=16      -  Amoxicillin/Clavulanic Acid: I 16/8      -  Ampicillin: R >16 These ampicillin results predict results for amoxicillin      -  Ampicillin/Sulbactam: I 16/8 Enterobacter, Citrobacter, and Serratia may develop resistance during prolonged therapy (3-4 days)      -  Aztreonam: R 16      -  Cefazolin: R >16 (MIC_CL_COM_ENTERIC_CEFAZU) For uncomplicated UTI with K. pneumoniae, E. coli, or P. mirablis: CECILIO <=16 is sensitive and CECILIO >=32 is resistant. This also predicts results for oral agents cefaclor, cefdinir, cefpodoxime, cefprozil, cefuroxime axetil, cephalexin and locarbef for uncomplicated UTI. Note that some isolates may be susceptible to these agents while testing resistant to cefazolin.      -  Cefepime: R >16      -  Cefotaxime: R >32      -  Cefoxitin: S <=8      -  Ceftazidime: R 8      -  Ceftriaxone: R >32 Enterobacter, Citrobacter, and Serratia may develop resistance during prolonged therapy      -  Cefuroxime: R >16      -  Ciprofloxacin: R >2      -  Ertapenem: S <=0.5      -  Gentamicin: R >8      -  Imipenem: S <=1      -  Levofloxacin: S <=0.5      -  Meropenem: S <=1      -  Minocycline: S <=4      -  Nitrofurantoin: I 64 Should not be used to treat pyelonephritis      -  Piperacillin/Tazobactam: S <=8      -  Tigecycline: S <=2      -  Tobramycin: R >8      -  Trimethoprim/Sulfamethoxazole: R >2/38      Method Type: CECILIO    Culture - Blood (collected 09-11-20 @ 10:27)  Source: .Blood Blood  Gram Stain (09-12-20 @ 13:07):    Growth in anaerobic bottle: Gram Positive Cocci in Clusters  Final Report (09-13-20 @ 10:30):    Growth in anaerobic bottle: Staphylococcus hominis    Coag Negative Staphylococcus    Single set isolate, possible contaminant. Contact    Microbiology if susceptibility testing clinically    indicated.    "Due to technical problems, Proteus sp. will Not be reported as part of    the BCID panel until further notice" ***Blood Panel PCR results on this    specimen are available    approximately 3 hours after the Gram stain result.***    Gram stain, PCR, and/or culture results may not always    correspond due to difference in methodologies.    ************************************************************    This PCR assay was performed using [employer].    The following targets are tested for: Enterococcus,    vancomycin resistant enterococci, Listeria monocytogenes,    coagulase negative staphylococci, S. aureus,    methicillin resistant S. aureus, Streptococcus agalactiae    (Group B), S. pneumoniae, S. pyogenes (Group A),    Acinetobacter baumannii, Enterobacter cloacae, E. coli,    Klebsiella oxytoca, K. pneumoniae, Proteus sp.,    Serratia marcescens, Haemophilus influenzae,    Neisseria meningitidis, Pseudomonas aeruginosa, Candida    albicans, C. glabrata, C krusei, C parapsilosis,    C. tropicalis and the KPC resistance gene.  Organism: Blood Culture PCR (09-13-20 @ 10:30)  Organism: Blood Culture PCR (09-13-20 @ 10:30)      -  Coagulase negative Staphylococcus: Detec      Method Type: PCR    Culture - Blood (collected 09-11-20 @ 10:27)  Source: .Blood Blood  Preliminary Report (09-12-20 @ 18:01):    No growth to date.        Lactate, Blood: 1.1 mmol/L (09-14-20 @ 06:24)  Lactate, Blood: 1.3 mmol/L (09-13-20 @ 09:17)      INFECTIOUS DISEASES TESTING  COVID-19 PCR: NotDetec (09-11-20 @ 17:50)      INFLAMMATORY MARKERS      RADIOLOGY & ADDITIONAL TESTS:  I have personally reviewed the last available Chest xray  CXR      CT      CARDIOLOGY TESTING      MEDICATIONS  apixaban 5 Oral every 12 hours  atorvastatin 10 Oral at bedtime  buPROPion XL . 300 Oral daily  chlorhexidine 4% Liquid 1 Topical <User Schedule>  cholecalciferol 5000 Oral daily  famotidine    Tablet 20 Oral daily  ferrous    sulfate 325 Oral daily  gabapentin 200 Oral every 8 hours  influenza   Vaccine 0.5 IntraMuscular once  melatonin 5 Oral at bedtime  metoprolol succinate ER 25 Oral daily  mirtazapine 7.5 Oral at bedtime  NIFEdipine XL 90 Oral daily  rOPINIRole 0.5 Oral at bedtime  senna 2 Oral at bedtime  sertraline 150 Oral daily  vancomycin    Solution 125 Oral every 6 hours      WEIGHT  Weight (kg): 96.9 (09-13-20 @ 14:11)  Creatinine, Serum: 0.9 mg/dL (09-16-20 @ 07:08)      ANTIBIOTICS:  vancomycin    Solution 125 milliGRAM(s) Oral every 6 hours      All available historical records have been reviewed

## 2020-09-16 NOTE — CHART NOTE - NSCHARTNOTEFT_GEN_A_CORE
The patient's blood pressure is consistently high. The patient does not demonstrates any signs of end organ damage.  Will push a second dose of Hydralazine 10mg IV.   Consider adding a new agent to his BP regimen ( a diuretic) or Increasing the dose of the existing medications ( maximum recommended dose).

## 2020-09-16 NOTE — DISCHARGE NOTE PROVIDER - CARE PROVIDERS DIRECT ADDRESSES
[email or web address]

## 2020-09-16 NOTE — PROGRESS NOTE ADULT - ASSESSMENT
ASSESSMENT  70 YO M with a PMH of bladder ca, DM, A-fib (Eliquis), and CABG who presents to the hospital with a c/o lower ABD pain for the past x 2 weeks, recent TURBT and antibiotics. Found to have Cdiff      IMPRESSION  #Cdiff diarrhea  #9/11 BCX 1/4 bottles coNS, contaminant     repeat BCX NG  #Pyuria but asymptomatic in the setting of SPC and obstruction    UCX   >100,000 CFU/ml Klebsiella pneumoniae ESBL  #Obesity BMI (kg/m2): 32.1  #DM     RECOMMENDATIONS  - PO vancomycin 125mg q6h x 10 days    If any questions, please call or send a message on [employer]  Spectra [phone]   ASSESSMENT  68 YO M with a PMH of bladder ca, DM, A-fib (Eliquis), and CABG who presents to the hospital with a c/o lower ABD pain for the past x 2 weeks, recent TURBT and antibiotics. Found to have Cdiff      IMPRESSION  #Cdiff diarrhea  #9/11 BCX 1/4 bottles coNS, contaminant     repeat BCX NG  #Pyuria but asymptomatic    UCX   >100,000 CFU/ml Klebsiella pneumoniae ESBL  #Obesity BMI (kg/m2): 32.1  #DM     RECOMMENDATIONS  - PO vancomycin 125mg q6h x 10 days    If any questions, please call or send a message on [employer]  Spectra [phone]

## 2020-09-16 NOTE — CHART NOTE - NSCHARTNOTEFT_GEN_A_CORE
I was notified by the RN that the patient had 3 bowel movements.     The patient endorses 3 BMs that are loose, non-bloody, non-mucoid, associated with abdominal pain. The patient has been C.diff positive since 09/12/2020 and has been on Vanco since the 09/13/2020.    Vitals: 123/80, HR:111 bpm, repeat HR:88.  Crea in a.m was 0.8.   PE: Abdomen: soft, mild diffuse tenderness, no rigidity appreciated on PE.     Will follow up the patient clinically, if his pain becomes severe, abdomen distended or rigid, diarrhea worsens --> Will send to CT scan urgently and get surgery on board and escalate with Antibiotics.   Follow up a.m WBCs and Creat.

## 2020-09-16 NOTE — DISCHARGE NOTE PROVIDER - CARE PROVIDER_API CALL
Petar Rosales (DO)  Hematology; Internal Medicine; Medical Oncology  [address] [place] [address]  Phone: [phone]  Fax: [phone]  Follow Up Time: 1 week    Brandon Sal  PODIATRIC MEDICINE  [address] [place] [address]  Phone: [phone]  Fax: [phone]  Follow Up Time: 1 week    Vera Jo  UROLOGY  [address], [address]  [place] [address]  Phone: [phone]  Fax: [phone]  Follow Up Time: 1 week

## 2020-09-17 NOTE — CONSULT NOTE ADULT - SUBJECTIVE AND OBJECTIVE BOX
Patient is a 69y old  Male who presents with a chief complaint of Cystitis (17 Sep 2020 10:54)    HPI: 68 YO M with a PMH of bladder ca T1 staging, stroke, DM, A-fib (Eliquis), and CABG who presents to the hospital with a c/o lower ABD pain for the past x 2 weeks. Associated with N/V/D that is non-black/bloody. Of note, the pt recently had TURBT performed by Dr. Jo and completed ABXs course.     Heme/ Onc was consulted for persistently elevated WBC. Pt has been on PO vancomycin since 9/12 with continuously  increasing WBC. Patient reports 1 mostly formed BM this morning. Denies abdominal pain, chills and fevers Endorses 12 lbs intentional weight loss this past year. Pt reports that he has had elevated weight counts since the age of 15. Review of labs from [facility] shows persistently elevated WBC dating back to 2017. Pt denies foot pain, urinary incontinence dysuria and increase in frequency.    PAST MEDICAL & SURGICAL HISTORY:  Afib  rate controlled currently    Hematuria    Bladder tumor    History of diabetic neuropathy    CAD (coronary artery disease) of bypass graft  cabg 1995 3v    Glaucoma    Stroke    Arthritis    GERD (gastroesophageal reflux disease)    HTN (hypertension)    Depression    DM (diabetes mellitus)    S/P lumbar laminectomy  with repair of CSF leak using dural graft 7/8/2018 - dr Crenshaw    Malfunction of intrathecal infusion pump  implanted initially in 1996  reimplanted 2006  removed 6/27/2018    Foot amputation status, right  partial foot amputation  tarsals and metatarsals of right foot    S/P laparoscopic cholecystectomy    CAD (coronary artery disease) of bypass graft    SOCIAL HISTORY: 6 year pack history quit many years ago. Denies alcohol use. Endorses daily marijuana use without tobacco.     FAMILY HISTORY:  CAD (coronary artery disease) (Father)        MEDICATIONS  (STANDING):  apixaban 5 milliGRAM(s) Oral every 12 hours  atorvastatin 10 milliGRAM(s) Oral at bedtime  buPROPion XL . 300 milliGRAM(s) Oral daily  chlorhexidine 4% Liquid 1 Application(s) Topical <User Schedule>  cholecalciferol 5000 Unit(s) Oral daily  famotidine    Tablet 20 milliGRAM(s) Oral daily  ferrous    sulfate 325 milliGRAM(s) Oral daily  gabapentin 200 milliGRAM(s) Oral every 8 hours  influenza   Vaccine 0.5 milliLiter(s) IntraMuscular once  labetalol 200 milliGRAM(s) Oral every 12 hours  lisinopril 40 milliGRAM(s) Oral daily  melatonin 5 milliGRAM(s) Oral at bedtime  mirtazapine 7.5 milliGRAM(s) Oral at bedtime  NIFEdipine XL 90 milliGRAM(s) Oral daily  rOPINIRole 0.5 milliGRAM(s) Oral at bedtime  sertraline 150 milliGRAM(s) Oral daily  vancomycin    Solution 125 milliGRAM(s) Oral every 6 hours    MEDICATIONS  (PRN):  acetaminophen   Tablet .. 650 milliGRAM(s) Oral every 6 hours PRN Mild Pain (1 - 3)  aluminum hydroxide/magnesium hydroxide/simethicone Suspension 30 milliLiter(s) Oral every 4 hours PRN Dyspepsia  traMADol 50 milliGRAM(s) Oral every 8 hours PRN Severe Pain (7 - 10)      Allergies    No Known Allergies      Vital Signs Last 24 Hrs  T(C): 36.9 (17 Sep 2020 05:20), Max: 37.2 (16 Sep 2020 21:30)  T(F): 98.5 (17 Sep 2020 05:20), Max: 99 (16 Sep 2020 21:30)  HR: 83 (17 Sep 2020 05:20) (80 - 111)  BP: 160/85 (17 Sep 2020 06:00) (123/79 - 194/86)  BP(mean): --  RR: 18 (17 Sep 2020 05:20) (17 - 18)  SpO2: 99% (17 Sep 2020 08:22) (98% - 99%)    PHYSICAL EXAM  General: NAD  HEENT: NCAT, EOMI  Neck: supple  CV: normal S1/S2 , RRR  Lungs: Clear to ascultation bilaterally, no respiratory distress  Abdomen: soft, non-distended, tenderness to deep palpation above the pubic bone  Ext: No edema, L LE adequately warm but difficult to palpate DP and PT on L. R foot exam was limited.     Skin: no rashes and no petechiae  Neuro: alert and oriented X 4, no focal deficits      LABS:                          13.1   24.56 )-----------( 389      ( 17 Sep 2020 06:48 )             41.3         Mean Cell Volume : 87.7 fL  Mean Cell Hemoglobin : 27.8 pg  Mean Cell Hemoglobin Concentration : 31.7 g/dL  Auto Neutrophil # : 18.28 K/uL  Auto Lymphocyte # : 4.37 K/uL  Auto Monocyte # : 1.48 K/uL  Auto Eosinophil # : 0.13 K/uL  Auto Basophil # : 0.11 K/uL  Auto Neutrophil % : 74.5 %  Auto Lymphocyte % : 17.8 %  Auto Monocyte % : 6.0 %  Auto Eosinophil % : 0.5 %  Auto Basophil % : 0.4 %      Serial CBC's  09-17 @ 06:48  Hct-41.3 / Hgb-13.1 / Plat-389 / RBC-4.71 / WBC-24.56  Serial CBC's  09-16 @ 07:08  Hct-41.5 / Hgb-13.1 / Plat-337 / RBC-4.83 / WBC-17.14  Serial CBC's  09-15 @ 05:39  Hct-39.1 / Hgb-12.4 / Plat-358 / RBC-4.52 / WBC-18.22  Serial CBC's  09-14 @ 06:24  Hct-40.1 / Hgb-12.7 / Plat-377 / RBC-4.58 / WBC-15.04      09-17    144  |  102  |  19  ----------------------------<  115<H>  4.5   |  28  |  1.0    Ca    9.9      17 Sep 2020 06:48    TPro  6.8  /  Alb  4.1  /  TBili  0.5  /  DBili  x   /  AST  16  /  ALT  14  /  AlkPhos  64  09-17        RADIOLOGY & ADDITIONAL STUDIES:  < from: CT Abdomen and Pelvis w/ Oral Cont and w/ IV Cont (09.11.20 @ 14:59) >    There is a 2 cm low density area in the right lobe of the liver contiguous to the gallbladder bed.Presumably representing focal fatty infiltration. Further evaluation with ultrasound suggested.  The spleen pancreas kidneys and adrenal glands appear normal.  There is no ascites.  There is no mesenteric retroperitoneal or pelvic lymphadenopathy.  The bowel pattern appears normal. There is no free air.  The bony structures appear intact.    IMPRESSION:  No acute process seen.  Low Density lesion right lobe liver further evaluation ultrasound suggested.    < end of copied text >      < from: US Abdomen Limited (09.12.20 @ 12:45) >  Liver: Increased echogenicity. The low-density lesion within the right hepatic lobe noted on recent CT is not definitely identified and therefore not well evaluated on this exam.  Bile ducts: Normal caliber. Common bile duct measures 6 mm.  Gallbladder: Cholecystectomy.  Pancreas: Visualized portions are within normal limits.  Right kidney: 11 cm. No hydronephrosis.  Ascites: None.  IVC: Visualized portions are within normal limits.    IMPRESSION:    Increased hepatic echogenicity. Differential includes hepatic steatosis or hepatocellular disease    The low-density lesion within the right hepatic lobe noted on recent CT is not definitely identified and therefore not well evaluated on this exam.    < end of copied text >    < from: Xray Foot AP + Lateral + Oblique, Right (09.14.20 @ 14:59) >  FINDINGS:    Status post trans-mid metatarsal amputation. Soft tissue ulceration at the forefoot stump present. Periosteal reaction present at the tips of the second and fourth metatarsal stump. Patchy osteopenia. No acute displaced fractures or dislocation.    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    IMPRESSION:    Status post transmetatarsal amputation with persistent soft tissue ulcer at stump    Second and fourth metatarsal stump periosteal reaction suggestive of recurrent/residual osteomyelitis. Consider MRI of the forefoot to evaluate extent of disease.    < end of copied text >   Patient is a 69y old  Male who presents with a chief complaint of Cystitis (17 Sep 2020 10:54)    HPI: 70 YO M with a PMH of bladder ca T1 staging, stroke, DM, A-fib (Eliquis), neuropathy, foot ulcer with partial foot amputation and CABG who presents to the hospital with a c/o lower ABD pain for the past x 2 weeks. Associated with N/V/D that is non-black/bloody. Of note, the pt recently had TURBT performed by Dr. Jo and completed ABXs course.     Heme/ Onc was consulted for persistently elevated WBC. Pt has been on PO vancomycin since 9/12 with continuously increasing WBC. Patient continues to have diarrhea with 4 loose BM. Denies abdominal pain, chills and fevers. Endorses 12 lbs intentional weight loss this past year. Pt reports that he has had elevated WBC counts since the age of 15. Review of labs from [facility] shows persistently elevated WBC dating back to 2017.     PAST MEDICAL & SURGICAL HISTORY:  Afib  rate controlled currently    Hematuria    Bladder tumor    History of diabetic neuropathy    CAD (coronary artery disease) of bypass graft  cabg 1995 3v    Glaucoma    Stroke    Arthritis    GERD (gastroesophageal reflux disease)    HTN (hypertension)    Depression    DM (diabetes mellitus)    S/P lumbar laminectomy  with repair of CSF leak using dural graft 7/8/2018 - dr Crenshaw    Malfunction of intrathecal infusion pump  implanted initially in 1996  reimplanted 2006  removed 6/27/2018    Foot amputation status, right  partial foot amputation  tarsals and metatarsals of right foot    S/P laparoscopic cholecystectomy    CAD (coronary artery disease) of bypass graft    SOCIAL HISTORY: 6 year pack history quit many years ago. Denies alcohol use. Endorses daily marijuana use without tobacco.     FAMILY HISTORY:  CAD (coronary artery disease) (Father)        MEDICATIONS  (STANDING):  apixaban 5 milliGRAM(s) Oral every 12 hours  atorvastatin 10 milliGRAM(s) Oral at bedtime  buPROPion XL . 300 milliGRAM(s) Oral daily  chlorhexidine 4% Liquid 1 Application(s) Topical <User Schedule>  cholecalciferol 5000 Unit(s) Oral daily  famotidine    Tablet 20 milliGRAM(s) Oral daily  ferrous    sulfate 325 milliGRAM(s) Oral daily  gabapentin 200 milliGRAM(s) Oral every 8 hours  influenza   Vaccine 0.5 milliLiter(s) IntraMuscular once  labetalol 200 milliGRAM(s) Oral every 12 hours  lisinopril 40 milliGRAM(s) Oral daily  melatonin 5 milliGRAM(s) Oral at bedtime  mirtazapine 7.5 milliGRAM(s) Oral at bedtime  NIFEdipine XL 90 milliGRAM(s) Oral daily  rOPINIRole 0.5 milliGRAM(s) Oral at bedtime  sertraline 150 milliGRAM(s) Oral daily  vancomycin    Solution 125 milliGRAM(s) Oral every 6 hours    MEDICATIONS  (PRN):  acetaminophen   Tablet .. 650 milliGRAM(s) Oral every 6 hours PRN Mild Pain (1 - 3)  aluminum hydroxide/magnesium hydroxide/simethicone Suspension 30 milliLiter(s) Oral every 4 hours PRN Dyspepsia  traMADol 50 milliGRAM(s) Oral every 8 hours PRN Severe Pain (7 - 10)      Allergies    No Known Allergies      Vital Signs Last 24 Hrs  T(C): 36.9 (17 Sep 2020 05:20), Max: 37.2 (16 Sep 2020 21:30)  T(F): 98.5 (17 Sep 2020 05:20), Max: 99 (16 Sep 2020 21:30)  HR: 83 (17 Sep 2020 05:20) (80 - 111)  BP: 160/85 (17 Sep 2020 06:00) (123/79 - 194/86)  BP(mean): --  RR: 18 (17 Sep 2020 05:20) (17 - 18)  SpO2: 99% (17 Sep 2020 08:22) (98% - 99%)    PHYSICAL EXAM  General: NAD  HEENT: NCAT, EOMI  Neck: supple, no lymphadenopathy   CV: normal S1/S2 , RRR  Lungs: Clear to ascultation bilaterally, no respiratory distress  Abdomen: soft, non-distended, suprapubic tenderness to deep palpation   Ext: No edema, L LE adequately warm but difficult to palpate DP and PT on L. R foot exam was limited.     Skin: no rashes and no petechiae  Neuro: alert and oriented X 4, no focal deficits      LABS:                          13.1   24.56 )-----------( 389      ( 17 Sep 2020 06:48 )             41.3         Mean Cell Volume : 87.7 fL  Mean Cell Hemoglobin : 27.8 pg  Mean Cell Hemoglobin Concentration : 31.7 g/dL  Auto Neutrophil # : 18.28 K/uL  Auto Lymphocyte # : 4.37 K/uL  Auto Monocyte # : 1.48 K/uL  Auto Eosinophil # : 0.13 K/uL  Auto Basophil # : 0.11 K/uL  Auto Neutrophil % : 74.5 %  Auto Lymphocyte % : 17.8 %  Auto Monocyte % : 6.0 %  Auto Eosinophil % : 0.5 %  Auto Basophil % : 0.4 %      Serial CBC's  09-17 @ 06:48  Hct-41.3 / Hgb-13.1 / Plat-389 / RBC-4.71 / WBC-24.56  Serial CBC's  09-16 @ 07:08  Hct-41.5 / Hgb-13.1 / Plat-337 / RBC-4.83 / WBC-17.14  Serial CBC's  09-15 @ 05:39  Hct-39.1 / Hgb-12.4 / Plat-358 / RBC-4.52 / WBC-18.22  Serial CBC's  09-14 @ 06:24  Hct-40.1 / Hgb-12.7 / Plat-377 / RBC-4.58 / WBC-15.04      09-17    144  |  102  |  19  ----------------------------<  115<H>  4.5   |  28  |  1.0    Ca    9.9      17 Sep 2020 06:48    TPro  6.8  /  Alb  4.1  /  TBili  0.5  /  DBili  x   /  AST  16  /  ALT  14  /  AlkPhos  64  09-17    PERIPHERAL BLOOD SMEAR:  WBC: negative for blasts, positive for dysplastic cells, normal granulocytes morphology, negative for basophiles, negative for eosinophils.  RBC: rouleaux cells present (likely due to infection process).  Platelets: estimated count 800-900K, positive for large platelets.       RADIOLOGY & ADDITIONAL STUDIES:  < from: CT Abdomen and Pelvis w/ Oral Cont and w/ IV Cont (09.11.20 @ 14:59) >    There is a 2 cm low density area in the right lobe of the liver contiguous to the gallbladder bed.Presumably representing focal fatty infiltration. Further evaluation with ultrasound suggested.  The spleen pancreas kidneys and adrenal glands appear normal.  There is no ascites.  There is no mesenteric retroperitoneal or pelvic lymphadenopathy.  The bowel pattern appears normal. There is no free air.  The bony structures appear intact.    IMPRESSION:  No acute process seen.  Low Density lesion right lobe liver further evaluation ultrasound suggested.    < end of copied text >      < from: US Abdomen Limited (09.12.20 @ 12:45) >  Liver: Increased echogenicity. The low-density lesion within the right hepatic lobe noted on recent CT is not definitely identified and therefore not well evaluated on this exam.  Bile ducts: Normal caliber. Common bile duct measures 6 mm.  Gallbladder: Cholecystectomy.  Pancreas: Visualized portions are within normal limits.  Right kidney: 11 cm. No hydronephrosis.  Ascites: None.  IVC: Visualized portions are within normal limits.    IMPRESSION:    Increased hepatic echogenicity. Differential includes hepatic steatosis or hepatocellular disease    The low-density lesion within the right hepatic lobe noted on recent CT is not definitely identified and therefore not well evaluated on this exam.    < end of copied text >    < from: Xray Foot AP + Lateral + Oblique, Right (09.14.20 @ 14:59) >  FINDINGS:    Status post trans-mid metatarsal amputation. Soft tissue ulceration at the forefoot stump present. Periosteal reaction present at the tips of the second and fourth metatarsal stump. Patchy osteopenia. No acute displaced fractures or dislocation.    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    IMPRESSION:    Status post transmetatarsal amputation with persistent soft tissue ulcer at stump    Second and fourth metatarsal stump periosteal reaction suggestive of recurrent/residual osteomyelitis. Consider MRI of the forefoot to evaluate extent of disease.    < end of copied text >

## 2020-09-17 NOTE — CONSULT NOTE ADULT - ATTENDING COMMENTS
seen/examined w/ hemonc team  note reviewed; plan discussed    need to r/o MPN/MPS  =flow cytometry  -BCRABL/ and JAK2  ESR, CRP, DYLAN, RF  Iron, tibc, ferriitin

## 2020-09-17 NOTE — PROGRESS NOTE ADULT - ASSESSMENT
ASSESSMENT  70 YO M with a PMH of bladder ca, DM, A-fib (Eliquis), and CABG who presents to the hospital with a c/o lower ABD pain for the past x 2 weeks, recent TURBT and antibiotics. Found to have Cdiff      IMPRESSION  #Cdiff diarrhea  #9/11 BCX 1/4 bottles coNS, contaminant     repeat BCX NG  #Pyuria but asymptomatic in the setting of SPC and obstruction    UCX   >100,000 CFU/ml Klebsiella pneumoniae ESBL  #Right 2nd MPJ plantar ulcer;  < from: Xray Foot AP + Lateral + Oblique, Right (09.14.20 @ 14:59) >  Status post transmetatarsal amputation with persistent soft tissue ulcer at stump  Second and fourth metatarsal stump periosteal reaction suggestive of recurrent/residual osteomyelitis. Consider MRI of the forefoot to evaluate extent of disease.  Sedimentation Rate, Erythrocyte: 38 mm/Hr (09-17-20 @ 09:35)  #Obesity BMI (kg/m2): 32.1  #DM     RECOMMENDATIONS  - Trend WBC  - f/u repeat CX  - PO vancomycin 125mg q6h x 10 days  - if fever, start meropenem   - foot ulcer clean appearing, doubt acute OM    If any questions, please call or send a message on [employer] Teams  Spectra [phone]   ASSESSMENT  70 YO M with a PMH of bladder ca, DM, A-fib (Eliquis), and CABG who presents to the hospital with a c/o lower ABD pain for the past x 2 weeks, recent TURBT and antibiotics. Found to have Cdiff      IMPRESSION  #Cdiff diarrhea  #9/11 BCX 1/4 bottles coNS, contaminant     repeat BCX NG  #Pyuria but asymptomatic    UCX   >100,000 CFU/ml Klebsiella pneumoniae ESBL  #Right 2nd MPJ plantar ulcer;  < from: Xray Foot AP + Lateral + Oblique, Right (09.14.20 @ 14:59) >  Status post transmetatarsal amputation with persistent soft tissue ulcer at stump  Second and fourth metatarsal stump periosteal reaction suggestive of recurrent/residual osteomyelitis. Consider MRI of the forefoot to evaluate extent of disease.  Sedimentation Rate, Erythrocyte: 38 mm/Hr (09-17-20 @ 09:35)  #Obesity BMI (kg/m2): 32.1  #DM     RECOMMENDATIONS  - Trend WBC  - f/u repeat CX  - PO vancomycin 125mg q6h x 10 days  - if fever, start meropenem   - foot ulcer clean appearing, doubt acute OM    If any questions, please call or send a message on [employer] Teams  Spectra [phone]

## 2020-09-17 NOTE — PROGRESS NOTE ADULT - SUBJECTIVE AND OBJECTIVE BOX
PRIYANK WESLEY  69y, Male  Allergy: No Known Allergies      LOS  6d    CHIEF COMPLAINT: Diarrhea and abdominal pain (17 Sep 2020 12:06)      INTERVAL EVENTS/HPI  - No acute events overnight, some loose stools,   - T(F): , Max: 99 (20 @ 21:30)  - Denies any worsening symptoms  - Tolerating medication  - WBC Count: 24.56 (20 @ 06:48)  WBC Count: 17.14 (20 @ 07:08)  - Creatinine, Serum: 1.0 (20 @ 06:48)  Creatinine, Serum: 0.9 (20 @ 07:08)       ROS  General: Denies rigors, nightsweats  HEENT: Denies headache, rhinorrhea, sore throat, eye pain  CV: Denies CP, palpitations  PULM: Denies wheezing, hemoptysis  GI: Denies hematemesis, hematochezia, melena  : Denies discharge, hematuria  MSK: Denies arthralgias, myalgias  SKIN: Denies rash, lesions  NEURO: Denies paresthesias, weakness  PSYCH: Denies depression, anxiety    VITALS:  T(F): 98.2, Max: 99 (20 @ 21:30)  HR: 81  BP: 191/86  RR: 16Vital Signs Last 24 Hrs  T(C): 36.8 (17 Sep 2020 13:50), Max: 37.2 (16 Sep 2020 21:30)  T(F): 98.2 (17 Sep 2020 13:50), Max: 99 (16 Sep 2020 21:30)  HR: 81 (17 Sep 2020 13:50) (80 - 83)  BP: 191/86 (17 Sep 2020 13:50) (141/66 - 194/86)  BP(mean): --  RR: 16 (17 Sep 2020 13:50) (16 - 18)  SpO2: 99% (17 Sep 2020 08:22) (98% - 99%)    PHYSICAL EXAM:  Gen: NAD, resting in bed  HEENT: Normocephalic, atraumatic  Neck: supple, no lymphadenopathy  CV: Regular rate & regular rhythm  Lungs: decreased BS at bases, no fremitus  Abdomen: Soft, BS present  Ext: Warm, well perfused  Neuro: non focal, awake  Skin: no rash, no erythema  Lines: no phlebitis    FH: Non-contributory  Social Hx: Non-contributory    TESTS & MEASUREMENTS:                        13.1   24.56 )-----------( 389      ( 17 Sep 2020 06:48 )             41.3         144  |  102  |  19  ----------------------------<  115<H>  4.5   |  28  |  1.0    Ca    9.9      17 Sep 2020 06:48    TPro  6.8  /  Alb  4.1  /  TBili  0.5  /  DBili  x   /  AST  16  /  ALT  14  /  AlkPhos  64      eGFR if Non African American: 78 mL/min/1.73M2 (20 @ 06:48)  eGFR if African American: 90 mL/min/1.73M2 (20 @ 06:48)    LIVER FUNCTIONS - ( 17 Sep 2020 06:48 )  Alb: 4.1 g/dL / Pro: 6.8 g/dL / ALK PHOS: 64 U/L / ALT: 14 U/L / AST: 16 U/L / GGT: x           Urinalysis Basic - ( 16 Sep 2020 22:10 )    Color: Yellow / Appearance: Slightly Turbid / S.020 / pH: x  Gluc: x / Ketone: Negative  / Bili: Negative / Urobili: <2 mg/dL   Blood: x / Protein: 30 mg/dL / Nitrite: Negative   Leuk Esterase: Large / RBC: 7 /HPF / WBC 43 /HPF   Sq Epi: x / Non Sq Epi: 0 /HPF / Bacteria: Many        Culture - Blood (collected 20 @ 18:14)  Source: .Blood None  Preliminary Report (20 @ 01:02):    No growth to date.    Culture - Blood (collected 20 @ 05:26)  Source: .Blood None  Final Report (20 @ 12:00):    No Growth Final    Culture - Urine (collected 20 @ 17:00)  Source: .Urine Clean Catch (Midstream)  Final Report (20 @ 18:12):    >100,000 CFU/ml Klebsiella pneumoniae ESBL  Organism: Klebsiella pneumoniae ESBL (20 @ 18:12)  Organism: Klebsiella pneumoniae ESBL (20 @ 18:12)      -  Amikacin: S <=16      -  Amoxicillin/Clavulanic Acid: I 16/8      -  Ampicillin: R >16 These ampicillin results predict results for amoxicillin      -  Ampicillin/Sulbactam: I 16/8 Enterobacter, Citrobacter, and Serratia may develop resistance during prolonged therapy (3-4 days)      -  Aztreonam: R 16      -  Cefazolin: R >16 ([id number]) For uncomplicated UTI with K. pneumoniae, E. coli, or P. mirablis: CECILIO <=16 is sensitive and CECILIO >=32 is resistant. This also predicts results for oral agents cefaclor, cefdinir, cefpodoxime, cefprozil, cefuroxime axetil, cephalexin and locarbef for uncomplicated UTI. Note that some isolates may be susceptible to these agents while testing resistant to cefazolin.      -  Cefepime: R >16      -  Cefotaxime: R >32      -  Cefoxitin: S <=8      -  Ceftazidime: R 8      -  Ceftriaxone: R >32 Enterobacter, Citrobacter, and Serratia may develop resistance during prolonged therapy      -  Cefuroxime: R >16      -  Ciprofloxacin: R >2      -  Ertapenem: S <=0.5      -  Gentamicin: R >8      -  Imipenem: S <=1      -  Levofloxacin: S <=0.5      -  Meropenem: S <=1      -  Minocycline: S <=4      -  Nitrofurantoin: I 64 Should not be used to treat pyelonephritis      -  Piperacillin/Tazobactam: S <=8      -  Tigecycline: S <=2      -  Tobramycin: R >8      -  Trimethoprim/Sulfamethoxazole: R >2/38      Method Type: CECILIO    Culture - Blood (collected 20 @ 10:27)  Source: .Blood Blood  Gram Stain (20 @ 13:07):    Growth in anaerobic bottle: Gram Positive Cocci in Clusters  Final Report (20 @ 10:30):    Growth in anaerobic bottle: Staphylococcus hominis    Coag Negative Staphylococcus    Single set isolate, possible contaminant. Contact    Microbiology if susceptibility testing clinically    indicated.    "Due to technical problems, Proteus sp. will Not be reported as part of    the BCID panel until further notice" ***Blood Panel PCR results on this    specimen are available    approximately 3 hours after the Gram stain result.***    Gram stain, PCR, and/or culture results may not always    correspond due to difference in methodologies.    ************************************************************    This PCR assay was performed using [employer].    The following targets are tested for: Enterococcus,    vancomycin resistant enterococci, Listeria monocytogenes,    coagulase negative staphylococci, S. aureus,    methicillin resistant S. aureus, Streptococcus agalactiae    (Group B), S. pneumoniae, S. pyogenes (Group A),    Acinetobacter baumannii, Enterobacter cloacae, E. coli,    Klebsiella oxytoca, K. pneumoniae, Proteus sp.,    Serratia marcescens, Haemophilus influenzae,    Neisseria meningitidis, Pseudomonas aeruginosa, Candida    albicans, C. glabrata, C krusei, C parapsilosis,    C. tropicalis and the KPC resistance gene.  Organism: Blood Culture PCR (20 @ 10:30)  Organism: Blood Culture PCR (20 @ 10:30)      -  Coagulase negative Staphylococcus: Detec      Method Type: PCR    Culture - Blood (collected 20 @ 10:27)  Source: .Blood Blood  Final Report (20 @ 18:01):    No Growth Final        Lactate, Blood: 1.1 mmol/L (20 @ 06:24)  Lactate, Blood: 1.3 mmol/L (20 @ 09:17)      INFECTIOUS DISEASES TESTING  Vancomycin Level, Random: <4.0 (20 @ 17:36)  COVID-19 PCR: NotDetec (20 @ 17:50)      INFLAMMATORY MARKERS  Sedimentation Rate, Erythrocyte: 38 mm/Hr (20 @ 09:35)      RADIOLOGY & ADDITIONAL TESTS:  I have personally reviewed the last available Chest xray  CXR      CT      CARDIOLOGY TESTING      MEDICATIONS  apixaban 5 Oral every 12 hours  atorvastatin 10 Oral at bedtime  buPROPion XL . 300 Oral daily  chlorhexidine 4% Liquid 1 Topical <User Schedule>  cholecalciferol 5000 Oral daily  famotidine    Tablet 20 Oral daily  ferrous    sulfate 325 Oral daily  gabapentin 200 Oral every 8 hours  influenza   Vaccine 0.5 IntraMuscular once  labetalol 200 Oral every 12 hours  lisinopril 40 Oral daily  melatonin 5 Oral at bedtime  mirtazapine 7.5 Oral at bedtime  NIFEdipine XL 90 Oral daily  rOPINIRole 0.5 Oral at bedtime  sertraline 150 Oral daily  vancomycin    Solution 125 Oral every 6 hours      WEIGHT  Weight (kg): 96.9 (20 @ 14:11)  Creatinine, Serum: 1.0 mg/dL (20 @ 06:48)      ANTIBIOTICS:  vancomycin    Solution 125 milliGRAM(s) Oral every 6 hours      All available historical records have been reviewed

## 2020-09-17 NOTE — PROGRESS NOTE ADULT - SUBJECTIVE AND OBJECTIVE BOX
Patient is a 69y old Male who presents with a chief complaint of diarrhea (16 Sep 2020 15:37)    No acute events overnight. Patient still complains of abdominal pain. Denies chest pain, SOB, N/V/D,    PAST MEDICAL & SURGICAL HISTORY  Afib  rate controlled currently    Hematuria    Bladder tumor    History of diabetic neuropathy    CAD (coronary artery disease) of bypass graft  cabg  3v    Glaucoma    Arthritis    GERD (gastroesophageal reflux disease)    HTN (hypertension)    Depression    DM (diabetes mellitus)    S/P lumbar laminectomy  with repair of CSF leak using dural graft 2018 - dr Crenshaw    Malfunction of intrathecal infusion pump  implanted initially in   reimplanted   removed 2018    Foot amputation status, right  partial foot amputation  tarsals and metatarsals of right foot    S/P laparoscopic cholecystectomy    CAD (coronary artery disease) of bypass graft        PHYSICAL EXAM  Vital Signs Last 24 Hrs  T(C): 36.9 (17 Sep 2020 05:20), Max: 37.2 (16 Sep 2020 21:30)  T(F): 98.5 (17 Sep 2020 05:20), Max: 99 (16 Sep 2020 21:30)  HR: 83 (17 Sep 2020 05:20) (80 - 111)  BP: 160/85 (17 Sep 2020 06:00) (123/79 - 194/86)  BP(mean): --  RR: 18 (17 Sep 2020 05:20) (17 - 18)  SpO2: 99% (17 Sep 2020 08:22) (98% - 99%)    I&O's Summary    16 Sep 2020 07:01  -  17 Sep 2020 07:00  --------------------------------------------------------  IN: 250 mL / OUT: 500 mL / NET: -250 mL      GENERAL: No acute distress, well-developed  HEAD:  Atraumatic, Normocephalic  ENT: PERRL, conjunctiva and sclera clear, neck supple, no JVD  CHEST/LUNG: Clear to auscultation bilaterally; No wheeze, equal breath sounds bilaterally, respirations nonlabored  HEART: Regular rate and rhythm; No murmurs, rubs, or gallops  ABDOMEN: Soft, mild suprapubic tenderness to palpation  EXTREMITIES:  Right foot wrapped in bandage  PSYCH: Nl behavior, nl affect  NEUROLOGY: AAOx3, non-focal, moves all extremities spontaneously      LABS                        13.1   24.56 )-----------( 389      ( 17 Sep 2020 06:48 )             41.3     Hemoglobin: 13.1 g/dL ( @ 06:48)  Hemoglobin: 13.1 g/dL ( @ 07:08)  Hemoglobin: 12.4 g/dL (09-15 @ 05:39)  Hemoglobin: 12.7 g/dL ( @ 06:24)  Hemoglobin: 12.1 g/dL ( @ 09:17)    CBC Full  -  ( 17 Sep 2020 06:48 )  WBC Count : 24.56 K/uL  RBC Count : 4.71 M/uL  Hemoglobin : 13.1 g/dL  Hematocrit : 41.3 %  Platelet Count - Automated : 389 K/uL  Mean Cell Volume : 87.7 fL  Mean Cell Hemoglobin : 27.8 pg  Mean Cell Hemoglobin Concentration : 31.7 g/dL  Auto Neutrophil # : 18.28 K/uL  Auto Lymphocyte # : 4.37 K/uL  Auto Monocyte # : 1.48 K/uL  Auto Eosinophil # : 0.13 K/uL  Auto Basophil # : 0.11 K/uL  Auto Neutrophil % : 74.5 %  Auto Lymphocyte % : 17.8 %  Auto Monocyte % : 6.0 %  Auto Eosinophil % : 0.5 %  Auto Basophil % : 0.4 %        144  |  102  |  19  ----------------------------<  115<H>  4.5   |  28  |  1.0    Ca    9.9      17 Sep 2020 06:48    TPro  6.8  /  Alb  4.1  /  TBili  0.5  /  DBili  x   /  AST  16  /  ALT  14  /  AlkPhos  64      Creatinine Trend: 1.0<--, 0.9<--, 0.9<--, 0.9<--, 0.9<--, 1.0<--  LIVER FUNCTIONS - ( 17 Sep 2020 06:48 )  Alb: 4.1 g/dL / Pro: 6.8 g/dL / ALK PHOS: 64 U/L / ALT: 14 U/L / AST: 16 U/L / GGT: x                         Urinalysis Basic - ( 16 Sep 2020 22:10 )    Color: Yellow / Appearance: Slightly Turbid / S.020 / pH: x  Gluc: x / Ketone: Negative  / Bili: Negative / Urobili: <2 mg/dL   Blood: x / Protein: 30 mg/dL / Nitrite: Negative   Leuk Esterase: Large / RBC: 7 /HPF / WBC 43 /HPF   Sq Epi: x / Non Sq Epi: 0 /HPF / Bacteria: Many

## 2020-09-17 NOTE — CONSULT NOTE ADULT - ASSESSMENT
69 year old male with poor functional status and pmh of ca T1 staging, stroke, DM, A-fib (Eliquis), severe lower extremities neuropathy,  and CABG who presents to the hospital with a c/o lower ABD pain for the past x 2 weeks. Pt recently had TURBT performed by Dr. Jo and completed ABXs course. Pt was found to have c.diff on admission and is suspicious for osteomyelitis. Pt's urine cx also grew klebsiella pneumoniae ESBL.     # leukocytosis  Pt leukocyte count has been trending up during this admission (24> 19> 19 >15 > 18> 17> 24). Pt reports history of elevated WBC since the age of 15. Labs dating back to 2017 show persistently elevated counts. Pt's foot x-ray indicates possible osteomyelitis, pt denies foot pain due to neuropathy. r/o infectious process vs. leukemia  - f/u MRI of the foot, follow recommendations per podiatry   - positive UCX follow up ID recommendation   - peripheral blood smear needed for evaluation   - Flow cytomerty for AML/ MDS/ ALL    # Bladder Ca T1   - F/U outpatient with Dr. Jo for adjuvant intravesical chemotherapy     69 year old male with ECOG 3 and pmh of bladder ca T1 staging, stroke, DM, A-fib (Eliquis), neuropathy, R foot ulcer with partial amputation of the R foot and CABG who presents to the hospital c/o lower ABD pain for the past x 2 weeks. Pt recently had TURBT performed by Dr. Jo and completed ABXs course. Pt was found to have c.diff on admission and x-ray done of the R foot is suspicious for osteomyelitis. Pt's urine cx also grew klebsiella pneumoniae ESBL.     # leukocytosis - Infectious process, rheumatological disease vs. leukemia    Pt leukocyte count has been trending up during this admission (24> 19> 19 >15 > 18> 17> 24). Labs dating back to 2017 show persistently elevated counts. Peripheral smear shows elevated platelet count likely 800-900K with enlarged platelets and some dysplastic cells.     Pt has several sources of active infection and continues to have diarrhea. X-ray suspicious for osteomyelitis and positive UCX.        - PCR for BCR/ABL and JAK2   - LDL, HDL and triglycerides to r/o liver related pathology  - Flow cytomerty for  MDS/ MPN  - CRP, DYLAN and RF to r/o rheumatological etiology   - f/u MRI of the foot, follow recommendations per podiatry   - C.diff, positive UCX follow up ID recommendation      # Bladder Ca T1     - F/U outpatient with Dr. Jo for adjuvant intravesical chemotherapy     69 year old male with ECOG 3 and pmh of bladder ca T1 staging, stroke, DM, A-fib (Eliquis), neuropathy, R foot ulcer with partial amputation of the R foot and CABG who presents to the hospital c/o lower ABD pain for the past x 2 weeks. Pt recently had TURBT performed by Dr. Jo and completed ABXs course. Pt was found to have c.diff on admission and x-ray done of the R foot is suspicious for osteomyelitis. Pt's urine cx also grew klebsiella pneumoniae ESBL.     # Leukocytosis: Infectious process (c diff, xay suspicious for osteomyelitis and positive UCX. vs rheumatological disease vs MPN  - Leukocyte count has been trending up during this admission (24> 19> 19 >15 > 18> 17> 24). Labs dating back to 2017 show persistently elevated counts. Peripheral smear shows elevated platelet count likely 800-900K with enlarged platelets and some dysplastic cells.   - PCR for BCR/ABL and JAK2   - LDL, HDL and triglycerides to r/o liver related pathology  - Flow cytomerty for  MDS/ MPN  - CRP, DYLAN and RF to r/o rheumatological etiology   - f/u MRI of the foot, follow recommendations per podiatry   - C.diff, positive UCX follow up ID recommendation      # Bladder Ca T1   - F/U outpatient with Dr. Jo for adjuvant intravesical chemotherapy

## 2020-09-17 NOTE — PROGRESS NOTE ADULT - ASSESSMENT
68yo male with a PMH of bladder ca, DM, A-fib (Eliquis), and CABG who presented to the hospital with a c/o lower ABD pain for the past x 2 weeks. Associated with N/V/D that is non-black/bloody. Of note, the pt recently had TURBT performed by Dr. Jo and completed abx course. In the ED, pt was seen by urology, no acute surgical intervention. CT-AP w/ IV contrast was negative. Cultures sent and pt started on IV Cefepime.       Suprapubic Pain w/ NVD, a few days after TURBT for bladder Ca: likely 2/2 Cystitis  - Initially started Empiric cefepime 1g q 24 and Vancomycin (given h/o instrumentation)  - Now on just Vancomycin 125mg q6 (C.diff, see below)  - Blood cx: coag negative Staph, likely contaminant  - Urine cx (9/10): Klebsiella ESBL. Pt current asymptomatic. F/u ID recs  - Repeat UA (9/16): with large LE, no nitrites, WBC 43  - Pt with persistent leukocytosis, although somewhat downtrending. Will consider consulting Heme/Onc for peripheral smear  - Urology: No acute urological intervention at this time, f/u OP with Dr. Jo with 1 week     Osteomyelitis of MPJ with chronic ulcer  - XR Right Foot (9/14): Second and fourth metatarsal stump periosteal reaction suggestive of recurrent/residual osteomyelitis.  - F/u MRI  - F/u ESR, CRP  - Possible surgical debridement if osteomyelitis noted on right 2nd metatarsal head; will follow up with X-ray report;  - Per Podiatry, surgical intervention will be pursued outpatient    C Diff colitis   - Hx of abx use for TURBT  - PO Vancomycin 125mg q6 (started 9/12, 10-day course, end 9/22)  - 3 episodes of diarrhea over last 24hrs    Hypertension  - Nifedipine increased to 90mg qd (9/14)  - Lisinopril increased to 40mg qd (9/16)  - Metoprolol switched to Labetalol 100mg BID    Diabetes mellitus with peripheral neuropathy  - A1C 6.6  - Insulin PRN  - FS AC qHS   - On Gabapentin 200mg TID    Chronic Afib  - Currently rate-controlled  - Questionable reports of earlier hematuria on presentation. Patient denies. Eliquis was held for this reason earlier.  - Hemoglobin stable 12.7. No current hematuria.  - On Eliquis    Right Chronic DFU/ s/p Rt TMA  - Daily dressing changes per podiatry  - Outpatient podiatry f/u per usual     Hypomagnesemia  - Resolved  - Replete PRN    Liver Lesion   - Found on CT Ab  - US liver ordered - per son he had some sort of scan in the past showing a lesion in the liver > 20 years ago but was told it was benign at the time    Misc  - Dispo: Active   - DVT PPx: Eliquis

## 2020-09-18 NOTE — PROGRESS NOTE ADULT - SUBJECTIVE AND OBJECTIVE BOX
24H events:  Still having diarrhea, changed to fidoxamicin,  WBC stable  Klebsiella UTI    PAST MEDICAL & SURGICAL HISTORY  Afib  rate controlled currently    Hematuria    Bladder tumor    History of diabetic neuropathy    CAD (coronary artery disease) of bypass graft  cabg  3v    Glaucoma    Arthritis    GERD (gastroesophageal reflux disease)    HTN (hypertension)    Depression    DM (diabetes mellitus)    S/P lumbar laminectomy  with repair of CSF leak using dural graft 2018 - dr Crenshaw    Malfunction of intrathecal infusion pump  implanted initially in   reimplanted   removed 2018    Foot amputation status, right  partial foot amputation  tarsals and metatarsals of right foot    S/P laparoscopic cholecystectomy    CAD (coronary artery disease) of bypass graft      SOCIAL HISTORY:  Negative for smoking/alcohol/drug use.     ALLERGIES:  No Known Allergies    MEDICATIONS:  STANDING MEDICATIONS  apixaban 5 milliGRAM(s) Oral every 12 hours  atorvastatin 10 milliGRAM(s) Oral at bedtime  buPROPion XL . 300 milliGRAM(s) Oral daily  chlorhexidine 4% Liquid 1 Application(s) Topical <User Schedule>  cholecalciferol 5000 Unit(s) Oral daily  famotidine    Tablet 20 milliGRAM(s) Oral daily  ferrous    sulfate 325 milliGRAM(s) Oral daily  fidaxomicin 200 milliGRAM(s) Oral two times a day  gabapentin 200 milliGRAM(s) Oral every 8 hours  influenza   Vaccine 0.5 milliLiter(s) IntraMuscular once  labetalol 200 milliGRAM(s) Oral every 12 hours  lisinopril 40 milliGRAM(s) Oral daily  melatonin 5 milliGRAM(s) Oral at bedtime  mirtazapine 7.5 milliGRAM(s) Oral at bedtime  NIFEdipine XL 90 milliGRAM(s) Oral daily  rOPINIRole 0.5 milliGRAM(s) Oral at bedtime  sertraline 150 milliGRAM(s) Oral daily    PRN MEDICATIONS  acetaminophen   Tablet .. 650 milliGRAM(s) Oral every 6 hours PRN  aluminum hydroxide/magnesium hydroxide/simethicone Suspension 30 milliLiter(s) Oral every 4 hours PRN  morphine  - Injectable 2 milliGRAM(s) IV Push every 4 hours PRN  traMADol 50 milliGRAM(s) Oral every 6 hours PRN    VITALS:   T(F): 98.6  HR: 65  BP: 149/70  RR: 18  SpO2: 98%    LABS:                        12.8   24.49 )-----------( 406      ( 18 Sep 2020 06:39 )             40.4     09-18    141  |  102  |  21<H>  ----------------------------<  172<H>  4.3   |  25  |  1.1    Ca    9.8      18 Sep 2020 06:39    TPro  6.7  /  Alb  4.1  /  TBili  0.5  /  DBili  x   /  AST  23  /  ALT  14  /  AlkPhos  62        Urinalysis Basic - ( 16 Sep 2020 22:10 )    Color: Yellow / Appearance: Slightly Turbid / S.020 / pH: x  Gluc: x / Ketone: Negative  / Bili: Negative / Urobili: <2 mg/dL   Blood: x / Protein: 30 mg/dL / Nitrite: Negative   Leuk Esterase: Large / RBC: 7 /HPF / WBC 43 /HPF   Sq Epi: x / Non Sq Epi: 0 /HPF / Bacteria: Many            Culture - Urine (collected 16 Sep 2020 22:10)  Source: .Urine Clean Catch (Midstream)  Preliminary Report (18 Sep 2020 15:53):    >100,000 CFU/ml Klebsiella pneumoniae          RADIOLOGY:    PHYSICAL EXAM:  GEN: No acute distress  HENT: NCAT, EOMI  LYMPH: No appreciable adenopathy  LUNGS: No respiratory distress, clear to auscultation bilaterally   HEART: regular rate and rhythm  ABD: Soft, non-tender, obese  SKIN: No rash  NEURO: Non focal

## 2020-09-18 NOTE — PROGRESS NOTE ADULT - ASSESSMENT
70yo male with a PMH of bladder ca, DM, A-fib (Eliquis), and CABG who presented to the hospital with a c/o lower ABD pain for the past x 2 weeks. Associated with N/V/D that is non-black/bloody. Of note, the pt recently had TURBT performed by Dr. Jo and completed abx course. In the ED, pt was seen by urology, no acute surgical intervention. CT-AP w/ IV contrast was negative. Cultures sent and pt started on IV Cefepime.       Suprapubic Pain w/ NVD, a few days after TURBT for bladder Ca: likely 2/2 Cystitis  - Initially started Empiric cefepime 1g q 24 and Vancomycin (given h/o instrumentation)  - Blood cx: coag negative Staph, likely contaminant  - Urine cx (9/10): Klebsiella ESBL. Pt current asymptomatic. F/u ID recs  - Repeat UA (9/16): with large LE, no nitrites, WBC 43  - Pt with persistent leukocytosis. Heme/Onc consulted, rec flow cytometry to r/o AML/MDS/ALL  - Urology: No acute urological intervention at this time, f/u OP with Dr. Jo with 1 week     Osteomyelitis of MPJ with chronic ulcer  - XR Right Foot (9/14): Second and fourth metatarsal stump periosteal reaction suggestive of recurrent/residual osteomyelitis.  - F/u MRI  - F/u CRP  - Possible surgical debridement if osteomyelitis noted on right 2nd metatarsal head; will follow up with X-ray report;  - Per Podiatry, surgical intervention will be pursued outpatient    C Diff colitis   - Hx of abx use for TURBT  - Patient with continued diarrhea over the last 24hrs  - Vancomycin d/c'd, started Fidaxomicin 200mg BIDx 10 days    Hypertension  - Nifedipine increased to 90mg qd (9/14)  - Lisinopril increased to 40mg qd (9/16)  - Labetalol 100mg BID    Diabetes mellitus with peripheral neuropathy  - A1C 6.6  - Insulin PRN  - FS AC qHS   - On Gabapentin 200mg TID    Chronic Afib  - Currently rate-controlled  - Questionable reports of earlier hematuria on presentation. Patient denies. Eliquis was held for this reason earlier.  - Hemoglobin stable 12.7. No current hematuria.  - On Eliquis    Right Chronic DFU/ s/p Rt TMA  - Daily dressing changes per podiatry  - Outpatient podiatry f/u per usual     Hypomagnesemia  - Resolved  - Replete PRN    Liver Lesion   - Found on CT Ab  - US liver ordered - per son he had some sort of scan in the past showing a lesion in the liver > 20 years ago but was told it was benign at the time    Misc  - Dispo: Active   - DVT PPx: Eliquis     68yo male with a PMH of bladder ca, DM, A-fib (Eliquis), and CABG who presented to the hospital with a c/o lower ABD pain for the past x 2 weeks. Associated with N/V/D that is non-black/bloody. Of note, the pt recently had TURBT performed by Dr. Jo and completed abx course. In the ED, pt was seen by urology, no acute surgical intervention. CT-AP w/ IV contrast was negative. Cultures sent and pt started on IV Cefepime.       Suprapubic Pain w/ NVD, a few days after TURBT for bladder Ca: likely 2/2 Cystitis  - Initially started Empiric cefepime 1g q 24 and Vancomycin (given h/o instrumentation)  - Blood cx: coag negative Staph, likely contaminant  - Urine cx (9/10): Klebsiella ESBL. Pt current asymptomatic. F/u ID recs  - Repeat UA (9/16): with large LE, no nitrites, WBC 43  - Pt with persistent leukocytosis. Heme/Onc consulted, flow cytometry pending to r/o AML/MDS/ALL  - F/u urine culture  - Urology: No acute urological intervention at this time, f/u OP with Dr. Jo with 1 week     Osteomyelitis of MPJ with chronic ulcer  - XR Right Foot (9/14): Second and fourth metatarsal stump periosteal reaction suggestive of recurrent/residual osteomyelitis.  - MRI (9/17): no evidence of osteomyelitis  - ESR 38. CRP 1.46  - Possible surgical debridement if osteomyelitis noted on right 2nd metatarsal head; will follow up with X-ray report;  - Per Podiatry, surgical intervention will be pursued outpatient    C Diff colitis   - Hx of abx use for TURBT  - No diarrhea overnight  - On Fidaxomicin 200mg BIDx 10 days    Hypertension  - Pt still hypertensive this /92 prior to getting AM meds  - Nifedipine increased to 90mg qd (9/14)  - Lisinopril increased to 40mg qd (9/16)  - Labetalol 100mg BID    Diabetes mellitus with peripheral neuropathy  - A1C 6.6  - Insulin PRN  - FS AC qHS   - On Gabapentin 200mg TID    Chronic Afib  - Currently rate-controlled  - Questionable reports of earlier hematuria on presentation. Patient denies. Eliquis was held for this reason earlier.  - Hemoglobin stable 12.7. No current hematuria.  - On Eliquis    Right Chronic DFU/ s/p Rt TMA  - Daily dressing changes per podiatry  - Outpatient podiatry f/u per usual     Hypomagnesemia  - Resolved  - Replete PRN    Liver Lesion   - Found on CT Ab  - US liver ordered - per son he had some sort of scan in the past showing a lesion in the liver > 20 years ago but was told it was benign at the time    Misc  - Dispo: Active   - DVT PPx: Eliquis

## 2020-09-18 NOTE — DIETITIAN INITIAL EVALUATION ADULT. - PERTINENT MEDS FT
eliquis, fidaxomicin, normodyne, lisinopril, morphine, ultram, maalox, pepcid, melatonin, liptor, vitamin D3, ferrous sulfate, remeron

## 2020-09-18 NOTE — DIETITIAN INITIAL EVALUATION ADULT. - OTHER INFO
Pt admitted d/t abdominal pain, vomiting and diarrhea. C. diff positive on 9/12, antibiotic regimen initiated on 9/13. XR R foot on 9/14 showed 2nd and 4th metatarsal stump periosteal reaction suggestive of recurrent/residual osteomyelitis-- planned for possible surgical debridement.

## 2020-09-18 NOTE — DIETITIAN INITIAL EVALUATION ADULT. - FACTORS AFF FOOD INTAKE
other (specify)/persistent diarrhea/Pt reports that his appetite has been ok, and that it is not at baseline. He typically eats well and has a good appetite. Per EMR documentation, po intake is varied 0-75% throughout LOS. Pt reports that he sometimes is having abdominal pain in relation to c. diff infection. No chewing/swallowing difficulties noted. Pt also states that he is receiving regular sugar with his meal but that he is not using it because he is a diabetic. DM diet is not currently ordered. Pt is also interested in a low fiber diet for now until diarrhea improves. Discussed adequate hydration with persistent diarrhea as well. Pt verbalizes understanding. NKFA/intolerances. No food preferences r/t culture/[facility]. Pt takes MVI supplement pta. Pt reports that his UBW is ~240 lbs from this year and endorses wt loss. He states that the wt loss was partly intentional, but also attributes it to the persistent diarrhea as well. Dosing wt is 96.9 kg/214 lbs. This would indicate a 26 lbs/11% wt loss within 1 year which is not considered clinically significant given PCM criteria. Pt does  not display any physical signs of muscle wasting/fat loss upon RD observation.

## 2020-09-18 NOTE — DIETITIAN INITIAL EVALUATION ADULT. - NS FNS REASON FOR WEIGHT CHANG
other (specify)/partially d/t c. diff diarrhea and also pt states intentional wt loss as well/altered GI function (specify)

## 2020-09-18 NOTE — DIETITIAN INITIAL EVALUATION ADULT. - ENERGY NEEDS
kcal: 1166-0866 (MSJ x 1-1.1 AF) obese BMI considered  protein: 75-90 g (1-1.2 g/kg IBW) same as above  fluid: 1 mL/kcal or per LIP

## 2020-09-18 NOTE — PROGRESS NOTE ADULT - ASSESSMENT
ASSESSMENT  68 YO M with a PMH of bladder ca, DM, A-fib (Eliquis), and CABG who presents to the hospital with a c/o lower ABD pain for the past x 2 weeks, recent TURBT and antibiotics. Found to have Cdiff      IMPRESSION  #Leukocytosis  #Cdiff diarrhea  #9/11 BCX 1/4 bottles coNS, contaminant     repeat BCX NG  #Pyuria but asymptomatic in the setting of SPC and obstruction    UCX   >100,000 CFU/ml Klebsiella pneumoniae ESBL  #Right 2nd MPJ plantar ulcer; MRI with no osteomyelitis   Sedimentation Rate, Erythrocyte: 38 mm/Hr (09-17-20 @ 09:35)  C-Reactive Protein, Serum: 1.46 mg/dL (09.17.20 @ 09:35)  #Obesity BMI (kg/m2): 32.1  #DM     RECOMMENDATIONS  - Trend WBC  - f/u repeat CX  - D/C PO VANC  - Fidaxomicin 200mg BID x 10 days    Please call with any questions or send a message on [employer] Teams  Spectra [phone]    ASSESSMENT  68 YO M with a PMH of bladder ca, DM, A-fib (Eliquis), and CABG who presents to the hospital with a c/o lower ABD pain for the past x 2 weeks, recent TURBT and antibiotics. Found to have Cdiff      IMPRESSION  #Leukocytosis  #Cdiff diarrhea  #9/11 BCX 1/4 bottles coNS, contaminant     repeat BCX NG  #Asymptomatic bacteriuria     UCX   >100,000 CFU/ml Klebsiella pneumoniae ESBL  #Right 2nd MPJ plantar ulcer; MRI with no osteomyelitis   Sedimentation Rate, Erythrocyte: 38 mm/Hr (09-17-20 @ 09:35)  C-Reactive Protein, Serum: 1.46 mg/dL (09.17.20 @ 09:35)  #Obesity BMI (kg/m2): 32.1  #DM     RECOMMENDATIONS  - Trend WBC  - f/u repeat CX  - D/C PO VANC  - Fidaxomicin 200mg BID x 10 days    Please call with any questions or send a message on [employer] Teams  Spectra [phone]    ASSESSMENT  68 YO M with a PMH of bladder ca, DM, A-fib (Eliquis), and CABG who presents to the hospital with a c/o lower ABD pain for the past x 2 weeks, recent TURBT and antibiotics. Found to have Cdiff      IMPRESSION  #Leukocytosis  #Cdiff diarrhea  #9/11 BCX 1/4 bottles coNS, contaminant     repeat BCX NG  #Asymptomatic bacteriuria in the setting of bladder cancer    9/16 UCX +    UCX   >100,000 CFU/ml Klebsiella pneumoniae ESBL    Recent TURBT- path with high grade urothelial cancer  #Right 2nd MPJ plantar ulcer; MRI with no osteomyelitis   Sedimentation Rate, Erythrocyte: 38 mm/Hr (09-17-20 @ 09:35)  C-Reactive Protein, Serum: 1.46 mg/dL (09.17.20 @ 09:35)  #Obesity BMI (kg/m2): 32.1  #DM     RECOMMENDATIONS  - Trend WBC  - f/u repeat CX  - D/C PO VANC  - Fidaxomicin 200mg BID x 10 days  - 9/16 UCX GNR- patient denies any symptoms. Given rising WBC, monitor closely- if any symptoms start meropenem IV     Please call with any questions or send a message on [employer] Teams  Spectra [phone]

## 2020-09-18 NOTE — PROGRESS NOTE ADULT - SUBJECTIVE AND OBJECTIVE BOX
Patient is a 69y old Male who presents with a chief complaint of Diarrhea and abdominal pain (17 Sep 2020 12:06)    No acute events overnight. Patient has no complaints this morning. Denies chest pain, SOB, N/V/D,    PAST MEDICAL & SURGICAL HISTORY  Afib  rate controlled currently    Hematuria    Bladder tumor    History of diabetic neuropathy    CAD (coronary artery disease) of bypass graft  cabg  3v    Glaucoma    Arthritis    GERD (gastroesophageal reflux disease)    HTN (hypertension)    Depression    DM (diabetes mellitus)    S/P lumbar laminectomy  with repair of CSF leak using dural graft 2018 - dr Crenshaw    Malfunction of intrathecal infusion pump  implanted initially in   reimplanted   removed 2018    Foot amputation status, right  partial foot amputation  tarsals and metatarsals of right foot    S/P laparoscopic cholecystectomy    CAD (coronary artery disease) of bypass graft        PHYSICAL EXAM  Vital Signs Last 24 Hrs  T(C): 36.7 (18 Sep 2020 05:19), Max: 37.5 (17 Sep 2020 21:30)  T(F): 98.1 (18 Sep 2020 05:19), Max: 99.5 (17 Sep 2020 21:30)  HR: 76 (18 Sep 2020 09:07) (76 - 82)  BP: 123/66 (18 Sep 2020 09:07) (123/66 - 208/90)  BP(mean): --  RR: 18 (18 Sep 2020 05:19) (16 - 18)  SpO2: 98% (17 Sep 2020 21:30) (98% - 99%)    I&O's Summary    17 Sep 2020 07:01  -  18 Sep 2020 07:00  --------------------------------------------------------  IN: 200 mL / OUT: 200 mL / NET: 0 mL      GENERAL: No acute distress, well-developed  HEAD:  Atraumatic, Normocephalic  ENT: PERRL, conjunctiva and sclera clear, neck supple, no JVD  CHEST/LUNG: Clear to auscultation bilaterally; No wheeze, equal breath sounds bilaterally, respirations nonlabored  HEART: Regular rate and rhythm; No murmurs, rubs, or gallops  ABDOMEN: Soft, mild suprapubic tenderness to palpation  EXTREMITIES:  Right foot wrapped in bandage  PSYCH: Nl behavior, nl affect  NEUROLOGY: AAOx3, non-focal, moves all extremities spontaneously      LABS                        12.8   24.49 )-----------( 406      ( 18 Sep 2020 06:39 )             40.4     Hemoglobin: 12.8 g/dL ( @ 06:39)  Hemoglobin: 13.1 g/dL ( @ 06:48)  Hemoglobin: 13.1 g/dL ( @ 07:08)  Hemoglobin: 12.4 g/dL (09-15 @ 05:39)  Hemoglobin: 12.7 g/dL ( @ 06:24)    CBC Full  -  ( 18 Sep 2020 06:39 )  WBC Count : 24.49 K/uL  RBC Count : 4.62 M/uL  Hemoglobin : 12.8 g/dL  Hematocrit : 40.4 %  Platelet Count - Automated : 406 K/uL  Mean Cell Volume : 87.4 fL  Mean Cell Hemoglobin : 27.7 pg  Mean Cell Hemoglobin Concentration : 31.7 g/dL  Auto Neutrophil # : 17.43 K/uL  Auto Lymphocyte # : 4.82 K/uL  Auto Monocyte # : 1.72 K/uL  Auto Eosinophil # : 0.13 K/uL  Auto Basophil # : 0.15 K/uL  Auto Neutrophil % : 71.2 %  Auto Lymphocyte % : 19.7 %  Auto Monocyte % : 7.0 %  Auto Eosinophil % : 0.5 %  Auto Basophil % : 0.6 %        141  |  102  |  21<H>  ----------------------------<  172<H>  4.3   |  25  |  1.1    Ca    9.8      18 Sep 2020 06:39    TPro  6.7  /  Alb  4.1  /  TBili  0.5  /  DBili  x   /  AST  23  /  ALT  14  /  AlkPhos  62      Creatinine Trend: 1.1<--, 1.0<--, 0.9<--, 0.9<--, 0.9<--, 0.9<--  LIVER FUNCTIONS - ( 18 Sep 2020 06:39 )  Alb: 4.1 g/dL / Pro: 6.7 g/dL / ALK PHOS: 62 U/L / ALT: 14 U/L / AST: 23 U/L / GGT: x                         Urinalysis Basic - ( 16 Sep 2020 22:10 )    Color: Yellow / Appearance: Slightly Turbid / S.020 / pH: x  Gluc: x / Ketone: Negative  / Bili: Negative / Urobili: <2 mg/dL   Blood: x / Protein: 30 mg/dL / Nitrite: Negative   Leuk Esterase: Large / RBC: 7 /HPF / WBC 43 /HPF   Sq Epi: x / Non Sq Epi: 0 /HPF / Bacteria: Many    MICROBIOLOGY    Culture - Urine (collected 16 Sep 2020 22:10)  Source: .Urine Clean Catch (Midstream)  Preliminary Report (18 Sep 2020 09:57):    >100,000 CFU/ml Gram Negative Rods      IMAGING    MR Foot No Cont, Right (20 @ 18:04)  IMPRESSION:    No evidence for osteomyelitis.

## 2020-09-18 NOTE — DIETITIAN INITIAL EVALUATION ADULT. - RD TO REMAIN AVAILABLE
yes/INTERVENTION: meals and snacks, nutrition related medication management, coordination of care. ME: RD to monitor diet order, energy intake, body composition, NFPF, glucose/renal profile

## 2020-09-18 NOTE — PROGRESS NOTE ADULT - ASSESSMENT
69 year old male with ECOG 3 and pmh of bladder ca T1 staging, stroke, DM, A-fib (Eliquis), neuropathy, R foot ulcer with partial amputation of the R foot and CABG who presents to the hospital c/o lower ABD pain for the past x 2 weeks. Pt recently had TURBT performed by Dr. Jo and completed ABXs course. Pt was found to have c.diff on admission and x-ray done of the R foot is suspicious for osteomyelitis. Pt's urine cx also grew klebsiella pneumoniae ESBL.     # Leukocytosis: Need to rule out MPN, however this is possibly leukemoid reaction from infection ( c diff, UTI?) vs rheum/inflammatory dz  - Leukocyte count has been trending up during this admission (24> 19> 19 >15 > 18> 17> 24). Labs dating back to 2017 show persistently elevated counts. Peripheral smear shows elevated platelet count likely 800-900K with enlarged platelets and some dysplastic cells.   - Please send for JAK2   - Flow cytometry sent for overlap MDS/ MPN  - LDL, HDL and triglycerides to r/o liver related pathology  - DYLAN and RF to r/o rheumatological etiology (ESR elevated)  - MRi foot negative for OM  - C/w fidaxomicin per ID positive UCX follow up ID recommendation      # Bladder Ca T1   - F/U outpatient with Dr. Jo for adjuvant intravesical chemotherapy

## 2020-09-18 NOTE — PROGRESS NOTE ADULT - SUBJECTIVE AND OBJECTIVE BOX
PRIYANK WESLEY  69y, Male  Allergy: No Known Allergies      LOS  7d    CHIEF COMPLAINT: Diarrhea and abdominal pain (17 Sep 2020 12:06)      INTERVAL EVENTS/HPI  - No acute events overnight  - T(F): , Max: 99.5 (20 @ 21:30)  - Tolerating medication  - MRI foot no osteomyelitis   - WBC Count: 24.49 (20 @ 06:39)  WBC Count: 24.56 (20 @ 06:48)  - Creatinine, Serum: 1.0 (20 @ 06:48)       ROS  ***    VITALS:  T(F): 98.1, Max: 99.5 (20 @ 21:30)  HR: 82  BP: 194/92  RR: 18Vital Signs Last 24 Hrs  T(C): 36.7 (18 Sep 2020 05:19), Max: 37.5 (17 Sep 2020 21:30)  T(F): 98.1 (18 Sep 2020 05:19), Max: 99.5 (17 Sep 2020 21:30)  HR: 82 (18 Sep 2020 05:19) (78 - 82)  BP: 194/92 (18 Sep 2020 05:19) (151/70 - 208/90)  BP(mean): --  RR: 18 (18 Sep 2020 05:19) (16 - 18)  SpO2: 98% (17 Sep 2020 21:30) (98% - 99%)    PHYSICAL EXAM:  Gen: NAD, resting in bed  HEENT: Normocephalic, atraumatic  Neck: supple, no lymphadenopathy  CV: Regular rate & regular rhythm  Lungs: decreased BS at bases, no fremitus  Abdomen: Soft, BS present  Ext: Warm, well perfused, TMA ulcer clean  Neuro: non focal, awake  Skin: no rash, no erythema  Lines: no phlebitis      FH: Non-contributory  Social Hx: Non-contributory    TESTS & MEASUREMENTS:                        12.8   24.49 )-----------( 406      ( 18 Sep 2020 06:39 )             40.4         144  |  102  |  19  ----------------------------<  115<H>  4.5   |  28  |  1.0    Ca    9.9      17 Sep 2020 06:48    TPro  6.8  /  Alb  4.1  /  TBili  0.5  /  DBili  x   /  AST  16  /  ALT  14  /  AlkPhos  64  09-17      LIVER FUNCTIONS - ( 17 Sep 2020 06:48 )  Alb: 4.1 g/dL / Pro: 6.8 g/dL / ALK PHOS: 64 U/L / ALT: 14 U/L / AST: 16 U/L / GGT: x           Urinalysis Basic - ( 16 Sep 2020 22:10 )    Color: Yellow / Appearance: Slightly Turbid / S.020 / pH: x  Gluc: x / Ketone: Negative  / Bili: Negative / Urobili: <2 mg/dL   Blood: x / Protein: 30 mg/dL / Nitrite: Negative   Leuk Esterase: Large / RBC: 7 /HPF / WBC 43 /HPF   Sq Epi: x / Non Sq Epi: 0 /HPF / Bacteria: Many        Culture - Blood (collected 20 @ 18:14)  Source: .Blood None  Final Report (20 @ 01:01):    No Growth Final    Culture - Blood (collected 20 @ 05:26)  Source: .Blood None  Final Report (20 @ 12:00):    No Growth Final    Culture - Urine (collected 20 @ 17:00)  Source: .Urine Clean Catch (Midstream)  Final Report (20 @ 18:12):    >100,000 CFU/ml Klebsiella pneumoniae ESBL  Organism: Klebsiella pneumoniae ESBL (20 @ 18:12)  Organism: Klebsiella pneumoniae ESBL (20 @ 18:12)      -  Amikacin: S <=16      -  Amoxicillin/Clavulanic Acid: I 168      -  Ampicillin: R >16 These ampicillin results predict results for amoxicillin      -  Ampicillin/Sulbactam: I 168 Enterobacter, Citrobacter, and Serratia may develop resistance during prolonged therapy (3-4 days)      -  Aztreonam: R 16      -  Cefazolin: R >16 ([id number]) For uncomplicated UTI with K. pneumoniae, E. coli, or P. mirablis: CECILIO <=16 is sensitive and CECILIO >=32 is resistant. This also predicts results for oral agents cefaclor, cefdinir, cefpodoxime, cefprozil, cefuroxime axetil, cephalexin and locarbef for uncomplicated UTI. Note that some isolates may be susceptible to these agents while testing resistant to cefazolin.      -  Cefepime: R >16      -  Cefotaxime: R >32      -  Cefoxitin: S <=8      -  Ceftazidime: R 8      -  Ceftriaxone: R >32 Enterobacter, Citrobacter, and Serratia may develop resistance during prolonged therapy      -  Cefuroxime: R >16      -  Ciprofloxacin: R >2      -  Ertapenem: S <=0.5      -  Gentamicin: R >8      -  Imipenem: S <=1      -  Levofloxacin: S <=0.5      -  Meropenem: S <=1      -  Minocycline: S <=4      -  Nitrofurantoin: I 64 Should not be used to treat pyelonephritis      -  Piperacillin/Tazobactam: S <=8      -  Tigecycline: S <=2      -  Tobramycin: R >8      -  Trimethoprim/Sulfamethoxazole: R >2/38      Method Type: CECILIO    Culture - Blood (collected 20 @ 10:27)  Source: .Blood Blood  Gram Stain (20 @ 13:07):    Growth in anaerobic bottle: Gram Positive Cocci in Clusters  Final Report (20 @ 10:30):    Growth in anaerobic bottle: Staphylococcus hominis    Coag Negative Staphylococcus    Single set isolate, possible contaminant. Contact    Microbiology if susceptibility testing clinically    indicated.    "Due to technical problems, Proteus sp. will Not be reported as part of    the BCID panel until further notice" ***Blood Panel PCR results on this    specimen are available    approximately 3 hours after the Gram stain result.***    Gram stain, PCR, and/or culture results may not always    correspond due to difference in methodologies.    ************************************************************    This PCR assay was performed using [employer].    The following targets are tested for: Enterococcus,    vancomycin resistant enterococci, Listeria monocytogenes,    coagulase negative staphylococci, S. aureus,    methicillin resistant S. aureus, Streptococcus agalactiae    (Group B), S. pneumoniae, S. pyogenes (Group A),    Acinetobacter baumannii, Enterobacter cloacae, E. coli,    Klebsiella oxytoca, K. pneumoniae, Proteus sp.,    Serratia marcescens, Haemophilus influenzae,    Neisseria meningitidis, Pseudomonas aeruginosa, Candida    albicans, C. glabrata, C krusei, C parapsilosis,    C. tropicalis and the KPC resistance gene.  Organism: Blood Culture PCR (20 @ 10:30)  Organism: Blood Culture PCR (20 @ 10:30)      -  Coagulase negative Staphylococcus: Detec      Method Type: PCR    Culture - Blood (collected 20 @ 10:27)  Source: .Blood Blood  Final Report (20 @ 18:01):    No Growth Final        Lactate, Blood: 1.1 mmol/L (20 @ 06:24)  Lactate, Blood: 1.3 mmol/L (20 @ 09:17)      INFECTIOUS DISEASES TESTING  Vancomycin Level, Random: <4.0 (20 @ 17:36)  COVID-19 PCR: NotDetec (20 @ 17:50)      INFLAMMATORY MARKERS  Sedimentation Rate, Erythrocyte: 38 mm/Hr (20 @ 09:35)  C-Reactive Protein, Serum: 1.46 mg/dL (20 @ 09:35)      RADIOLOGY & ADDITIONAL TESTS:  I have personally reviewed the last available Chest xray  CXR      CT      CARDIOLOGY TESTING      MEDICATIONS  apixaban 5 Oral every 12 hours  atorvastatin 10 Oral at bedtime  buPROPion XL . 300 Oral daily  chlorhexidine 4% Liquid 1 Topical <User Schedule>  cholecalciferol 5000 Oral daily  famotidine    Tablet 20 Oral daily  ferrous    sulfate 325 Oral daily  fidaxomicin 200 Oral two times a day  gabapentin 200 Oral every 8 hours  influenza   Vaccine 0.5 IntraMuscular once  labetalol 200 Oral every 12 hours  lisinopril 40 Oral daily  melatonin 5 Oral at bedtime  mirtazapine 7.5 Oral at bedtime  NIFEdipine XL 90 Oral daily  rOPINIRole 0.5 Oral at bedtime  sertraline 150 Oral daily  vancomycin    Solution 125 Oral every 6 hours      WEIGHT  Weight (kg): 96.9 (20 @ 14:11)      ANTIBIOTICS:  fidaxomicin 200 milliGRAM(s) Oral two times a day  vancomycin    Solution 125 milliGRAM(s) Oral every 6 hours      All available historical records have been reviewed       PRIYANK WESLEY  69y, Male  Allergy: No Known Allergies      LOS  7d    CHIEF COMPLAINT: Diarrhea and abdominal pain (17 Sep 2020 12:06)      INTERVAL EVENTS/HPI  - No acute events overnight, diarrhea  - T(F): , Max: 99.5 (20 @ 21:30)  - Tolerating medication  - MRI foot no osteomyelitis   - WBC Count: 24.49 (20 @ 06:39)  WBC Count: 24.56 (20 @ 06:48)  - Creatinine, Serum: 1.0 (20 @ 06:48)       ROS  General: Denies rigors, nightsweats  HEENT: Denies headache, rhinorrhea, sore throat, eye pain  CV: Denies CP, palpitations  PULM: Denies wheezing, hemoptysis  GI: Denies hematemesis, hematochezia, melena  : Denies discharge, hematuria  MSK: Denies arthralgias, myalgias  SKIN: Denies rash, lesions  NEURO: Denies paresthesias, weakness  PSYCH: Denies depression, anxiety     VITALS:  T(F): 98.1, Max: 99.5 (20 @ 21:30)  HR: 82  BP: 194/92  RR: 18Vital Signs Last 24 Hrs  T(C): 36.7 (18 Sep 2020 05:19), Max: 37.5 (17 Sep 2020 21:30)  T(F): 98.1 (18 Sep 2020 05:19), Max: 99.5 (17 Sep 2020 21:30)  HR: 82 (18 Sep 2020 05:19) (78 - 82)  BP: 194/92 (18 Sep 2020 05:19) (151/70 - 208/90)  BP(mean): --  RR: 18 (18 Sep 2020 05:19) (16 - 18)  SpO2: 98% (17 Sep 2020 21:30) (98% - 99%)    PHYSICAL EXAM:  Gen: NAD, resting in bed  HEENT: Normocephalic, atraumatic  Neck: supple, no lymphadenopathy  CV: Regular rate & regular rhythm  Lungs: decreased BS at bases, no fremitus  Abdomen: Soft, BS present  Ext: Warm, well perfused, TMA ulcer clean  Neuro: non focal, awake  Skin: no rash, no erythema  Lines: no phlebitis      FH: Non-contributory  Social Hx: Non-contributory    TESTS & MEASUREMENTS:                        12.8   24.49 )-----------( 406      ( 18 Sep 2020 06:39 )             40.4         144  |  102  |  19  ----------------------------<  115<H>  4.5   |  28  |  1.0    Ca    9.9      17 Sep 2020 06:48    TPro  6.8  /  Alb  4.1  /  TBili  0.5  /  DBili  x   /  AST  16  /  ALT  14  /  AlkPhos  64        LIVER FUNCTIONS - ( 17 Sep 2020 06:48 )  Alb: 4.1 g/dL / Pro: 6.8 g/dL / ALK PHOS: 64 U/L / ALT: 14 U/L / AST: 16 U/L / GGT: x           Urinalysis Basic - ( 16 Sep 2020 22:10 )    Color: Yellow / Appearance: Slightly Turbid / S.020 / pH: x  Gluc: x / Ketone: Negative  / Bili: Negative / Urobili: <2 mg/dL   Blood: x / Protein: 30 mg/dL / Nitrite: Negative   Leuk Esterase: Large / RBC: 7 /HPF / WBC 43 /HPF   Sq Epi: x / Non Sq Epi: 0 /HPF / Bacteria: Many        Culture - Blood (collected 20 @ 18:14)  Source: .Blood None  Final Report (20 @ 01:01):    No Growth Final    Culture - Blood (collected 20 @ 05:26)  Source: .Blood None  Final Report (20 @ 12:00):    No Growth Final    Culture - Urine (collected 20 @ 17:00)  Source: .Urine Clean Catch (Midstream)  Final Report (20 @ 18:12):    >100,000 CFU/ml Klebsiella pneumoniae ESBL  Organism: Klebsiella pneumoniae ESBL (20 @ 18:12)  Organism: Klebsiella pneumoniae ESBL (20 @ 18:12)      -  Amikacin: S <=16      -  Amoxicillin/Clavulanic Acid: I 16/8      -  Ampicillin: R >16 These ampicillin results predict results for amoxicillin      -  Ampicillin/Sulbactam: I 16/8 Enterobacter, Citrobacter, and Serratia may develop resistance during prolonged therapy (3-4 days)      -  Aztreonam: R 16      -  Cefazolin: R >16 (MIC_CL_COM_[id number]) For uncomplicated UTI with K. pneumoniae, E. coli, or P. mirablis: CECILIO <=16 is sensitive and CECILIO >=32 is resistant. This also predicts results for oral agents cefaclor, cefdinir, cefpodoxime, cefprozil, cefuroxime axetil, cephalexin and locarbef for uncomplicated UTI. Note that some isolates may be susceptible to these agents while testing resistant to cefazolin.      -  Cefepime: R >16      -  Cefotaxime: R >32      -  Cefoxitin: S <=8      -  Ceftazidime: R 8      -  Ceftriaxone: R >32 Enterobacter, Citrobacter, and Serratia may develop resistance during prolonged therapy      -  Cefuroxime: R >16      -  Ciprofloxacin: R >2      -  Ertapenem: S <=0.5      -  Gentamicin: R >8      -  Imipenem: S <=1      -  Levofloxacin: S <=0.5      -  Meropenem: S <=1      -  Minocycline: S <=4      -  Nitrofurantoin: I 64 Should not be used to treat pyelonephritis      -  Piperacillin/Tazobactam: S <=8      -  Tigecycline: S <=2      -  Tobramycin: R >8      -  Trimethoprim/Sulfamethoxazole: R >2/38      Method Type: CECILIO    Culture - Blood (collected 20 @ 10:27)  Source: .Blood Blood  Gram Stain (20 @ 13:07):    Growth in anaerobic bottle: Gram Positive Cocci in Clusters  Final Report (20 @ 10:30):    Growth in anaerobic bottle: Staphylococcus hominis    Coag Negative Staphylococcus    Single set isolate, possible contaminant. Contact    Microbiology if susceptibility testing clinically    indicated.    "Due to technical problems, Proteus sp. will Not be reported as part of    the BC[facility] panel until further notice" ***Blood Panel PCR results on this    specimen are available    approximately 3 hours after the Gram stain result.***    Gram stain, PCR, and/or culture results may not always    correspond due to difference in methodologies.    ************************************************************    This PCR assay was performed using [employer].    The following targets are tested for: Enterococcus,    vancomycin resistant enterococci, Listeria monocytogenes,    coagulase negative staphylococci, S. aureus,    methicillin resistant S. aureus, Streptococcus agalactiae    (Group B), S. pneumoniae, S. pyogenes (Group A),    Acinetobacter baumannii, Enterobacter cloacae, E. coli,    Klebsiella oxytoca, K. pneumoniae, Proteus sp.,    Serratia marcescens, Haemophilus influenzae,    Neisseria meningitidis, Pseudomonas aeruginosa, Candida    albicans, C. glabrata, C krusei, C parapsilosis,    C. tropicalis and the KPC resistance gene.  Organism: Blood Culture PCR (20 @ 10:30)  Organism: Blood Culture PCR (20 @ 10:30)      -  Coagulase negative Staphylococcus: Detec      Method Type: PCR    Culture - Blood (collected 20 @ 10:27)  Source: .Blood Blood  Final Report (20 @ 18:01):    No Growth Final        Lactate, Blood: 1.1 mmol/L (20 @ 06:24)  Lactate, Blood: 1.3 mmol/L (20 @ 09:17)      INFECTIOUS DISEASES TESTING  Vancomycin Level, Random: <4.0 (20 @ 17:36)  COVID-19 PCR: NotDetec (20 @ 17:50)      INFLAMMATORY MARKERS  Sedimentation Rate, Erythrocyte: 38 mm/Hr (20 @ 09:35)  C-Reactive Protein, Serum: 1.46 mg/dL (20 @ 09:35)      RADIOLOGY & ADDITIONAL TESTS:  I have personally reviewed the last available Chest xray  CXR      CT      CARDIOLOGY TESTING      MEDICATIONS  apixaban 5 Oral every 12 hours  atorvastatin 10 Oral at bedtime  buPROPion XL . 300 Oral daily  chlorhexidine 4% Liquid 1 Topical <User Schedule>  cholecalciferol 5000 Oral daily  famotidine    Tablet 20 Oral daily  ferrous    sulfate 325 Oral daily  fidaxomicin 200 Oral two times a day  gabapentin 200 Oral every 8 hours  influenza   Vaccine 0.5 IntraMuscular once  labetalol 200 Oral every 12 hours  lisinopril 40 Oral daily  melatonin 5 Oral at bedtime  mirtazapine 7.5 Oral at bedtime  NIFEdipine XL 90 Oral daily  rOPINIRole 0.5 Oral at bedtime  sertraline 150 Oral daily  vancomycin    Solution 125 Oral every 6 hours      WEIGHT  Weight (kg): 96.9 (20 @ 14:11)      ANTIBIOTICS:  fidaxomicin 200 milliGRAM(s) Oral two times a day  vancomycin    Solution 125 milliGRAM(s) Oral every 6 hours      All available historical records have been reviewed

## 2020-09-18 NOTE — DIETITIAN INITIAL EVALUATION ADULT. - ADD RECOMMEND
add consistent CHO and fiber/residue restriction to current diet order, consider initiating a probiotic in the setting on c. diff on abx, encourage po intake, provide assistance with meals PRN add consistent CHO and fiber/residue restriction to current diet order, consider initiating a probiotic in the setting on c. diff on abx, encourage po intake, provide assistance with meals PRN. Recs discussed with Dr Wilson [phone]

## 2020-09-19 NOTE — PROGRESS NOTE ADULT - ATTENDING COMMENTS
I saw and evaluated patient  by bedside, reports mild abdominal pain, 1 loose bm in am today , tolerating diet well.  no  symptoms, no fever  All labs, radiology studies, VS was reviewed  I have reviewed the resident's note and agree with documented findings and  plan of care.  a/p:  Patient is a 70 y/o  Male with a PMH of bladder ca, DM, A-fib (Eliquis), and CABG who presents to the hospital with a c/o lower ABD pain for the past x 2 weeks. Associated with N/V/D that is non-black/bloody. Of note, the pt recently had TURBT performed by Dr. Jo and completed ABXs course. In the ED, seen  by urology, no acute surgical intervention. CT-AP w/ IV contrast was negative. Patient was admitted to medical unit with the following course of therapy:     #Acute suprapubic Pain after TURBT for bladder Ca:   #Cystitis:  initially started Empiric cefepime 1g q 24 and Vanco (given h/o instrumentation). But eventually took abx off per id d/t c diff colitis.  - Urology: No acute urological intervention at this time, F/U OP with Dr. Jo with 1 week   Urine culture grew Klebsiella ESBL which may be a colonizer as per ID  -  repeated urine cxs from 09/16- still with ESBL Klebsiella, f/up ID - still no IV abx. tx. - I have discussed abnormal urine cxs results with patient. Patient was instructed to notify MD if patient develops  symptoms.    #C Diff colitis   - hx of abx use for TURBT  PO Vanco 125 q6- still had  diarrhea episodes, on 09/17/2020 - switched to Dificid 200 mg po twice daily   d/gama vanc/cefepime.       # Persistent leukocytosis-/ thrombocytosis  - ID reporting that there is no active infection, besides C diff- will switch to dificid , pt. is not on any IV abx. at present time  -  ESR- elevated 38  - MRI of right foot- no OM  -consulted Hematology - recommended to review peripheral smear and obtain flow cytometry- f/up results  - f/up EL 2 mutation, f/up lipid profile, DYLAN, RF factor  - daily CBC monitoring    #Diabetes mellitus with hyperglycemia  - Hg A1C- 6.6  - insulin PRN  - FSG AC qHS     #Right Chronic DFU/ s/p Rt TMA  Now TMA Stump Xr s/o Osteomyelitis. Podiatry recommended outpatient f/up for further management.   daily dressing changes per podiatry.   -MRI of foot - no MRI    #Chronic Afib  - currently rate-controlled  on eliquis    #Hypomagnesemia  replete prn    #Liver Lesion   - found on CT Ab  -per son he had some sort of scan in the past showing a lesion in the liver > 20 years ago but was told it was benign at the time  -US liver here could not visualize the node. Outpatient surveillance  Probable fatty liver noted.     #HTN- better controlled on new regimen  - lisinopril to 40 mg po once daily  - d/c metoprolol, started on po labetalol 200 mg po twice daily  -continue Nifedipine 90 mg po once daily  continue current medical regimen.     #Progress Note Handoff:  as per [facility] rec.- f/up EL 2 mutation, DYLAN, RF factor, flow cytometry . will send Dificid to patient's pharmacy - to make sure its available for patient upon d/c home   Family discussion: yes Disposition: Home_ once all results available for review .
[facility]  [facility]; agree w/ plan
68 YO M with a PMH of bladder ca, DM, A-fib (Eliquis), and CABG who presents to the hospital with a c/o lower ABD pain for the past x 2 weeks. Associated with N/V/D that is non-black/bloody. Of note, the pt recently had TURBT performed by Dr. Jo and completed ABXs course. In the ED, seen  by urology, no acute surgical intervention. CT-AP w/ IV contrast was negative. Cultures sent and pt started on IV ABX (Cefepime).     #suprapubic Pain w/ NVD, a few days after TURBT for bladder Ca: likely d/t   #Cystitis:  initially started Empiric cefepime 1g q 24 and Vanco for now (given h/o instrumentation). f/u cultures.  - Urology: No acute urological intervention at this time, F/U OP with Dr. Jo with 1 week     #C Diff colitis   - hx of abx use for TURBT  PO Vanco 125 q6  d/gama vanc/cefepime.   gradually getting better.    #Diabetes mellitus with hyperglycemia  - Pending A1C  - insulin PRN  - FSG AC qHS     #Afib  - currently rate-controlled  - questionable reports of earlier hematuria on presentation. Patient denies. Eliquis was held for this reason earlier.  - hemoglobin stable 12.3. No current hematuria.  - eliquis restarted    #Right Chronic DFU/ s/p Rt TMA  daily dressing changes per podiatry.   outpatient podiatry f/u per usual     #Hypomagnesemia  replete prn    #Liver Lesion   - found on CT Ab  -per son he had some sort of scan in the past showing a lesion in the liver > 20 years ago but was told it was benign at the time  -US liver here could not visualize the node. Outpatient surveillance  Probable fatty liver noted.     #HTN: increase to Nifedipine 90.     Dispo: Active   DVT PPx: Eliquis .
70 YO M with a PMH of bladder ca, DM, A-fib (Eliquis), and CABG who presents to the hospital with a c/o lower ABD pain for the past x 2 weeks. Associated with N/V/D that is non-black/bloody. Of note, the pt recently had TURBT performed by Dr. Jo and completed ABXs course. In the ED, seen  by urology, no acute surgical intervention. CT-AP w/ IV contrast was negative. Cultures sent and pt started on IV ABX (Cefepime).     #suprapubic Pain w/ NVD, a few days after TURBT for bladder Ca: likely d/t   #Cystitis:  Empiric cefepime 1g q 24 and Vanco for now (given h/o instrumentation). f/u cultures.  ID consult pending  - Urology: No acute urological intervention at this time, F/U OP with Dr. Jo with 1 week       #Diarrhea  - hx of abx use for TURBT  - C. Diff sample pending     #Diabetes mellitus with hyperglycemia  - Pending A1C  - insulin PRN  - FSG AC qHS     #Afib  - currently rate-controlled  - questionable reports of earlier hematuria on presentation. Patient denies. Eliquis was held for this reason earlier.  - hemoglobin stable 12.3. No current hematuria.  - eliquis restarted    #Right Chronic DFU/ s/p Rt TMA  daily dressing changes per podiatry.   outpatient podiatry f/u per usual     #Hypomagnesemia  - 1.7, given 2 g IVPB  - will recheck in AM     #Liver Lesion   - found on CT Ab  - US liver ordered - per son he had some sort of scan in the past showing a lesion in the liver > 20 years ago but was told it was benign at the time    Dispo: Active   DVT PPx: Eliquis
I saw and evaluated patient  by bedside,  c/o loose bm's with abdominal pain, no fever, no dysuria , tolerating diet well.    All labs, radiology studies, VS was reviewed  Vital Signs Last 24 Hrs  T(C): 36.8 (17 Sep 2020 13:50), Max: 37.2 (16 Sep 2020 21:30)  T(F): 98.2 (17 Sep 2020 13:50), Max: 99 (16 Sep 2020 21:30)  HR: 81 (17 Sep 2020 13:50) (80 - 83)  BP: 191/86 (17 Sep 2020 13:50) (141/66 - 194/86)  RR: 16 (17 Sep 2020 13:50) (16 - 18)  SpO2: 99% (17 Sep 2020 08:22) (98% - 99%)  I have reviewed the resident's note and agree with documented findings and  plan of care.  a/p:    Patient is a 70 y/o  Male with a PMH of bladder ca, DM, A-fib (Eliquis), and CABG who presents to the hospital with a c/o lower ABD pain for the past x 2 weeks. Associated with N/V/D that is non-black/bloody. Of note, the pt recently had TURBT performed by Dr. Jo and completed ABXs course. In the ED, seen  by urology, no acute surgical intervention. CT-AP w/ IV contrast was negative. Patient was admitted to medical unit with the following course of therapy:     #Acute suprapubic Pain after TURBT for bladder Ca: likely due to acute UTI  #Cystitis:  initially started Empiric cefepime 1g q 24 and Vanco (given h/o instrumentation). But eventually took abx off per id d/t c diff colitis.  - Urology: No acute urological intervention at this time, F/U OP with Dr. Jo with 1 week   Urine culture grew Klebsiella ESBL which may be a colonizer as per ID  - f/up repeated urine cxs from 09/16    #C Diff colitis   - hx of abx use for TURBT  PO Vanco 125 q6- still with diarrhea episodes, switch to Dificid 200 mg po twice daily if available in our inpatient pharmacy  d/gama vanc/cefepime.       # Persistent leukocytosis-  - ID reporting that there is no active infection, besides C diff- will switch to dificid , pt. is not on any IV abx. at present time  -  ESR- elevated 38  - f/up CRP  -? source is foot OM- obtain MRI of right foot  -consulted Hematology - recommended to review peripheral smear and obtain flow cytometry- f/up results  - daily CBC monitoring    #Diabetes mellitus with hyperglycemia  - Hg A1C- 6.6  - insulin PRN  - FSG AC qHS     #Right Chronic DFU/ s/p Rt TMA  Now TMA Stump Xr s/o Osteomyelitis. Podiatry recommended outpatient f/up for further management.   daily dressing changes per podiatry.   -since there is a persistent leukocytosis, i wound like to obtain MRI of right foot to r/o remaining acute OM.    #Chronic Afib  - currently rate-controlled  on eliquis    #Hypomagnesemia  replete prn    #Liver Lesion   - found on CT Ab  -per son he had some sort of scan in the past showing a lesion in the liver > 20 years ago but was told it was benign at the time  -US liver here could not visualize the node. Outpatient surveillance  Probable fatty liver noted.     #HTN- uncontrolled,  1st we have increased lisinopril to 40 mg po once daily  2nd d/c metoprolol, started on po labetalol 200 mg po twice daily  -continue Nifedipine 90 mg po once daily  continue current medical regimen.     #Progress Note Handoff: f/up CRP, f/up MRI of foot, f/up flow cytometry, peripheral smear review to further assess etiology of leukocytosis, due to ongoing diarrhea- switched to PO Dificid tx. , [facility] HTN- adjusted meds today to further optimize b/p control  Family discussion: medical plan of tx. d/w pt. by bedside Disposition: Home_ once medically stable
I saw and evaluated patient  by bedside, reports that diarrhea has decreased in frequency today, decreased abdominal pain , tolerating diet well.    All labs, radiology studies, VS was reviewed  I have reviewed the resident's note and agree with documented findings and  plan of care.  a/p:  Patient is a 70 y/o  Male with a PMH of bladder ca, DM, A-fib (Eliquis), and CABG who presents to the hospital with a c/o lower ABD pain for the past x 2 weeks. Associated with N/V/D that is non-black/bloody. Of note, the pt recently had TURBT performed by Dr. Jo and completed ABXs course. In the ED, seen  by urology, no acute surgical intervention. CT-AP w/ IV contrast was negative. Patient was admitted to medical unit with the following course of therapy:     #Acute suprapubic Pain after TURBT for bladder Ca: likely due to acute UTI  #Cystitis:  initially started Empiric cefepime 1g q 24 and Vanco (given h/o instrumentation). But eventually took abx off per id d/t c diff colitis.  - Urology: No acute urological intervention at this time, F/U OP with Dr. Jo with 1 week   Urine culture grew Klebsiella ESBL which may be a colonizer as per ID  - f/up repeated urine cxs from 09/16    #C Diff colitis   - hx of abx use for TURBT  PO Vanco 125 q6- still with diarrhea episodes, on 09/17/2020 - switched to Dificid 200 mg po twice daily   d/gama vanc/cefepime.       # Persistent leukocytosis-  - ID reporting that there is no active infection, besides C diff- will switch to dificid , pt. is not on any IV abx. at present time  -  ESR- elevated 38  - MRI of right foot- no OM  -consulted Hematology - recommended to review peripheral smear and obtain flow cytometry- f/up results  - daily CBC monitoring    #Diabetes mellitus with hyperglycemia  - Hg A1C- 6.6  - insulin PRN  - FSG AC qHS     #Right Chronic DFU/ s/p Rt TMA  Now TMA Stump Xr s/o Osteomyelitis. Podiatry recommended outpatient f/up for further management.   daily dressing changes per podiatry.   -MRI of foot - no MRI    #Chronic Afib  - currently rate-controlled  on eliquis    #Hypomagnesemia  replete prn    #Liver Lesion   - found on CT Ab  -per son he had some sort of scan in the past showing a lesion in the liver > 20 years ago but was told it was benign at the time  -US liver here could not visualize the node. Outpatient surveillance  Probable fatty liver noted.     #HTN- better controlled on new regimen  - lisinopril to 40 mg po once daily  - d/c metoprolol, started on po labetalol 200 mg po twice daily  -continue Nifedipine 90 mg po once daily  continue current medical regimen.     #Progress Note Handoff: if diarrhea resolves, d/c planning tomorrow  Family discussion: yes Disposition: Home_ possibly tomorrow
chronic ulcer right plantar TMA stump  may benefit from a percutaneous achilles tenotomy (in-patient vs outpatient), once infection subsides   OM of amputation stump must first be ruled out-->please obtain right foot xray .     I was physically present for the key portions of the evaluation and management (E/M) service provided.  I agree with the above history, physical, and plan which I have reviewed and edited where appropriate.
70 YO M with a PMH of bladder ca, DM, A-fib (Eliquis), and CABG who presents to the hospital with a c/o lower ABD pain for the past x 2 weeks. Associated with N/V/D that is non-black/bloody. Of note, the pt recently had TURBT performed by Dr. Jo and completed ABXs course. In the ED, seen  by urology, no acute surgical intervention. CT-AP w/ IV contrast was negative. Cultures sent and pt started on IV ABX (Cefepime).     #suprapubic Pain w/ NVD, a few days after TURBT for bladder Ca: likely d/t   #Cystitis:  initially started Empiric cefepime 1g q 24 and Vanco (given h/o instrumentation). But eventually took abx off per id d/t c diff colitis.  - Urology: No acute urological intervention at this time, F/U OP with Dr. Jo with 1 week   Urine culture grew Klebsiella ESBL which may be a colonizer as per ID?     #C Diff colitis   - hx of abx use for TURBT  PO Vanco 125 q6  d/gama vanc/cefepime.   abdo pain/diarrhea gradually getting better.    #Diabetes mellitus with hyperglycemia  - Pending A1C  - insulin PRN  - FSG AC qHS     #Right Chronic DFU/ s/p Rt TMA  Now TMA Stump Xr s/o Osteomyelitis. Podiatry planning inpatient debridement and then tendon procedure.   daily dressing changes per podiatry.     #Chronic Afib  - currently rate-controlled  on eliquis    #Hypomagnesemia  replete prn    #Liver Lesion   - found on CT Ab  -per son he had some sort of scan in the past showing a lesion in the liver > 20 years ago but was told it was benign at the time  -US liver here could not visualize the node. Outpatient surveillance  Probable fatty liver noted.     #HTN: increase to Nifedipine 90.     Dispo: Active   DVT PPx: Eliquis . f/u podiatry when to hold this prior to procedure.
follow up in clinic for wound care and future surgical planning

## 2020-09-19 NOTE — PROGRESS NOTE ADULT - ASSESSMENT
70yo male with a PMH of bladder ca, DM, A-fib (Eliquis), and CABG who presented to the hospital with a c/o lower ABD pain for the past x 2 weeks. Associated with N/V/D that is non-black/bloody. Of note, the pt recently had TURBT performed by Dr. Jo and completed abx course. In the ED, pt was seen by urology, no acute surgical intervention. CT-AP w/ IV contrast was negative. Cultures sent and pt started on IV Cefepime.       Suprapubic Pain w/ NVD, a few days after TURBT for bladder Ca: likely 2/2 Cystitis  - Initially started Empiric cefepime 1g q 24 and Vancomycin (given h/o instrumentation)  - Blood cx: coag negative Staph, likely contaminant  - Urine cx (9/10): Klebsiella ESBL. Pt current asymptomatic. F/u ID recs  - Repeat UA (9/16): with large LE, no nitrites, WBC 43  - Pt with persistent leukocytosis. Heme/Onc consulted, flow cytometry pending to r/o AML/MDS/ALL  - JAK2 specimen ordered  - Repeat urine culture still positive for Klebsiella  - ID consulted: still rec no additional treatment of urine culture, as it is expected given hx of instrumentatioon  - Urology: No acute urological intervention at this time, f/u OP with Dr. Jo with 1 week     Osteomyelitis of MPJ with chronic ulcer  - XR Right Foot (9/14): Second and fourth metatarsal stump periosteal reaction suggestive of recurrent/residual osteomyelitis.  - MRI (9/17): no evidence of osteomyelitis  - ESR 38. CRP 1.46  - Per Podiatry, surgical intervention will be pursued outpatient    C Diff colitis   - Hx of abx use for TURBT  - No diarrhea overnight  - On Fidaxomicin 200mg BIDx 10 days    Hypertension  - Hypertensive in AM but improved in PM  - C/w Nifedipine 90mg qd (9/14)  - C/w Lisinopril 40mg qd (9/16)  - Labetalol increased to 200mg BID    Diabetes mellitus with peripheral neuropathy  - A1C 6.6  - Insulin PRN  - FS AC qHS   - On Gabapentin 200mg TID    Chronic Afib  - Currently rate-controlled  - Questionable reports of earlier hematuria on presentation. Patient denies. Eliquis was held for this reason earlier.  - Hemoglobin stable 12.7. No current hematuria.  - On Eliquis    Right Chronic DFU/ s/p Rt TMA  - Daily dressing changes per podiatry  - Outpatient podiatry f/u per usual     Hypomagnesemia  - Resolved  - Replete PRN    Liver Lesion   - Found on CT Ab  - US liver ordered - per son he had some sort of scan in the past showing a lesion in the liver > 20 years ago but was told it was benign at the time    Misc  - Dispo: Active   - DVT PPx: Eliquis

## 2020-09-19 NOTE — PROGRESS NOTE ADULT - SUBJECTIVE AND OBJECTIVE BOX
Patient is a 69y old Male who presents with a chief complaint of Diarrhea and abdominal pain (17 Sep 2020 12:06)    No acute events overnight. Patient still complains of abdominal pain. Denies chest pain, SOB, N/V/D,    PAST MEDICAL & SURGICAL HISTORY  Afib  rate controlled currently    Hematuria    Bladder tumor    History of diabetic neuropathy    CAD (coronary artery disease) of bypass graft  cabg 1995 3v    Glaucoma    Arthritis    GERD (gastroesophageal reflux disease)    HTN (hypertension)    Depression    DM (diabetes mellitus)    S/P lumbar laminectomy  with repair of CSF leak using dural graft 7/8/2018 - dr Crenshaw    Malfunction of intrathecal infusion pump  implanted initially in 1996  reimplanted 2006  removed 6/27/2018    Foot amputation status, right  partial foot amputation  tarsals and metatarsals of right foot    S/P laparoscopic cholecystectomy    CAD (coronary artery disease) of bypass graft        PHYSICAL EXAM  Vital Signs Last 24 Hrs  T(C): 36 (19 Sep 2020 05:44), Max: 37 (18 Sep 2020 13:42)  T(F): 96.8 (19 Sep 2020 05:44), Max: 98.6 (18 Sep 2020 13:42)  HR: 77 (19 Sep 2020 05:44) (65 - 80)  BP: 180/73 (19 Sep 2020 05:44) (145/65 - 190/81)  BP(mean): --  RR: 18 (19 Sep 2020 05:44) (18 - 18)  SpO2: 98% (18 Sep 2020 19:20) (98% - 98%)    I&O's Summary    GENERAL: No acute distress, well-developed  HEAD:  Atraumatic, Normocephalic  ENT: PERRL, conjunctiva and sclera clear, neck supple, no JVD  CHEST/LUNG: Clear to auscultation bilaterally; No wheeze, equal breath sounds bilaterally, respirations nonlabored  HEART: Regular rate and rhythm; No murmurs, rubs, or gallops  ABDOMEN: Soft, mild suprapubic tenderness to palpation  EXTREMITIES:  Right foot wrapped in bandage  PSYCH: Nl behavior, nl affect  NEUROLOGY: AAOx3, non-focal, moves all extremities spontaneously      LABS                        12.3   21.81 )-----------( 362      ( 19 Sep 2020 07:32 )             39.8     Hemoglobin: 12.3 g/dL (09-19 @ 07:32)  Hemoglobin: 12.8 g/dL (09-18 @ 06:39)  Hemoglobin: 13.1 g/dL (09-17 @ 06:48)  Hemoglobin: 13.1 g/dL (09-16 @ 07:08)  Hemoglobin: 12.4 g/dL (09-15 @ 05:39)    CBC Full  -  ( 19 Sep 2020 07:32 )  WBC Count : 21.81 K/uL  RBC Count : 4.53 M/uL  Hemoglobin : 12.3 g/dL  Hematocrit : 39.8 %  Platelet Count - Automated : 362 K/uL  Mean Cell Volume : 87.9 fL  Mean Cell Hemoglobin : 27.2 pg  Mean Cell Hemoglobin Concentration : 30.9 g/dL  Auto Neutrophil # : 15.60 K/uL  Auto Lymphocyte # : 4.18 K/uL  Auto Monocyte # : 1.64 K/uL  Auto Eosinophil # : 0.12 K/uL  Auto Basophil # : 0.13 K/uL  Auto Neutrophil % : 71.5 %  Auto Lymphocyte % : 19.2 %  Auto Monocyte % : 7.5 %  Auto Eosinophil % : 0.6 %  Auto Basophil % : 0.6 %    09-19    142  |  101  |  20  ----------------------------<  152<H>  3.8   |  28  |  1.1    Ca    10.0      19 Sep 2020 07:32    TPro  6.9  /  Alb  4.1  /  TBili  0.7  /  DBili  x   /  AST  15  /  ALT  13  /  AlkPhos  61  09-19    Creatinine Trend: 1.1<--, 1.1<--, 1.0<--, 0.9<--, 0.9<--, 0.9<--  LIVER FUNCTIONS - ( 19 Sep 2020 07:32 )  Alb: 4.1 g/dL / Pro: 6.9 g/dL / ALK PHOS: 61 U/L / ALT: 13 U/L / AST: 15 U/L / GGT: x             MICROBIOLOGY:    Culture - Urine (collected 16 Sep 2020 22:10)  Source: .Urine Clean Catch (Midstream)  Final Report (19 Sep 2020 09:02):    >100,000 CFU/ml Klebsiella pneumoniae ESBL  Organism: Klebsiella pneumoniae ESBL (19 Sep 2020 09:02)  Organism: Klebsiella pneumoniae ESBL (19 Sep 2020 09:02)

## 2020-09-20 NOTE — PROGRESS NOTE ADULT - SUBJECTIVE AND OBJECTIVE BOX
PRIYANK WESLEY  [facility]            Patient was evaluated and examined  by bedside, reports decreased diarrhea episodes, still c/o mild abdominal pain, tolerating diet well, no dysuria, no fever.          REVIEW OF SYSTEMS:  please see pertinent positives mentioned above, all other 12 ROS negative      T(C): , Max: 37 (09-20-20 @ 05:00)  HR: 73 (09-20-20 @ 09:42)  BP: 140/60 (09-20-20 @ 09:42)  RR: 18 (09-20-20 @ 05:00)  SpO2: 98% (09-19-20 @ 19:52)  CAPILLARY BLOOD GLUCOSE      POCT Blood Glucose.: 177 mg/dL (20 Sep 2020 10:57)  POCT Blood Glucose.: 159 mg/dL (20 Sep 2020 07:31)  POCT Blood Glucose.: 149 mg/dL (19 Sep 2020 16:33)      PHYSICAL EXAM:  General: NAD, AAOX3, patient is laying comfortably in bed, obese  HEENT: AT, NC, Supple, NO JVD, NO CB  Lungs: CTA B/L, no wheezing, no rhonchi  CVS: normal S1, S2, RRR, NO M/G/R  Abdomen: soft, bowel sounds present, minimally tender mid quadrant area, non-distended  Extremities: right foot covered with dressing, no edema, no clubbing, no cyanosis, positive peripheral pulses b/l  Neuro: no acute focal neurological deficits  Skin: no rash, no ecchymosis      LAB  CBC  Date: 09-20-20 @ 07:54  Mean cell Kiscuctzgx90.4  Mean cell Hemoglobin Conc31.2  Mean cell Volum 88.0  Platelet count-Automate 371  RBC Count 4.34  Red Cell Distrib Width13.6  WBC Count20.27  % Albumin, Urine--  Hematocrit 38.2  Hemoglobin 11.9  CBC  Date: 09-19-20 @ 07:32  Mean cell Kckgskulhn16.2  Mean cell Hemoglobin Conc30.9  Mean cell Volum 87.9  Platelet count-Automate 362  RBC Count 4.53  Red Cell Distrib Width13.7  WBC Count21.81  % Albumin, Urine--  Hematocrit 39.8  Hemoglobin 12.3  CBC  Date: 09-18-20 @ 06:39  Mean cell Uerbajhqrw18.7  Mean cell Hemoglobin Conc31.7  Mean cell Volum 87.4  Platelet count-Automate 406  RBC Count 4.62  Red Cell Distrib Width13.6  WBC Count24.49  % Albumin, Urine--  Hematocrit 40.4  Hemoglobin 12.8  CBC  Date: 09-17-20 @ 06:48  Mean cell Fntmvefjqz44.8  Mean cell Hemoglobin Conc31.7  Mean cell Volum 87.7  Platelet count-Automate 389  RBC Count 4.71  Red Cell Distrib Width13.4  WBC Count24.56  % Albumin, Urine--  Hematocrit 41.3  Hemoglobin 13.1  CBC  Date: 09-16-20 @ 07:08  Mean cell Xmlzvnjazd42.1  Mean cell Hemoglobin Conc31.6  Mean cell Volum 85.9  Platelet count-Automate 337  RBC Count 4.83  Red Cell Distrib Width13.2  WBC Count17.14  % Albumin, Urine--  Hematocrit 41.5  Hemoglobin 13.1  CBC  Date: 09-15-20 @ 05:39  Mean cell Lyzgcerbqi45.4  Mean cell Hemoglobin Conc31.7  Mean cell Volum 86.5  Platelet count-Automate 358  RBC Count 4.52  Red Cell Distrib Width13.1  WBC Count18.22  % Albumin, Urine--  Hematocrit 39.1  Hemoglobin 12.4  CBC  Date: 09-14-20 @ 06:24  Mean cell Vlrexovvtb26.7  Mean cell Hemoglobin Conc31.7  Mean cell Volum 87.6  Platelet count-Automate 377  RBC Count 4.58  Red Cell Distrib Width13.1  WBC Count15.04  % Albumin, Urine--  Hematocrit 40.1  Hemoglobin 12.7    [facility]  09-20-20 @ 07:54  Blood Gas Arterial-Calcium,Ionized--  Blood Urea Nitrogen, Serum 20 mg/dL [10 - 20]  Carbon Dioxide, Serum26 mmol/L [17 - 32]  Chloride, Roouo581 mmol/L [98 - 110]  Creatinie, Serum1.0 mg/dL [0.7 - 1.5]  Glucose, Mkmuv571 mg/dL<H> [70 - 99]  Potassium, Serum3.9 mmol/L [3.5 - 5.0]  Sodium, Serum 138 mmol/L [135 - 146]  [facility]  09-19-20 @ 07:32  Blood Gas Arterial-Calcium,Ionized--  Blood Urea Nitrogen, Serum 20 mg/dL [10 - 20]  Carbon Dioxide, Serum28 mmol/L [17 - 32]  Chloride, Rjpkj839 mmol/L [98 - 110]  Creatinie, Serum1.1 mg/dL [0.7 - 1.5]  Glucose, Ajlrq472 mg/dL<H> [70 - 99]  Potassium, Serum3.8 mmol/L [3.5 - 5.0]  Sodium, Serum 142 mmol/L [135 - 146]  [facility]  09-18-20 @ 06:39  Blood Gas Arterial-Calcium,Ionized--  Blood Urea Nitrogen, Serum 21 mg/dL<H> [10 - 20]  Carbon Dioxide, Serum25 mmol/L [17 - 32]  Chloride, Xagvr386 mmol/L [98 - 110]  Creatinie, Serum1.1 mg/dL [0.7 - 1.5]  Glucose, Qsiay561 mg/dL<H> [70 - 99]  Potassium, Serum4.3 mmol/L [3.5 - 5.0]  Sodium, Serum 141 mmol/L [135 - 146]  [facility]  09-17-20 @ 06:48  Blood Gas Arterial-Calcium,Ionized--  Blood Urea Nitrogen, Serum 19 mg/dL [10 - 20]  Carbon Dioxide, Serum28 mmol/L [17 - 32]  Chloride, Gjlax641 mmol/L [98 - 110]  Creatinie, Serum1.0 mg/dL [0.7 - 1.5]  Glucose, Ivsoc439 mg/dL<H> [70 - 99]  Potassium, Serum4.5 mmol/L [3.5 - 5.0]  Sodium, Serum 144 mmol/L [135 - 146]  [facility]  09-16-20 @ 07:08  Blood Gas Arterial-Calcium,Ionized--  Blood Urea Nitrogen, Serum 15 mg/dL [10 - 20]  Carbon Dioxide, Serum24 mmol/L [17 - 32]  Chloride, Serum98 mmol/L [98 - 110]  Creatinie, Serum0.9 mg/dL [0.7 - 1.5]  Glucose, Myzat542 mg/dL<H> [70 - 99]  Potassium, Serum3.4 mmol/L<L> [3.5 - 5.0]  Sodium, Serum 137 mmol/L [135 - 146]              Microbiology:    Culture - Urine (collected 09-16-20 @ 22:10)  Source: .Urine Clean Catch (Midstream)  Final Report (09-19-20 @ 09:02):    >100,000 CFU/ml Klebsiella pneumoniae ESBL  Organism: Klebsiella pneumoniae ESBL (09-19-20 @ 09:02)  Organism: Klebsiella pneumoniae ESBL (09-19-20 @ 09:02)      -  Amikacin: S <=16      -  Amoxicillin/Clavulanic Acid: I 16/8      -  Ampicillin: R >16 These ampicillin results predict results for amoxicillin      -  Ampicillin/Sulbactam: I 16/8 Enterobacter, Citrobacter, and Serratia may develop resistance during prolonged therapy (3-4 days)      -  Aztreonam: R 16      -  Cefazolin: R >16 (MIC_CL_COM_ENTERIC_CEFAZU) For uncomplicated UTI with K. pneumoniae, E. coli, or P. mirablis: CECILIO <=16 is sensitive and CECILIO >=32 is resistant. This also predicts results for oral agents cefaclor, cefdinir, cefpodoxime, cefprozil, cefuroxime axetil, cephalexin and locarbef for uncomplicated UTI. Note that some isolates may be susceptible to these agents while testing resistant to cefazolin.      -  Cefepime: R >16      -  Cefoxitin: S <=8      -  Ceftriaxone: R >32 Enterobacter, Citrobacter, and Serratia may develop resistance during prolonged therapy      -  Ciprofloxacin: R >2      -  Ertapenem: S <=0.5      -  Gentamicin: R >8      -  Imipenem: S <=1      -  Levofloxacin: S <=0.5      -  Meropenem: S <=1      -  Nitrofurantoin: R >64 Should not be used to treat pyelonephritis      -  Piperacillin/Tazobactam: S <=8      -  Tigecycline: S <=2      -  Tobramycin: R >8      -  Trimethoprim/Sulfamethoxazole: R >2/38      Method Type: CECILIO    Culture - Blood (collected 09-12-20 @ 18:14)  Source: .Blood None  Final Report (09-18-20 @ 01:01):    No Growth Final    Culture - Blood (collected 09-12-20 @ 05:26)  Source: .Blood None  Final Report (09-17-20 @ 12:00):    No Growth Final    Culture - Urine (collected 09-11-20 @ 17:00)  Source: .Urine Clean Catch (Midstream)  Final Report (09-14-20 @ 18:12):    >100,000 CFU/ml Klebsiella pneumoniae ESBL  Organism: Klebsiella pneumoniae ESBL (09-14-20 @ 18:12)  Organism: Klebsiella pneumoniae ESBL (09-14-20 @ 18:12)      -  Amikacin: S <=16      -  Amoxicillin/Clavulanic Acid: I 16/8      -  Ampicillin: R >16 These ampicillin results predict results for amoxicillin      -  Ampicillin/Sulbactam: I 16/8 Enterobacter, Citrobacter, and Serratia may develop resistance during prolonged therapy (3-4 days)      -  Aztreonam: R 16      -  Cefazolin: R >16 (MIC_CL_COM_ENTERIC_CEFAZU) For uncomplicated UTI with K. pneumoniae, E. coli, or P. mirablis: CECILIO <=16 is sensitive and CECILIO >=32 is resistant. This also predicts results for oral agents cefaclor, cefdinir, cefpodoxime, cefprozil, cefuroxime axetil, cephalexin and locarbef for uncomplicated UTI. Note that some isolates may be susceptible to these agents while testing resistant to cefazolin.      -  Cefepime: R >16      -  Cefotaxime: R >32      -  Cefoxitin: S <=8      -  Ceftazidime: R 8      -  Ceftriaxone: R >32 Enterobacter, Citrobacter, and Serratia may develop resistance during prolonged therapy      -  Cefuroxime: R >16      -  Ciprofloxacin: R >2      -  Ertapenem: S <=0.5      -  Gentamicin: R >8      -  Imipenem: S <=1      -  Levofloxacin: S <=0.5      -  Meropenem: S <=1      -  Minocycline: S <=4      -  Nitrofurantoin: I 64 Should not be used to treat pyelonephritis      -  Piperacillin/Tazobactam: S <=8      -  Tigecycline: S <=2      -  Tobramycin: R >8      -  Trimethoprim/Sulfamethoxazole: R >2/38      Method Type: CECILIO    Culture - Blood (collected 09-11-20 @ 10:27)  Source: .Blood Blood  Gram Stain (09-12-20 @ 13:07):    Growth in anaerobic bottle: Gram Positive Cocci in Clusters  Final Report (09-13-20 @ 10:30):    Growth in anaerobic bottle: Staphylococcus hominis    Coag Negative Staphylococcus    Single set isolate, possible contaminant. Contact    Microbiology if susceptibility testing clinically    indicated.    "Due to technical problems, Proteus sp. will Not be reported as part of    the BCID panel until further notice" ***Blood Panel PCR results on this    specimen are available    approximately 3 hours after the Gram stain result.***    Gram stain, PCR, and/or culture results may not always    correspond due to difference in methodologies.    ************************************************************    This PCR assay was performed using [employer].    The following targets are tested for: Enterococcus,    vancomycin resistant enterococci, Listeria monocytogenes,    coagulase negative staphylococci, S. aureus,    methicillin resistant S. aureus, Streptococcus agalactiae    (Group B), S. pneumoniae, S. pyogenes (Group A),    Acinetobacter baumannii, Enterobacter cloacae, E. coli,    Klebsiella oxytoca, K. pneumoniae, Proteus sp.,    Serratia marcescens, Haemophilus influenzae,    Neisseria meningitidis, Pseudomonas aeruginosa, Candida    albicans, C. glabrata, C krusei, C parapsilosis,    C. tropicalis and the KPC resistance gene.  Organism: Blood Culture PCR (09-13-20 @ 10:30)  Organism: Blood Culture PCR (09-13-20 @ 10:30)      -  Coagulase negative Staphylococcus: Detec      Method Type: PCR    Culture - Blood (collected 09-11-20 @ 10:27)  Source: .Blood Blood  Final Report (09-16-20 @ 18:01):    No Growth Final        Medications:  acetaminophen   Tablet .. 650 milliGRAM(s) Oral every 6 hours PRN  aluminum hydroxide/magnesium hydroxide/simethicone Suspension 30 milliLiter(s) Oral every 4 hours PRN  apixaban 5 milliGRAM(s) Oral every 12 hours  atorvastatin 10 milliGRAM(s) Oral at bedtime  buPROPion XL . 300 milliGRAM(s) Oral daily  chlorhexidine 4% Liquid 1 Application(s) Topical <User Schedule>  cholecalciferol 5000 Unit(s) Oral daily  famotidine    Tablet 20 milliGRAM(s) Oral daily  ferrous    sulfate 325 milliGRAM(s) Oral daily  fidaxomicin 200 milliGRAM(s) Oral two times a day  gabapentin 200 milliGRAM(s) Oral every 8 hours  influenza   Vaccine 0.5 milliLiter(s) IntraMuscular once  labetalol 200 milliGRAM(s) Oral every 12 hours  lisinopril 40 milliGRAM(s) Oral daily  melatonin 5 milliGRAM(s) Oral at bedtime  mirtazapine 7.5 milliGRAM(s) Oral at bedtime  morphine  - Injectable 2 milliGRAM(s) IV Push every 4 hours PRN  NIFEdipine XL 90 milliGRAM(s) Oral daily  rOPINIRole 0.5 milliGRAM(s) Oral at bedtime  sertraline 150 milliGRAM(s) Oral daily  traMADol 50 milliGRAM(s) Oral every 6 hours PRN        Assessment and Plan:  Patient is a 70 y/o  Male with a PMH of bladder ca, DM, A-fib (Eliquis), and CABG who presents to the hospital with a c/o lower ABD pain for the past x 2 weeks. Associated with N/V/D that is non-black/bloody. Of note, the pt recently had TURBT performed by Dr. Jo and completed ABXs course. In the ED, seen  by urology, no acute surgical intervention. CT-AP w/ IV contrast was negative. Patient was admitted to medical unit with the following course of therapy:     #Acute suprapubic Pain after TURBT for bladder Ca:   #Cystitis:  initially started Empiric cefepime 1g q 24 and Vanco (given h/o instrumentation). But eventually took abx off per id d/t c diff colitis.  - Urology: No acute urological intervention at this time, F/U OP with Dr. Jo with 1 week   Urine culture grew Klebsiella ESBL which may be a colonizer as per ID  -  repeated urine cxs from 09/16- still with ESBL Klebsiella, f/up ID - still no IV abx. tx. - I have discussed abnormal urine cxs results with patient. Patient was instructed to notify MD if patient develops  symptoms.    #C Diff colitis   - hx of abx use for TURBT  - patient was initially started on PO Vanco 125 q6-  still had diarrhea episodes,   on 09/17/2020 - switched to Dificid 200 mg po twice daily - diarrhea has decreased.  -patient to be d/c home on PO Dificid tx.  d/gama vanc/cefepime.       # Persistent leukocytosis-/ thrombocytosis  - ID reporting that there is no active infection, besides C diff- will switch to dificid , pt. is not on any IV abx. at present time  -  ESR- elevated 38  - MRI of right foot- no OM  -consulted Hematology - recommended to review peripheral smear and obtain flow cytometry- f/up results  - f/up EL 2 mutation,   - lipid profile- elevated triglycerides  - f/up DYLAN  - normal  RF factor  - outpatient  CBC monitoring with Hematology f/up    #Diabetes mellitus with hyperglycemia  - Hg A1C- 6.6  - insulin PRN  - FSG AC qHS     #Right Chronic DFU/ s/p Rt TMA  Now TMA Stump Xr s/o Osteomyelitis. Podiatry recommended outpatient f/up for further management.   daily dressing changes per podiatry.   -MRI of foot - no MRI    #Chronic Afib  - currently rate-controlled  on eliquis    #Hypomagnesemia  replete prn    #Liver Lesion   - found on CT Ab  -per son he had some sort of scan in the past showing a lesion in the liver > 20 years ago but was told it was benign at the time  -US liver here could not visualize the node. Outpatient surveillance  Probable fatty liver noted.     #HTN- better controlled on new regimen  - lisinopril to 40 mg po once daily  - d/c metoprolol, started on po labetalol 200 mg po twice daily  -continue Nifedipine 90 mg po once daily  continue current medical regimen.     #Progress Note Handoff: Patient has improved clinically, decreased diarrhea episodes, still c/o abdominal pain, Dificid was sent to patient's pharmacy still not in stock.  Family discussion: medical plan of tx. was d/w pt .by bedside Disposition: Home__tomorrow in am

## 2020-09-20 NOTE — PROVIDER CONTACT NOTE (OTHER) - SITUATION
MD notified, pt is refusing discharge at this time despite encouragement. Pt requests to see the MD.  AM team to resolve. MD is aware.

## 2020-09-21 NOTE — CHART NOTE - NSCHARTNOTEFT_GEN_A_CORE
<<<RESIDENT DISCHARGE NOTE>>>     PRIYANK WESLEY  MRN-[id number]    VITAL SIGNS:  T(F): 97.4 (09-21-20 @ 05:00), Max: 98.8 (09-20-20 @ 20:37)  HR: 77 (09-21-20 @ 05:00)  BP: 136/61 (09-21-20 @ 05:00)  SpO2: 98% (09-21-20 @ 07:30)      PHYSICAL EXAMINATION:  General: NAD, AAOX3, patient is laying comfortably in bed, obese  HEENT: AT, NC, Supple, NO JVD, NO CB  Lungs: CTA B/L, no wheezing, no rhonchi  CVS: normal S1, S2, RRR, NO M/G/R  Abdomen: soft, bowel sounds present, minimally tender mid quadrant area, non-distended  Extremities: right foot covered with dressing, no edema, no clubbing, no cyanosis, positive peripheral pulses b/l  Neuro: no acute focal neurological deficits  Skin: no rash, no ecchymosis    TEST RESULTS:                        11.8   21.85 )-----------( 387      ( 21 Sep 2020 07:34 )             37.9       09-21    139  |  98  |  21<H>  ----------------------------<  143<H>  4.3   |  26  |  1.1    Ca    10.0      21 Sep 2020 07:34    TPro  6.8  /  Alb  4.0  /  TBili  0.7  /  DBili  x   /  AST  19  /  ALT  17  /  AlkPhos  61  09-21      FINAL DISCHARGE INTERVIEW:  Resident(s) Present: (Name: Daniel Wilson MD), RN Present: (Name: Em)    DISCHARGE MEDICATION RECONCILIATION  reviewed with Attending (Name: Orville)    DISPOSITION:   [X] Home,    [  ] Home with Visiting Nursing Services,   [    ]  SNF/ NH,    [   ] Acute Rehab (4A),   [   ] Other (Specify:_________)

## 2020-09-21 NOTE — PROGRESS NOTE ADULT - PROVIDER SPECIALTY LIST ADULT
Hospitalist
Infectious Disease
Internal Medicine
Podiatry
Podiatry
Heme/Onc

## 2020-09-21 NOTE — PROGRESS NOTE ADULT - SUBJECTIVE AND OBJECTIVE BOX
PRIYANK WESLEY  [facility] T2-3A 023 A ([facility] T2-3A)            Patient was evaluated and examined  by bedside, c/o abdominal pain, which was relieved with oral tylenol, tramadol, tolerating diet well, patient had 1 bm in am. no fever. no dysuria                REVIEW OF SYSTEMS:  please see pertinent positives mentioned above, all other 12 ROS negative      T(C): , Max: 37.1 (09-20-20 @ 20:37)  HR: 77 (09-21-20 @ 05:00)  BP: 136/61 (09-21-20 @ 05:00)  RR: 18 (09-21-20 @ 05:00)  SpO2: 98% (09-21-20 @ 07:30)  CAPILLARY BLOOD GLUCOSE      POCT Blood Glucose.: 172 mg/dL (21 Sep 2020 08:05)  POCT Blood Glucose.: 184 mg/dL (20 Sep 2020 21:57)  POCT Blood Glucose.: 180 mg/dL (20 Sep 2020 16:43)      PHYSICAL EXAM:  General: NAD, AAOX3, patient is laying comfortably in bed, obese  HEENT: AT, NC, Supple, NO JVD, NO CB  Lungs: CTA B/L, no wheezing, no rhonchi  CVS: normal S1, S2, RRR, NO M/G/R  Abdomen: soft, bowel sounds present, minimally tender mid quadrant area, non-distended  Extremities: right foot covered with dressing, no edema, no clubbing, no cyanosis, positive peripheral pulses b/l  Neuro: no acute focal neurological deficits  Skin: no rash, no ecchymosis          LAB  CBC  Date: 09-21-20 @ 07:34  Mean cell Dafjzljrgw42.9  Mean cell Hemoglobin Conc31.1  Mean cell Volum 89.6  Platelet count-Automate 387  RBC Count 4.23  Red Cell Distrib Width13.7  WBC Count21.85  % Albumin, Urine--  Hematocrit 37.9  Hemoglobin 11.8  CBC  Date: 09-20-20 @ 07:54  Mean cell Azkiwmtrkd19.4  Mean cell Hemoglobin Conc31.2  Mean cell Volum 88.0  Platelet count-Automate 371  RBC Count 4.34  Red Cell Distrib Width13.6  WBC Count20.27  % Albumin, Urine--  Hematocrit 38.2  Hemoglobin 11.9  CBC  Date: 09-19-20 @ 07:32  Mean cell Aochokxrcm67.2  Mean cell Hemoglobin Conc30.9  Mean cell Volum 87.9  Platelet count-Automate 362  RBC Count 4.53  Red Cell Distrib Width13.7  WBC Count21.81  % Albumin, Urine--  Hematocrit 39.8  Hemoglobin 12.3  CBC  Date: 09-18-20 @ 06:39  Mean cell Qxdbtxahwn56.7  Mean cell Hemoglobin Conc31.7  Mean cell Volum 87.4  Platelet count-Automate 406  RBC Count 4.62  Red Cell Distrib Width13.6  WBC Count24.49  % Albumin, Urine--  Hematocrit 40.4  Hemoglobin 12.8  CBC  Date: 09-17-20 @ 06:48  Mean cell Xhiiiowjcn02.8  Mean cell Hemoglobin Conc31.7  Mean cell Volum 87.7  Platelet count-Automate 389  RBC Count 4.71  Red Cell Distrib Width13.4  WBC Count24.56  % Albumin, Urine--  Hematocrit 41.3  Hemoglobin 13.1  CBC  Date: 09-16-20 @ 07:08  Mean cell Rgzmqqkrof31.1  Mean cell Hemoglobin Conc31.6  Mean cell Volum 85.9  Platelet count-Automate 337  RBC Count 4.83  Red Cell Distrib Width13.2  WBC Count17.14  % Albumin, Urine--  Hematocrit 41.5  Hemoglobin 13.1  CBC  Date: 09-15-20 @ 05:39  Mean cell Ntasbylokp98.4  Mean cell Hemoglobin Conc31.7  Mean cell Volum 86.5  Platelet count-Automate 358  RBC Count 4.52  Red Cell Distrib Width13.1  WBC Count18.22  % Albumin, Urine--  Hematocrit 39.1  Hemoglobin 12.4    [facility]  09-21-20 @ 07:34  Blood Gas Arterial-Calcium,Ionized--  Blood Urea Nitrogen, Serum 21 mg/dL<H> [10 - 20]  Carbon Dioxide, Serum26 mmol/L [17 - 32]  Chloride, Serum98 mmol/L [98 - 110]  Creatinie, Serum1.1 mg/dL [0.7 - 1.5]  Glucose, Mguro758 mg/dL<H> [70 - 99]  Potassium, Serum4.3 mmol/L [3.5 - 5.0]  Sodium, Serum 139 mmol/L [135 - 146]  [facility]  09-20-20 @ 07:54  Blood Gas Arterial-Calcium,Ionized--  Blood Urea Nitrogen, Serum 20 mg/dL [10 - 20]  Carbon Dioxide, Serum26 mmol/L [17 - 32]  Chloride, Owizq972 mmol/L [98 - 110]  Creatinie, Serum1.0 mg/dL [0.7 - 1.5]  Glucose, Svhrt440 mg/dL<H> [70 - 99]  Potassium, Serum3.9 mmol/L [3.5 - 5.0]  Sodium, Serum 138 mmol/L [135 - 146]  [facility]  09-19-20 @ 07:32  Blood Gas Arterial-Calcium,Ionized--  Blood Urea Nitrogen, Serum 20 mg/dL [10 - 20]  Carbon Dioxide, Serum28 mmol/L [17 - 32]  Chloride, Qpefw873 mmol/L [98 - 110]  Creatinie, Serum1.1 mg/dL [0.7 - 1.5]  Glucose, Xvjlo591 mg/dL<H> [70 - 99]  Potassium, Serum3.8 mmol/L [3.5 - 5.0]  Sodium, Serum 142 mmol/L [135 - 146]  [facility]  09-18-20 @ 06:39  Blood Gas Arterial-Calcium,Ionized--  Blood Urea Nitrogen, Serum 21 mg/dL<H> [10 - 20]  Carbon Dioxide, Serum25 mmol/L [17 - 32]  Chloride, Qnyfv412 mmol/L [98 - 110]  Creatinie, Serum1.1 mg/dL [0.7 - 1.5]  Glucose, Tdrdp386 mg/dL<H> [70 - 99]  Potassium, Serum4.3 mmol/L [3.5 - 5.0]  Sodium, Serum 141 mmol/L [135 - 146]  [facility]  09-17-20 @ 06:48  Blood Gas Arterial-Calcium,Ionized--  Blood Urea Nitrogen, Serum 19 mg/dL [10 - 20]  Carbon Dioxide, Serum28 mmol/L [17 - 32]  Chloride, Meqcx807 mmol/L [98 - 110]  Creatinie, Serum1.0 mg/dL [0.7 - 1.5]  Glucose, Wtzbm635 mg/dL<H> [70 - 99]  Potassium, Serum4.5 mmol/L [3.5 - 5.0]  Sodium, Serum 144 mmol/L [135 - 146]              Microbiology:    Culture - Urine (collected 09-16-20 @ 22:10)  Source: .Urine Clean Catch (Midstream)  Final Report (09-19-20 @ 09:02):    >100,000 CFU/ml Klebsiella pneumoniae ESBL  Organism: Klebsiella pneumoniae ESBL (09-19-20 @ 09:02)  Organism: Klebsiella pneumoniae ESBL (09-19-20 @ 09:02)      -  Amikacin: S <=16      -  Amoxicillin/Clavulanic Acid: I 16/8      -  Ampicillin: R >16 These ampicillin results predict results for amoxicillin      -  Ampicillin/Sulbactam: I 16/8 Enterobacter, Citrobacter, and Serratia may develop resistance during prolonged therapy (3-4 days)      -  Aztreonam: R 16      -  Cefazolin: R >16 (MIC_CL_COM_ENTERIC_CEFAZU) For uncomplicated UTI with K. pneumoniae, E. coli, or P. mirablis: CECILIO <=16 is sensitive and CECILIO >=32 is resistant. This also predicts results for oral agents cefaclor, cefdinir, cefpodoxime, cefprozil, cefuroxime axetil, cephalexin and locarbef for uncomplicated UTI. Note that some isolates may be susceptible to these agents while testing resistant to cefazolin.      -  Cefepime: R >16      -  Cefoxitin: S <=8      -  Ceftriaxone: R >32 Enterobacter, Citrobacter, and Serratia may develop resistance during prolonged therapy      -  Ciprofloxacin: R >2      -  Ertapenem: S <=0.5      -  Gentamicin: R >8      -  Imipenem: S <=1      -  Levofloxacin: S <=0.5      -  Meropenem: S <=1      -  Nitrofurantoin: R >64 Should not be used to treat pyelonephritis      -  Piperacillin/Tazobactam: S <=8      -  Tigecycline: S <=2      -  Tobramycin: R >8      -  Trimethoprim/Sulfamethoxazole: R >2/38      Method Type: CECILIO    Culture - Blood (collected 09-12-20 @ 18:14)  Source: .Blood None  Final Report (09-18-20 @ 01:01):    No Growth Final    Culture - Blood (collected 09-12-20 @ 05:26)  Source: .Blood None  Final Report (09-17-20 @ 12:00):    No Growth Final    Culture - Urine (collected 09-11-20 @ 17:00)  Source: .Urine Clean Catch (Midstream)  Final Report (09-14-20 @ 18:12):    >100,000 CFU/ml Klebsiella pneumoniae ESBL  Organism: Klebsiella pneumoniae ESBL (09-14-20 @ 18:12)  Organism: Klebsiella pneumoniae ESBL (09-14-20 @ 18:12)      -  Amikacin: S <=16      -  Amoxicillin/Clavulanic Acid: I 16/8      -  Ampicillin: R >16 These ampicillin results predict results for amoxicillin      -  Ampicillin/Sulbactam: I 16/8 Enterobacter, Citrobacter, and Serratia may develop resistance during prolonged therapy (3-4 days)      -  Aztreonam: R 16      -  Cefazolin: R >16 (MIC_CL_COM_ENTERIC_CEFAZU) For uncomplicated UTI with K. pneumoniae, E. coli, or P. mirablis: CECILIO <=16 is sensitive and CECILIO >=32 is resistant. This also predicts results for oral agents cefaclor, cefdinir, cefpodoxime, cefprozil, cefuroxime axetil, cephalexin and locarbef for uncomplicated UTI. Note that some isolates may be susceptible to these agents while testing resistant to cefazolin.      -  Cefepime: R >16      -  Cefotaxime: R >32      -  Cefoxitin: S <=8      -  Ceftazidime: R 8      -  Ceftriaxone: R >32 Enterobacter, Citrobacter, and Serratia may develop resistance during prolonged therapy      -  Cefuroxime: R >16      -  Ciprofloxacin: R >2      -  Ertapenem: S <=0.5      -  Gentamicin: R >8      -  Imipenem: S <=1      -  Levofloxacin: S <=0.5      -  Meropenem: S <=1      -  Minocycline: S <=4      -  Nitrofurantoin: I 64 Should not be used to treat pyelonephritis      -  Piperacillin/Tazobactam: S <=8      -  Tigecycline: S <=2      -  Tobramycin: R >8      -  Trimethoprim/Sulfamethoxazole: R >2/38      Method Type: CECILIO    Culture - Blood (collected 09-11-20 @ 10:27)  Source: .Blood Blood  Gram Stain (09-12-20 @ 13:07):    Growth in anaerobic bottle: Gram Positive Cocci in Clusters  Final Report (09-13-20 @ 10:30):    Growth in anaerobic bottle: Staphylococcus hominis    Coag Negative Staphylococcus    Single set isolate, possible contaminant. Contact    Microbiology if susceptibility testing clinically    indicated.    "Due to technical problems, Proteus sp. will Not be reported as part of    the BCID panel until further notice" ***Blood Panel PCR results on this    specimen are available    approximately 3 hours after the Gram stain result.***    Gram stain, PCR, and/or culture results may not always    correspond due to difference in methodologies.    ************************************************************    This PCR assay was performed using [employer].    The following targets are tested for: Enterococcus,    vancomycin resistant enterococci, Listeria monocytogenes,    coagulase negative staphylococci, S. aureus,    methicillin resistant S. aureus, Streptococcus agalactiae    (Group B), S. pneumoniae, S. pyogenes (Group A),    Acinetobacter baumannii, Enterobacter cloacae, E. coli,    Klebsiella oxytoca, K. pneumoniae, Proteus sp.,    Serratia marcescens, Haemophilus influenzae,    Neisseria meningitidis, Pseudomonas aeruginosa, Candida    albicans, C. glabrata, C krusei, C parapsilosis,    C. tropicalis and the KPC resistance gene.  Organism: Blood Culture PCR (09-13-20 @ 10:30)  Organism: Blood Culture PCR (09-13-20 @ 10:30)      -  Coagulase negative Staphylococcus: Detec      Method Type: PCR    Culture - Blood (collected 09-11-20 @ 10:27)  Source: .Blood Blood  Final Report (09-16-20 @ 18:01):    No Growth Final      Medications:  acetaminophen   Tablet .. 650 milliGRAM(s) Oral every 6 hours PRN  aluminum hydroxide/magnesium hydroxide/simethicone Suspension 30 milliLiter(s) Oral every 4 hours PRN  apixaban 5 milliGRAM(s) Oral every 12 hours  atorvastatin 10 milliGRAM(s) Oral at bedtime  buPROPion XL . 300 milliGRAM(s) Oral daily  chlorhexidine 4% Liquid 1 Application(s) Topical <User Schedule>  cholecalciferol 5000 Unit(s) Oral daily  famotidine    Tablet 20 milliGRAM(s) Oral daily  ferrous    sulfate 325 milliGRAM(s) Oral daily  fidaxomicin 200 milliGRAM(s) Oral two times a day  gabapentin 200 milliGRAM(s) Oral every 8 hours  influenza   Vaccine 0.5 milliLiter(s) IntraMuscular once  labetalol 200 milliGRAM(s) Oral every 12 hours  lisinopril 40 milliGRAM(s) Oral daily  melatonin 5 milliGRAM(s) Oral at bedtime  mirtazapine 7.5 milliGRAM(s) Oral at bedtime  NIFEdipine XL 90 milliGRAM(s) Oral daily  rOPINIRole 0.5 milliGRAM(s) Oral at bedtime  sertraline 150 milliGRAM(s) Oral daily  traMADol 50 milliGRAM(s) Oral every 6 hours PRN        Assessment and Plan:  Patient is a 68 y/o  Male with a PMH of bladder ca, DM, A-fib (Eliquis), and CABG who presents to the hospital with a c/o lower ABD pain for the past x 2 weeks. Associated with N/V/D that is non-black/bloody. Of note, the pt recently had TURBT performed by Dr. Jo and completed ABXs course. In the ED, seen  by urology, no acute surgical intervention. CT-AP w/ IV contrast was negative. Patient was admitted to medical unit with the following course of therapy:     #Acute suprapubic Pain after TURBT for bladder Ca:   #Cystitis:  initially started Empiric cefepime 1g q 24 and Vanco (given h/o instrumentation). But eventually took abx off per id d/t c diff colitis.  - Urology: No acute urological intervention at this time, F/U OP with Dr. Jo with 1 week   Urine culture grew Klebsiella ESBL which may be a colonizer as per ID  -  repeated urine cxs from 09/16- still with ESBL Klebsiella, f/up ID - still no IV abx. tx. - I have discussed abnormal urine cxs results with patient. Patient was instructed to notify MD if patient develops  symptoms.    #C Diff colitis   - hx of abx use for TURBT  - patient was initially started on PO Vanco 125 q6-  still had diarrhea episodes,   on 09/17/2020 - switched to Dificid 200 mg po twice daily - diarrhea has resolved, pt. has 1 to 2 bm's per day.  -patient to be d/c home on PO Dificid tx.  d/gama vanc/cefepime.       # Persistent leukocytosis-/ thrombocytosis  - ID reporting that there is no active infection, besides C diff- will switch to dificid , pt. is not on any IV abx. at present time  -  ESR- elevated 38  - MRI of right foot- no OM  -consulted Hematology - recommended to review peripheral smear and obtain flow cytometry- f/up results  - f/up EL 2 mutation,   - lipid profile- elevated triglycerides  - f/up DYLAN  - normal  RF factor  - outpatient  CBC monitoring with Hematology f/up    #Diabetes mellitus with hyperglycemia  - Hg A1C- 6.6  - insulin PRN  - FSG AC qHS     #Right Chronic DFU/ s/p Rt TMA  Now TMA Stump Xr s/o Osteomyelitis. Podiatry recommended outpatient f/up for further management.   daily dressing changes per podiatry.   -MRI of foot - no MRI    #Chronic Afib  - currently rate-controlled  on eliquis    #Hypomagnesemia  replete prn    #Liver Lesion   - found on CT Ab  -per son he had some sort of scan in the past showing a lesion in the liver > 20 years ago but was told it was benign at the time  -US liver here could not visualize the node. Outpatient surveillance  Probable fatty liver noted.     #HTN- better controlled on new regimen  - lisinopril to 40 mg po once daily  - d/c metoprolol, started on po labetalol 200 mg po twice daily  -continue Nifedipine 90 mg po once daily  continue current medical regimen.     #Progress Note Handoff: Patient refused to leave the hospital yesterday due to abdominal pain, agreeing to go home today   Family discussion: Patient verbalized good understanding of discharge instructions and agreed with discharge plan.  Disposition: Home__with home care  today

## 2020-09-27 NOTE — H&P ADULT - NSICDXPASTMEDICALHX_GEN_ALL_CORE_FT
PAST MEDICAL HISTORY:  Afib rate controlled currently    Arthritis     Bladder tumor     CAD (coronary artery disease) of bypass graft cabg 1995 3v    Depression     DM (diabetes mellitus)     GERD (gastroesophageal reflux disease)     Glaucoma     Hematuria     History of diabetic neuropathy     HTN (hypertension)

## 2020-09-27 NOTE — ED PROVIDER NOTE - NS ED ROS FT
Review of Systems:  CONSTITUTIONAL: No fever  SKIN: No rash  RESPIRATORY: No shortness of breath, No cough  CARDIAC: No chest pain, No palpitations  GI: diffuse abdominal pain; vomiting; diarrhea see HPI  : No dysuria, hematuria.   NEUROLOGIC: No numbness, No focal weakness, No headache, No dizziness  All other systems negative, unless specified in HPI

## 2020-09-27 NOTE — H&P ADULT - NSICDXPASTSURGICALHX_GEN_ALL_CORE_FT
PAST SURGICAL HISTORY:  CAD (coronary artery disease) of bypass graft     Foot amputation status, right partial foot amputation  tarsals and metatarsals of right foot    Malfunction of intrathecal infusion pump implanted initially in 1996  reimplanted 2006  removed 6/27/2018    S/P laparoscopic cholecystectomy     S/P lumbar laminectomy with repair of CSF leak using dural graft 7/8/2018 - dr Crenshaw     dizziness since yesterday edema to R eye area

## 2020-09-27 NOTE — H&P ADULT - NSHPPHYSICALEXAM_GEN_ALL_CORE
VITALS:  T(F): 97.4 (09-27-20 @ 12:11), Max: 97.4 (09-27-20 @ 12:11)  HR: 97 (09-27-20 @ 12:11) (97 - 97)  BP: 163/99 (09-27-20 @ 12:11) (163/99 - 163/99)  RR: 20 (09-27-20 @ 12:11) (20 - 20)  SpO2: 100% (09-27-20 @ 12:11) (100% - 100%)    PHYSICAL EXAM:  General: NAD, AAOx3, calm and cooperative  HEENT: NCAT, SARAH, EOMI, Trachea ML, Neck supple  Cardiac: RRR S1, S2, no Murmurs, rubs or gallops  Respiratory: CTAB, normal respiratory effort, breath sounds equal BL, no wheeze, rhonchi or crackles  Abdomen: Soft, non-distended, non-tender, no rebound, no guarding. +BS.  Rectal: Good tone, +stool, no blood, no helen-anal masses/lesions, no fistulas, fissures, hemorrhoids  Musculoskeletal: Strength 5/5 BL UE/LE, ROM intact, compartments soft  Neuro: Sensation grossly intact and equal throughout, no focal deficits  Vascular: Pulses 2+ throughout, extremities well perfused  Skin: Warm/dry, normal color, no jaundice  Incision/wound: healing well, dressings in place, clean, dry and intact    MEDICATIONS  (STANDING):    MEDICATIONS  (PRN): VITALS:  T(F): 97.4 (09-27-20 @ 12:11), Max: 97.4 (09-27-20 @ 12:11)  HR: 97 (09-27-20 @ 12:11) (97 - 97)  BP: 163/99 (09-27-20 @ 12:11) (163/99 - 163/99)  RR: 20 (09-27-20 @ 12:11) (20 - 20)  SpO2: 100% (09-27-20 @ 12:11) (100% - 100%)    PHYSICAL EXAM:  General: NAD, AAOx3, calm and cooperative  HEENT: NCAT, SARAH, EOMI  Cardiac: RRR S1, S2  Respiratory: CTAB, normal respiratory effort, breath sounds equal BL  Abdomen: Soft, mildly distended, mild diffuse tenderness, no rebound, no guarding.  Skin: Warm/dry, normal color, no jaundice    MEDICATIONS  (STANDING):    MEDICATIONS  (PRN):

## 2020-09-27 NOTE — ED PROVIDER NOTE - PROGRESS NOTE DETAILS
SL: Pt endorsing improvement in pain and nausea. Abdomen still diffusely tender on exam. No additional episodes of diarrhea and vomitig. SL: Pt home aid contact is [phone] ([facility]). Pt son Frank contact ([phone]) SL: Pt has had not additional episodes of diarrhea/vomiting. Abdomen still diffusely tender. Pending CT scan RK: CT report c/o SBO. Dr. Corcoran discussed w/ surgical resident Dr. Montanez. Ronnie jovel pt. SL: NG tube placed, pt tolerated well, bowel sounds auscultated. NGT draining well. SL: Spoke to surgery who says they are planning to see patient. SL: Patient signed out to Dr. Perry.  Pending repeat lactate and surgery recs Patient here for SBO; elevated lactate and WBC; abx and cultures ordered. Pending rpt lactate, surgery eval. Abdomen diffusely tender but soft, no rebound/guarding. -DC RK: pt feels better. surgery request CT w/ PO contrast- CT pending. Pt endorsed to Dr. Titus. Tacho: received pt on signout. On reassessment, pt tender in the R sided abdomen. Pt pending repeat CT w/ PO contrast. Pt seen by surgery. Pt to be admitted to surgery. NG tube in place. Will continue to monitor.

## 2020-09-27 NOTE — ED PROVIDER NOTE - CLINICAL SUMMARY MEDICAL DECISION MAKING FREE TEXT BOX
69 yr old m who presents today with abdominal pain. CT demonstrated partial SBO vs ileus. NG tube placed. Pt also given antibiotics. Pt also noted to have elevated lactate. Pt admitted to Surgery.

## 2020-09-27 NOTE — H&P ADULT - NSHPLABSRESULTS_GEN_ALL_CORE
LAB/STUDIES:                        14.2   22.40 )-----------( 595      ( 27 Sep 2020 13:14 )             44.4     09-27    137  |  86<L>  |  38<H>  ----------------------------<  230<H>  4.4   |  26  |  1.4    Ca    11.0<H>      27 Sep 2020 13:14    TPro  8.4<H>  /  Alb  4.4  /  TBili  1.6<H>  /  DBili  x   /  AST  20  /  ALT  24  /  AlkPhos  112  09-27      LIVER FUNCTIONS - ( 27 Sep 2020 13:14 )  Alb: 4.4 g/dL / Pro: 8.4 g/dL / ALK PHOS: 112 U/L / ALT: 24 U/L / AST: 20 U/L / GGT: x                         IMAGING: LAB/STUDIES:                        14.2   22.40 )-----------( 595      ( 27 Sep 2020 13:14 )             44.4     09-27    137  |  86<L>  |  38<H>  ----------------------------<  230<H>  4.4   |  26  |  1.4    Ca    11.0<H>      27 Sep 2020 13:14    TPro  8.4<H>  /  Alb  4.4  /  TBili  1.6<H>  /  DBili  x   /  AST  20  /  ALT  24  /  AlkPhos  112  09-27      LIVER FUNCTIONS - ( 27 Sep 2020 13:14 )  Alb: 4.4 g/dL / Pro: 8.4 g/dL / ALK PHOS: 112 U/L / ALT: 24 U/L / AST: 20 U/L / GGT: x                 IMAGING:    < from: CT Abdomen and Pelvis w/ IV Cont (09.27.20 @ 16:03) >    EXAM:  CT ABDOMEN AND PELVIS IC            PROCEDURE DATE:  09/27/2020      INTERPRETATION:  CLINICAL STATEMENT: Abdominal pain    TECHNIQUE: Contiguous axial CT images were obtained from the lower chest to the pubic symphysis .  Oral contrast not given. 100 cc of Optiray 320 intravenous contrast was given no contrast was discarded Reformatted images in the coronal and sagittal planes were acquired.    COMPARISON CT: 9/11/2020    OTHER STUDIES USED FOR CORRELATION: None.    The liver contains a 2 cm low density nodule unchanged. Further evaluation with ultrasound suggested  The spleen pancreas kidneys and adrenal glands appear normal.  There is no ascites.  There is no retroperitoneal mesenteric or pelvic lymphadenopathy seen.  There is dilatation of the small bowel up to 4 cm to the level of the proximal ileum with normal caliber distal ileum and colon. These findings suggest a small bowel obstruction. No free air is seen.  No pelvic mass or inflammatory process is seen.  The bony structures are intact.  Impression  Findings suggesting small bowel obstruction and follow-up exam needed    GRAYSON ZAMORANO M.D., ATTENDING RADIOLOGIST  This document has been electronically signed. Sep 27 2020  4:22PM  < end of copied text >    x< from: Xray Chest 1 View- PORTABLE-Urgent (09.27.20 @ 14:15) >  Impression:  Increased pulmonary vascular congestion.  < end of copied text >      < from: Xray Kidney Ureter Bladder (09.27.20 @ 14:14) >  Impression:  Dilated small bowel loops in the upper abdomen measuring up to 3.5 cm. Colonic air is also seen. Small bowel obstruction is not excluded. Recommend CT abdomen and pelvis with intravenous and oral contrast for further evaluation.  < end of copied text >

## 2020-09-27 NOTE — ED PROVIDER NOTE - OBJECTIVE STATEMENT
69M with PMHx of bladder cancer s/p TURBT by Dr. Jo, recent hospital stay for C diff colitis discharged 9/21 on PO Dificin, Afib on elliquis, DM, CABG presents to ED for constant severe diffuse nonradiating abdominal pain x 1 week. Pt states since discharge he has had multiple episodes of watery black diarrhea every day and multiple episodes of black vomit every day. He has been unable to tolerate PO food and medicine consistently for the past week. Pt is regularly urinary incontinent but denies hematuria and dysuria. Denies fever/chills, chest pain, sob, headache, numbness, tingling, weakness.

## 2020-09-27 NOTE — ED PROVIDER NOTE - PHYSICAL EXAMINATION
VITAL SIGNS: I have reviewed nursing notes and confirm.  CONSTITUTIONAL: ill-appearing man laying in bed moaning in pain, vomited once black sputum during exam  HEAD: NCAT  EYES: R eye blind. L pupil reactive to light. No scleral icterus  ENT: Dry mucous membranes, normal pharynx with no erythema or exudates  NECK: Supple; non tender. No cervical LAD  CARD: RRR, no murmurs, rubs or gallops  RESP: clear to ausculation b/l.  No rales, rhonchi, or wheezing.  ABD: soft, obese, diffuse ttp. No rebound/guarding.  EXT: RLE s/p R foot amputation, R chronic foot ulcer noted on plantar surface.  NEURO: normal motor. normal sensory. Unable to assess gait.  PSYCH: Cooperative, appropriate.

## 2020-09-27 NOTE — H&P ADULT - HISTORY OF PRESENT ILLNESS
GENERAL SURGERY CONSULT NOTE    Patient: PRIYANK WESLEY , 69y ([birth year])Male   MRN: [id number]  Location: [facility] ED  Visit: 09-27-20 Emergency  Date: 09-27-20 @ 19:31    HPI:      PAST MEDICAL & SURGICAL HISTORY:  Afib  rate controlled currently    Hematuria    Bladder tumor    History of diabetic neuropathy    CAD (coronary artery disease) of bypass graft  cabg 1995 3v    Glaucoma    Arthritis    GERD (gastroesophageal reflux disease)    HTN (hypertension)    Depression    DM (diabetes mellitus)    S/P lumbar laminectomy  with repair of CSF leak using dural graft 7/8/2018 - dr Crenshaw    Malfunction of intrathecal infusion pump  implanted initially in 1996  reimplanted 2006  removed 6/27/2018    Foot amputation status, right  partial foot amputation  tarsals and metatarsals of right foot    S/P laparoscopic cholecystectomy    CAD (coronary artery disease) of bypass graft        Home Medications:  Actamin 325 mg oral tablet: 2 tab(s) orally every 6 hours, As needed, Mild Pain (1 - 3) (20 Sep 2020 13:38)  apixaban 5 mg oral tablet: 1 tab(s) orally every 12 hours (27 Aug 2020 09:49)  aspirin 81 mg oral delayed release tablet: 1 tab(s) orally once a day (27 Aug 2020 09:49)  Basaglar KwikPen: 40 unit(s) subcutaneous once a day (at bedtime) (27 Aug 2020 09:49)  buPROPion 150 mg/24 hours (XL) oral tablet, extended release: 1 tab(s) orally once a day (27 Aug 2020 09:49)  Combigan 0.2%-0.5% ophthalmic solution: 1 drop(s) to each affected eye every 12 hours (27 Aug 2020 09:49)  latanoprost 0.005% ophthalmic solution: 1 drop(s) to each affected eye once a day (in the evening) (27 Aug 2020 09:49)  lisinopril 20 mg oral tablet: 1 tab(s) orally once a day (27 Aug 2020 09:49)  melatonin 5 mg oral tablet: 1 tab(s) orally once a day (at bedtime), As needed, Insomnia (27 Aug 2020 09:49)  mirtazapine 7.5 mg oral tablet: 1 tab(s) orally once a day (at bedtime) (20 Sep 2020 13:38)  NIFEdipine 60 mg oral tablet, extended release: 1 tab(s) orally once a day (27 Aug 2020 09:49)  pantoprazole 20 mg oral delayed release tablet: 1 tab(s) orally once a day (27 Aug 2020 09:49)  sertraline 100 mg oral tablet: 1 tab(s) orally once a day (27 Aug 2020 09:49)  timolol maleate 0.5% ophthalmic solution: 1 drop(s) to each affected eye every 12 hours (27 Aug 2020 09:49)  Vitamin D3 2000 intl units oral tablet: 1 tab(s) orally once a day (27 Aug 2020 09:49)        VITALS:  T(F): 97.4 (09-27-20 @ 12:11), Max: 97.4 (09-27-20 @ 12:11)  HR: 97 (09-27-20 @ 12:11) (97 - 97)  BP: 163/99 (09-27-20 @ 12:11) (163/99 - 163/99)  RR: 20 (09-27-20 @ 12:11) (20 - 20)  SpO2: 100% (09-27-20 @ 12:11) (100% - 100%)    PHYSICAL EXAM:  General: NAD, AAOx3, calm and cooperative  HEENT: NCAT, SARAH, EOMI, Trachea ML, Neck supple  Cardiac: RRR S1, S2, no Murmurs, rubs or gallops  Respiratory: CTAB, normal respiratory effort, breath sounds equal BL, no wheeze, rhonchi or crackles  Abdomen: Soft, non-distended, non-tender, no rebound, no guarding. +BS.  Rectal: Good tone, +stool, no blood, no helen-anal masses/lesions, no fistulas, fissures, hemorrhoids  Musculoskeletal: Strength 5/5 BL UE/LE, ROM intact, compartments soft  Neuro: Sensation grossly intact and equal throughout, no focal deficits  Vascular: Pulses 2+ throughout, extremities well perfused  Skin: Warm/dry, normal color, no jaundice  Incision/wound: healing well, dressings in place, clean, dry and intact    MEDICATIONS  (STANDING):    MEDICATIONS  (PRN):      LAB/STUDIES:                        14.2   22.40 )-----------( 595      ( 27 Sep 2020 13:14 )             44.4     09-27    137  |  86<L>  |  38<H>  ----------------------------<  230<H>  4.4   |  26  |  1.4    Ca    11.0<H>      27 Sep 2020 13:14    TPro  8.4<H>  /  Alb  4.4  /  TBili  1.6<H>  /  DBili  x   /  AST  20  /  ALT  24  /  AlkPhos  112  09-27      LIVER FUNCTIONS - ( 27 Sep 2020 13:14 )  Alb: 4.4 g/dL / Pro: 8.4 g/dL / ALK PHOS: 112 U/L / ALT: 24 U/L / AST: 20 U/L / GGT: x                         IMAGING:    ASSESSMENT:  69yM w/ PMHx of  ***  who presented with ***. Physical exam findings, imaging, and labs as documented above.     PLAN:  -  -  -    Above plan discussed with  ***    GENERAL SURGERY CONSULT NOTE    Patient: PRIYANK WESLEY , 69y ([birth year])Male   MRN: [id number]  Location: [facility] ED  Visit: 09-27-20 Emergency  Date: 09-27-20 @ 19:31    HPI: 69 year old male with a PMH of bladder cancer s/p TURBT by Dr. Jo, recent hospital stay for C diff colitis discharged 9/21 on PO Dificin, Afib on eliquis, DM, CABG presents to ED for abdominal pain, nausea, vomiting and diarrhea. Patient was just discharged from the hospital 1 week ago after being her for about 1 month with C. Diff colitis. Patient was not improving so they started him on Fidaxomicin, and sent him home. Once home, he devloped abdominal pain, nausea and vomiting, and continued diarrhea. Now, patient's last bowel movement was Friday 9/25. passed some gas today.     in ED, patient distended. NGT placed, with 1500 cc out. Plan was to get CT with PO and IV contrast, but patient vomited after starting to drink the contrast. nevertheless, CT did show evidence of SBO.    PAST MEDICAL & SURGICAL HISTORY:  Afib  rate controlled currently    Hematuria    Bladder tumor    History of diabetic neuropathy    CAD (coronary artery disease) of bypass graft  cabg 1995 3v    Glaucoma    Arthritis    GERD (gastroesophageal reflux disease)    HTN (hypertension)    Depression    DM (diabetes mellitus)    S/P lumbar laminectomy  with repair of CSF leak using dural graft 7/8/2018 - dr Crenshaw    Malfunction of intrathecal infusion pump  implanted initially in 1996  reimplanted 2006  removed 6/27/2018    Foot amputation status, right  partial foot amputation  tarsals and metatarsals of right foot    S/P laparoscopic cholecystectomy    CAD (coronary artery disease) of bypass graft        Home Medications:  Actamin 325 mg oral tablet: 2 tab(s) orally every 6 hours, As needed, Mild Pain (1 - 3) (20 Sep 2020 13:38)  apixaban 5 mg oral tablet: 1 tab(s) orally every 12 hours (27 Aug 2020 09:49)  aspirin 81 mg oral delayed release tablet: 1 tab(s) orally once a day (27 Aug 2020 09:49)  Basaglar KwikPen: 40 unit(s) subcutaneous once a day (at bedtime) (27 Aug 2020 09:49)  buPROPion 150 mg/24 hours (XL) oral tablet, extended release: 1 tab(s) orally once a day (27 Aug 2020 09:49)  Combigan 0.2%-0.5% ophthalmic solution: 1 drop(s) to each affected eye every 12 hours (27 Aug 2020 09:49)  latanoprost 0.005% ophthalmic solution: 1 drop(s) to each affected eye once a day (in the evening) (27 Aug 2020 09:49)  lisinopril 20 mg oral tablet: 1 tab(s) orally once a day (27 Aug 2020 09:49)  melatonin 5 mg oral tablet: 1 tab(s) orally once a day (at bedtime), As needed, Insomnia (27 Aug 2020 09:49)  mirtazapine 7.5 mg oral tablet: 1 tab(s) orally once a day (at bedtime) (20 Sep 2020 13:38)  NIFEdipine 60 mg oral tablet, extended release: 1 tab(s) orally once a day (27 Aug 2020 09:49)  pantoprazole 20 mg oral delayed release tablet: 1 tab(s) orally once a day (27 Aug 2020 09:49)  sertraline 100 mg oral tablet: 1 tab(s) orally once a day (27 Aug 2020 09:49)  timolol maleate 0.5% ophthalmic solution: 1 drop(s) to each affected eye every 12 hours (27 Aug 2020 09:49)  Vitamin D3 2000 intl units oral tablet: 1 tab(s) orally once a day (27 Aug 2020 09:49)       GENERAL SURGERY CONSULT NOTE    Patient: PRIYANK WESLEY , 69y ([birth year])Male   MRN: [id number]  Location: [facility] ED  Visit: 09-27-20 Emergency  Date: 09-27-20 @ 19:31    HPI: 69 year old male with a PMH of bladder cancer s/p TURBT by Dr. Jo, recent hospital stay for C diff colitis discharged 9/21 on PO Dificin, Afib on eliquis, DM, CABG presents to ED for abdominal pain, nausea, vomiting and diarrhea. Patient was just discharged from the hospital 1 week ago after being her for about 1 month with C. Diff colitis. Patient was not improving so they started him on Fidaxomicin, and sent him home. Once home, he devloped abdominal pain, nausea and vomiting, and continued diarrhea. Now, patient's last bowel movement was Friday 9/25. passed some gas today. Otherwise, denies fever/chills, chest pain, sob, headache, numbness, tingling, weakness.    in ED, patient distended. NGT placed, with 1500 cc out. Plan was to get CT with PO and IV contrast, but patient vomited after starting to drink the contrast. nevertheless, CT did show evidence of SBO.    PAST MEDICAL & SURGICAL HISTORY:  Afib  rate controlled currently    Hematuria    Bladder tumor    History of diabetic neuropathy    CAD (coronary artery disease) of bypass graft  cabg 1995 3v    Glaucoma    Arthritis    GERD (gastroesophageal reflux disease)    HTN (hypertension)    Depression    DM (diabetes mellitus)    S/P lumbar laminectomy  with repair of CSF leak using dural graft 7/8/2018 - dr Crenshaw    Malfunction of intrathecal infusion pump  implanted initially in 1996  reimplanted 2006  removed 6/27/2018    Foot amputation status, right  partial foot amputation  tarsals and metatarsals of right foot    S/P laparoscopic cholecystectomy    CAD (coronary artery disease) of bypass graft        Home Medications:  Actamin 325 mg oral tablet: 2 tab(s) orally every 6 hours, As needed, Mild Pain (1 - 3) (20 Sep 2020 13:38)  apixaban 5 mg oral tablet: 1 tab(s) orally every 12 hours (27 Aug 2020 09:49)  aspirin 81 mg oral delayed release tablet: 1 tab(s) orally once a day (27 Aug 2020 09:49)  Basaglar KwikPen: 40 unit(s) subcutaneous once a day (at bedtime) (27 Aug 2020 09:49)  buPROPion 150 mg/24 hours (XL) oral tablet, extended release: 1 tab(s) orally once a day (27 Aug 2020 09:49)  Combigan 0.2%-0.5% ophthalmic solution: 1 drop(s) to each affected eye every 12 hours (27 Aug 2020 09:49)  latanoprost 0.005% ophthalmic solution: 1 drop(s) to each affected eye once a day (in the evening) (27 Aug 2020 09:49)  lisinopril 20 mg oral tablet: 1 tab(s) orally once a day (27 Aug 2020 09:49)  melatonin 5 mg oral tablet: 1 tab(s) orally once a day (at bedtime), As needed, Insomnia (27 Aug 2020 09:49)  mirtazapine 7.5 mg oral tablet: 1 tab(s) orally once a day (at bedtime) (20 Sep 2020 13:38)  NIFEdipine 60 mg oral tablet, extended release: 1 tab(s) orally once a day (27 Aug 2020 09:49)  pantoprazole 20 mg oral delayed release tablet: 1 tab(s) orally once a day (27 Aug 2020 09:49)  sertraline 100 mg oral tablet: 1 tab(s) orally once a day (27 Aug 2020 09:49)  timolol maleate 0.5% ophthalmic solution: 1 drop(s) to each affected eye every 12 hours (27 Aug 2020 09:49)  Vitamin D3 2000 intl units oral tablet: 1 tab(s) orally once a day (27 Aug 2020 09:49)       GENERAL SURGERY CONSULT NOTE    Patient: PRIYANK WESLEY , 69y ([birth year])Male   MRN: [id number]  Location: [facility] ED  Visit: 09-27-20 Emergency  Date: 09-27-20 @ 19:31    HPI: 69 year old male with a PMH of bladder cancer s/p TURBT by Dr. Jo, recent hospital stay for C diff colitis discharged 9/21 on PO Dificin, Afib on eliquis, DM, CABG presents to ED for abdominal pain, nausea, vomiting and diarrhea. Patient was just discharged from the hospital 1 week ago after being her for about 1 month with C. Diff colitis. Patient was not improving so they started him on Fidaxomicin, and sent him home. Once home, he devloped abdominal pain, nausea and vomiting, and continued diarrhea. Now, patient's last bowel movement was Friday 9/25. passed some gas today. Otherwise, denies fever/chills, chest pain, sob, headache, numbness, tingling, weakness.    in ED, patient distended. NGT placed, with 1500 cc bilious output. Plan was to get CT with PO and IV contrast, but patient vomited after starting to drink the contrast. nevertheless, CT did show evidence of SBO.    PAST MEDICAL & SURGICAL HISTORY:  Afib  rate controlled currently    Hematuria    Bladder tumor    History of diabetic neuropathy    CAD (coronary artery disease) of bypass graft  cabg 1995 3v    Glaucoma    Arthritis    GERD (gastroesophageal reflux disease)    HTN (hypertension)    Depression    DM (diabetes mellitus)    S/P lumbar laminectomy  with repair of CSF leak using dural graft 7/8/2018 - dr Crenshaw    Malfunction of intrathecal infusion pump  implanted initially in 1996  reimplanted 2006  removed 6/27/2018    Foot amputation status, right  partial foot amputation  tarsals and metatarsals of right foot    S/P laparoscopic cholecystectomy    CAD (coronary artery disease) of bypass graft        Home Medications:  Actamin 325 mg oral tablet: 2 tab(s) orally every 6 hours, As needed, Mild Pain (1 - 3) (20 Sep 2020 13:38)  apixaban 5 mg oral tablet: 1 tab(s) orally every 12 hours (27 Aug 2020 09:49)  aspirin 81 mg oral delayed release tablet: 1 tab(s) orally once a day (27 Aug 2020 09:49)  Basaglar KwikPen: 40 unit(s) subcutaneous once a day (at bedtime) (27 Aug 2020 09:49)  buPROPion 150 mg/24 hours (XL) oral tablet, extended release: 1 tab(s) orally once a day (27 Aug 2020 09:49)  Combigan 0.2%-0.5% ophthalmic solution: 1 drop(s) to each affected eye every 12 hours (27 Aug 2020 09:49)  latanoprost 0.005% ophthalmic solution: 1 drop(s) to each affected eye once a day (in the evening) (27 Aug 2020 09:49)  lisinopril 20 mg oral tablet: 1 tab(s) orally once a day (27 Aug 2020 09:49)  melatonin 5 mg oral tablet: 1 tab(s) orally once a day (at bedtime), As needed, Insomnia (27 Aug 2020 09:49)  mirtazapine 7.5 mg oral tablet: 1 tab(s) orally once a day (at bedtime) (20 Sep 2020 13:38)  NIFEdipine 60 mg oral tablet, extended release: 1 tab(s) orally once a day (27 Aug 2020 09:49)  pantoprazole 20 mg oral delayed release tablet: 1 tab(s) orally once a day (27 Aug 2020 09:49)  sertraline 100 mg oral tablet: 1 tab(s) orally once a day (27 Aug 2020 09:49)  timolol maleate 0.5% ophthalmic solution: 1 drop(s) to each affected eye every 12 hours (27 Aug 2020 09:49)  Vitamin D3 2000 intl units oral tablet: 1 tab(s) orally once a day (27 Aug 2020 09:49)

## 2020-09-27 NOTE — ED PROVIDER NOTE - ATTENDING CONTRIBUTION TO CARE
89 y/o M pmh afib on NOAC, CAD, HTN, DM, recnt hx c.diff colitis dc 9/21 on Dificin p/w diffuse moderate to severe abd pain x 1wk, intermittent now constant. + v/d, both dark/ black. No fever, dysuria, cp, sob, or cough.    CONSTITUTIONAL: uncomfortable  SKIN: Warm dry  HEAD: NCAT  EYES: NL inspection  ENT: MMM  NECK: Supple; non tender.  CARD: RRR  RESP: No resp distress  ABD: soft, distended, diffuse ttp, no r/g  EXT: no pedal edema  NEURO: Grossly unremarkable  PSYCH: Cooperative, appropriate    IMP: colitis vs ischemia vs SBO.  P: analgesia, ivf, labs, CTabd, cxr, xray kub, reassess.

## 2020-09-27 NOTE — H&P ADULT - ASSESSMENT
ASSESSMENT:  69yM w/ PMHx of  ***  who presented with ***. Physical exam findings, imaging, and labs as documented above.     PLAN:  -  -  -    Above plan discussed with  ***      ASSESSMENT:  69yM w/ PMHx bladder cancer s/p TURBT by Dr. Jo, recent hospital stay for C diff colitis discharged 9/21 on PO Dificin, Afib on eliquis, DM, CABG presents to ED for abdominal pain, nausea, vomiting and diarrhea. Patient was just discharged from the hospital 1 week ago after being her for about 1 month with C. Diff colitis. Patient was not improving so they started him on Fidaxomicin, and sent him home. Once home, he devloped abdominal pain, nausea and vomiting, and continued diarrhea. Now, patient's last bowel movement was Friday 9/25. passed some gas today. Physical exam findings, imaging, and labs as documented above.     PLAN:  - Get CT with PO contrast to evaluate for SBO further  - Admit to surgery  - Monitor vitals  - Monitor labs and replete as necessary  - Monitor for bowel function  - Monitor urine output  - DVT and GI Prophylaxis  - Follow Hospitalist consult  - Follow PT/Physiatry  - Contact social work/case management for necessary patient needs.       Above plan discussed with Dr. Yoo, surgery team, and patient     ASSESSMENT:  69yM w/ PMHx bladder cancer s/p TURBT by Dr. Jo, recent hospital stay for C diff colitis discharged 9/21 on PO Dificin, Afib on eliquis, DM, CABG presents to ED for abdominal pain, nausea, vomiting and diarrhea. Patient was just discharged from the hospital 1 week ago after being her for about 1 month with C. Diff colitis. Patient was not improving so they started him on Fidaxomicin, and sent him home. Once home, he devloped abdominal pain, nausea and vomiting, and continued diarrhea. Now, patient's last bowel movement was Friday 9/25. passed some gas today. Physical exam findings, imaging, and labs as documented above.     PLAN:  - Get CT with PO contrast to evaluate for SBO further  - Admit to surgery  - SICU consult   - NPO, IVF  - Repeat labs   - Monitor vitals  - Monitor labs and replete as necessary  - Monitor for bowel function  - Monitor urine output  - DVT and GI Prophylaxis  - Follow Hospitalist consult  - Follow PT/Physiatry  - Contact social work/case management for necessary patient needs.     Senior Resident Note  Pt seen and examined  Above note has been reviewed and edited  Plan d/w patient and Dr. Naila Montanez

## 2020-09-28 NOTE — PATIENT PROFILE ADULT - FALLEN IN THE PAST
At [facility], one important tool we use to improve our patient services is our Patient Survey.  Following your visit you may receive our survey in the mail.    Please take the time to complete the survey.    If your visit with us was great, we want to hear about it.    If we can improve, please let us know how.          no

## 2020-09-28 NOTE — CONSULT NOTE ADULT - ASSESSMENT
ASSESSMENT:  69yM w/ PMHx bladder cancer s/p TURBT by Dr. Jo, recent hospital stay for C diff colitis discharged 9/21 on PO Dificin, Afib on eliquis, DM, CABG presents to ED for abdominal pain, nausea, vomiting and diarrhea. Patient was just discharged from the hospital 1 week ago after being her for about 1 month with C. Diff colitis. Patient was not improving so they started him on Fidaxomicin, and sent him home. Once home, he devloped abdominal pain, nausea and vomiting, and continued diarrhea. Now, patient's last bowel movement was Friday 9/25. passed some gas today.    IMPRESSION;  CD colitis seems to be resolving  SBO with ileus ( which I doubt is related to the CD colitis )    RECOMMENDATIONS;  Po Fidaxomicin 200 mg q12h   See no need for other ABx

## 2020-09-28 NOTE — CONSULT NOTE ADULT - SUBJECTIVE AND OBJECTIVE BOX
PRIYANK WESLEY  69y, Male  Allergy: No Known Allergies      All historical available data reviewed.    HPI:  GENERAL SURGERY CONSULT NOTE    Patient: PRIYANK WESLEY , 69y ([birth year])Male   MRN: [id number]  Location: [facility] ED  Visit: 09-27-20 Emergency  Date: 09-27-20 @ 19:31    HPI: 69 year old male with a PMH of bladder cancer s/p TURBT by Dr. Jo, recent hospital stay for C diff colitis discharged 9/21 on PO Dificin, Afib on eliquis, DM, CABG presents to ED for abdominal pain, nausea, vomiting and diarrhea. Patient was just discharged from the hospital 1 week ago after being her for about 1 month with C. Diff colitis. Patient was not improving so they started him on Fidaxomicin, and sent him home. Once home, he devloped abdominal pain, nausea and vomiting, and continued diarrhea. Now, patient's last bowel movement was Friday 9/25. passed some gas today. Otherwise, denies fever/chills, chest pain, sob, headache, numbness, tingling, weakness.    in ED, patient distended. NGT placed, with 1500 cc bilious output. Plan was to get CT with PO and IV contrast, but patient vomited after starting to drink the contrast. nevertheless, CT did show evidence of SBO.    ID called for ABx    PAST MEDICAL & SURGICAL HISTORY:  Afib  rate controlled currently    Hematuria    Bladder tumor    History of diabetic neuropathy    CAD (coronary artery disease) of bypass graft  cabg 1995 3v    Glaucoma    Arthritis    GERD (gastroesophageal reflux disease)    HTN (hypertension)    Depression    DM (diabetes mellitus)    S/P lumbar laminectomy  with repair of CSF leak using dural graft 7/8/2018 - dr Crenshaw    Malfunction of intrathecal infusion pump  implanted initially in 1996  reimplanted 2006  removed 6/27/2018    Foot amputation status, right  partial foot amputation  tarsals and metatarsals of right foot    S/P laparoscopic cholecystectomy    CAD (coronary artery disease) of bypass graft        Home Medications:  Actamin 325 mg oral tablet: 2 tab(s) orally every 6 hours, As needed, Mild Pain (1 - 3) (20 Sep 2020 13:38)  apixaban 5 mg oral tablet: 1 tab(s) orally every 12 hours (27 Aug 2020 09:49)  aspirin 81 mg oral delayed release tablet: 1 tab(s) orally once a day (27 Aug 2020 09:49)  Basaglar KwikPen: 40 unit(s) subcutaneous once a day (at bedtime) (27 Aug 2020 09:49)  buPROPion 150 mg/24 hours (XL) oral tablet, extended release: 1 tab(s) orally once a day (27 Aug 2020 09:49)  Combigan 0.2%-0.5% ophthalmic solution: 1 drop(s) to each affected eye every 12 hours (27 Aug 2020 09:49)  latanoprost 0.005% ophthalmic solution: 1 drop(s) to each affected eye once a day (in the evening) (27 Aug 2020 09:49)  lisinopril 20 mg oral tablet: 1 tab(s) orally once a day (27 Aug 2020 09:49)  melatonin 5 mg oral tablet: 1 tab(s) orally once a day (at bedtime), As needed, Insomnia (27 Aug 2020 09:49)  mirtazapine 7.5 mg oral tablet: 1 tab(s) orally once a day (at bedtime) (20 Sep 2020 13:38)  NIFEdipine 60 mg oral tablet, extended release: 1 tab(s) orally once a day (27 Aug 2020 09:49)  pantoprazole 20 mg oral delayed release tablet: 1 tab(s) orally once a day (27 Aug 2020 09:49)  sertraline 100 mg oral tablet: 1 tab(s) orally once a day (27 Aug 2020 09:49)  timolol maleate 0.5% ophthalmic solution: 1 drop(s) to each affected eye every 12 hours (27 Aug 2020 09:49)  Vitamin D3 2000 intl units oral tablet: 1 tab(s) orally once a day (27 Aug 2020 09:49)       (27 Sep 2020 19:28)    FAMILY HISTORY:  CAD (coronary artery disease) (Father)      PAST MEDICAL & SURGICAL HISTORY:  Afib  rate controlled currently    Hematuria    Bladder tumor    History of diabetic neuropathy    CAD (coronary artery disease) of bypass graft  cabg 1995 3v    Glaucoma    Arthritis    GERD (gastroesophageal reflux disease)    HTN (hypertension)    Depression    DM (diabetes mellitus)    S/P lumbar laminectomy  with repair of CSF leak using dural graft 7/8/2018 - dr Crenshaw    Malfunction of intrathecal infusion pump  implanted initially in 1996  reimplanted 2006  removed 6/27/2018    Foot amputation status, right  partial foot amputation  tarsals and metatarsals of right foot    S/P laparoscopic cholecystectomy    CAD (coronary artery disease) of bypass graft          VITALS:  T(F): 97.8, Max: 98.7 (09-27-20 @ 20:05)  HR: 82  BP: 167/79  RR: 18Vital Signs Last 24 Hrs  T(C): 36.6 (28 Sep 2020 12:05), Max: 37.1 (27 Sep 2020 20:05)  T(F): 97.8 (28 Sep 2020 12:05), Max: 98.7 (27 Sep 2020 20:05)  HR: 82 (28 Sep 2020 12:05) (77 - 94)  BP: 167/79 (28 Sep 2020 12:05) (139/66 - 185/68)  BP(mean): 103 (28 Sep 2020 11:00) (96 - 123)  RR: 18 (28 Sep 2020 12:05) (18 - 19)  SpO2: 96% (28 Sep 2020 12:05) (95% - 96%)    TESTS & MEASUREMENTS:                        11.6   19.65 )-----------( 392      ( 28 Sep 2020 04:12 )             35.9     09-28    132<L>  |  90<L>  |  39<H>  ----------------------------<  98  3.2<L>   |  31  |  1.1    Ca    9.4      28 Sep 2020 04:12  Phos  3.9     09-28  Mg     1.9     09-28    TPro  8.4<H>  /  Alb  4.4  /  TBili  1.6<H>  /  DBili  x   /  AST  20  /  ALT  24  /  AlkPhos  112  09-27    LIVER FUNCTIONS - ( 27 Sep 2020 13:14 )  Alb: 4.4 g/dL / Pro: 8.4 g/dL / ALK PHOS: 112 U/L / ALT: 24 U/L / AST: 20 U/L / GGT: x             Urinalysis Basic - ( 27 Sep 2020 22:50 )    Color: Yellow / Appearance: Clear / SG: >1.050 / pH: x  Gluc: x / Ketone: Trace  / Bili: Small / Urobili: <2 mg/dL   Blood: x / Protein: 30 mg/dL / Nitrite: Negative   Leuk Esterase: Large / RBC: 3 /HPF /  /HPF   Sq Epi: x / Non Sq Epi: 0 /HPF / Bacteria: Many          RADIOLOGY & ADDITIONAL TESTS:  Personal review of radiological diagnostics performed  Echo and EKG results noted when applicable.     MEDICATIONS:  acetaminophen  IVPB .. 1000 milliGRAM(s) IV Intermittent once PRN  benzocaine 20% Spray 1 Spray(s) Topical every 4 hours PRN  cefTRIAXone   IVPB      chlorhexidine 4% Liquid 1 Application(s) Topical once  dextrose 50% Injectable 12.5 Gram(s) IV Push once  dextrose 50% Injectable 25 Gram(s) IV Push once  dextrose 50% Injectable 25 Gram(s) IV Push once  enalaprilat Injectable 1.25 milliGRAM(s) IV Push every 6 hours  enoxaparin Injectable 81 milliGRAM(s) SubCutaneous every 12 hours  glucagon  Injectable 1 milliGRAM(s) IntraMuscular once PRN  HYDROmorphone  Injectable 0.25 milliGRAM(s) IV Push every 4 hours PRN  HYDROmorphone  Injectable 0.5 milliGRAM(s) IV Push every 4 hours PRN  insulin regular  human corrective regimen sliding scale   IV Push every 4 hours  labetalol Injectable 5 milliGRAM(s) IV Push every 6 hours  lactated ringers. 1000 milliLiter(s) IV Continuous <Continuous>  ondansetron Injectable 4 milliGRAM(s) IV Push every 6 hours PRN  pantoprazole  Injectable 40 milliGRAM(s) IV Push daily      ANTIBIOTICS:  cefTRIAXone   IVPB

## 2020-09-28 NOTE — CONSULT NOTE ADULT - SUBJECTIVE AND OBJECTIVE BOX
HPI:  GENERAL SURGERY CONSULT NOTE    Patient: PRIYANK WESLEY , 69y ([birth year])Male   MRN: [id number]  Location: [facility] ED  Visit: 09-27-20 Emergency  Date: 09-27-20 @ 19:31    HPI: 69 year old male with a PMH of bladder cancer s/p TURBT by Dr. Jo, recent hospital stay for C diff colitis discharged 9/21 on PO Dificin, Afib on eliquis, DM, CABG presents to ED for abdominal pain, nausea, vomiting and diarrhea. Patient was just discharged from the hospital 1 week ago after being her for about 1 month with C. Diff colitis. Patient was not improving so they started him on Fidaxomicin, and sent him home. Once home, he devloped abdominal pain, nausea and vomiting, and continued diarrhea. Now, patient's last bowel movement was Friday 9/25. passed some gas today. Otherwise, denies fever/chills, chest pain, sob, headache, numbness, tingling, weakness.    in ED, patient distended. NGT placed, with 1500 cc bilious output. Plan was to get CT with PO and IV contrast, but patient vomited after starting to drink the contrast. nevertheless, CT did show evidence of SBO.    PAST MEDICAL & SURGICAL HISTORY:  Afib  rate controlled currently    Hematuria    Bladder tumor    History of diabetic neuropathy    CAD (coronary artery disease) of bypass graft  cabg 1995 3v    Glaucoma    Arthritis    GERD (gastroesophageal reflux disease)    HTN (hypertension)    Depression    DM (diabetes mellitus)    S/P lumbar laminectomy  with repair of CSF leak using dural graft 7/8/2018 - dr Crenshaw    Malfunction of intrathecal infusion pump  implanted initially in 1996  reimplanted 2006  removed 6/27/2018    Foot amputation status, right  partial foot amputation  tarsals and metatarsals of right foot    S/P laparoscopic cholecystectomy    CAD (coronary artery disease) of bypass graft        Home Medications:  Actamin 325 mg oral tablet: 2 tab(s) orally every 6 hours, As needed, Mild Pain (1 - 3) (20 Sep 2020 13:38)  apixaban 5 mg oral tablet: 1 tab(s) orally every 12 hours (27 Aug 2020 09:49)  aspirin 81 mg oral delayed release tablet: 1 tab(s) orally once a day (27 Aug 2020 09:49)  Basaglar KwikPen: 40 unit(s) subcutaneous once a day (at bedtime) (27 Aug 2020 09:49)  buPROPion 150 mg/24 hours (XL) oral tablet, extended release: 1 tab(s) orally once a day (27 Aug 2020 09:49)  Combigan 0.2%-0.5% ophthalmic solution: 1 drop(s) to each affected eye every 12 hours (27 Aug 2020 09:49)  latanoprost 0.005% ophthalmic solution: 1 drop(s) to each affected eye once a day (in the evening) (27 Aug 2020 09:49)  lisinopril 20 mg oral tablet: 1 tab(s) orally once a day (27 Aug 2020 09:49)  melatonin 5 mg oral tablet: 1 tab(s) orally once a day (at bedtime), As needed, Insomnia (27 Aug 2020 09:49)  mirtazapine 7.5 mg oral tablet: 1 tab(s) orally once a day (at bedtime) (20 Sep 2020 13:38)  NIFEdipine 60 mg oral tablet, extended release: 1 tab(s) orally once a day (27 Aug 2020 09:49)  pantoprazole 20 mg oral delayed release tablet: 1 tab(s) orally once a day (27 Aug 2020 09:49)  sertraline 100 mg oral tablet: 1 tab(s) orally once a day (27 Aug 2020 09:49)  timolol maleate 0.5% ophthalmic solution: 1 drop(s) to each affected eye every 12 hours (27 Aug 2020 09:49)  Vitamin D3 2000 intl units oral tablet: 1 tab(s) orally once a day (27 Aug 2020 09:49)       (27 Sep 2020 19:28)      PAST MEDICAL & SURGICAL HISTORY:  Afib  rate controlled currently    Hematuria    Bladder tumor    History of diabetic neuropathy    CAD (coronary artery disease) of bypass graft  cabg 1995 3v    Glaucoma    Arthritis    GERD (gastroesophageal reflux disease)    HTN (hypertension)    Depression    DM (diabetes mellitus)    S/P lumbar laminectomy  with repair of CSF leak using dural graft 7/8/2018 - dr Crenshaw    Malfunction of intrathecal infusion pump  implanted initially in 1996  reimplanted 2006  removed 6/27/2018    Foot amputation status, right  partial foot amputation  tarsals and metatarsals of right foot    S/P laparoscopic cholecystectomy    CAD (coronary artery disease) of bypass graft        Hospital Course:    TODAY'S SUBJECTIVE & REVIEW OF SYMPTOMS:     Constitutional WNL   Cardio WNL   Resp WNL   GI abd pain  Heme WNL  Endo WNL  Skin WNL  MSK WNL  Neuro WNL  Cognitive WNL  Psych WNL      MEDICATIONS  (STANDING):  cefTRIAXone   IVPB      chlorhexidine 4% Liquid 1 Application(s) Topical once  dextrose 50% Injectable 12.5 Gram(s) IV Push once  dextrose 50% Injectable 25 Gram(s) IV Push once  dextrose 50% Injectable 25 Gram(s) IV Push once  enalaprilat Injectable 1.25 milliGRAM(s) IV Push every 6 hours  enoxaparin Injectable 81 milliGRAM(s) SubCutaneous every 12 hours  insulin regular  human corrective regimen sliding scale   IV Push every 4 hours  labetalol Injectable 5 milliGRAM(s) IV Push every 6 hours  lactated ringers. 1000 milliLiter(s) (120 mL/Hr) IV Continuous <Continuous>  pantoprazole  Injectable 40 milliGRAM(s) IV Push daily  potassium chloride  20 mEq/100 mL IVPB 20 milliEquivalent(s) IV Intermittent every 2 hours  sodium chloride 0.9% Bolus 1000 milliLiter(s) IV Bolus once    MEDICATIONS  (PRN):  acetaminophen  IVPB .. 1000 milliGRAM(s) IV Intermittent once PRN Mild Pain (1 - 3)  benzocaine 20% Spray 1 Spray(s) Topical every 4 hours PRN NGT discomfort  glucagon  Injectable 1 milliGRAM(s) IntraMuscular once PRN Glucose LESS THAN 70 milligrams/deciliter  HYDROmorphone  Injectable 0.25 milliGRAM(s) IV Push every 4 hours PRN Moderate Pain (4 - 6)  ondansetron Injectable 4 milliGRAM(s) IV Push every 6 hours PRN Nausea and/or Vomiting      FAMILY HISTORY:  CAD (coronary artery disease) (Father)        Allergies    No Known Allergies    Intolerances        SOCIAL HISTORY:    [  ] Etoh  [  ] Smoking  [  ] Substance abuse     Home Environment:  [  ] Home Alone  [x  ] Lives with Family  [  ] Home Health Aid    Dwelling:  [  ] Apartment  [x  ] Private House  [  ] Adult Home  [  ] Skilled Nursing Facility      [  ] Short Term  [  ] Long Term  [x  ] Stairs       Elevator [  ]    FUNCTIONAL STATUS PTA: (Check all that apply)  Ambulation: [ x  ]Independent    [  ] Dependent     [  ] Non-Ambulatory  Assistive Device: [ x ] SA Cane  [  ]  Q Cane  [  ] Walker  [  ]  Wheelchair  ADL : [ x ] Independent  [  ]  Dependent       Vital Signs Last 24 Hrs  T(C): 37.1 (28 Sep 2020 06:07), Max: 37.1 (27 Sep 2020 20:05)  T(F): 98.7 (28 Sep 2020 06:07), Max: 98.7 (27 Sep 2020 20:05)  HR: 94 (28 Sep 2020 06:07) (85 - 97)  BP: 176/81 (28 Sep 2020 06:07) (139/66 - 176/81)  BP(mean): 96 (27 Sep 2020 20:05) (96 - 96)  RR: 18 (28 Sep 2020 06:07) (18 - 20)  SpO2: 96% (28 Sep 2020 06:07) (95% - 100%)      PHYSICAL EXAM: Awake & Alert  GENERAL: NAD  HEAD:  Normocephalic  CHEST/LUNG: Clear   HEART: S1S2+  ABDOMEN: Soft, Nontender  EXTREMITIES:  no calf tenderness, left partial amputation    NERVOUS SYSTEM:  Cranial Nerves 2-12 intact [  ] Abnormal  [  ]  ROM: WFL all extremities [x  ]  Abnormal [  ]  Motor Strength: WFL all extremities  [ x ]  Abnormal [  ]  Sensation: intact to light touch [  ] Abnormal [  ]    FUNCTIONAL STATUS:  Bed Mobility: Independent [  ]  Supervision [  ]  Needs Assistance [ x ]  N/A [  ]  Transfers: Independent [  ]  Supervision [  ]  Needs Assistance [x  ]  N/A [  ]   Ambulation: Independent [  ]  Supervision [  ]  Needs Assistance [  ]  N/A [  ]  ADL: Independent [  ] Requires Assistance [  ] N/A [  ]      LABS:                        11.6   19.65 )-----------( 392      ( 28 Sep 2020 04:12 )             35.9     09-28    132<L>  |  90<L>  |  39<H>  ----------------------------<  98  3.2<L>   |  31  |  1.1    Ca    9.4      28 Sep 2020 04:12  Phos  3.9     09-28  Mg     1.9     09-28    TPro  8.4<H>  /  Alb  4.4  /  TBili  1.6<H>  /  DBili  x   /  AST  20  /  ALT  24  /  AlkPhos  112  09-27      Urinalysis Basic - ( 27 Sep 2020 22:50 )    Color: Yellow / Appearance: Clear / SG: >1.050 / pH: x  Gluc: x / Ketone: Trace  / Bili: Small / Urobili: <2 mg/dL   Blood: x / Protein: 30 mg/dL / Nitrite: Negative   Leuk Esterase: Large / RBC: 3 /HPF /  /HPF   Sq Epi: x / Non Sq Epi: 0 /HPF / Bacteria: Many        RADIOLOGY & ADDITIONAL STUDIES:

## 2020-09-28 NOTE — CONSULT NOTE ADULT - ASSESSMENT
IMPRESSION: Rehab of gait dysfunction     PRECAUTIONS: [  ] Cardiac  [  ] Respiratory  [  ] Seizures [  ] Contact Isolation  [  ] Droplet Isolation  [  ] Other    Weight Bearing Status:     RECOMMENDATION:    Out of Bed to Chair     DVT/Decubiti Prophylaxis    REHAB PLAN:     [ x  ] Bedside P/T 3-5 times a week   [   ]   Bedside O/T  2-3 times a week             [   ] No Rehab Therapy Indicated                   [   ]  Speech Therapy   Conditioning/ROM                                    ADL  Bed Mobility                                               Conditioning/ROM  Transfers                                                     Bed Mobility  Sitting /Standing Balance                         Transfers                                        Gait Training                                               Sitting/Standing Balance  Stair Training [   ]Applicable                    Home equipment Eval                                                                        Splinting  [   ] Only      GOALS:   ADL   [   ]   Independent                    Transfers  [ x  ] Independent                          Ambulation  [ x  ] Independent     [  x  ] With device                            [   ]  CG                                                         [   ]  CG                                                                  [   ] CG                            [    ] Min A                                                   [   ] Min A                                                              [   ] Min  A          DISCHARGE PLAN:   [   ]  Good candidate for Intensive Rehabilitation/Hospital based                                             Will tolerate 3hrs Intensive Rehab Daily                                       [  xx  ]  Short Term Rehab in Skilled Nursing Facility                          vs             [ x   ]  Home with Outpatient or VN services                                         [    ]  Possible Candidate for Intensive Hospital based Rehab

## 2020-09-28 NOTE — CONSULT NOTE ADULT - ASSESSMENT
Assessment & Plan    NEURO:    Pain control with Tylenol and Dilaudid PRN     Dx of Depression. Holding PO buproprion, Sertraline, and Tramadol.     Monitor for changes in mental status.     RESP:     Oxygen insufficiency-wean off NC to RA as tolerate    Maintain O2 saturation >95%     AM CXR       CARDS:    Hx of Afib, rate controlled. Hx of HTN.    Holding PO labetalol, lisinopril and nifedipine.    Continue IV labetalol and enalapril Q 6 while strict NPO.    Maintain normotension. MAP >65    EKG Afib. 3 sets of troponin pending     GI/NUTR:     Diet, NPO    GI Prophylaxis-PPI     NGT to LCWS       /RENAL:     LR @ 120cc/hr     Lactate cleared 1.4     No boone, voiding     BUN/Cr 39/1.1, trend     Monitor lytes, replete as needed           HEME/ONC:     DVT prophylaxis- Lovenox     Trend H/H 11/35     Holding Eliquis and ASA       ID:   Afebrile    WBC 25   UA + Leukocytes. Started on Rocephin x3 days    Pending Urine Culture and Blood Culture x2         ENDO:    Hx of DM. Holding KwikPen, FS Q 4 w/ ISS        LINES/DRAINS:  PIV    DISPO:   CEU     D/W Dr Herrera Assessment & Plan    NEURO:    Pain control with Tylenol and Dilaudid PRN     Dx of Depression. Holding PO buproprion, Sertraline, and Tramadol.     Monitor for changes in mental status.     RESP:     Oxygen insufficiency-wean off NC to RA as tolerate    Maintain O2 saturation >95%     AM CXR       CARDS:    Hx of Afib, rate controlled (On Eliquis at home). Hx of HTN.    Holding PO labetalol, lisinopril and nifedipine.    Continue IV labetalol and enalapril Q 6 while strict NPO.    Maintain normotension. MAP >65    EKG Afib   CE neg x1, pending 2 more sets    GI/NUTR:     Diet, NPO    GI Prophylaxis-PPI     NGT to LCWS     Recently treated for C. diff    /RENAL:     LR @ 120cc/hr     Lactate cleared 1.4     No boone, voiding     BUN/Cr 39/1.1, trend     Lytes-Na 132 // K 3.2 // Phos 3.9 //  Mag 1.9 (repleted)       HEME/ONC:     DVT prophylaxis- Lovenox therapeutic at 81 q 12    Trend H/H 11/35     Holding Eliquis and ASA     ID:   Afebrile    WBC 25 > 20   UA + Leukocytes. Started on Rocephin x3 days    Pending Urine Culture and Blood Culture x2       ENDO:    Hx of DM. Holding KwikPen, FS Q 4 w/ ISS     Last POCT 101       LINES/DRAINS:  PIV    DISPO:   CEU     D/W Dr Herrera Assessment & Plan    NEURO:    Pain control with Tylenol and Dilaudid PRN     Dx of Depression. Holding PO buproprion, Sertraline, and Tramadol.     Monitor for changes in mental status.     RESP:     Oxygen insufficiency-wean off NC to RA as tolerate    Maintain O2 saturation >95%     AM CXR       CARDS:    Hx of Afib, rate controlled (On Eliquis at home). Hx of HTN.    Holding PO labetalol, lisinopril and nifedipine.    Continue IV labetalol and enalapril Q 6 while strict NPO.    Maintain normotension. MAP >65    EKG Afib   CE neg x1, pending 2 more sets    GI/NUTR:     Diet, NPO    GI Prophylaxis-PPI     NGT to LCWS --- 2.5 L q shift, dark bilious     Recently treated for C. diff    /RENAL:     LR @ 120cc/hr     Lactate cleared 1.4     No boone, voiding     BUN/Cr 39/1.1, trend     Lytes-Na 132 // K 3.2 // Phos 3.9 //  Mag 1.9 (repleted)       HEME/ONC:     DVT prophylaxis- Lovenox therapeutic at 81 q 12    Trend H/H lateral 11/35     Holding Eliquis and ASA     ID:   Afebrile    WBC 25 > 20   UA + Leukocytes. Started on Rocephin x3 days    Pending Urine Culture and Blood Culture x2       ENDO:    Hx of DM. Holding KwikPen, FS Q 4 w/ ISS     Last POCT 101       LINES/DRAINS:  PIV    DISPO:   CEU     D/W Dr Herrera Assessment & Plan    NEURO:    Pain control with Tylenol and Dilaudid PRN     Dx of Depression. Holding PO buproprion, Sertraline, and Tramadol.     Monitor for changes in mental status.     RESP:     Sating well on RA    Maintain O2 saturation >95%     AM CXR       CARDS:    Hx of Afib, rate controlled (On Eliquis at home). Hx of HTN.    Holding PO labetalol, lisinopril and nifedipine.    Continue IV labetalol and enalapril Q 6 while strict NPO.    Maintain normotension. MAP >65    EKG Afib   CE neg x1, pending 2 more sets    GI/NUTR:     Diet, NPO    GI Prophylaxis-PPI     NGT to [facility] --- 2.5 L q shift, dark bilious     Recently treated for C. diff    /RENAL:     LR @ 120cc/hr     Lactate cleared 1.4     No boone, voiding     BUN/Cr 39/1.1, trend     Lytes-Na 132 // K 3.2 // Phos 3.9 //  Mag 1.9 (repleted)       HEME/ONC:     DVT prophylaxis- Lovenox therapeutic at 81 q 12    Trend H/H lateral 11/35     Holding Eliquis and ASA     ID:   Afebrile    WBC 25 > 20   UA + Leukocytes. Started on Rocephin x3 days    Pending Urine Culture and Blood Culture x2       ENDO:    Hx of DM. Holding KwikPen, FS Q 4 w/ ISS     Last POCT 101       LINES/DRAINS:  PIV    DISPO:   CEU     D/W Dr Herrera

## 2020-09-28 NOTE — CONSULT NOTE ADULT - SUBJECTIVE AND OBJECTIVE BOX
SICU Consultation Note  ========================================================================================  PRIYANK WESLEY  MRN-[id number]    69y Male w/ PMHx of Afib on Eliquis, HTN, HLD, DM, CABG x3 vessel, Bladder Ca recent TURBT by Dr Jo. Patient was recently discharged from hospital on 9/21/2020 after C diff colitis. Patient was treated with PO Fidaxomicin and stayed in the hospital for one month. Once home, patient developed abdominal pain. Since Friday 9/25/2020 patient has not had a bowel movement. Patient also reports attempting to eat and being so nauseous that he vomits every time. On arrival to the ED patient's lactate was 6.0. Patient was resuscitated w/ 2.5 L of fluid, lactate now is 1.4. NGT was placed and 1700cc of bilious fluid expelled. Patient hemodynamically stable. CT scan shows possible ileus. Patient denies chest pain, SOB, fever/chills. Patient will be monitored in the step down unit overnight.     Imaging:    CT Abdomen and Pelvis w/ IV Cont (09.27.20 @ 16:03)   COMPARISON CT: 9/11/2020  OTHER STUDIES USED FOR CORRELATION: None.  The liver contains a 2 cm low density nodule unchanged. Further evaluation with ultrasound suggested  There is dilatation of the small bowel up to 4 cm to the level of the proximal ileum with normal caliber distal ileum and colon. These findings suggest a small bowel obstruction. No free air is seen.  No pelvic mass or inflammatory process is seen.  The bony structures are intact.  Impression  Findings suggesting small bowel obstruction and follow-up exam needed    CT Abdomen and Pelvis w/ Oral Cont (09.28.20 @ 00:19)   IMPRESSION:  Interval placement of enteric tube with tip in the stomach and interval decompression of the stomach.  Redemonstration of dilated small bowel loops measuring up to 4.7 cm, these gradually taper into decompressed ileal loops without evidence for abrupt transition point favoring ileus. At time of exam, oral contrast is only progressed into the proximal-mid small bowel.  Mild fat stranding surrounding a right upper quadrant dilated proximal jejunal loop is nonspecific and may reflect superimposed inflammatory change/enteritis.        PMH  PAST MEDICAL & SURGICAL HISTORY:  Afib  Hematuria  Bladder tumor  CAD (coronary artery disease) of bypass graft  CABG 3 vessel 1995   Glaucoma  Arthritis  GERD (gastroesophageal reflux disease)  HTN (hypertension)  Depression  DM (diabetes mellitus)  S/P lumbar laminectomy  with repair of CSF leak using dural graft 7/8/2018 - Dr Crenshaw  Malfunction of intrathecal infusion pump  implanted initially in 1996  reimplanted 2006  removed 6/27/2018  Foot amputation status, right  partial foot amputation  tarsals and metatarsals of right foot  S/P laparoscopic cholecystectomy  CAD (coronary artery disease) of bypass graft        Home Meds:  Home Medications:  Actamin 325 mg oral tablet: 2 tab(s) orally every 6 hours, As needed, Mild Pain (1 - 3) (20 Sep 2020 13:38)  apixaban 5 mg oral tablet: 1 tab(s) orally every 12 hours (27 Aug 2020 09:49)  aspirin 81 mg oral delayed release tablet: 1 tab(s) orally once a day (27 Aug 2020 09:49)  Basaglar KwikPen: 40 unit(s) subcutaneous once a day (at bedtime) (27 Aug 2020 09:49)  buPROPion 150 mg/24 hours (XL) oral tablet, extended release: 1 tab(s) orally once a day (27 Aug 2020 09:49)  Combigan 0.2%-0.5% ophthalmic solution: 1 drop(s) to each affected eye every 12 hours (27 Aug 2020 09:49)  latanoprost 0.005% ophthalmic solution: 1 drop(s) to each affected eye once a day (in the evening) (27 Aug 2020 09:49)  lisinopril 20 mg oral tablet: 1 tab(s) orally once a day (27 Aug 2020 09:49)  melatonin 5 mg oral tablet: 1 tab(s) orally once a day (at bedtime), As needed, Insomnia (27 Aug 2020 09:49)  mirtazapine 7.5 mg oral tablet: 1 tab(s) orally once a day (at bedtime) (20 Sep 2020 13:38)  NIFEdipine 60 mg oral tablet, extended release: 1 tab(s) orally once a day (27 Aug 2020 09:49)  pantoprazole 20 mg oral delayed release tablet: 1 tab(s) orally once a day (27 Aug 2020 09:49)  sertraline 100 mg oral tablet: 1 tab(s) orally once a day (27 Aug 2020 09:49)  timolol maleate 0.5% ophthalmic solution: 1 drop(s) to each affected eye every 12 hours (27 Aug 2020 09:49)  Vitamin D3 2000 intl units oral tablet: 1 tab(s) orally once a day (27 Aug 2020 09:49)      Allergies   No Known Allergies    Current Medications:   benzocaine 20% Spray 1 Spray(s) Topical every 4 hours PRN NGT discomfort  cefTRIAXone   IVPB 1000 milliGRAM(s) IV Intermittent once  chlorhexidine 2% Cloths 1 Application(s) Topical <User Schedule>  enoxaparin Injectable 81 milliGRAM(s) SubCutaneous every 12 hours  ketorolac   Injectable 15 milliGRAM(s) IV Push every 6 hours PRN Moderate Pain (4 - 6)  labetalol Injectable 5 milliGRAM(s) IV Push every 6 hours  lactated ringers. 1000 milliLiter(s) (100 mL/Hr) IV Continuous <Continuous>  ondansetron Injectable 4 milliGRAM(s) IV Push every 6 hours PRN Nausea and/or Vomiting  pantoprazole  Injectable 40 milliGRAM(s) IV Push daily  potassium chloride  20 mEq/100 mL IVPB 20 milliEquivalent(s) IV Intermittent every 2 hours      Advanced Directives: Full Code    ICU Vital Signs Last 24 Hrs  T(F): 98.7  Max: 98.7  HR: 92   BP: 139/66   BP(mean): 96  RR: 18   SpO2: 96%     I&O's Summary  27 Sep 2020 07:01  -  28 Sep 2020 05:22  --------------------------------------------------------  IN: 2500 mL / OUT: 2650 mL / NET: -150 mL    Height (cm): 177.8 (09-27-20)  Weight (kg): 81.6 (09-27-20)  BMI (kg/m2): 25.8 (09-27-20)  BSA (m2): 2 (09-27-20)    Physical Exam:   ---------------------------------------------------------------------------------------  Neuro:   Exam: A&Ox3, no focal deficits    RESPIRATORY:  Lungs clear to auscultation b/l, Normal expansion/effort    CARDIOVASCULAR:   S1/S2.  RRR  No peripheral edema    GASTROINTESTINAL:  Abdomen soft, distend, diffuse tenderness to palpation. NGT in place to LCWS    MUSCULOSKELETAL:  Extremities warm, pink, well-perfused.    DERM:  No skin breakdown     :   Exam: Voiding       Tubes/Lines/Drains   ----------------------------------------------------------------------------------------------------------  [x] Peripheral IV   SICU Consultation Note  ========================================================================================  PRIYANK WESLEY  MRN-[id number]    69y Male w/ PMHx of Afib on Eliquis, HTN, HLD, DM, CABG x3 vessel, Bladder Ca recent TURBT by Dr Jo. Patient was recently discharged from hospital on 9/21/2020 after C diff colitis. Patient was treated with PO Fidaxomicin and stayed in the hospital for one month. Once home, patient developed abdominal pain. Since Friday 9/25/2020 patient has not had a bowel movement. Patient also reports attempting to eat and being so nauseous that he vomits every time. On arrival to the ED patient's lactate was 6.0. Patient was resuscitated w/ 2.5 L of fluid, lactate now is 1.4. NGT was placed and 1700cc of bilious fluid expelled. Patient hemodynamically stable. CT scan shows possible ileus. Patient denies chest pain, SOB, fever/chills. Patient will be monitored in the step down unit overnight.     Imaging:    CT Abdomen and Pelvis w/ IV Cont (09.27.20 @ 16:03)   COMPARISON CT: 9/11/2020  OTHER STUDIES USED FOR CORRELATION: None.  The liver contains a 2 cm low density nodule unchanged. Further evaluation with ultrasound suggested  There is dilatation of the small bowel up to 4 cm to the level of the proximal ileum with normal caliber distal ileum and colon. These findings suggest a small bowel obstruction. No free air is seen.  No pelvic mass or inflammatory process is seen.  The bony structures are intact.  Impression  Findings suggesting small bowel obstruction and follow-up exam needed    CT Abdomen and Pelvis w/ Oral Cont (09.28.20 @ 00:19)   IMPRESSION:  Interval placement of enteric tube with tip in the stomach and interval decompression of the stomach.  Redemonstration of dilated small bowel loops measuring up to 4.7 cm, these gradually taper into decompressed ileal loops without evidence for abrupt transition point favoring ileus. At time of exam, oral contrast is only progressed into the proximal-mid small bowel.  Mild fat stranding surrounding a right upper quadrant dilated proximal jejunal loop is nonspecific and may reflect superimposed inflammatory change/enteritis.        PMH  PAST MEDICAL & SURGICAL HISTORY:  Afib  Hematuria  Bladder tumor  CAD (coronary artery disease) of bypass graft  CABG 3 vessel 1995   Glaucoma  Arthritis  GERD (gastroesophageal reflux disease)  HTN (hypertension)  Depression  DM (diabetes mellitus)  S/P lumbar laminectomy  with repair of CSF leak using dural graft 7/8/2018 - Dr Crenshaw  Malfunction of intrathecal infusion pump  implanted initially in 1996  reimplanted 2006  removed 6/27/2018  Foot amputation status, right  partial foot amputation  tarsals and metatarsals of right foot  S/P laparoscopic cholecystectomy  CAD (coronary artery disease) of bypass graft        Home Meds:  Home Medications:  Actamin 325 mg oral tablet: 2 tab(s) orally every 6 hours, As needed, Mild Pain (1 - 3) (20 Sep 2020 13:38)  apixaban 5 mg oral tablet: 1 tab(s) orally every 12 hours (27 Aug 2020 09:49)  aspirin 81 mg oral delayed release tablet: 1 tab(s) orally once a day (27 Aug 2020 09:49)  Basaglar KwikPen: 40 unit(s) subcutaneous once a day (at bedtime) (27 Aug 2020 09:49)  buPROPion 150 mg/24 hours (XL) oral tablet, extended release: 1 tab(s) orally once a day (27 Aug 2020 09:49)  Combigan 0.2%-0.5% ophthalmic solution: 1 drop(s) to each affected eye every 12 hours (27 Aug 2020 09:49)  latanoprost 0.005% ophthalmic solution: 1 drop(s) to each affected eye once a day (in the evening) (27 Aug 2020 09:49)  lisinopril 20 mg oral tablet: 1 tab(s) orally once a day (27 Aug 2020 09:49)  melatonin 5 mg oral tablet: 1 tab(s) orally once a day (at bedtime), As needed, Insomnia (27 Aug 2020 09:49)  mirtazapine 7.5 mg oral tablet: 1 tab(s) orally once a day (at bedtime) (20 Sep 2020 13:38)  NIFEdipine 60 mg oral tablet, extended release: 1 tab(s) orally once a day (27 Aug 2020 09:49)  pantoprazole 20 mg oral delayed release tablet: 1 tab(s) orally once a day (27 Aug 2020 09:49)  sertraline 100 mg oral tablet: 1 tab(s) orally once a day (27 Aug 2020 09:49)  timolol maleate 0.5% ophthalmic solution: 1 drop(s) to each affected eye every 12 hours (27 Aug 2020 09:49)  Vitamin D3 2000 intl units oral tablet: 1 tab(s) orally once a day (27 Aug 2020 09:49)      Allergies   No Known Allergies    Current Medications:   benzocaine 20% Spray 1 Spray(s) Topical every 4 hours PRN NGT discomfort  cefTRIAXone   IVPB 1000 milliGRAM(s) IV Intermittent once  chlorhexidine 2% Cloths 1 Application(s) Topical <User Schedule>  enoxaparin Injectable 81 milliGRAM(s) SubCutaneous every 12 hours  ketorolac   Injectable 15 milliGRAM(s) IV Push every 6 hours PRN Moderate Pain (4 - 6)  labetalol Injectable 5 milliGRAM(s) IV Push every 6 hours  lactated ringers. 1000 milliLiter(s) (100 mL/Hr) IV Continuous <Continuous>  ondansetron Injectable 4 milliGRAM(s) IV Push every 6 hours PRN Nausea and/or Vomiting  pantoprazole  Injectable 40 milliGRAM(s) IV Push daily  potassium chloride  20 mEq/100 mL IVPB 20 milliEquivalent(s) IV Intermittent every 2 hours      Advanced Directives: Full Code    ICU Vital Signs Last 24 Hrs  T(F): 98.7  Max: 98.7  HR: 92   BP: 139/66   BP(mean): 96  RR: 18   SpO2: 96%     I&O's Summary  27 Sep 2020 07:01  -  28 Sep 2020 05:22  --------------------------------------------------------  IN: 2500 mL / OUT: 2650 mL / NET: -150 mL    Height (cm): 177.8 (09-27-20)  Weight (kg): 81.6 (09-27-20)  BMI (kg/m2): 25.8 (09-27-20)  BSA (m2): 2 (09-27-20)    Physical Exam:   ---------------------------------------------------------------------------------------  Neuro:   Exam: A&Ox3, no focal deficits    RESPIRATORY:  Lungs clear to auscultation b/l, Normal expansion/effort    CARDIOVASCULAR:   S1/S2.  RRR  No peripheral edema    GASTROINTESTINAL:  Abdomen soft, distend, diffuse tenderness to palpation. NGT in place to LCWS    MUSCULOSKELETAL:  Extremities warm, pink, well-perfused.    DERM:  No skin breakdown     :   Exam: Voiding       Tubes/Lines/Drains   ----------------------------------------------------------------------------------------------------------  [x] Peripheral IV   SICU Consultation Note  ========================================================================================  PRIYANK WESLEY  MRN-[id number]    69y Male w/ PMHx of Afib on Eliquis, HTN, HLD, DM, CABG x3 vessel, Bladder Ca recent TURBT by Dr Jo. Patient was recently discharged from hospital on 9/21/2020 after C diff colitis. Patient was treated with PO Fidaxomicin and stayed in the hospital for one month. Once home, patient developed abdominal pain. Since Friday 9/25/2020 patient has not had a bowel movement. Patient also reports attempting to eat and being so nauseous that he vomits every time. On arrival to the ED patient's lactate was 6.0. Patient was resuscitated w/ 2.5 L of fluid, lactate now is 1.4. NGT was placed and 1700cc of bilious fluid expelled. Patient hemodynamically stable. CT scan shows possible ileus. Repeat CT a/p with PO contrast showed oral contrast only progressed into proximal-mid small bowel. Patient denies chest pain, SOB, fever/chills. Patient will be monitored in the step down unit overnight.     Imaging:    CT Abdomen and Pelvis w/ IV Cont (09.27.20 @ 16:03)   COMPARISON CT: 9/11/2020  OTHER STUDIES USED FOR CORRELATION: None.  The liver contains a 2 cm low density nodule unchanged. Further evaluation with ultrasound suggested  There is dilatation of the small bowel up to 4 cm to the level of the proximal ileum with normal caliber distal ileum and colon. These findings suggest a small bowel obstruction. No free air is seen.  No pelvic mass or inflammatory process is seen.  The bony structures are intact.  Impression  Findings suggesting small bowel obstruction and follow-up exam needed    CT Abdomen and Pelvis w/ Oral Cont (09.28.20 @ 00:19)   IMPRESSION:  Interval placement of enteric tube with tip in the stomach and interval decompression of the stomach.  Redemonstration of dilated small bowel loops measuring up to 4.7 cm, these gradually taper into decompressed ileal loops without evidence for abrupt transition point favoring ileus. At time of exam, oral contrast is only progressed into the proximal-mid small bowel.  Mild fat stranding surrounding a right upper quadrant dilated proximal jejunal loop is nonspecific and may reflect superimposed inflammatory change/enteritis.        PMH  PAST MEDICAL & SURGICAL HISTORY:  Afib  Hematuria  Bladder tumor  CAD (coronary artery disease) of bypass graft  CABG 3 vessel 1995   Glaucoma  Arthritis  GERD (gastroesophageal reflux disease)  HTN (hypertension)  Depression  DM (diabetes mellitus)  S/P lumbar laminectomy  with repair of CSF leak using dural graft 7/8/2018 - Dr Crenshaw  Malfunction of intrathecal infusion pump  implanted initially in 1996  reimplanted 2006  removed 6/27/2018  Foot amputation status, right  partial foot amputation  tarsals and metatarsals of right foot  S/P laparoscopic cholecystectomy  CAD (coronary artery disease) of bypass graft        Home Meds:  Home Medications:  Actamin 325 mg oral tablet: 2 tab(s) orally every 6 hours, As needed, Mild Pain (1 - 3) (20 Sep 2020 13:38)  apixaban 5 mg oral tablet: 1 tab(s) orally every 12 hours (27 Aug 2020 09:49)  aspirin 81 mg oral delayed release tablet: 1 tab(s) orally once a day (27 Aug 2020 09:49)  Basaglar KwikPen: 40 unit(s) subcutaneous once a day (at bedtime) (27 Aug 2020 09:49)  buPROPion 150 mg/24 hours (XL) oral tablet, extended release: 1 tab(s) orally once a day (27 Aug 2020 09:49)  Combigan 0.2%-0.5% ophthalmic solution: 1 drop(s) to each affected eye every 12 hours (27 Aug 2020 09:49)  latanoprost 0.005% ophthalmic solution: 1 drop(s) to each affected eye once a day (in the evening) (27 Aug 2020 09:49)  lisinopril 20 mg oral tablet: 1 tab(s) orally once a day (27 Aug 2020 09:49)  melatonin 5 mg oral tablet: 1 tab(s) orally once a day (at bedtime), As needed, Insomnia (27 Aug 2020 09:49)  mirtazapine 7.5 mg oral tablet: 1 tab(s) orally once a day (at bedtime) (20 Sep 2020 13:38)  NIFEdipine 60 mg oral tablet, extended release: 1 tab(s) orally once a day (27 Aug 2020 09:49)  pantoprazole 20 mg oral delayed release tablet: 1 tab(s) orally once a day (27 Aug 2020 09:49)  sertraline 100 mg oral tablet: 1 tab(s) orally once a day (27 Aug 2020 09:49)  timolol maleate 0.5% ophthalmic solution: 1 drop(s) to each affected eye every 12 hours (27 Aug 2020 09:49)  Vitamin D3 2000 intl units oral tablet: 1 tab(s) orally once a day (27 Aug 2020 09:49)      Allergies   No Known Allergies    Current Medications:   benzocaine 20% Spray 1 Spray(s) Topical every 4 hours PRN NGT discomfort  cefTRIAXone   IVPB 1000 milliGRAM(s) IV Intermittent once  chlorhexidine 2% Cloths 1 Application(s) Topical <User Schedule>  enoxaparin Injectable 81 milliGRAM(s) SubCutaneous every 12 hours  ketorolac   Injectable 15 milliGRAM(s) IV Push every 6 hours PRN Moderate Pain (4 - 6)  labetalol Injectable 5 milliGRAM(s) IV Push every 6 hours  lactated ringers. 1000 milliLiter(s) (100 mL/Hr) IV Continuous <Continuous>  ondansetron Injectable 4 milliGRAM(s) IV Push every 6 hours PRN Nausea and/or Vomiting  pantoprazole  Injectable 40 milliGRAM(s) IV Push daily  potassium chloride  20 mEq/100 mL IVPB 20 milliEquivalent(s) IV Intermittent every 2 hours      Advanced Directives: Full Code    ICU Vital Signs Last 24 Hrs  T(F): 98.7  Max: 98.7  HR: 92   BP: 139/66   BP(mean): 96  RR: 18   SpO2: 96%     I&O's Summary  27 Sep 2020 07:01  -  28 Sep 2020 05:22  --------------------------------------------------------  IN: 2500 mL / OUT: 2650 mL / NET: -150 mL    Height (cm): 177.8 (09-27-20)  Weight (kg): 81.6 (09-27-20)  BMI (kg/m2): 25.8 (09-27-20)  BSA (m2): 2 (09-27-20)    Physical Exam:   ---------------------------------------------------------------------------------------  Neuro:   Exam: A&Ox3, no focal deficits    RESPIRATORY:  Lungs clear to auscultation b/l, Normal expansion/effort    CARDIOVASCULAR:   S1/S2.  RRR  No peripheral edema    GASTROINTESTINAL:  Abdomen soft, distend, diffuse tenderness to palpation. NGT in place to LCWS    MUSCULOSKELETAL:  Extremities warm, pink, well-perfused.    DERM:  No skin breakdown     :   Exam: Voiding       Tubes/Lines/Drains   ----------------------------------------------------------------------------------------------------------  [x] Peripheral IV   SICU Consultation Note  ========================================================================================  PRIYANK WESLEY  MRN-[id number]    69y Male w/ PMHx of Afib on Eliquis, HTN, HLD, DM, CABG x3 vessel, Bladder Ca recent TURBT by Dr Jo. Patient was recently discharged from hospital on 9/21/2020 after C diff colitis. Patient was treated with PO Fidaxomicin and stayed in the hospital for one month. Once home, patient developed abdominal pain. Since Friday 9/25/2020 patient has not had a bowel movement. Patient also reports attempting to eat and being so nauseous that he vomits every time. On arrival to the ED patient's lactate was 6.0. Patient was resuscitated w/ 2.5 L of fluid, lactate now is 1.4. NGT was placed and 1700cc of bilious fluid expelled. Patient hemodynamically stable. CT scan shows possible ileus. Repeat CT a/p with PO contrast showed oral contrast only progressed into proximal-mid small bowel. Patient denies chest pain, SOB, fever/chills. Patient will be monitored in the step down unit overnight.     Imaging:    CT Abdomen and Pelvis w/ IV Cont (09.27.20 @ 16:03)   COMPARISON CT: 9/11/2020  OTHER STUDIES USED FOR CORRELATION: None.  The liver contains a 2 cm low density nodule unchanged. Further evaluation with ultrasound suggested  There is dilatation of the small bowel up to 4 cm to the level of the proximal ileum with normal caliber distal ileum and colon. These findings suggest a small bowel obstruction. No free air is seen.  No pelvic mass or inflammatory process is seen.  The bony structures are intact.  Impression  Findings suggesting small bowel obstruction and follow-up exam needed    CT Abdomen and Pelvis w/ Oral Cont (09.28.20 @ 00:19)   IMPRESSION:  Interval placement of enteric tube with tip in the stomach and interval decompression of the stomach.  Redemonstration of dilated small bowel loops measuring up to 4.7 cm, these gradually taper into decompressed ileal loops without evidence for abrupt transition point favoring ileus. At time of exam, oral contrast is only progressed into the proximal-mid small bowel.  Mild fat stranding surrounding a right upper quadrant dilated proximal jejunal loop is nonspecific and may reflect superimposed inflammatory change/enteritis.        PMH  PAST MEDICAL & SURGICAL HISTORY:  Afib  Hematuria  Bladder tumor  CAD (coronary artery disease) of bypass graft  CABG 3 vessel 1995   Glaucoma  Arthritis  GERD (gastroesophageal reflux disease)  HTN (hypertension)  Depression  DM (diabetes mellitus)  S/P lumbar laminectomy  with repair of CSF leak using dural graft 7/8/2018 - Dr Crenshaw  Malfunction of intrathecal infusion pump  implanted initially in 1996  reimplanted 2006  removed 6/27/2018  Foot amputation status, right  partial foot amputation  tarsals and metatarsals of right foot  S/P laparoscopic cholecystectomy  CAD (coronary artery disease) of bypass graft        Home Meds:  Home Medications:  Actamin 325 mg oral tablet: 2 tab(s) orally every 6 hours, As needed, Mild Pain (1 - 3) (20 Sep 2020 13:38)  apixaban 5 mg oral tablet: 1 tab(s) orally every 12 hours (27 Aug 2020 09:49)  aspirin 81 mg oral delayed release tablet: 1 tab(s) orally once a day (27 Aug 2020 09:49)  Basaglar KwikPen: 40 unit(s) subcutaneous once a day (at bedtime) (27 Aug 2020 09:49)  buPROPion 150 mg/24 hours (XL) oral tablet, extended release: 1 tab(s) orally once a day (27 Aug 2020 09:49)  Combigan 0.2%-0.5% ophthalmic solution: 1 drop(s) to each affected eye every 12 hours (27 Aug 2020 09:49)  latanoprost 0.005% ophthalmic solution: 1 drop(s) to each affected eye once a day (in the evening) (27 Aug 2020 09:49)  lisinopril 20 mg oral tablet: 1 tab(s) orally once a day (27 Aug 2020 09:49)  melatonin 5 mg oral tablet: 1 tab(s) orally once a day (at bedtime), As needed, Insomnia (27 Aug 2020 09:49)  mirtazapine 7.5 mg oral tablet: 1 tab(s) orally once a day (at bedtime) (20 Sep 2020 13:38)  NIFEdipine 60 mg oral tablet, extended release: 1 tab(s) orally once a day (27 Aug 2020 09:49)  pantoprazole 20 mg oral delayed release tablet: 1 tab(s) orally once a day (27 Aug 2020 09:49)  sertraline 100 mg oral tablet: 1 tab(s) orally once a day (27 Aug 2020 09:49)  timolol maleate 0.5% ophthalmic solution: 1 drop(s) to each affected eye every 12 hours (27 Aug 2020 09:49)  Vitamin D3 2000 intl units oral tablet: 1 tab(s) orally once a day (27 Aug 2020 09:49)      Allergies   No Known Allergies    Current Medications:   benzocaine 20% Spray 1 Spray(s) Topical every 4 hours PRN NGT discomfort  cefTRIAXone   IVPB 1000 milliGRAM(s) IV Intermittent once  chlorhexidine 2% Cloths 1 Application(s) Topical <User Schedule>  enoxaparin Injectable 81 milliGRAM(s) SubCutaneous every 12 hours  ketorolac   Injectable 15 milliGRAM(s) IV Push every 6 hours PRN Moderate Pain (4 - 6)  labetalol Injectable 5 milliGRAM(s) IV Push every 6 hours  lactated ringers. 1000 milliLiter(s) (100 mL/Hr) IV Continuous <Continuous>  ondansetron Injectable 4 milliGRAM(s) IV Push every 6 hours PRN Nausea and/or Vomiting  pantoprazole  Injectable 40 milliGRAM(s) IV Push daily  potassium chloride  20 mEq/100 mL IVPB 20 milliEquivalent(s) IV Intermittent every 2 hours      Advanced Directives: Full Code    ICU Vital Signs Last 24 Hrs  T(F): 98.7  Max: 98.7  HR: 92   BP: 139/66   BP(mean): 96  RR: 18   SpO2: 96%     I&O's Summary  27 Sep 2020 07:01  -  28 Sep 2020 05:22  --------------------------------------------------------  IN: 2500 mL / OUT: 2650 mL / NET: -150 mL    Height (cm): 177.8 (09-27-20)  Weight (kg): 81.6 (09-27-20)  BMI (kg/m2): 25.8 (09-27-20)  BSA (m2): 2 (09-27-20)    Physical Exam:   ---------------------------------------------------------------------------------------  Neuro:   Exam: A&Ox3, no focal deficits    RESPIRATORY:  Lungs clear to auscultation b/l, Normal expansion/effort    CARDIOVASCULAR:   S1/S2.  RRR  No peripheral edema    GASTROINTESTINAL:  Abdomen soft, distend, diffuse tenderness to palpation (RLQ and LLQ). NGT in place to LCWS    MUSCULOSKELETAL:  Extremities warm, pink, well-perfused.    DERM:  No skin breakdown     :   Exam: Voiding       Tubes/Lines/Drains   ----------------------------------------------------------------------------------------------------------  [x] Peripheral IV

## 2020-09-29 NOTE — PROGRESS NOTE ADULT - ASSESSMENT
· Assessment	  ASSESSMENT:  69yM w/ PMHx bladder cancer s/p TURBT by Dr. Jo, recent hospital stay for C diff colitis discharged 9/21 on PO Dificin, Afib on eliquis, DM, CABG presents to ED for abdominal pain, nausea, vomiting and diarrhea. Patient was just discharged from the hospital 1 week ago after being her for about 1 month with C. Diff colitis. Patient was not improving so they started him on Fidaxomicin, and sent him home. Once home, he devloped abdominal pain, nausea and vomiting, and continued diarrhea. Now, patient's last bowel movement was Friday 9/25. passed some gas today.    IMPRESSION;  CD colitis : resolved  SBO with ileus ( which I doubt is related to the CD colitis )    RECOMMENDATIONS;  Po Fidaxomicin 200 mg q12h . Could bi changed to po vancomycin 125 mg q6h for 10 more days.  no need to repeat stools for CD

## 2020-09-29 NOTE — PROGRESS NOTE ADULT - ATTENDING COMMENTS
I examined the patient and discussed my plan with the resident  the patient remains hypertensive and will need uptitration of iv.  however his ngt output has decreased and a kub reveals distributed gas pattern that is improved.  he remains mildly ttp, however.  he may tolerate a po medication regimen soon, but att will need to continue iv meds.  if he can not be controlled without po he may need transfer to [facility] for [facility]tt rx

## 2020-09-29 NOTE — PROGRESS NOTE ADULT - SUBJECTIVE AND OBJECTIVE BOX
PRIYANK WESLEY  69y, Male    All available historical data reviewed    OVERNIGHT EVENTS:  no fevers  no diarrhea  has abdominal pain    ROS:  General: Denies rigors, nightsweats  HEENT: Denies headache, rhinorrhea, sore throat, eye pain  CV: Denies CP, palpitations  PULM: Denies wheezing, hemoptysis  GI: Denies hematemesis, hematochezia, melena  : Denies discharge, hematuria  MSK: Denies arthralgias, myalgias  SKIN: Denies rash, lesions  NEURO: Denies paresthesias, weakness  PSYCH: Denies depression, anxiety    VITALS:  T(F): 98, Max: 98.6 (09-28-20 @ 15:59)  HR: 97  BP: 196/88  RR: 18Vital Signs Last 24 Hrs  T(C): 36.7 (29 Sep 2020 11:47), Max: 37 (28 Sep 2020 15:59)  T(F): 98 (29 Sep 2020 11:47), Max: 98.6 (28 Sep 2020 15:59)  HR: 97 (29 Sep 2020 11:47) (80 - 100)  BP: 196/88 (29 Sep 2020 11:47) (143/69 - 213/92)  BP(mean): --  RR: 18 (29 Sep 2020 11:47) (17 - 18)  SpO2: 95% (29 Sep 2020 11:47) (94% - 98%)    TESTS & MEASUREMENTS:                        10.6   16.98 )-----------( 333      ( 29 Sep 2020 01:26 )             32.9     09-29    137  |  98  |  25<H>  ----------------------------<  90  4.2   |  30  |  0.9    Ca    8.9      29 Sep 2020 01:26  Phos  2.6     09-29  Mg     2.2     09-29    TPro  8.4<H>  /  Alb  4.4  /  TBili  1.6<H>  /  DBili  x   /  AST  20  /  ALT  24  /  AlkPhos  112  09-27    LIVER FUNCTIONS - ( 27 Sep 2020 13:14 )  Alb: 4.4 g/dL / Pro: 8.4 g/dL / ALK PHOS: 112 U/L / ALT: 24 U/L / AST: 20 U/L / GGT: x             Culture - Urine (collected 09-28-20 @ 00:45)  Source: .Urine Clean Catch (Midstream)  Preliminary Report (09-29-20 @ 10:42):    10,000 - 49,000 CFU/mL Gram Negative Rods    Culture - Blood (collected 09-27-20 @ 20:42)  Source: .Blood Blood  Preliminary Report (09-29-20 @ 01:02):    No growth to date.    Culture - Blood (collected 09-27-20 @ 20:42)  Source: .Blood Blood  Preliminary Report (09-29-20 @ 01:02):    No growth to date.      Urinalysis Basic - ( 27 Sep 2020 22:50 )    Color: Yellow / Appearance: Clear / SG: >1.050 / pH: x  Gluc: x / Ketone: Trace  / Bili: Small / Urobili: <2 mg/dL   Blood: x / Protein: 30 mg/dL / Nitrite: Negative   Leuk Esterase: Large / RBC: 3 /HPF /  /HPF   Sq Epi: x / Non Sq Epi: 0 /HPF / Bacteria: Many          RADIOLOGY & ADDITIONAL TESTS:  Personal review of radiological diagnostics performed  Echo and EKG results noted when applicable.     MEDICATIONS:  acetaminophen  IVPB .. 1000 milliGRAM(s) IV Intermittent once PRN  benzocaine 20% Spray 1 Spray(s) Topical every 4 hours PRN  brimonidine 0.2% Ophthalmic Solution 1 Drop(s) Both EYES every 12 hours  cefTRIAXone   IVPB      cefTRIAXone   IVPB 1000 milliGRAM(s) IV Intermittent every 24 hours  chlorhexidine 4% Liquid 1 Application(s) Topical once  dextrose 50% Injectable 12.5 Gram(s) IV Push once  dextrose 50% Injectable 25 Gram(s) IV Push once  dextrose 50% Injectable 25 Gram(s) IV Push once  diatrizoate meglumine/diatrizoate sodium. 30 milliLiter(s) Oral once  enalaprilat Injectable 2.5 milliGRAM(s) IV Push every 6 hours  enoxaparin Injectable 81 milliGRAM(s) SubCutaneous every 12 hours  fidaxomicin 200 milliGRAM(s) Oral two times a day  glucagon  Injectable 1 milliGRAM(s) IntraMuscular once PRN  HYDROmorphone  Injectable 0.25 milliGRAM(s) IV Push every 4 hours PRN  HYDROmorphone  Injectable 0.5 milliGRAM(s) IV Push every 4 hours PRN  insulin regular  human corrective regimen sliding scale   IV Push every 4 hours  labetalol Injectable 20 milliGRAM(s) IV Push every 6 hours  lactated ringers. 1000 milliLiter(s) IV Continuous <Continuous>  latanoprost 0.005% Ophthalmic Solution 1 Drop(s) Both EYES at bedtime  ondansetron Injectable 4 milliGRAM(s) IV Push every 6 hours PRN  pantoprazole  Injectable 40 milliGRAM(s) IV Push daily  timolol 0.5% Solution 1 Drop(s) Both EYES every 12 hours      ANTIBIOTICS:  cefTRIAXone   IVPB      cefTRIAXone   IVPB 1000 milliGRAM(s) IV Intermittent every 24 hours  fidaxomicin 200 milliGRAM(s) Oral two times a day

## 2020-09-29 NOTE — SBIRT NOTE ADULT - NSSBIRTUNABLESCROTHER_GEN_A_CORE
Pt refusing to answer questions as pt states he does not have any history of ETOH or substance use bedsides any prescribed medications in which he reports he does not abuse or take inappropriately.

## 2020-09-29 NOTE — PROGRESS NOTE ADULT - SUBJECTIVE AND OBJECTIVE BOX
PRIYANK CRUZROSA ISELAROSISUZI  [id number]  69y Male    Indication for ICU admission: SBO     Admit Date:09-28-20  ICU Date: 9/28/2020   OR Date:    No Known Allergies    PAST MEDICAL & SURGICAL HISTORY:  Afib (rate controlled)   Hematuria  Bladder tumor  History of diabetic neuropathy  CAD (coronary artery disease) of bypass graft  cabg 1995 3v  Glaucoma  Arhritis  GERD (gastroesophageal reflux disease)  HTN (hypertension)  Depression  DM (diabetes mellitus)  S/P lumbar laminectomy  with repair of CSF leak using dural graft 7/8/2018 - dr Crenshaw  Malfunction of intrathecal infusion pump  implanted initially in 1996  reimplanted 2006  removed 6/27/2018  Foot amputation status, right  partial foot amputation  tarsals and metatarsals of right foot  S/P laparoscopic cholecystectomy  CAD (coronary artery disease) of bypass graft      Home Medications:  Actamin 325 mg oral tablet: 2 tab(s) orally every 6 hours, As needed, Mild Pain (1 - 3) (20 Sep 2020 13:38)  apixaban 5 mg oral tablet: 1 tab(s) orally every 12 hours (27 Aug 2020 09:49)  aspirin 81 mg oral delayed release tablet: 1 tab(s) orally once a day (27 Aug 2020 09:49)  Basaglar KwikPen: 40 unit(s) subcutaneous once a day (at bedtime) (27 Aug 2020 09:49)  buPROPion 150 mg/24 hours (XL) oral tablet, extended release: 1 tab(s) orally once a day (27 Aug 2020 09:49)  Combigan 0.2%-0.5% ophthalmic solution: 1 drop(s) to each affected eye every 12 hours (27 Aug 2020 09:49)  latanoprost 0.005% ophthalmic solution: 1 drop(s) to each affected eye once a day (in the evening) (27 Aug 2020 09:49)  lisinopril 20 mg oral tablet: 1 tab(s) orally once a day (27 Aug 2020 09:49)  melatonin 5 mg oral tablet: 1 tab(s) orally once a day (at bedtime), As needed, Insomnia (27 Aug 2020 09:49)  mirtazapine 7.5 mg oral tablet: 1 tab(s) orally once a day (at bedtime) (20 Sep 2020 13:38)  NIFEdipine 60 mg oral tablet, extended release: 1 tab(s) orally once a day (27 Aug 2020 09:49)  pantoprazole 20 mg oral delayed release tablet: 1 tab(s) orally once a day (27 Aug 2020 09:49)  sertraline 100 mg oral tablet: 1 tab(s) orally once a day (27 Aug 2020 09:49)  timolol maleate 0.5% ophthalmic solution: 1 drop(s) to each affected eye every 12 hours (27 Aug 2020 09:49)  Vitamin D3 2000 intl units oral tablet: 1 tab(s) orally once a day (27 Aug 2020 09:49)        24HRS EVENT:    Overnight: COVID negative. NGT output 500cc. Repleted sodium phos. Hypertensive. Extra Labetalol 10 push x1. Restarted Enalapril 1.25 Q 6.     NEURO:    Pain control with Tylenol and Dilaudid PRN     Dx of Depression. Holding PO buproprion, Sertraline, and Tramadol.     Monitor for changes in mental status.     RESP:     Oxygen insufficiency-wean off NC to RA as tolerate    Maintain O2 saturation >95%     AM CXR       CARDS:    Hx of Afib, rate controlled. Hx of HTN.    Holding PO labetalol, lisinopril and nifedipine.    Continue IV labetalol 10 q6 and enalapril 1.25 Q 6 while strict NPO.    Maintain normotension. MAP >65    EKG Afib   CE neg x3    GI/NUTR:     Diet, NPO    GI Prophylaxis-PPI     NGT to [facility]    KUB 9/28--multiple gas filled dilated SB up to 3.5cm w colonic gas -> ileus     /RENAL:     LR @ 120cc/hr     Lactate cleared 1.4     No obone, voiding     BUN/Cr 25/0.9     Na 137/ K 4.2/ Mg 2.2/ Phos 2.6     HEME/ONC:     DVT prophylaxis- Lovenox 81 q12    Trend Hgb 10     Holding Eliquis and ASA     ID:   Afebrile    WBC downtrending 16    UA + Leukocytes. Started on Rocephin x3 days    As per ID, can restart home Fidaxomicin 200 BID (non formulary sent to pharmacy)    Pending Urine Culture    Blood Culture from 9/27 NGTD       ENDO:    Hx of DM. Holding KwikPen, FS Q 4 w/ ISS     DVT PTX: enoxaparin Injectable 81 milliGRAM(s) SubCutaneous every 12 hours      GI PTX: PPI     ***Tubes/Lines/Drains  ***  Peripheral IV    REVIEW OF SYSTEMS    [x] A ten-point review of systems was otherwise negative except as noted.  [ ] Due to altered mental status/intubation, subjective information were not able to be obtained from the patient. History was obtained, to the extent possible, from review of the chart and collateral sources of information.                     PRIYANK CRUZROSA ISELAROSISUZI  [id number]  69y Male    Indication for ICU admission: SBO     Admit Date:09-28-20  ICU Date: 9/28/2020   OR Date:    No Known Allergies    PAST MEDICAL & SURGICAL HISTORY:  Afib (rate controlled)   Hematuria  Bladder tumor  History of diabetic neuropathy  CAD (coronary artery disease) of bypass graft  cabg 1995 3v  Glaucoma  Arhritis  GERD (gastroesophageal reflux disease)  HTN (hypertension)  Depression  DM (diabetes mellitus)  S/P lumbar laminectomy  with repair of CSF leak using dural graft 7/8/2018 - dr Crenshaw  Malfunction of intrathecal infusion pump  implanted initially in 1996  reimplanted 2006  removed 6/27/2018  Foot amputation status, right  partial foot amputation  tarsals and metatarsals of right foot  S/P laparoscopic cholecystectomy  CAD (coronary artery disease) of bypass graft      Home Medications:  Actamin 325 mg oral tablet: 2 tab(s) orally every 6 hours, As needed, Mild Pain (1 - 3) (20 Sep 2020 13:38)  apixaban 5 mg oral tablet: 1 tab(s) orally every 12 hours (27 Aug 2020 09:49)  aspirin 81 mg oral delayed release tablet: 1 tab(s) orally once a day (27 Aug 2020 09:49)  Basaglar KwikPen: 40 unit(s) subcutaneous once a day (at bedtime) (27 Aug 2020 09:49)  buPROPion 150 mg/24 hours (XL) oral tablet, extended release: 1 tab(s) orally once a day (27 Aug 2020 09:49)  Combigan 0.2%-0.5% ophthalmic solution: 1 drop(s) to each affected eye every 12 hours (27 Aug 2020 09:49)  latanoprost 0.005% ophthalmic solution: 1 drop(s) to each affected eye once a day (in the evening) (27 Aug 2020 09:49)  lisinopril 20 mg oral tablet: 1 tab(s) orally once a day (27 Aug 2020 09:49)  melatonin 5 mg oral tablet: 1 tab(s) orally once a day (at bedtime), As needed, Insomnia (27 Aug 2020 09:49)  mirtazapine 7.5 mg oral tablet: 1 tab(s) orally once a day (at bedtime) (20 Sep 2020 13:38)  NIFEdipine 60 mg oral tablet, extended release: 1 tab(s) orally once a day (27 Aug 2020 09:49)  pantoprazole 20 mg oral delayed release tablet: 1 tab(s) orally once a day (27 Aug 2020 09:49)  sertraline 100 mg oral tablet: 1 tab(s) orally once a day (27 Aug 2020 09:49)  timolol maleate 0.5% ophthalmic solution: 1 drop(s) to each affected eye every 12 hours (27 Aug 2020 09:49)  Vitamin D3 2000 intl units oral tablet: 1 tab(s) orally once a day (27 Aug 2020 09:49)        24HRS EVENT:    Overnight: COVID negative. NGT output 500cc. Repleted sodium phos. Hypertensive. Extra Labetalol 10 push x1. Restarted Enalapril 1.25 Q 6.     NEURO:    Pain control with Tylenol and Dilaudid PRN     Dx of Depression. Holding PO buproprion, Sertraline, and Tramadol.     Monitor for changes in mental status.     RESP:     Oxygen insufficiency-wean off NC to RA as tolerate    Maintain O2 saturation >95%     AM CXR       CARDS:    Hx of Afib, rate controlled. Hx of HTN.    Holding PO labetalol, lisinopril and nifedipine.    Continue IV labetalol 10 q6 and enalapril 1.25 Q 6 while strict NPO.    Maintain normotension. MAP >65    EKG Afib   CE neg x3    GI/NUTR:     Diet, NPO    GI Prophylaxis-PPI     NGT to [facility] [place]    KUB 9/28--multiple gas filled dilated SB up to 3.5cm w colonic gas -> ileus     /RENAL:     LR @ 120cc/hr     Lactate cleared 1.4     No boone, voiding     BUN/Cr 25/0.9     Na 137/ K 4.2/ Mg 2.2/ Phos 2.6     HEME/ONC:     DVT prophylaxis- Lovenox 81 q12    Trend Hgb 10     Holding Eliquis and ASA     ID:   Afebrile    WBC downtrending 16    UA + Leukocytes. Started on Rocephin x3 days    As per ID, can restart home Fidaxomicin 200 BID (non formulary sent to pharmacy)    Pending Urine Culture    Blood Culture from 9/27 NGTD       ENDO:    Hx of DM. Holding KwikPen, FS Q 4 w/ ISS     DVT PTX: enoxaparin Injectable 81 milliGRAM(s) SubCutaneous every 12 hours      GI PTX: PPI     ***Tubes/Lines/Drains  ***  Peripheral IV    REVIEW OF SYSTEMS    [x] A ten-point review of systems was otherwise negative except as noted.  [ ] Due to altered mental status/intubation, subjective information were not able to be obtained from the patient. History was obtained, to the extent possible, from review of the chart and collateral sources of information.    Daily     Daily     Diet, NPO (09-28-20 @ 03:27)      CURRENT MEDS:  Neurologic Medications  acetaminophen  IVPB .. 1000 milliGRAM(s) IV Intermittent once PRN Mild Pain (1 - 3)  HYDROmorphone  Injectable 0.25 milliGRAM(s) IV Push every 4 hours PRN Moderate Pain (4 - 6)  HYDROmorphone  Injectable 0.5 milliGRAM(s) IV Push every 4 hours PRN Severe Pain (7 - 10)  ondansetron Injectable 4 milliGRAM(s) IV Push every 6 hours PRN Nausea and/or Vomiting    Respiratory Medications    Cardiovascular Medications  enalaprilat Injectable 1.25 milliGRAM(s) IV Push every 6 hours  labetalol Injectable 10 milliGRAM(s) IV Push every 6 hours    Gastrointestinal Medications  lactated ringers. 1000 milliLiter(s) IV Continuous <Continuous>  pantoprazole  Injectable 40 milliGRAM(s) IV Push daily    Genitourinary Medications    Hematologic/Oncologic Medications  enoxaparin Injectable 81 milliGRAM(s) SubCutaneous every 12 hours    Antimicrobial/Immunologic Medications  cefTRIAXone   IVPB      cefTRIAXone   IVPB 1000 milliGRAM(s) IV Intermittent every 24 hours  fidaxomicin 200 milliGRAM(s) Oral two times a day    Endocrine/Metabolic Medications  dextrose 50% Injectable 12.5 Gram(s) IV Push once  dextrose 50% Injectable 25 Gram(s) IV Push once  dextrose 50% Injectable 25 Gram(s) IV Push once  glucagon  Injectable 1 milliGRAM(s) IntraMuscular once PRN Glucose LESS THAN 70 milligrams/deciliter  insulin regular  human corrective regimen sliding scale   IV Push every 4 hours    Topical/Other Medications  benzocaine 20% Spray 1 Spray(s) Topical every 4 hours PRN NGT discomfort  brimonidine 0.2% Ophthalmic Solution 1 Drop(s) Both EYES every 12 hours  chlorhexidine 4% Liquid 1 Application(s) Topical once  latanoprost 0.005% Ophthalmic Solution 1 Drop(s) Both EYES at bedtime  timolol 0.5% Solution 1 Drop(s) Both EYES every 12 hours      ICU Vital Signs Last 24 Hrs  T(C): 36.7 (29 Sep 2020 07:59), Max: 37 (28 Sep 2020 15:59)  T(F): 98 (29 Sep 2020 07:59), Max: 98.6 (28 Sep 2020 15:59)  HR: 89 (29 Sep 2020 07:59) (77 - 100)  BP: 180/87 (29 Sep 2020 07:59) (140/75 - 213/92)  BP(mean): 103 (28 Sep 2020 11:00) (103 - 123)  ABP: --  ABP(mean): --  RR: 17 (29 Sep 2020 07:59) (17 - 18)  SpO2: 98% (29 Sep 2020 07:59) (94% - 98%)        I&O's Summary    28 Sep 2020 07:01  -  29 Sep 2020 07:00  --------------------------------------------------------  IN: 0 mL / OUT: 1273 mL / NET: -1273 mL      I&O's Detail    28 Sep 2020 07:01  -  29 Sep 2020 07:00  --------------------------------------------------------  IN:  Total IN: 0 mL    OUT:    Nasogastric/Oral tube (mL): 425 mL    Voided (mL): 848 mL  Total OUT: 1273 mL    Total NET: -1273 mL          PHYSICAL EXAM:    General/Neuro     Deficits:                             alert & oriented x 3, no focal deficits  Pupils:    Lungs:      clear to auscultation, Normal expansion/effort.     Cardiovascular : S1, S2.  Regular rate and rhythm.  No Peripheral edema   Cardiac Rhythm: Normal Sinus Rhythm    GI: Abdomen soft, mildly tender, Non-distended.  +BS  Nasogastric tube in place.      Extremities: Extremities warm, pink, well-perfused. R foot partial amputation    Derm: Good skin turgor, no skin breakdown.      :       Boone catheter in place.      CXR:     LABS:  CAPILLARY BLOOD GLUCOSE      POCT Blood Glucose.: 138 mg/dL (29 Sep 2020 07:47)  POCT Blood Glucose.: 108 mg/dL (29 Sep 2020 04:02)  POCT Blood Glucose.: 84 mg/dL (28 Sep 2020 23:53)  POCT Blood Glucose.: 85 mg/dL (28 Sep 2020 20:54)  POCT Blood Glucose.: 86 mg/dL (28 Sep 2020 15:53)  POCT Blood Glucose.: 106 mg/dL (28 Sep 2020 11:11)                          10.6   16.98 )-----------( 333      ( 29 Sep 2020 01:26 )             32.9       09-29    137  |  98  |  25<H>  ----------------------------<  90  4.2   |  30  |  0.9    Ca    8.9      29 Sep 2020 01:26  Phos  2.6     09-29  Mg     2.2     09-29    TPro  8.4<H>  /  Alb  4.4  /  TBili  1.6<H>  /  DBili  x   /  AST  20  /  ALT  24  /  AlkPhos  112  09-27        CARDIAC MARKERS ( 28 Sep 2020 17:39 )  x     / <0.01 ng/mL / 276 U/L / x     / 2.0 ng/mL  CARDIAC MARKERS ( 28 Sep 2020 11:02 )  x     / <0.01 ng/mL / 262 U/L / x     / 2.7 ng/mL  CARDIAC MARKERS ( 28 Sep 2020 05:50 )  x     / x     / 341 U/L / x     / x      CARDIAC MARKERS ( 28 Sep 2020 05:40 )  x     / <0.01 ng/mL / x     / x     / 2.9 ng/mL      Urinalysis Basic - ( 27 Sep 2020 22:50 )    Color: Yellow / Appearance: Clear / SG: >1.050 / pH: x  Gluc: x / Ketone: Trace  / Bili: Small / Urobili: <2 mg/dL   Blood: x / Protein: 30 mg/dL / Nitrite: Negative   Leuk Esterase: Large / RBC: 3 /HPF /  /HPF   Sq Epi: x / Non Sq Epi: 0 /HPF / Bacteria: Many        Culture - Blood (collected 27 Sep 2020 20:42)  Source: .Blood Blood  Preliminary Report (29 Sep 2020 01:02):    No growth to date.    Culture - Blood (collected 27 Sep 2020 20:42)  Source: .Blood Blood  Preliminary Report (29 Sep 2020 01:02):    No growth to date.

## 2020-09-29 NOTE — PHARMACOTHERAPY INTERVENTION NOTE - COMMENTS
Spoke to covering pa, wants labetalol standing aware medication is short acting but still wants to give standing around the clock

## 2020-09-29 NOTE — PROGRESS NOTE ADULT - SUBJECTIVE AND OBJECTIVE BOX
Progress Note: General Surgery  Patient: PRIYANK WESLEY , 69y (1950)Male   MRN: [id number]  Location: [address]  Visit: 09-28-20 Inpatient  Date: 09-29-20 @ 11:15    Admit Diagnosis/Chief Complaint: SBO    Procedure/Diagnosis:  SBO    Events/ 24h: Overnight: COVID negative. NGT output 500cc. Repleted sodium phos. Hypertensive. Extra Labetalol 10 push x1. Restarted Enalapril 1.25 Q 6. . patient does report one small bowel movement.     Vitals: T(F): 98 (09-29-20 @ 07:59), Max: 98.6 (09-28-20 @ 15:59)  HR: 89 (09-29-20 @ 07:59)  BP: 180/87 (09-29-20 @ 07:59) (143/69 - 213/92)  RR: 17 (09-29-20 @ 07:59)  SpO2: 98% (09-29-20 @ 07:59)    In:   09-28-20 @ 07:01  -  09-29-20 @ 07:00  --------------------------------------------------------  IN: 0 mL      Out:   09-28-20 @ 07:01  -  09-29-20 @ 07:00  --------------------------------------------------------  OUT:    Nasogastric/Oral tube (mL): 425 mL    Voided (mL): 848 mL  Total OUT: 1273 mL        Net:   09-28-20 @ 07:01  -  09-29-20 @ 07:00  --------------------------------------------------------  NET: -1273 mL        Diet: Diet, NPO (09-28-20 @ 03:27)    IV Fluids: lactated ringers. 1000 milliLiter(s) (50 mL/Hr) IV Continuous <Continuous>      Physical Examination:  General Appearance: NAD  HEENT: EOMI, sclera non-icteric.  Heart: RRR   Lungs: CTABL.   Abdomen:  Soft, mildly tender R side, nondistended. NGT in place  MSK/Extremities: Warm & well-perfused.   Skin: Warm, dry. No jaundice.       Medications: [Standing]  brimonidine 0.2% Ophthalmic Solution 1 Drop(s) Both EYES every 12 hours  cefTRIAXone   IVPB      cefTRIAXone   IVPB 1000 milliGRAM(s) IV Intermittent every 24 hours  chlorhexidine 4% Liquid 1 Application(s) Topical once  dextrose 50% Injectable 12.5 Gram(s) IV Push once  dextrose 50% Injectable 25 Gram(s) IV Push once  dextrose 50% Injectable 25 Gram(s) IV Push once  diatrizoate meglumine/diatrizoate sodium. 30 milliLiter(s) Oral once  enalaprilat Injectable 2.5 milliGRAM(s) IV Push every 6 hours  enoxaparin Injectable 81 milliGRAM(s) SubCutaneous every 12 hours  fidaxomicin 200 milliGRAM(s) Oral two times a day  insulin regular  human corrective regimen sliding scale   IV Push every 4 hours  labetalol Injectable 20 milliGRAM(s) IV Push every 6 hours  lactated ringers. 1000 milliLiter(s) (50 mL/Hr) IV Continuous <Continuous>  latanoprost 0.005% Ophthalmic Solution 1 Drop(s) Both EYES at bedtime  pantoprazole  Injectable 40 milliGRAM(s) IV Push daily  timolol 0.5% Solution 1 Drop(s) Both EYES every 12 hours    DVT Prophylaxis: enoxaparin Injectable 81 milliGRAM(s) SubCutaneous every 12 hours    GI Prophylaxis: pantoprazole  Injectable 40 milliGRAM(s) IV Push daily    Antibiotics: cefTRIAXone   IVPB      cefTRIAXone   IVPB 1000 milliGRAM(s) IV Intermittent every 24 hours  fidaxomicin 200 milliGRAM(s) Oral two times a day    Anticoagulation:   Medications:[PRN]  acetaminophen  IVPB .. 1000 milliGRAM(s) IV Intermittent once PRN  benzocaine 20% Spray 1 Spray(s) Topical every 4 hours PRN  glucagon  Injectable 1 milliGRAM(s) IntraMuscular once PRN  HYDROmorphone  Injectable 0.25 milliGRAM(s) IV Push every 4 hours PRN  HYDROmorphone  Injectable 0.5 milliGRAM(s) IV Push every 4 hours PRN  ondansetron Injectable 4 milliGRAM(s) IV Push every 6 hours PRN      Labs:                        10.6   16.98 )-----------( 333      ( 29 Sep 2020 01:26 )             32.9     09-29    137  |  98  |  25<H>  ----------------------------<  90  4.2   |  30  |  0.9    Ca    8.9      29 Sep 2020 01:26  Phos  2.6     09-29  Mg     2.2     09-29    TPro  8.4<H>  /  Alb  4.4  /  TBili  1.6<H>  /  DBili  x   /  AST  20  /  ALT  24  /  AlkPhos  112  09-27    LIVER FUNCTIONS - ( 27 Sep 2020 13:14 )  Alb: 4.4 g/dL / Pro: 8.4 g/dL / ALK PHOS: 112 U/L / ALT: 24 U/L / AST: 20 U/L / GGT: x               CARDIAC MARKERS ( 28 Sep 2020 17:39 )  x     / <0.01 ng/mL / 276 U/L / x     / 2.0 ng/mL  CARDIAC MARKERS ( 28 Sep 2020 11:02 )  x     / <0.01 ng/mL / 262 U/L / x     / 2.7 ng/mL  CARDIAC MARKERS ( 28 Sep 2020 05:50 )  x     / x     / 341 U/L / x     / x      CARDIAC MARKERS ( 28 Sep 2020 05:40 )  x     / <0.01 ng/mL / x     / x     / 2.9 ng/mL        Urine/Micro:    Culture - Urine (collected 28 Sep 2020 00:45)  Source: .Urine Clean Catch (Midstream)  Preliminary Report (29 Sep 2020 10:42):    10,000 - 49,000 CFU/mL Gram Negative Rods    Culture - Blood (collected 27 Sep 2020 20:42)  Source: .Blood Blood  Preliminary Report (29 Sep 2020 01:02):    No growth to date.    Culture - Blood (collected 27 Sep 2020 20:42)  Source: .Blood Blood  Preliminary Report (29 Sep 2020 01:02):    No growth to date.      Urinalysis Basic - ( 27 Sep 2020 22:50 )    Color: Yellow / Appearance: Clear / SG: >1.050 / pH: x  Gluc: x / Ketone: Trace  / Bili: Small / Urobili: <2 mg/dL   Blood: x / Protein: 30 mg/dL / Nitrite: Negative   Leuk Esterase: Large / RBC: 3 /HPF /  /HPF   Sq Epi: x / Non Sq Epi: 0 /HPF / Bacteria: Many      Assessment:  69y Male patient admitted S/P SBO  Patient seen and examined at bedside. NAD.     Plan:  _ Gastrograffin challenge with obstructive series today  - Monitor vitals  - Monitor labs and replete as necessary  - Monitor for bowel function  - Continue Pain Medications if necessary  - Continue Antibiotics for Cdif  - Follow results of new cdif  - Encourage ambulation as tolerated  - Monitor urine output  - DVT and GI Prophylaxis  - Follow PT/Physiatry if necessary        Date/Time: 09-29-20 @ 11:15

## 2020-09-29 NOTE — PROGRESS NOTE ADULT - ASSESSMENT
Alis:    Pain control with Tylenol and Dilaudid PRN     Dx of Depression. Holding PO buproprion, Sertraline, and Tramadol.     Monitor for changes in mental status.     RESP:     Sating well on RA    Maintain O2 saturation >95%     AM CXR       CARDS:    Hx of Afib, rate controlled (On Eliquis at home). Hx of HTN.    Holding PO labetalol, lisinopril and nifedipine.    Continue IV labetalol and enalapril Q 6 while strict NPO.    Maintain normotension. MAP >65    EKG Afib   CE neg x3    GI/NUTR:     Diet, NPO    GI Prophylaxis-PPI     NGT to LCWS. Monitor output     Recently treated for C. diff    /RENAL:     LR @ 120cc/hr     Lactate cleared 1.4     No boone, voiding     BUN/Cr, trend     Monitor lytes, replete as needed       HEME/ONC:     DVT prophylaxis- Lovenox therapeutic at 81 q 12    Trend H/H    Holding Eliquis and ASA     ID:   Afebrile    WBC downtrending    UA + Leukocytes. Started on Rocephin x3 days    Pending Urine Culture    Blood culture 9/27, NGTD    Cdiff pending. ID following, resume PO FIdoxamicin       ENDO:    Hx of DM. Holding KwikPen, FS Q 4 w/ ISS     Last POCT 101       LINES/DRAINS:  PIV    DISPO:   CEU     D/W Dr Sanders       NEURO:    Pain control with Dilaudid PRN     Dx of Depression. Holding PO buproprion, Sertraline, and Tramadol.     Monitor for changes in mental status.     RESP:     Sating well on RA    Maintain O2 saturation >95%     AM CXR       CARDS:    Hx of Afib, rate controlled (On Eliquis at home). Hx of HTN.    Holding PO labetalol, lisinopril and nifedipine.    Continue IV labetalol and enalapril Q 6 while strict NPO, required pushed of Labetalol 10mg x2, will increase enalapril IV    Maintain normotension. MAP >65    EKG Afib   CE neg x3    GI/NUTR:     Diet, NPO    GI Prophylaxis-PPI     NGT to LCWS. Monitor output     Recently treated for C. diff, repeat CDIFF 9/27- neg     /RENAL:     LR @ 120cc/hr     Lactate cleared 1.4     No boone, voiding     BUN/Cr, trend     Monitor lytes, replete as needed       HEME/ONC:     DVT prophylaxis- Lovenox therapeutic at 81 q 12    Trend H/H    Holding Eliquis and ASA     ID:   Afebrile    WBC downtrending    UA + Leukocytes. Started on Rocephin x3 days    Pending Urine Culture    Blood culture 9/27, NGTD    9/27 Cdiff neg. ID following, resume PO FIdoxamicin       ENDO:    Hx of DM. Holding KwikPen, FS Q 4 w/ ISS     Last POCT 101       LINES/DRAINS:  PIV    DISPO:   CEU     D/W Dr Sanders

## 2020-09-30 NOTE — PROGRESS NOTE ADULT - SUBJECTIVE AND OBJECTIVE BOX
PRIYANK CRUZROSA ISELAROSISUZI  [id number]  69y Male    Indication for ICU admission: SBO     Admit Date:09-28-20  ICU Date: 9/28/2020   OR Date:    No Known Allergies    PAST MEDICAL & SURGICAL HISTORY:  Afib (rate controlled)   Hematuria  Bladder tumor  History of diabetic neuropathy  CAD (coronary artery disease) of bypass graft  cabg 1995 3v  Glaucoma  Arhritis  GERD (gastroesophageal reflux disease)  HTN (hypertension)  Depression  DM (diabetes mellitus)  S/P lumbar laminectomy  with repair of CSF leak using dural graft 7/8/2018 - dr Crenshaw  Malfunction of intrathecal infusion pump  implanted initially in 1996  reimplanted 2006  removed 6/27/2018  Foot amputation status, right  partial foot amputation  tarsals and metatarsals of right foot  S/P laparoscopic cholecystectomy  CAD (coronary artery disease) of bypass graft    Home Medications:  Actamin 325 mg oral tablet: 2 tab(s) orally every 6 hours, As needed, Mild Pain (1 - 3) (20 Sep 2020 13:38)  apixaban 5 mg oral tablet: 1 tab(s) orally every 12 hours (27 Aug 2020 09:49)  aspirin 81 mg oral delayed release tablet: 1 tab(s) orally once a day (27 Aug 2020 09:49)  Basaglar KwikPen: 40 unit(s) subcutaneous once a day (at bedtime) (27 Aug 2020 09:49)  buPROPion 150 mg/24 hours (XL) oral tablet, extended release: 1 tab(s) orally once a day (27 Aug 2020 09:49)  Combigan 0.2%-0.5% ophthalmic solution: 1 drop(s) to each affected eye every 12 hours (27 Aug 2020 09:49)  latanoprost 0.005% ophthalmic solution: 1 drop(s) to each affected eye once a day (in the evening) (27 Aug 2020 09:49)  lisinopril 20 mg oral tablet: 1 tab(s) orally once a day (27 Aug 2020 09:49)  melatonin 5 mg oral tablet: 1 tab(s) orally once a day (at bedtime), As needed, Insomnia (27 Aug 2020 09:49)  mirtazapine 7.5 mg oral tablet: 1 tab(s) orally once a day (at bedtime) (20 Sep 2020 13:38)  NIFEdipine 60 mg oral tablet, extended release: 1 tab(s) orally once a day (27 Aug 2020 09:49)  pantoprazole 20 mg oral delayed release tablet: 1 tab(s) orally once a day (27 Aug 2020 09:49)  sertraline 100 mg oral tablet: 1 tab(s) orally once a day (27 Aug 2020 09:49)  timolol maleate 0.5% ophthalmic solution: 1 drop(s) to each affected eye every 12 hours (27 Aug 2020 09:49)  Vitamin D3 2000 intl units oral tablet: 1 tab(s) orally once a day (27 Aug 2020 09:49)    24HRS EVENT: Briefly started on cleveprex drip for HTN d/c'd since midnight, PO labetolol 100 STAT, standing dose increased to 250 q12    NEURO:    Pain control with Dilaudid PRN     Dx of Depression. Holding PO buproprion, Sertraline, and Tramadol.     Monitor for changes in mental status.     RESP:     Oxygen insufficiency-wean off NC to RA as tolerate    Maintain O2 saturation >95%     AM CXR       CARDS:    Hx of Afib, rate controlled. Hx of HTN.    restarted home  labetalol, enalapril (do not have benazapril) and nifedipine.    Maintain normotension. MAP >65    EKG Afib   CE neg x3    GI/NUTR:     Diet, NPO    GI Prophylaxis-PPI     passing small gas     KUB 9/28--multiple gas filled dilated SB up to 3.5cm w colonic gas -> ileus     -gastrograffin study w/ obstructive series - neg, NGT d/c'd     /RENAL:     LR @ 50    Lactate cleared 1.4     No boone, voiding     BUN/Cr 25/0.9     HEME/ONC:     DVT prophylaxis- Lovenox 81 q12    Trend Hgb 10     Holding Eliquis and ASA     ID:   Afebrile    WBC downtrending 16    UA + Leukocytes. Started on Rocephin x3 days    As per ID, can restart home Fidaxomicin 200 BID (non formulary sent to pharmacy)    Ucx- klebsiella    9/29 Cdiff- neg    Blood Culture from 9/27 NGTD       ENDO:    Hx of DM. Holding KwikPen, FS Q 4 w/ ISS     DVT PTX: enoxaparin Injectable 81 milliGRAM(s) SubCutaneous every 12 hours      GI PTX: PPI     ***Tubes/Lines/Drains  ***  Peripheral IV    REVIEW OF SYSTEMS    [x] A ten-point review of systems was otherwise negative except as noted.  [ ] Due to altered mental status/intubation, subjective information were not able to be obtained from the patient. History was obtained, to the extent possible, from review of the chart and collateral sources of information.   PRIYANK CRUZROSA ISELAROSISUZI  [id number]  69y Male    Indication for ICU admission: SBO     Admit Date:09-28-20  ICU Date: 9/28/2020   OR Date:    No Known Allergies    PAST MEDICAL & SURGICAL HISTORY:  Afib (rate controlled)   Hematuria  Bladder tumor  History of diabetic neuropathy  CAD (coronary artery disease) of bypass graft  cabg 1995 3v  Glaucoma  Arhritis  GERD (gastroesophageal reflux disease)  HTN (hypertension)  Depression  DM (diabetes mellitus)  S/P lumbar laminectomy  with repair of CSF leak using dural graft 7/8/2018 - dr Crenshaw  Malfunction of intrathecal infusion pump  implanted initially in 1996  reimplanted 2006  removed 6/27/2018  Foot amputation status, right  partial foot amputation  tarsals and metatarsals of right foot  S/P laparoscopic cholecystectomy  CAD (coronary artery disease) of bypass graft    Home Medications:  Actamin 325 mg oral tablet: 2 tab(s) orally every 6 hours, As needed, Mild Pain (1 - 3) (20 Sep 2020 13:38)  apixaban 5 mg oral tablet: 1 tab(s) orally every 12 hours (27 Aug 2020 09:49)  aspirin 81 mg oral delayed release tablet: 1 tab(s) orally once a day (27 Aug 2020 09:49)  Basaglar KwikPen: 40 unit(s) subcutaneous once a day (at bedtime) (27 Aug 2020 09:49)  buPROPion 150 mg/24 hours (XL) oral tablet, extended release: 1 tab(s) orally once a day (27 Aug 2020 09:49)  Combigan 0.2%-0.5% ophthalmic solution: 1 drop(s) to each affected eye every 12 hours (27 Aug 2020 09:49)  latanoprost 0.005% ophthalmic solution: 1 drop(s) to each affected eye once a day (in the evening) (27 Aug 2020 09:49)  lisinopril 20 mg oral tablet: 1 tab(s) orally once a day (27 Aug 2020 09:49)  melatonin 5 mg oral tablet: 1 tab(s) orally once a day (at bedtime), As needed, Insomnia (27 Aug 2020 09:49)  mirtazapine 7.5 mg oral tablet: 1 tab(s) orally once a day (at bedtime) (20 Sep 2020 13:38)  NIFEdipine 60 mg oral tablet, extended release: 1 tab(s) orally once a day (27 Aug 2020 09:49)  pantoprazole 20 mg oral delayed release tablet: 1 tab(s) orally once a day (27 Aug 2020 09:49)  sertraline 100 mg oral tablet: 1 tab(s) orally once a day (27 Aug 2020 09:49)  timolol maleate 0.5% ophthalmic solution: 1 drop(s) to each affected eye every 12 hours (27 Aug 2020 09:49)  Vitamin D3 2000 intl units oral tablet: 1 tab(s) orally once a day (27 Aug 2020 09:49)    24HRS EVENT: Briefly started on cleveprex drip for HTN d/c'd since midnight, PO labetolol 100 STAT, standing dose increased to 250 q12    NEURO:    Pain control with Dilaudid PRN     Dx of Depression. Holding PO buproprion, Sertraline, and Tramadol.     Monitor for changes in mental status.     RESP:     Oxygen insufficiency-wean off NC to RA as tolerate    Maintain O2 saturation >95%     AM CXR       CARDS:    Hx of Afib, rate controlled. Hx of HTN.    restarted home  labetalol, enalapril (do not have benazapril) and nifedipine.    Maintain normotension. MAP >65    EKG Afib   CE neg x3    GI/NUTR:     Diet, NPO    GI Prophylaxis-PPI     passing small gas     KUB 9/28--multiple gas filled dilated SB up to 3.5cm w colonic gas -> ileus     -gastrograffin study w/ obstructive series - neg, NGT d/c'd     /RENAL:     LR @ 50    Lactate cleared 1.4     No boone, voiding     BUN/Cr 25/0.9     HEME/ONC:     DVT prophylaxis- Lovenox 81 q12    Trend Hgb 10     Holding Eliquis and ASA     ID:   Afebrile    WBC downtrending 16    UA + Leukocytes. Started on Rocephin x3 days    As per ID, can restart home Fidaxomicin 200 BID (non formulary sent to pharmacy)    Ucx- klebsiella    9/29 Cdiff- neg    Blood Culture from 9/27 NGTD       ENDO:    Hx of DM. Holding KwikPen, FS Q 4 w/ ISS     DVT PTX: enoxaparin Injectable 81 milliGRAM(s) SubCutaneous every 12 hours    GI PTX: PPI     ***Tubes/Lines/Drains  ***  Peripheral IV    REVIEW OF SYSTEMS    [x] A ten-point review of systems was otherwise negative except as noted.  [ ] Due to altered mental status/intubation, subjective information were not able to be obtained from the patient. History was obtained, to the extent possible, from review of the chart and collateral sources of information.    Daily       Diet, NPO:   Except Medications (09-29-20 @ 17:39)    CURRENT MEDS:  Neurologic Medications  acetaminophen   Tablet .. 650 milliGRAM(s) Oral every 6 hours  HYDROmorphone  Injectable 0.25 milliGRAM(s) IV Push every 4 hours PRN Moderate Pain (4 - 6)  HYDROmorphone  Injectable 0.5 milliGRAM(s) IV Push every 4 hours PRN Severe Pain (7 - 10)  ondansetron Injectable 4 milliGRAM(s) IV Push every 6 hours PRN Nausea and/or Vomiting    Respiratory Medications    Cardiovascular Medications  enalapril 20 milliGRAM(s) Oral daily  labetalol 250 milliGRAM(s) Oral every 12 hours  NIFEdipine XL 60 milliGRAM(s) Oral daily    Gastrointestinal Medications  lactated ringers. 1000 milliLiter(s) IV Continuous <Continuous>  pantoprazole    Tablet 40 milliGRAM(s) Oral before breakfast    Genitourinary Medications    Hematologic/Oncologic Medications  enoxaparin Injectable 81 milliGRAM(s) SubCutaneous every 12 hours    Antimicrobial/Immunologic Medications  cefTRIAXone   IVPB      cefTRIAXone   IVPB 1000 milliGRAM(s) IV Intermittent every 24 hours  vancomycin    Solution 125 milliGRAM(s) Oral every 6 hours    Endocrine/Metabolic Medications  dextrose 50% Injectable 25 Gram(s) IV Push once  glucagon  Injectable 1 milliGRAM(s) IntraMuscular once PRN Glucose LESS THAN 70 milligrams/deciliter  insulin regular  human corrective regimen sliding scale   IV Push every 4 hours    Topical/Other Medications  benzocaine 20% Spray 1 Spray(s) Topical every 4 hours PRN NGT discomfort  brimonidine 0.2% Ophthalmic Solution 1 Drop(s) Both EYES every 12 hours  chlorhexidine 4% Liquid 1 Application(s) Topical once  latanoprost 0.005% Ophthalmic Solution 1 Drop(s) Both EYES at bedtime  timolol 0.5% Solution 1 Drop(s) Both EYES every 12 hours    ICU Vital Signs Last 24 Hrs  T(C): 36.9 (30 Sep 2020 00:00), Max: 36.9 (30 Sep 2020 00:00)  T(F): 98.4 (30 Sep 2020 00:00), Max: 98.4 (30 Sep 2020 00:00)  HR: 71 (30 Sep 2020 01:30) (62 - 97)  BP: 152/68 (30 Sep 2020 01:30) (118/58 - 213/93)  BP(mean): 98 (30 Sep 2020 01:30) (84 - 133)  RR: 20 (30 Sep 2020 01:30) (17 - 20)  SpO2: 97% (30 Sep 2020 01:30) (89% - 100%)    I&O's Summary    28 Sep 2020 07:01  -  29 Sep 2020 07:00  --------------------------------------------------------  IN: 0 mL / OUT: 1273 mL / NET: -1273 mL    29 Sep 2020 07:01  -  30 Sep 2020 02:40  --------------------------------------------------------  IN: 608 mL / OUT: 650 mL / NET: -42 mL    I&O's Detail    28 Sep 2020 07:01  -  29 Sep 2020 07:00  --------------------------------------------------------  IN:  Total IN: 0 mL    OUT:    Nasogastric/Oral tube (mL): 425 mL    Voided (mL): 848 mL  Total OUT: 1273 mL  Total NET: -1273 mL    29 Sep 2020 07:01  -  30 Sep 2020 02:40  --------------------------------------------------------  IN:    Clevidipine: 58 mL    Lactated Ringers: 550 mL  Total IN: 608 mL    OUT:    Nasogastric/Oral tube (mL): 300 mL    Voided (mL): 350 mL  Total OUT: 650 mL  Total NET: -42 mL    PHYSICAL EXAM:    General/Neuro  A&Ox3, no focal deficits     Lungs:  clear to auscultation, Normal expansion/effort.     Cardiovascular : S1, S2.  Regular rate and rhythm.  Peripheral edema   Cardiac Rhythm: Normal Sinus Rhythm    GI: Abdomen soft, Non-tender, Non-distended.      Extremities: Extremities warm, pink, well-perfused. Pulses:Rt     Lt    Derm: Good skin turgor, no skin breakdown.      : Boone catheter in place.    LABS:  CAPILLARY BLOOD GLUCOSE    POCT Blood Glucose.: 127 mg/dL (30 Sep 2020 01:45)  POCT Blood Glucose.: 134 mg/dL (29 Sep 2020 22:04)  POCT Blood Glucose.: 108 mg/dL (29 Sep 2020 17:51)  POCT Blood Glucose.: 112 mg/dL (29 Sep 2020 13:07)  POCT Blood Glucose.: 138 mg/dL (29 Sep 2020 07:47)  POCT Blood Glucose.: 108 mg/dL (29 Sep 2020 04:02)                        10.5   15.31 )-----------( 325      ( 30 Sep 2020 00:14 )             34.2       09-30    139  |  99  |  20  ----------------------------<  124<H>  3.7   |  27  |  0.8    Ca    9.2      30 Sep 2020 00:14  Phos  3.0     09-30  Mg     2.0     09-30      PT/INR - ( 30 Sep 2020 00:14 )   PT: 20.00 sec;   INR: 1.74 ratio      PTT - ( 30 Sep 2020 00:14 )  PTT:33.9 sec  CARDIAC MARKERS ( 28 Sep 2020 17:39 )  x     / <0.01 ng/mL / 276 U/L / x     / 2.0 ng/mL  CARDIAC MARKERS ( 28 Sep 2020 11:02 )  x     / <0.01 ng/mL / 262 U/L / x     / 2.7 ng/mL  CARDIAC MARKERS ( 28 Sep 2020 05:50 )  x     / x     / 341 U/L / x     / x      CARDIAC MARKERS ( 28 Sep 2020 05:40 )  x     / <0.01 ng/mL / x     / x     / 2.9 ng/mL    Culture - Urine (collected 28 Sep 2020 00:45)  Source: .Urine Clean Catch (Midstream)  Preliminary Report (29 Sep 2020 15:37):    10,000 - 49,000 CFU/mL Klebsiella pneumoniae    Culture - Blood (collected 27 Sep 2020 20:42)  Source: .Blood Blood  Preliminary Report (29 Sep 2020 01:02):    No growth to date.    Culture - Blood (collected 27 Sep 2020 20:42)  Source: .Blood Blood  Preliminary Report (29 Sep 2020 01:02):    No growth to date.     PRIYANK CRUZROSA ISELAROSISUZI  [id number]  69y Male    Indication for ICU admission: SBO     Admit Date:09-28-20  ICU Date: 9/28/2020   OR Date:    No Known Allergies    PAST MEDICAL & SURGICAL HISTORY:  Afib (rate controlled)   Hematuria  Bladder tumor  History of diabetic neuropathy  CAD (coronary artery disease) of bypass graft  cabg 1995 3v  Glaucoma  Arhritis  GERD (gastroesophageal reflux disease)  HTN (hypertension)  Depression  DM (diabetes mellitus)  S/P lumbar laminectomy  with repair of CSF leak using dural graft 7/8/2018 - dr Crenshaw  Malfunction of intrathecal infusion pump  implanted initially in 1996  reimplanted 2006  removed 6/27/2018  Foot amputation status, right  partial foot amputation  tarsals and metatarsals of right foot  S/P laparoscopic cholecystectomy  CAD (coronary artery disease) of bypass graft    Home Medications:  Actamin 325 mg oral tablet: 2 tab(s) orally every 6 hours, As needed, Mild Pain (1 - 3) (20 Sep 2020 13:38)  apixaban 5 mg oral tablet: 1 tab(s) orally every 12 hours (27 Aug 2020 09:49)  aspirin 81 mg oral delayed release tablet: 1 tab(s) orally once a day (27 Aug 2020 09:49)  Basaglar KwikPen: 40 unit(s) subcutaneous once a day (at bedtime) (27 Aug 2020 09:49)  buPROPion 150 mg/24 hours (XL) oral tablet, extended release: 1 tab(s) orally once a day (27 Aug 2020 09:49)  Combigan 0.2%-0.5% ophthalmic solution: 1 drop(s) to each affected eye every 12 hours (27 Aug 2020 09:49)  latanoprost 0.005% ophthalmic solution: 1 drop(s) to each affected eye once a day (in the evening) (27 Aug 2020 09:49)  lisinopril 20 mg oral tablet: 1 tab(s) orally once a day (27 Aug 2020 09:49)  melatonin 5 mg oral tablet: 1 tab(s) orally once a day (at bedtime), As needed, Insomnia (27 Aug 2020 09:49)  mirtazapine 7.5 mg oral tablet: 1 tab(s) orally once a day (at bedtime) (20 Sep 2020 13:38)  NIFEdipine 60 mg oral tablet, extended release: 1 tab(s) orally once a day (27 Aug 2020 09:49)  pantoprazole 20 mg oral delayed release tablet: 1 tab(s) orally once a day (27 Aug 2020 09:49)  sertraline 100 mg oral tablet: 1 tab(s) orally once a day (27 Aug 2020 09:49)  timolol maleate 0.5% ophthalmic solution: 1 drop(s) to each affected eye every 12 hours (27 Aug 2020 09:49)  Vitamin D3 2000 intl units oral tablet: 1 tab(s) orally once a day (27 Aug 2020 09:49)    24HRS EVENT: Briefly started on cleveprex drip for HTN d/c'd since midnight, PO labetolol 100 STAT, standing dose increased to 250 q12    NEURO:    Pain control with Dilaudid PRN     Dx of Depression. Holding PO buproprion, Sertraline, and Tramadol.     Monitor for changes in mental status.     RESP:     Oxygen insufficiency-wean off NC to RA as tolerate    Maintain O2 saturation >95%     AM CXR       CARDS:    Hx of Afib, rate controlled. Hx of HTN.    restarted home  labetalol, enalapril (do not have benazapril) and nifedipine.    Maintain normotension. MAP >65    EKG Afib   CE neg x3    GI/NUTR:     Diet, NPO    GI Prophylaxis-PPI     passing small gas     KUB 9/28--multiple gas filled dilated SB up to 3.5cm w colonic gas -> ileus     -gastrograffin study w/ obstructive series - neg, NGT d/c'd     /RENAL:     LR @ 50    Lactate cleared 1.4     No boone, voiding     BUN/Cr 25/0.9     HEME/ONC:     DVT prophylaxis- Lovenox 81 q12    Trend Hgb 10     Holding Eliquis and ASA   Daily     Daily     Diet, NPO:   Except Medications (09-29-20 @ 17:39)      CURRENT MEDS:  Neurologic Medications  acetaminophen   Tablet .. 650 milliGRAM(s) Oral every 6 hours  HYDROmorphone  Injectable 0.25 milliGRAM(s) IV Push every 4 hours PRN Moderate Pain (4 - 6)  HYDROmorphone  Injectable 0.5 milliGRAM(s) IV Push every 4 hours PRN Severe Pain (7 - 10)  ondansetron Injectable 4 milliGRAM(s) IV Push every 6 hours PRN Nausea and/or Vomiting    Respiratory Medications    Cardiovascular Medications  enalapril 20 milliGRAM(s) Oral daily  labetalol 250 milliGRAM(s) Oral every 12 hours  NIFEdipine XL 60 milliGRAM(s) Oral daily    Gastrointestinal Medications  lactated ringers. 1000 milliLiter(s) IV Continuous <Continuous>  pantoprazole    Tablet 40 milliGRAM(s) Oral before breakfast    Genitourinary Medications    Hematologic/Oncologic Medications  enoxaparin Injectable 81 milliGRAM(s) SubCutaneous every 12 hours    Antimicrobial/Immunologic Medications  cefTRIAXone   IVPB      cefTRIAXone   IVPB 1000 milliGRAM(s) IV Intermittent every 24 hours  vancomycin    Solution 125 milliGRAM(s) Oral every 6 hours    Endocrine/Metabolic Medications  dextrose 50% Injectable 25 Gram(s) IV Push once  glucagon  Injectable 1 milliGRAM(s) IntraMuscular once PRN Glucose LESS THAN 70 milligrams/deciliter  insulin regular  human corrective regimen sliding scale   IV Push every 4 hours    Topical/Other Medications  benzocaine 20% Spray 1 Spray(s) Topical every 4 hours PRN NGT discomfort  brimonidine 0.2% Ophthalmic Solution 1 Drop(s) Both EYES every 12 hours  chlorhexidine 4% Liquid 1 Application(s) Topical once  latanoprost 0.005% Ophthalmic Solution 1 Drop(s) Both EYES at bedtime  timolol 0.5% Solution 1 Drop(s) Both EYES every 12 hours      ICU Vital Signs Last 24 Hrs  T(C): 35.2 (30 Sep 2020 07:35), Max: 36.9 (30 Sep 2020 00:00)  T(F): 95.3 (30 Sep 2020 07:35), Max: 98.4 (30 Sep 2020 00:00)  HR: 74 (30 Sep 2020 06:00) (62 - 97)  BP: 159/67 (30 Sep 2020 06:00) (118/58 - 213/93)  BP(mean): 96 (30 Sep 2020 06:00) (84 - 133)  ABP: --  ABP(mean): --  RR: 20 (30 Sep 2020 06:00) (17 - 20)  SpO2: 97% (30 Sep 2020 06:00) (89% - 100%)        I&O's Summary    29 Sep 2020 07:01  -  30 Sep 2020 07:00  --------------------------------------------------------  IN: 1258 mL / OUT: 1050 mL / NET: 208 mL      I&O's Detail    29 Sep 2020 07:01  -  30 Sep 2020 07:00  --------------------------------------------------------  IN:    Clevidipine: 58 mL    IV PiggyBack: 400 mL    Lactated Ringers: 800 mL  Total IN: 1258 mL    OUT:    Nasogastric/Oral tube (mL): 300 mL    Voided (mL): 750 mL  Total OUT: 1050 mL    Total NET: 208 mL          PHYSICAL EXAM:    General/Neuro  Deficits:                             alert & oriented x 3, no focal deficits  Pupils:    Lungs:      clear to auscultation, Normal expansion/effort.     Cardiovascular : S1, S2.  Regular rate and rhythm.  Peripheral edema   Cardiac Rhythm: Normal Sinus Rhythm    GI: Abdomen soft, RUQ and epigastric area tenderness , Non-distended.  +BS      Extremities: Extremities warm, pink, well-perfused. R partial foot amputation     Derm: Good skin turgor, no skin breakdown.      :       voiding       CXR:     LABS:  CAPILLARY BLOOD GLUCOSE      POCT Blood Glucose.: 142 mg/dL (30 Sep 2020 06:06)  POCT Blood Glucose.: 127 mg/dL (30 Sep 2020 01:45)  POCT Blood Glucose.: 134 mg/dL (29 Sep 2020 22:04)  POCT Blood Glucose.: 108 mg/dL (29 Sep 2020 17:51)  POCT Blood Glucose.: 112 mg/dL (29 Sep 2020 13:07)  POCT Blood Glucose.: 138 mg/dL (29 Sep 2020 07:47)                          10.5   15.31 )-----------( 325      ( 30 Sep 2020 00:14 )             34.2       09-30    139  |  99  |  20  ----------------------------<  124<H>  3.7   |  27  |  0.8    Ca    9.2      30 Sep 2020 00:14  Phos  3.0     09-30  Mg     2.0     09-30        PT/INR - ( 30 Sep 2020 00:14 )   PT: 20.00 sec;   INR: 1.74 ratio         PTT - ( 30 Sep 2020 00:14 )  PTT:33.9 sec  CARDIAC MARKERS ( 28 Sep 2020 17:39 )  x     / <0.01 ng/mL / 276 U/L / x     / 2.0 ng/mL  CARDIAC MARKERS ( 28 Sep 2020 11:02 )  x     / <0.01 ng/mL / 262 U/L / x     / 2.7 ng/mL          Culture - Urine (collected 28 Sep 2020 00:45)  Source: .Urine Clean Catch (Midstream)  Preliminary Report (29 Sep 2020 15:37):    10,000 - 49,000 CFU/mL Klebsiella pneumoniae    Culture - Blood (collected 27 Sep 2020 20:42)  Source: .Blood Blood  Preliminary Report (29 Sep 2020 01:02):    No growth to date.    Culture - Blood (collected 27 Sep 2020 20:42)  Source: .Blood Blood  Preliminary Report (29 Sep 2020 01:02):    No growth to date.        ID:   Afebrile    WBC downtrending 16    UA + Leukocytes. Started on Rocephin x3 days    As per ID, can restart home Fidaxomicin 200 BID (non formulary sent to pharmacy)    Ucx- klebsiella    9/29 Cdiff- neg    Blood Culture from 9/27 NGTD       ENDO:    Hx of DM. Holding KwikPen, FS Q 4 w/ ISS     DVT PTX: enoxaparin Injectable 81 milliGRAM(s) SubCutaneous every 12 hours    GI PTX: PPI     ***Tubes/Lines/Drains  ***  Peripheral IV    REVIEW OF SYSTEMS    [x] A ten-point review of systems was otherwise negative except as noted.  [ ] Due to altered mental status/intubation, subjective information were not able to be obtained from the patient. History was obtained, to the extent possible, from review of the chart and collateral sources of information.

## 2020-09-30 NOTE — PROGRESS NOTE ADULT - ASSESSMENT
69y Male w/ PMHx of Afib on Eliquis, HTN, HLD, DM, CABG x3 vessel, Bladder Ca recent TURBT by Dr Jo. Patient was recently discharged from hospital on 9/21/2020 after C diff colitis, now admitted for SBO     NEURO:    Pain control with Dilaudid PRN     Dx of Depression. Holding PO buproprion, Sertraline, and Tramadol.     Monitor for changes in mental status.     RESP:     Sating well on RA    Maintain O2 saturation >95%     AM CXR       CARDS:    Hx of Afib, rate controlled (On Eliquis at home). Hx of HTN.    Holding PO labetalol, lisinopril and nifedipine.    Continue IV labetalol and enalapril Q 6 while strict NPO, required pushed of Labetalol 10mg x2, will increase enalapril IV    Maintain normotension. MAP >65    EKG Afib   CE neg x3    GI/NUTR:     Diet, NPO    GI Prophylaxis-PPI     NGT to LCWS. Monitor output     Recently treated for C. diff, repeat CDIFF 9/27- neg     /RENAL:     LR @ 120cc/hr     Lactate cleared 1.4     No boone, voiding     BUN/Cr, trend     Monitor lytes, replete as needed       HEME/ONC:     DVT prophylaxis- Lovenox therapeutic at 81 q 12    Trend H/H    Holding Eliquis and ASA     ID:   Afebrile    WBC downtrending    UA + Leukocytes. Started on Rocephin x3 days    Pending Urine Culture    Blood culture 9/27, NGTD    9/27 Cdiff neg. ID following, resume PO FIdoxamicin       ENDO:    Hx of DM. Holding KwikPen, FS Q 4 w/ ISS     Last POCT 101       LINES/DRAINS:  PIV    DISPO:   CEU    69y Male w/ PMHx of Afib on Eliquis, HTN, HLD, DM, CABG x3 vessel, Bladder Ca recent TURBT by Dr Jo. Patient was recently discharged from hospital on 9/21/2020 after C diff colitis, now admitted for SBO     NEURO:    Pain control with tylenol, Dilaudid PRN     Dx of Depression. restart PO buproprion, Sertraline, and Tramadol.     Monitor for changes in mental status.     RESP:     Sating well on RA    Maintain O2 saturation >95%     AM CXR       CARDS:    Hx of Afib, rate controlled (On Eliquis at home). Hx of HTN.    restarted PO labetalol, lisinopril and nifedipine.    Maintain normotension. MAP >65    EKG Afib   CE neg x3    GI/NUTR:     Diet, NPO, NGT d/c'd, having BMs    GI Prophylaxis-PPI     Recently treated for C. diff, repeat CDIFF 9/29- neg     /RENAL:     LR @ 50     Lactate cleared 1.4     No boone, voiding     BUN/Cr, trend     Monitor lytes, replete as needed       HEME/ONC:     DVT prophylaxis- Lovenox therapeutic at 81 q 12    Trend H/H    Holding Eliquis and ASA     ID:   Afebrile    WBC downtrending    UA + Leukocytes. Started on Rocephin x3 days    Ucx- klebsiella, f/u sens   Blood culture 9/27, NGTD    9/27 Cdiff neg. ID following, started Vanc po      ENDO:    Hx of DM. Holding KwikPen, FS Q 4 w/ ISS     Last POCT 101       LINES/DRAINS:  PIV    DISPO:   CEU

## 2020-09-30 NOTE — PROGRESS NOTE ADULT - SUBJECTIVE AND OBJECTIVE BOX
Progress Note: General Surgery  Patient: PRIYANK WESLEY , 69y (1950)Male   MRN: [id number]  [facility]  Visit: 09-28-20 Inpatient  Date: 09-30-20 @ 06:53    Admit Diagnosis/Chief Complaint: SBO    Procedure/Diagnosis:  SBO    Events/ 24h: Patient obstructive series done with gastrograffin challenge yesterday. Contrast into the colon. Patient had multiple soft bowel movements. Abdomen soft nondistended. Was put on cleviprex drip for a couple hours, then taken off and switched to his home medications. BP currently under control. Patient complaining of back pain this morning, otherwise okay.    Vitals: T(F): 98.3 (09-30-20 @ 04:00), Max: 98.4 (09-30-20 @ 00:00)  HR: 74 (09-30-20 @ 06:00)  BP: 159/67 (09-30-20 @ 06:00) (118/58 - 213/93)  RR: 20 (09-30-20 @ 06:00)  SpO2: 97% (09-30-20 @ 06:00)    In:   09-28-20 @ 07:01  -  09-29-20 @ 07:00  --------------------------------------------------------  IN: 0 mL    09-29-20 @ 07:01  -  09-30-20 @ 06:53  --------------------------------------------------------  IN: 1258 mL      Out:   09-28-20 @ 07:01  -  09-29-20 @ 07:00  --------------------------------------------------------  OUT:    Nasogastric/Oral tube (mL): 425 mL    Voided (mL): 848 mL  Total OUT: 1273 mL      09-29-20 @ 07:01  -  09-30-20 @ 06:53  --------------------------------------------------------  OUT:    Nasogastric/Oral tube (mL): 300 mL    Voided (mL): 750 mL  Total OUT: 1050 mL        Net:   09-28-20 @ 07:01  -  09-29-20 @ 07:00  --------------------------------------------------------  NET: -1273 mL    09-29-20 @ 07:01  -  09-30-20 @ 06:53  --------------------------------------------------------  NET: 208 mL        Diet: Diet, NPO:   Except Medications (09-29-20 @ 17:39)    IV Fluids: lactated ringers. 1000 milliLiter(s) (50 mL/Hr) IV Continuous <Continuous>      Physical Examination:  General Appearance: NAD   HEENT: EOMI, sclera non-icteric.  Heart: RRR   Lungs: CTABL.   Abdomen:  Soft, mildly tender R side, nondistended.   MSK/Extremities: Warm & well-perfused.   Skin: Warm, dry. No jaundice.       Medications: [Standing]  acetaminophen   Tablet .. 650 milliGRAM(s) Oral every 6 hours  brimonidine 0.2% Ophthalmic Solution 1 Drop(s) Both EYES every 12 hours  cefTRIAXone   IVPB      cefTRIAXone   IVPB 1000 milliGRAM(s) IV Intermittent every 24 hours  chlorhexidine 4% Liquid 1 Application(s) Topical once  dextrose 50% Injectable 25 Gram(s) IV Push once  enalapril 20 milliGRAM(s) Oral daily  enoxaparin Injectable 81 milliGRAM(s) SubCutaneous every 12 hours  insulin regular  human corrective regimen sliding scale   IV Push every 4 hours  labetalol 250 milliGRAM(s) Oral every 12 hours  lactated ringers. 1000 milliLiter(s) (50 mL/Hr) IV Continuous <Continuous>  latanoprost 0.005% Ophthalmic Solution 1 Drop(s) Both EYES at bedtime  NIFEdipine XL 60 milliGRAM(s) Oral daily  pantoprazole    Tablet 40 milliGRAM(s) Oral before breakfast  timolol 0.5% Solution 1 Drop(s) Both EYES every 12 hours  vancomycin    Solution 125 milliGRAM(s) Oral every 6 hours    DVT Prophylaxis: enoxaparin Injectable 81 milliGRAM(s) SubCutaneous every 12 hours    GI Prophylaxis: pantoprazole    Tablet 40 milliGRAM(s) Oral before breakfast    Antibiotics: cefTRIAXone   IVPB      cefTRIAXone   IVPB 1000 milliGRAM(s) IV Intermittent every 24 hours  vancomycin    Solution 125 milliGRAM(s) Oral every 6 hours    Anticoagulation:   Medications:[PRN]  benzocaine 20% Spray 1 Spray(s) Topical every 4 hours PRN  glucagon  Injectable 1 milliGRAM(s) IntraMuscular once PRN  HYDROmorphone  Injectable 0.25 milliGRAM(s) IV Push every 4 hours PRN  HYDROmorphone  Injectable 0.5 milliGRAM(s) IV Push every 4 hours PRN  ondansetron Injectable 4 milliGRAM(s) IV Push every 6 hours PRN      Labs:                        10.5   15.31 )-----------( 325      ( 30 Sep 2020 00:14 )             34.2     09-30    139  |  99  |  20  ----------------------------<  124<H>  3.7   |  27  |  0.8    Ca    9.2      30 Sep 2020 00:14  Phos  3.0     09-30  Mg     2.0     09-30        PT/INR - ( 30 Sep 2020 00:14 )   PT: 20.00 sec;   INR: 1.74 ratio         PTT - ( 30 Sep 2020 00:14 )  PTT:33.9 sec    CARDIAC MARKERS ( 28 Sep 2020 17:39 )  x     / <0.01 ng/mL / 276 U/L / x     / 2.0 ng/mL  CARDIAC MARKERS ( 28 Sep 2020 11:02 )  x     / <0.01 ng/mL / 262 U/L / x     / 2.7 ng/mL        Urine/Micro:    Culture - Urine (collected 28 Sep 2020 00:45)  Source: .Urine Clean Catch (Midstream)  Preliminary Report (29 Sep 2020 15:37):    10,000 - 49,000 CFU/mL Klebsiella pneumoniae    Culture - Blood (collected 27 Sep 2020 20:42)  Source: .Blood Blood  Preliminary Report (29 Sep 2020 01:02):    No growth to date.    Culture - Blood (collected 27 Sep 2020 20:42)  Source: .Blood Blood  Preliminary Report (29 Sep 2020 01:02):    No growth to date.      Assessment:  69y Male patient admitted for sbo. C dif negative  Patient seen and examined at bedside. NAD.     Plan:  - Monitor vitals (BP)  - Monitor labs and replete as necessary  - Monitor for bowel function  - Continue Pain Medications  - Encourage ambulation as tolerated  - Monitor urine output  - DVT and GI Prophylaxis  - Follow PT/Physiatry if necessary  - Contact social work/case management for necessary patient needs.       Date/Time: 09-30-20 @ 06:53

## 2020-09-30 NOTE — PROGRESS NOTE ADULT - ATTENDING COMMENTS
I personally examined this patient with the PA and resident and discussed my plan with them.    pt upgraded yesterday for hypertension.  the pt subsequently had the ngt out and had a bm.  he tolerated po and was restarted on his home meds with resolution of his htn.    he has new ruq ttp and will undergo us  he can be downgraded to floor

## 2020-09-30 NOTE — PHYSICAL THERAPY INITIAL EVALUATION ADULT - GENERAL OBSERVATIONS, REHAB EVAL
chart reviewed - pt reported he is not feeling well - saying " im just not ready for PT " - pt reported significant pain - SICU was called and informed of patients pain and they reported they will alter patients pain meds
PT IE attempt 10:15am. Patient refused therapy despite max encouragement. PT explained benefits of improved strength and endurance but patient requested PT to leave. Patient presented with frustration and did not give clear reason for refusal of treatment. Will attempt at another time.

## 2020-09-30 NOTE — PROGRESS NOTE ADULT - SUBJECTIVE AND OBJECTIVE BOX
PRIYANK WESLEY  69y, Male    All available historical data reviewed    OVERNIGHT EVENTS:  no fevers  no pressors  no diarrhea    ROS:  General: Denies rigors, nightsweats  HEENT: Denies headache, rhinorrhea, sore throat, eye pain  CV: Denies CP, palpitations  PULM: Denies wheezing, hemoptysis  GI: Denies hematemesis, hematochezia, melena  : Denies discharge, hematuria  MSK: Denies arthralgias, myalgias  SKIN: Denies rash, lesions  NEURO: + weakness  PSYCH: Denies depression, anxiety    VITALS:  T(F): 97.6, Max: 98.4 (09-30-20 @ 00:00)  HR: 76  BP: 154/70  RR: 19Vital Signs Last 24 Hrs  T(C): 36.4 (30 Sep 2020 12:01), Max: 36.9 (30 Sep 2020 00:00)  T(F): 97.6 (30 Sep 2020 12:01), Max: 98.4 (30 Sep 2020 00:00)  HR: 76 (30 Sep 2020 12:00) (62 - 92)  BP: 154/70 (30 Sep 2020 12:00) (118/58 - 213/93)  BP(mean): 100 (30 Sep 2020 12:00) (84 - 133)  RR: 19 (30 Sep 2020 10:00) (18 - 20)  SpO2: 98% (30 Sep 2020 12:00) (89% - 100%)    TESTS & MEASUREMENTS:                        10.5   15.31 )-----------( 325      ( 30 Sep 2020 00:14 )             34.2     09-30    139  |  99  |  20  ----------------------------<  124<H>  3.7   |  27  |  0.8    Ca    9.2      30 Sep 2020 00:14  Phos  3.0     09-30  Mg     2.0     09-30          Culture - Urine (collected 09-28-20 @ 00:45)  Source: .Urine Clean Catch (Midstream)  Final Report (09-30-20 @ 11:55):    10,000 - 49,000 CFU/mL Klebsiella pneumoniae ESBL  Organism: Klebsiella pneumoniae ESBL (09-30-20 @ 11:55)  Organism: Klebsiella pneumoniae ESBL (09-30-20 @ 11:55)      -  Amikacin: S <=16      -  Amoxicillin/Clavulanic Acid: I 16/8      -  Ampicillin: R >16 These ampicillin results predict results for amoxicillin      -  Ampicillin/Sulbactam: R >16/8 Enterobacter, Citrobacter, and Serratia may develop resistance during prolonged therapy (3-4 days)      -  Aztreonam: R 16      -  Cefazolin: R >16 (MIC_CL_COM_ENTERIC_CEFAZU) For uncomplicated UTI with K. pneumoniae, E. coli, or P. mirablis: CECILIO <=16 is sensitive and CECILIO >=32 is resistant. This also predicts results for oral agents cefaclor, cefdinir, cefpodoxime, cefprozil, cefuroxime axetil, cephalexin and locarbef for uncomplicated UTI. Note that some isolates may be susceptible to these agents while testing resistant to cefazolin.      -  Cefepime: R >16      -  Cefoxitin: S <=8      -  Ceftriaxone: R >32 Enterobacter, Citrobacter, and Serratia may develop resistance during prolonged therapy      -  Ciprofloxacin: R >2      -  Ertapenem: S <=0.5      -  Gentamicin: R >8      -  Imipenem: S <=1      -  Levofloxacin: S <=0.5      -  Meropenem: S <=1      -  Nitrofurantoin: R >64 Should not be used to treat pyelonephritis      -  Piperacillin/Tazobactam: S <=8      -  Tigecycline: S <=2      -  Tobramycin: R >8      -  Trimethoprim/Sulfamethoxazole: R >2/38      Method Type: CECILIO    Culture - Blood (collected 09-27-20 @ 20:42)  Source: .Blood Blood  Preliminary Report (09-29-20 @ 01:02):    No growth to date.    Culture - Blood (collected 09-27-20 @ 20:42)  Source: .Blood Blood  Preliminary Report (09-29-20 @ 01:02):    No growth to date.            RADIOLOGY & ADDITIONAL TESTS:  Personal review of radiological diagnostics performed  Echo and EKG results noted when applicable.     MEDICATIONS:  acetaminophen   Tablet .. 650 milliGRAM(s) Oral every 6 hours  benzocaine 20% Spray 1 Spray(s) Topical every 4 hours PRN  brimonidine 0.2% Ophthalmic Solution 1 Drop(s) Both EYES every 12 hours  chlorhexidine 4% Liquid 1 Application(s) Topical once  enalapril 20 milliGRAM(s) Oral daily  enoxaparin Injectable 81 milliGRAM(s) SubCutaneous every 12 hours  HYDROmorphone  Injectable 0.25 milliGRAM(s) IV Push every 4 hours PRN  HYDROmorphone  Injectable 0.5 milliGRAM(s) IV Push every 4 hours PRN  insulin regular  human corrective regimen sliding scale   IV Push every 4 hours  labetalol 250 milliGRAM(s) Oral every 12 hours  lactated ringers. 1000 milliLiter(s) IV Continuous <Continuous>  latanoprost 0.005% Ophthalmic Solution 1 Drop(s) Both EYES at bedtime  levoFLOXacin IVPB 500 milliGRAM(s) IV Intermittent every 24 hours  NIFEdipine XL 60 milliGRAM(s) Oral daily  ondansetron Injectable 4 milliGRAM(s) IV Push every 6 hours PRN  pantoprazole    Tablet 40 milliGRAM(s) Oral before breakfast  timolol 0.5% Solution 1 Drop(s) Both EYES every 12 hours  vancomycin    Solution 125 milliGRAM(s) Oral every 6 hours      ANTIBIOTICS:  levoFLOXacin IVPB 500 milliGRAM(s) IV Intermittent every 24 hours  vancomycin    Solution 125 milliGRAM(s) Oral every 6 hours

## 2020-09-30 NOTE — PROGRESS NOTE ADULT - ASSESSMENT
ASSESSMENT:  69yM w/ PMHx bladder cancer s/p TURBT by Dr. Jo, recent hospital stay for C diff colitis discharged 9/21 on PO Dificin, Afib on eliquis, DM, CABG presents to ED for abdominal pain, nausea, vomiting and diarrhea. Patient was just discharged from the hospital 1 week ago after being her for about 1 month with C. Diff colitis. Patient was not improving so they started him on Fidaxomicin, and sent him home. Once home, he devloped abdominal pain, nausea and vomiting, and continued diarrhea. Now, patient's last bowel movement was Friday 9/25. passed some gas today.    IMPRESSION;  CD colitis : resolved  CD 9/29 NG  SBO with ileus ( which I doubt is related to the CD colitis ): resolving  Asymptomatic bacteruria with ESBL Kleb : no need to treat    RECOMMENDATIONS;  D/c po  vancomycin   D/c po Levoquin  No ABx  Bedside PT

## 2020-10-01 NOTE — PHYSICAL THERAPY INITIAL EVALUATION ADULT - SPECIFY REASON(S)
9:00-Attempted to see pt for PT IE however pt currently off unit at OR for Ex-Lap. PT to f/u when medically appropriate.

## 2020-10-01 NOTE — CHART NOTE - NSCHARTNOTEFT_GEN_A_CORE
Post Operative Note  Patient: PRIYANK WESLEY 69y (1950) Male   MRN: [id number]  [facility]  Visit: 09-28-20 Inpatient  Date: 10-01-20 @ 16:47    Procedure: Perforated viscus     S/P Temporary closure of abdominal cavity    Small bowel resection    Exploratory laparotomy    Subjective: Patient is intubated, sedated on fentanyl and versed. Following commands, grimacing to pain. NGT in place with approximately 100cc of dark brown bilious output. Abthera in place. Not requiring pressor support, hemodynamically stable.    Objective:  Vitals: T(F): 97.2 (10-01-20 @ 15:35), Max: 98.3 (09-30-20 @ 22:34)  HR: 77 (10-01-20 @ 15:35)  BP: 110/56 (10-01-20 @ 12:00) (110/56 - 177/82)  RR: 21 (10-01-20 @ 12:00)  SpO2: 99% (10-01-20 @ 15:35)  Vent Settings: Mode: AC/ CMV (Assist Control/ Continuous Mandatory Ventilation),AVEA, RR (machine): 14, TV (machine): 450, FiO2: 50, PEEP: 5, MAP: 10, PIP: 20    In:   09-30-20 @ 07:01  -  10-01-20 @ 07:00  --------------------------------------------------------  IN: 150 mL    10-01-20 @ 07:01  -  10-01-20 @ 16:47  --------------------------------------------------------  IN: 831.8 mL      IV Fluids: lactated ringers. 1000 milliLiter(s) (120 mL/Hr) IV Continuous <Continuous>  potassium chloride  20 mEq/100 mL IVPB 20 milliEquivalent(s) IV Intermittent every 2 hours      Out:   09-30-20 @ 07:01  -  10-01-20 @ 07:00  --------------------------------------------------------  OUT: 1500 mL    10-01-20 @ 07:01  -  10-01-20 @ 16:47  --------------------------------------------------------  OUT: 425 mL      EBL:     Voided Urine:   09-30-20 @ 07:01  -  10-01-20 @ 07:00  --------------------------------------------------------  OUT: 1500 mL    10-01-20 @ 07:01  -  10-01-20 @ 16:47  --------------------------------------------------------  OUT: 425 mL      Estrada Catheter: yes no   Drains:   CHAGO:    ,   Chest Tube:      NG Tube:   09-30-20 @ 07:01  -  10-01-20 @ 07:00  --------------------------------------------------------  OUT: 1000 mL    10-01-20 @ 07:01  -  10-01-20 @ 16:47  --------------------------------------------------------  OUT: 100 mL        Physical Examination:  General Appearance: Intubated sedated, grimaces to noxious stimuli, localizing  Abdomen:  Abthera wound vac in place  Skin: Warm, dry. No jaundice.     Medications: [Standing]  brimonidine 0.2% Ophthalmic Solution 1 Drop(s) Both EYES every 12 hours  chlorhexidine 4% Liquid 1 Application(s) Topical once  dexMEDEtomidine Infusion 0.2 MICROgram(s)/kG/Hr IV Continuous <Continuous>  fentaNYL    Injectable 25 MICROGram(s) IV Push every 4 hours PRN  fentaNYL   Infusion 0.501 MICROgram(s)/kG/Hr IV Continuous <Continuous>  heparin  Infusion 1500 Unit(s)/Hr IV Continuous <Continuous>  insulin lispro (HumaLOG) corrective regimen sliding scale   SubCutaneous three times a day before meals  lactated ringers. 1000 milliLiter(s) IV Continuous <Continuous>  latanoprost 0.005% Ophthalmic Solution 1 Drop(s) Both EYES at bedtime  meropenem  IVPB      meropenem  IVPB 1000 milliGRAM(s) IV Intermittent every 8 hours  pantoprazole  Injectable 40 milliGRAM(s) IV Push daily  potassium chloride  20 mEq/100 mL IVPB 20 milliEquivalent(s) IV Intermittent every 2 hours  timolol 0.5% Solution 1 Drop(s) Both EYES every 12 hours    Medications: [PRN]  brimonidine 0.2% Ophthalmic Solution 1 Drop(s) Both EYES every 12 hours  chlorhexidine 4% Liquid 1 Application(s) Topical once  dexMEDEtomidine Infusion 0.2 MICROgram(s)/kG/Hr IV Continuous <Continuous>  fentaNYL    Injectable 25 MICROGram(s) IV Push every 4 hours PRN  fentaNYL   Infusion 0.501 MICROgram(s)/kG/Hr IV Continuous <Continuous>  heparin  Infusion 1500 Unit(s)/Hr IV Continuous <Continuous>  insulin lispro (HumaLOG) corrective regimen sliding scale   SubCutaneous three times a day before meals  lactated ringers. 1000 milliLiter(s) IV Continuous <Continuous>  latanoprost 0.005% Ophthalmic Solution 1 Drop(s) Both EYES at bedtime  meropenem  IVPB      meropenem  IVPB 1000 milliGRAM(s) IV Intermittent every 8 hours  pantoprazole  Injectable 40 milliGRAM(s) IV Push daily  potassium chloride  20 mEq/100 mL IVPB 20 milliEquivalent(s) IV Intermittent every 2 hours  timolol 0.5% Solution 1 Drop(s) Both EYES every 12 hours    Labs:                        9.1    26.87 )-----------( 295      ( 01 Oct 2020 09:55 )             29.1     10-01    142  |  102  |  16  ----------------------------<  78  3.0<L>   |  21  |  0.9    Ca    8.6      01 Oct 2020 09:55  Phos  3.1     10-01  Mg     1.5     10-01    TPro  5.7<L>  /  Alb  2.9<L>  /  TBili  0.4  /  DBili  <0.2  /  AST  47<H>  /  ALT  80<H>  /  AlkPhos  75  09-30    PT/INR - ( 01 Oct 2020 09:55 )   PT: 22.30 sec;   INR: 1.94 ratio         PTT - ( 01 Oct 2020 09:55 )  PTT:34.9 sec  CARDIAC MARKERS ( 01 Oct 2020 15:45 )  x     / <0.01 ng/mL / 70 U/L / x     / 1.8 ng/mL  CARDIAC MARKERS ( 01 Oct 2020 09:55 )  x     / <0.01 ng/mL / 63 U/L / x     / 1.9 ng/mL      Imaging:  No post-op imaging studies    Assessment:  69yMale patient S/P SBR left in discontinuity for 2 perforations with abthera placed for temporary wound closure    Plan:  - NPO, IVF, NGT to suction  - c/w Meropenem  - f/u  - monitor NGT output  - monitor vent settings  - hemodynamic monitoring  - pain control as needed  - will d/w attending regarding second look operative plan        Date/Time: 10-01-20 @ 16:47

## 2020-10-01 NOTE — PROGRESS NOTE ADULT - SUBJECTIVE AND OBJECTIVE BOX
Pt seen and examined @ bedside without acute complaints  pt reports + flatus    Vital Signs Last 24 Hrs  T(C): 36.4 (01 Oct 2020 02:00), Max: 36.8 (30 Sep 2020 04:00)  T(F): 97.6 (01 Oct 2020 02:00), Max: 98.3 (30 Sep 2020 04:00)  HR: 77 (01 Oct 2020 02:00) (69 - 81)  BP: 149/71 (01 Oct 2020 02:00) (136/63 - 172/77)  BP(mean): 105 (30 Sep 2020 21:00) (90 - 114)  RR: 18 (01 Oct 2020 02:00) (18 - 20)  SpO2: 100% (30 Sep 2020 21:00) (93% - 100%)    On examination pt in no acute distress  CV: S1S2  lungs: + air entry bilat  abd: soft  no rebound tenderness no guarding    ext: no calf tenderness  neuro alert and oriented    LABS:                        10.7   19.89 )-----------( 254      ( 30 Sep 2020 22:00 )             33.8     09-30    136  |  96<L>  |  15  ----------------------------<  169<H>  3.9   |  26  |  0.7    Ca    8.8      30 Sep 2020 22:00  Phos  3.2     09-30  Mg     2.0     09-30    TPro  5.7<L>  /  Alb  2.9<L>  /  TBili  0.4  /  DBili  <0.2  /  AST  47<H>  /  ALT  80<H>  /  AlkPhos  75  09-30    PT/INR - ( 30 Sep 2020 00:14 )   PT: 20.00 sec;   INR: 1.74 ratio         PTT - ( 30 Sep 2020 00:14 )  PTT:33.9 sec      A/P 69yMale admitted for SBO . C dif negative ,with leukocytosis  Continue current management   strict I&O  f/u labs  f/u ID  Continue close observation

## 2020-10-01 NOTE — CHART NOTE - NSCHARTNOTEFT_GEN_A_CORE
informed by nurse that pt c/o abdominal pain and vomited.  Pt seen and examined @ bedside c/o  abdominal pain    Vital Signs Last 24 Hrs  T(C): 36.4 (01 Oct 2020 02:00), Max: 36.8 (30 Sep 2020 22:34)  T(F): 97.6 (01 Oct 2020 02:00), Max: 98.3 (30 Sep 2020 22:34)  HR: 88 (01 Oct 2020 03:52) (70 - 88)  BP: 146/76 (01 Oct 2020 03:52) (137/64 - 172/77)  BP(mean): 105 (30 Sep 2020 21:00) (92 - 114)  RR: 18 (01 Oct 2020 02:00) (18 - 20)  SpO2: 100% (01 Oct 2020 03:52) (95% - 100%)    On examination pt in no respiratory distress  CV: S1S2  lungs: + air entry bilat  abd: soft  + tenderness on palp @ mid abdominal area  ext: no calf tenderness  neuro alert     LABS:                        10.7   19.89 )-----------( 254      ( 30 Sep 2020 22:00 )             33.8     09-30    136  |  96<L>  |  15  ----------------------------<  169<H>  3.9   |  26  |  0.7    Ca    8.8      30 Sep 2020 22:00  Phos  3.2     09-30  Mg     2.0     09-30    TPro  5.7<L>  /  Alb  2.9<L>  /  TBili  0.4  /  DBili  <0.2  /  AST  47<H>  /  ALT  80<H>  /  AlkPhos  75  09-30    PT/INR - ( 30 Sep 2020 00:14 )   PT: 20.00 sec;   INR: 1.74 ratio         PTT - ( 30 Sep 2020 00:14 )  PTT:33.9 sec      A/P 69y.o admitted for SBO, downgraded to floor , now c/o abdominal pain and vomiting   NGT  NPO / IVF  strict I&O  f/u labs  EKG  CXR / KUB  Continue close observation  case discussed with Dr Franco informed by nurse that pt c/o abdominal pain and vomited.  Pt seen and examined @ bedside c/o  abdominal pain    Vital Signs Last 24 Hrs  T(C): 36.4 (01 Oct 2020 02:00), Max: 36.8 (30 Sep 2020 22:34)  T(F): 97.6 (01 Oct 2020 02:00), Max: 98.3 (30 Sep 2020 22:34)  HR: 88 (01 Oct 2020 03:52) (70 - 88)  BP: 146/76 (01 Oct 2020 03:52) (137/64 - 172/77)  BP(mean): 105 (30 Sep 2020 21:00) (92 - 114)  RR: 18 (01 Oct 2020 02:00) (18 - 20)  SpO2: 100% (01 Oct 2020 03:52) (95% - 100%)    On examination pt in no respiratory distress  CV: S1S2  lungs: + air entry bilat  abd: soft  + tenderness on palp @ mid abdominal area  ext: no calf tenderness  neuro alert     LABS:                        10.7   19.89 )-----------( 254      ( 30 Sep 2020 22:00 )             33.8     09-30    136  |  96<L>  |  15  ----------------------------<  169<H>  3.9   |  26  |  0.7    Ca    8.8      30 Sep 2020 22:00  Phos  3.2     09-30  Mg     2.0     09-30    TPro  5.7<L>  /  Alb  2.9<L>  /  TBili  0.4  /  DBili  <0.2  /  AST  47<H>  /  ALT  80<H>  /  AlkPhos  75  09-30    PT/INR - ( 30 Sep 2020 00:14 )   PT: 20.00 sec;   INR: 1.74 ratio         PTT - ( 30 Sep 2020 00:14 )  PTT:33.9 sec      A/P 69y.o admitted for SBO, downgraded to floor , now c/o abdominal pain and vomiting   NGT  NPO / IVF  strict I&O  f/u labs  EKG  CXR / KUB  Continue close observation  case discussed with Dr Franco    Addendum:  Ct angio of abd/pelvis ordered   Case endorsed to Dr Calderon informed by nurse that pt c/o abdominal pain and vomited.  Pt seen and examined @ bedside c/o  abdominal pain    Vital Signs Last 24 Hrs  T(C): 36.4 (01 Oct 2020 02:00), Max: 36.8 (30 Sep 2020 22:34)  T(F): 97.6 (01 Oct 2020 02:00), Max: 98.3 (30 Sep 2020 22:34)  HR: 88 (01 Oct 2020 03:52) (70 - 88)  BP: 146/76 (01 Oct 2020 03:52) (137/64 - 172/77)  BP(mean): 105 (30 Sep 2020 21:00) (92 - 114)  RR: 18 (01 Oct 2020 02:00) (18 - 20)  SpO2: 100% (01 Oct 2020 03:52) (95% - 100%)    On examination pt in no respiratory distress  CV: S1S2  lungs: + air entry bilat  abd: soft  + tenderness on palp @ mid abdominal area  ext: no calf tenderness  neuro alert     LABS:                        10.7   19.89 )-----------( 254      ( 30 Sep 2020 22:00 )             33.8     09-30    136  |  96<L>  |  15  ----------------------------<  169<H>  3.9   |  26  |  0.7    Ca    8.8      30 Sep 2020 22:00  Phos  3.2     09-30  Mg     2.0     09-30    TPro  5.7<L>  /  Alb  2.9<L>  /  TBili  0.4  /  DBili  <0.2  /  AST  47<H>  /  ALT  80<H>  /  AlkPhos  75  09-30    PT/INR - ( 30 Sep 2020 00:14 )   PT: 20.00 sec;   INR: 1.74 ratio         PTT - ( 30 Sep 2020 00:14 )  PTT:33.9 sec      A/P 69y.o admitted for SBO, downgraded to floor , now c/o abdominal pain and vomiting   NGT  NPO / IVF  strict I&O  f/u labs  EKG  CXR / KUB  Continue close observation  case discussed with Dr Franco    Addendum:  Ct angio of abd/pelvis ordered   Case endorsed to Dr Elizondo      __________________________________________    Patient seen by team in morning. In acute painful distress. Severe tenderness to palpation of abdomen. CT angio of abdomen and pelvis was done, showing evidence of free air. patient taken to OR as a level 1 for exploratory laparotomy with Dr. Mari. Patient tolerated procedure well, remains intubated and will be transferred to the trauma service under Dr. Mari. Patient to be placed in SICU. Will continue to follow.      - Chavez Elizondo DO    < from: CT Angio Abdomen and Pelvis w/ IV Cont (10.01.20 @ 06:37) >      IMPRESSION:    Multiple dilated loops of small bowel now measuring up to 5.4 cm with wall hypoenhancement. There is pneumatosis as well as punctate foci of portal venous gas and moderate volume pneumoperitoneum. Findings are concerning for bowel ischemia, complicated by bowel perforation.    Unchanged indeterminate hypodensity in the central right hepatic lobe with areas of punctate calcification. Nonemergent MRI evaluation recommended.    Dr. Kandice George discussed preliminary findings with PRINCE BARFIELD on 10/1/2020 6:55 AM withreadback.    < end of copied text > informed by nurse that pt c/o abdominal pain and vomited.  Pt seen and examined @ bedside c/o  abdominal pain    Vital Signs Last 24 Hrs  T(C): 36.4 (01 Oct 2020 02:00), Max: 36.8 (30 Sep 2020 22:34)  T(F): 97.6 (01 Oct 2020 02:00), Max: 98.3 (30 Sep 2020 22:34)  HR: 88 (01 Oct 2020 03:52) (70 - 88)  BP: 146/76 (01 Oct 2020 03:52) (137/64 - 172/77)  BP(mean): 105 (30 Sep 2020 21:00) (92 - 114)  RR: 18 (01 Oct 2020 02:00) (18 - 20)  SpO2: 100% (01 Oct 2020 03:52) (95% - 100%)    On examination pt in no respiratory distress  CV: S1S2  lungs: + air entry bilat  abd: soft  + tenderness on palp @ mid abdominal area  ext: no calf tenderness  neuro alert     LABS:                        10.7   19.89 )-----------( 254      ( 30 Sep 2020 22:00 )             33.8     09-30    136  |  96<L>  |  15  ----------------------------<  169<H>  3.9   |  26  |  0.7    Ca    8.8      30 Sep 2020 22:00  Phos  3.2     09-30  Mg     2.0     09-30    TPro  5.7<L>  /  Alb  2.9<L>  /  TBili  0.4  /  DBili  <0.2  /  AST  47<H>  /  ALT  80<H>  /  AlkPhos  75  09-30    PT/INR - ( 30 Sep 2020 00:14 )   PT: 20.00 sec;   INR: 1.74 ratio         PTT - ( 30 Sep 2020 00:14 )  PTT:33.9 sec      A/P 69y.o admitted for SBO, downgraded to floor , now c/o abdominal pain and vomiting   NGT  NPO / IVF  strict I&O  f/u labs  EKG  CXR / KUB  Continue close observation  case discussed with Dr Franco    Addendum:  Ct angio of abd/pelvis ordered   Case endorsed to Dr Elizondo      __________________________________________    Patient seen by team in morning. In acute painful distress. Severe tenderness to palpation of abdomen. CT angio of abdomen and pelvis was done, showing evidence of free air. patient taken to OR as a level 1 for exploratory laparotomy with Dr. Mari. 2 units FFP administered before OR. Patient tolerated procedure well, remains intubated and will be transferred to the trauma service under Dr. Mari. Patient to be placed in SICU. Will continue to follow.      - Chavez Elizondo DO    < from: CT Angio Abdomen and Pelvis w/ IV Cont (10.01.20 @ 06:37) >      IMPRESSION:    Multiple dilated loops of small bowel now measuring up to 5.4 cm with wall hypoenhancement. There is pneumatosis as well as punctate foci of portal venous gas and moderate volume pneumoperitoneum. Findings are concerning for bowel ischemia, complicated by bowel perforation.    Unchanged indeterminate hypodensity in the central right hepatic lobe with areas of punctate calcification. Nonemergent MRI evaluation recommended.    Dr. Kandice George discussed preliminary findings with PRINCE BARFIELD on 10/1/2020 6:55 AM withreadback.    < end of copied text >

## 2020-10-01 NOTE — CHART NOTE - NSCHARTNOTEFT_GEN_A_CORE
PACU ANESTHESIA ADMISSION NOTE      Procedure: Temporary closure of abdominal cavity    Small bowel resection    Exploratory laparotomy      Post op diagnosis:  Perforated viscus        _x___  Intubated  TV:500______       Rate: _12_____      FiO2: _100_____    ____  Patent Airway    ____  Full return of protective reflexes    ____  Full recovery from anesthesia / back to baseline     Vitals:   T:  36.5         R: 12 vent                  BP: 148/65                 Sat:  100                 P:88       Mental Status:  ____ Awake   _____ Alert   _____ Drowsy   __x___ Sedated    Nausea/Vomiting:  __x__ NO  ______Yes,   See Post - Op Orders          Pain Scale (0-10):  _0____    Treatment: ____ None    ____ See Post - Op/PCA Orders    Post - Operative Fluids:   ____ Oral   __x__ See Post - Op Orders    Plan: Discharge:   ____Home       _____Floor     _____Critical Care    x_____  Other:_________________    Comments:  D/C to ICU when criteria met, with O2 via ambu and full monitors

## 2020-10-01 NOTE — CHART NOTE - NSCHARTNOTEFT_GEN_A_CORE
PRIYANK WESLEY   [id number] (1950)    Event: s/p; ex lap  resected 100cm necrotic small bowel  2 perforation resected, left in discontinutiy  abthera placed  ffp given overnight  hd stable      1hr  10cc ebl  1.8 IVF  200 cc w/ boone  left radial  1u FFP in OR  2g cefotetan      Abdominal  Leukocytosis- 21.93  <<---,  19.89  <<---,  15.31  <<---  Temp trend- 24hrs T(F): 97.6 (10-01 @ 07:10), Max: 98.3 (09-30 @ 22:34)  Antibiotics-ertapenem  IVPB          < from: CT Angio Abdomen and Pelvis w/ IV Cont (10.01.20 @ 06:37) >    Multiple dilated loops of small bowel now measuring up to 5.4 cm with wall hypoenhancement. There is pneumatosis as well as punctate foci of portal venous gas and moderate volume pneumoperitoneum. Findings are concerning for bowel ischemia, complicated by bowel perforation.    Unchanged indeterminate hypodensity in the central right hepatic lobe with areas of punctate calcification. Nonemergent MRI evaluation recommended.      < end of copied text >                  T(C): 36.4 (10-01), Max: 36.8 (09-30)  HR: 89 (10-01) (70 - 89)  BP: 177/82 (10-01) (140/65 - 177/82)  BP(mean): 105 (09-30) (94 - 111)  ABP: --  ABP(mean): --  RR: 20 (10-01) (18 - 20)  SpO2: 98% (10-01) (96% - 100%)              A/P  -3pm hep gtt therapeutic  -ID consult  -ng tube  RR (machine): 14, TV (machine): 450, FiO2: 100, PEEP: 5, ITime: 1 PRIYANK WESLEY   [id number] (1950)    Event: s/p exploratory laparotomy, temporal closure of abdomen w/ abthera vac 2/2 CT findings of pneumoperitoneuum 2/2 bowel perforation    Findings: Upon entering abdominal cavity murky fluid was appreciated and washed out. Bowel ran from ligament of Treitz to TI, 2 perforations appreciated, bowel also appeared dusky. Colon examined and appeared healthy. Approximately 100cm of small bowel resected, ends dropped, abdominal cavity irrigated and washed out, abtherra placed for temporary wound closure    OR Stats:    Time-1hr    IVF-1.8 L    EBL-100cc    Blood Products-1u FFP     Urine-200cc      9/30 PM-Patient complained of abdominal pain with no analgesia relief. Lactate elevated to 8, WBC 21, episodes of vomiting, CT imaging showed pneumoperitoneum.    CT Angio Abdomen and Pelvis w/ IV Cont (10.01.20 @ 06:37) >  Multiple dilated loops of small bowel now measuring up to 5.4 cm with wall hypoenhancement. There is pneumatosis as well as punctate foci of portal venous gas and moderate volume pneumoperitoneum. Findings are concerning for bowel ischemia, complicated by bowel perforation.  Unchanged indeterminate hypodensity in the central right hepatic lobe with areas of punctate calcification. Nonemergent MRI evaluation recommended.        A/P  70yo male s/p exploratory laparotomy, temporal closure of abdomen w/ abthera vac 2/2 CT findings of pneumoperitoneum 2/2 bowel perforation     Neuro  -fentanyl gtt at 0.5, do not titrate, w/ prn fentanyl IV push  -precedex for sedation    Resp    RR (machine): 14, TV (machine): 450, FiO2: 50, PEEP: 5, ITime: 1    10-01 @ 09:44--7.38 / 41 / 68 / 24 / 91     Cards    hx of Afib-starting on hep gtt at 3pm on 10/1    CE neg x1, f/u set    f/u post of CXR    GI    NG tube in place, strict NPO        boone in place    ID    meropenem abx    Endo    FSG q4    Dispo: SICU d/w Dr. Sanders

## 2020-10-02 NOTE — BRIEF OPERATIVE NOTE - NSICDXBRIEFPROCEDURE_GEN_ALL_CORE_FT
PROCEDURES:  Temporary closure of abdominal cavity 01-Oct-2020 09:08:44  Ronny Ortiz  Exploratory laparotomy 01-Oct-2020 09:08:17  Ronny Ortiz  
PROCEDURES:  Temporary closure of abdominal cavity 01-Oct-2020 09:08:44  Ronny Ortiz  Small bowel resection 01-Oct-2020 09:08:24  Ronny Ortiz  Exploratory laparotomy 01-Oct-2020 09:08:17  Ronny Ortiz

## 2020-10-02 NOTE — PRE-ANESTHESIA EVALUATION ADULT - NSANTHOSAYNRD_GEN_A_CORE
No. SHREYAS screening performed.  STOP BANG Legend: 0-2 = LOW Risk; 3-4 = INTERMEDIATE Risk; 5-8 = HIGH Risk
No. SHREYAS screening performed.  STOP BANG Legend: 0-2 = LOW Risk; 3-4 = INTERMEDIATE Risk; 5-8 = HIGH Risk

## 2020-10-02 NOTE — CHART NOTE - NSCHARTNOTESELECT_GEN_ALL_CORE
Event Note/Post op- SICU
Event Note
Event Note
Event Note/Post Op
POST OP NOTE/Event Note
Post Anesth Note
Transfer Note
attending/Event Note
lactate/Event Note

## 2020-10-02 NOTE — PROGRESS NOTE ADULT - ASSESSMENT
69y Male w/ PMHx of Afib on Eliquis, HTN, HLD, DM, CABG x3 vessel, Bladder Ca recent TURBT by Dr Jo. Patient was recently discharged from hospital on 9/21/2020 after C diff colitis, now admitted for SBO     NEURO:    Pain control with tylenol, Dilaudid PRN     Dx of Depression. restart PO buproprion, Sertraline, and Tramadol.     Monitor for changes in mental status.     RESP:     Sating well on RA    Maintain O2 saturation >95%     AM CXR       CARDS:    Hx of Afib, rate controlled (On Eliquis at home). Hx of HTN.    restarted PO labetalol, lisinopril and nifedipine.    Maintain normotension. MAP >65    EKG Afib   CE neg x3    GI/NUTR:     Diet, NPO, NGT d/c'd, having BMs    GI Prophylaxis-PPI     Recently treated for C. diff, repeat CDIFF 9/29- neg     /RENAL:     LR @ 50     Lactate cleared 1.4     No boone, voiding     BUN/Cr, trend     Monitor lytes, replete as needed       HEME/ONC:     DVT prophylaxis- Lovenox therapeutic at 81 q 12    Trend H/H    Holding Eliquis and ASA     ID:   Afebrile    WBC downtrending    UA + Leukocytes. Started on Rocephin x3 days    Ucx- klebsiella, f/u sens   Blood culture 9/27, NGTD    9/27 Cdiff neg. ID following, started Vanc po      ENDO:    Hx of DM. Holding KwikPen, FS Q 4 w/ ISS     Last POCT 101       LINES/DRAINS:  PIV    DISPO:   CEU

## 2020-10-02 NOTE — PROGRESS NOTE ADULT - CARDIOVASCULAR
Regular rate & rhythm, normal S1, S2; no murmurs, gallops or rubs; no S3, S4
Regular rate & rhythm, normal S1, S2; no murmurs, gallops or rubs; no S3, S4
detailed exam

## 2020-10-02 NOTE — BRIEF OPERATIVE NOTE - OPERATION/FINDINGS
on entry and evisceration it was immediately apparent that the entire small bowel was infarcted.  The infarction was apparent for the entire distal jejunum to and including the cecum, and the portion of the jejunum distal to the lot.  I called the patient's son and explained the findings and that the patient was now futile.
Upon entering abdominal cavity murky fluid was appreciated and washed out. Bowel ran from ligament of Treitz to TI, 2 perforations appreciated, bowel also appeared dusky. Colon examined and appeared healthy. Approximately 100cm of small bowel resected, ends dropped, abdominal cavity irrigated and washed out, abtherra placed for temporary wound closure

## 2020-10-02 NOTE — PROGRESS NOTE ADULT - PROVIDER SPECIALTY LIST ADULT
Infectious Disease
SICU
Surgery

## 2020-10-02 NOTE — CHART NOTE - NSCHARTNOTEFT_GEN_A_CORE
Patient with rising lactate from 4.8 to 5.9 at 2310. Patient abdomen found to be exquisitely tender to palpation. Spoke with senior on call at [phone] and ICU attending notified. ORdered 500cc bolus via NICOM. NICOM was non responsive. Urine output maintaining 20cc/hr and patient HD stable. ICU attending intra op at this time but plan to assess patient after OR case.    ICU attending bedside to round on all critical SICU patient between 1230-1am. On physical exam patient abdominal exam remained the same, exquisitely tender. Plan to manage pain and continue resuscitation with 1L LR and repeat lactate. @ 0409 Called by RT for lactate 8.0. STAT bolus ordered. BP stable remains stable. ICU attending notified at [phone]. Senior on call notified at [phone]. Patient with rising lactate from 4.8 to 5.9 at 2310. Patient abdomen found to be exquisitely tender to palpation. Spoke with senior on call at [phone] and ICU attending notified. ORdered 500cc bolus via NICOM. NICOM was non responsive. Urine output maintaining 20cc/hr and patient HD stable. ICU attending intra op at this time but plan to assess patient after OR case.    ICU attending bedside to round on all critical SICU patient between 1230-1am. On physical exam patient abdominal exam remained the same, exquisitely tender. Plan to manage pain and continue resuscitation with 1L LR and repeat lactate. @ 0409 Called by RT for lactate 8.0. STAT bolus ordered and STAT full set of labs. BP stable remains stable. ICU attending notified at 3639. Senior on call notified at 0843.

## 2020-10-02 NOTE — DISCHARGE NOTE FOR THE EXPIRED PATIENT - HOSPITAL COURSE
69y Male w/ PMHx of Afib on Eliquis, HTN, HLD, DM, CABG x3 vessel, Bladder Ca recent TURBT by Dr Jo. Patient was recently discharged from hospital on 9/21/2020 after being treated for C diff colitis x1 month w/o improvement, sent home on Fidaxomicin Patient returned to ED on 9/27/2020 c/o abdominal pain, stating last BM was on 9/25/2020; +n/v with PO intake. On arrival to the ED patient's lactate was 6.0. Patient was resuscitated w/ 2.5 L of fluid, repeat lactate 1.4. NGT was placed and 1700cc of bilious fluid expelled. Patient remained hemodynamically stable. CT A/P with PO contrast showed oral contrast only progressed into proximal-mid small bowel. Patient denied chest pain, SOB, fever/chills.   Patient admitted to stepdown unit, symptoms improved, NGT d/c'ed, diet advanced, and was downgraded to the floor on 9/30.  Overnight, patient decompensating with worsening labs, increased lactate. Repeat CT A/P showed ischemic bowel; he was taken to the OR as Level 1 for exlap, SBR of 100cm, with placement of Abthera vac. Patient remained intubated; showed signs of clinical improvement immediately post-op, however decompensated overnight - increased abdominal pain, peritonitic, worsening lactic acidosis. Patient was taken back to the OR on 10/2 for re-exploration, w/ findings of infarcted small bowel. Patient had temporary abdominal closure replaced, became hypotensive prior to completion of case requiring pushes of pressors, and returned to SICU w/ grave prognosis. Next of kin notified by attending Dr. Sanders.  Upon return to SICU, patient became hypotensive, coded and achieved ROSC after 2 minutes. As discussed with Dr. Sanders and patient's son he was made DNR. Patient went into asystole and was pronounced at 13:27pm. Attendings Dr. Sanders and Dr. Mari notified.

## 2020-10-02 NOTE — PROGRESS NOTE ADULT - ATTENDING COMMENTS
I personally provided over 30 minutes of direct critical care to this patient.  I examined the patient with the PA and resident and discussed my plan with them.    pod1 s/p ex lap   hypotensive on pressors now  RR (machine): 14, TV (machine): 450, FiO2: 40, PEEP: 5, ITime: 1  10-02 @ 06:00--7.32 / 33 / 118 / 17 / 98   10-02 @ 03:18--7.35 / 35 / 105 / 19 / 98   lactate increasing, wbc increasing  pt needs re-exploration to identify intra-abdominal source I personally provided over 30 minutes of direct critical care to this patient.  I examined the patient with the PA and resident and discussed my plan with them.    pod1 s/p ex lap   hypotensive on pressors now  RR (machine): 14, TV (machine): 450, FiO2: 40, PEEP: 5, ITime: 1  10-02 @ 06:00--7.32 / 33 / 118 / 17 / 98   10-02 @ 03:18--7.35 / 35 / 105 / 19 / 98   lactate increasing, wbc increasing  pt needs re-exploration to identify intra-abdominal source    provided an additional 150 minutes of direct critical care to the patient in the post-operative setting leading up to his death and including family discussions for goals of care

## 2020-10-02 NOTE — BRIEF OPERATIVE NOTE - NSICDXBRIEFPREOP_GEN_ALL_CORE_FT
PRE-OP DIAGNOSIS:  Peritonitis 02-Oct-2020 12:50:31  Kevin Sanders  
PRE-OP DIAGNOSIS:  Perforated viscus 01-Oct-2020 09:08:52  Ronny Ortiz

## 2020-10-02 NOTE — BRIEF OPERATIVE NOTE - NSICDXBRIEFPOSTOP_GEN_ALL_CORE_FT
POST-OP DIAGNOSIS:  Peritonitis 02-Oct-2020 12:50:40  Kevin Sanders  
POST-OP DIAGNOSIS:  Perforated viscus 01-Oct-2020 09:08:59  Ronny Ortiz

## 2020-10-02 NOTE — PROGRESS NOTE ADULT - ASSESSMENT
Assessment:  69y Male patient admitted POD 1 s/p SBR left in discontinuity with abthera placement for wound closure, with the above physical exam, labs, and imaging findings.    Plan:  - OR today AM  - f/u CXR  - f/u hemodynamic monitoring and pressor support  - stop hep gtt  -Pain control as needed  -Hemodynamic monitoring as per routine      Date/Time: 10-02-20 @ 07:15

## 2020-10-02 NOTE — CHART NOTE - NSCHARTNOTEFT_GEN_A_CORE
On exploration today the patient was found to have complete infarction of his small bowel.  I spoke to his son on the phone from the or about the findings and then prepared to return with him to the sicu. Prior to the transfer he became bradycardic and hypotensive and required a push of shimon.  Once in the sicu he had a cardiac arrest and had rosc with a push of epi.  I then called the son again and reinforced the futility of his condition and explained that this extended to chest compressions in the case of another arrest.  the patient was made DNR.  Palliative medicine was present (Dr Weems) and agreed to speak to the family about comfort measures.

## 2020-10-02 NOTE — PROGRESS NOTE ADULT - SUBJECTIVE AND OBJECTIVE BOX
PRIYANK CRUZROSA ISELAROSISUZI  [id number]  69y Male    Indication for ICU admission: SBO     Admit Date:09-28-20  ICU Date: 9/28/2020   OR Date:     No Known Allergies    PAST MEDICAL & SURGICAL HISTORY:  Afib (rate controlled)   Hematuria  Bladder tumor  History of diabetic neuropathy  CAD (coronary artery disease) of bypass graft  cabg 1995 3v  Glaucoma  Arhritis  GERD (gastroesophageal reflux disease)  HTN (hypertension)  Depression  DM (diabetes mellitus)  S/P lumbar laminectomy  with repair of CSF leak using dural graft 7/8/2018 - dr Crenshaw  Malfunction of intrathecal infusion pump  implanted initially in 1996  reimplanted 2006  removed 6/27/2018  Foot amputation status, right  partial foot amputation  tarsals and metatarsals of right foot  S/P laparoscopic cholecystectomy  CAD (coronary artery disease) of bypass graft    Home Medications:  Actamin 325 mg oral tablet: 2 tab(s) orally every 6 hours, As needed, Mild Pain (1 - 3) (20 Sep 2020 13:38)  apixaban 5 mg oral tablet: 1 tab(s) orally every 12 hours (27 Aug 2020 09:49)  aspirin 81 mg oral delayed release tablet: 1 tab(s) orally once a day (27 Aug 2020 09:49)  Basaglar KwikPen: 40 unit(s) subcutaneous once a day (at bedtime) (27 Aug 2020 09:49)  buPROPion 150 mg/24 hours (XL) oral tablet, extended release: 1 tab(s) orally once a day (27 Aug 2020 09:49)  Combigan 0.2%-0.5% ophthalmic solution: 1 drop(s) to each affected eye every 12 hours (27 Aug 2020 09:49)  latanoprost 0.005% ophthalmic solution: 1 drop(s) to each affected eye once a day (in the evening) (27 Aug 2020 09:49)  lisinopril 20 mg oral tablet: 1 tab(s) orally once a day (27 Aug 2020 09:49)  melatonin 5 mg oral tablet: 1 tab(s) orally once a day (at bedtime), As needed, Insomnia (27 Aug 2020 09:49)  mirtazapine 7.5 mg oral tablet: 1 tab(s) orally once a day (at bedtime) (20 Sep 2020 13:38)  NIFEdipine 60 mg oral tablet, extended release: 1 tab(s) orally once a day (27 Aug 2020 09:49)  pantoprazole 20 mg oral delayed release tablet: 1 tab(s) orally once a day (27 Aug 2020 09:49)  sertraline 100 mg oral tablet: 1 tab(s) orally once a day (27 Aug 2020 09:49)  timolol maleate 0.5% ophthalmic solution: 1 drop(s) to each affected eye every 12 hours (27 Aug 2020 09:49)  Vitamin D3 2000 intl units oral tablet: 1 tab(s) orally once a day (27 Aug 2020 09:49)    24HRS EVENT: .   lactate increasing to 6.6 total 2L in boluses, Atanassov evaluated patient at bedside and recommended continuing fluid resuscitation, hyperkalemia treated, UOP 15-20cc/hr, hep gtt increased to 1700 for PTT 43, INR 2.4     NEURO:    Alert and following commands     Pain control with fentanyl prn, fentanyl gtt at 1 (do not titrate)    Sedaiton with precedex (.5)    Dx of Depression holding all PO rx (buproprion, Sertraline, and Tramadol)    Monitor for changes in mental status.     RESP:     vented    RR (machine): 14, TV (machine): 450, FiO2: 40, PEEP: 5, ITime: 1    10-01 @ 17:10--7.40 / 38 / 112 / 23 / 98     10-01 @ 14:09--7.38 / 40 / 92 / 24 / 96      CARDS:    Hx of Afib, rate controlled    Hx of HTN- labetalol, enalapril (home benzapril), nifedipine   EKG Afib   CE neg x2 (third pending)   BNP 4283    GI/NUTR:     Diet-strict NPO, ng tube in place    GI Prophylaxis-PPI     KUB 9/28--multiple gas filled dilated SB up to 3.5cm w colonic gas -> ileus     -gastrograffin study w/ obstructive series - neg    /RENAL:     IVL    Lactate 6.6    Estrada     BUN/Cr 25/0.9 >20/.8 > 23/1.4    HEME/ONC:     DVT prophylaxis-     Trend Hgb 9.1> 9.0    Holding Eliquis and ASA     hep gtt increased to 1700 for PTT 43    ID:   Afebrile    WBC 19 to 30   UA + Leukocytes.  Ucx- klebsiella    D/C PO vanc and levaquin as per ID   continue meropenem    9/29 Cdiff- neg    Blood Culture from 9/27 NGTD       ENDO:    Hx of DM. Holding KwikPen, FS Q 4 w/ ISS     DVT PTX: heparin gtt      GI PTX: PPI     ***Tubes/Lines/Drains  ***  Peripheral IV    REVIEW OF SYSTEMS    [x] A ten-point review of systems was otherwise negative except as noted.  [ ] Due to altered mental status/intubation, subjective information were not able to be obtained from the patient. History was obtained, to the extent possible, from review of the chart and collateral sources of information.

## 2020-10-02 NOTE — CONSULT NOTE ADULT - SUBJECTIVE AND OBJECTIVE BOX
REQUESTED OF: DR. PARKER    Chart reviewed, Hospital Day 5    PRIYANK WESLEY 69yMale  HPI:  GENERAL SURGERY CONSULT NOTE    Patient: PRIYANK WESLEY , 69y ([birth year])Male   MRN: [id number]  Location: [facility] ED  Visit: 09-27-20 Emergency  Date: 09-27-20 @ 19:31    HPI: 69 year old male with a PMH of bladder cancer s/p TURBT by Dr. Jo, recent hospital stay for C diff colitis discharged 9/21 on PO Dificin, Afib on eliquis, DM, CABG presents to ED for abdominal pain, nausea, vomiting and diarrhea. Patient was just discharged from the hospital 1 week ago after being her for about 1 month with C. Diff colitis. Patient was not improving so they started him on Fidaxomicin, and sent him home. Once home, he devloped abdominal pain, nausea and vomiting, and continued diarrhea. Now, patient's last bowel movement was Friday 9/25. passed some gas today. Otherwise, denies fever/chills, chest pain, sob, headache, numbness, tingling, weakness.    in ED, patient distended. NGT placed, with 1500 cc bilious output. Plan was to get CT with PO and IV contrast, but patient vomited after starting to drink the contrast. nevertheless, CT did show evidence of SBO.    PAST MEDICAL & SURGICAL HISTORY:  Afib  rate controlled currently    Hematuria    Bladder tumor    History of diabetic neuropathy    CAD (coronary artery disease) of bypass graft  cabg 1995 3v    Glaucoma    Arthritis    GERD (gastroesophageal reflux disease)    HTN (hypertension)    Depression    DM (diabetes mellitus)    S/P lumbar laminectomy  with repair of CSF leak using dural graft 7/8/2018 - dr Crenshaw    Malfunction of intrathecal infusion pump  implanted initially in 1996  reimplanted 2006  removed 6/27/2018    Foot amputation status, right  partial foot amputation  tarsals and metatarsals of right foot    S/P laparoscopic cholecystectomy    CAD (coronary artery disease) of bypass graft        Home Medications:  Actamin 325 mg oral tablet: 2 tab(s) orally every 6 hours, As needed, Mild Pain (1 - 3) (20 Sep 2020 13:38)  apixaban 5 mg oral tablet: 1 tab(s) orally every 12 hours (27 Aug 2020 09:49)  aspirin 81 mg oral delayed release tablet: 1 tab(s) orally once a day (27 Aug 2020 09:49)  Basaglar KwikPen: 40 unit(s) subcutaneous once a day (at bedtime) (27 Aug 2020 09:49)  buPROPion 150 mg/24 hours (XL) oral tablet, extended release: 1 tab(s) orally once a day (27 Aug 2020 09:49)  Combigan 0.2%-0.5% ophthalmic solution: 1 drop(s) to each affected eye every 12 hours (27 Aug 2020 09:49)  latanoprost 0.005% ophthalmic solution: 1 drop(s) to each affected eye once a day (in the evening) (27 Aug 2020 09:49)  lisinopril 20 mg oral tablet: 1 tab(s) orally once a day (27 Aug 2020 09:49)  melatonin 5 mg oral tablet: 1 tab(s) orally once a day (at bedtime), As needed, Insomnia (27 Aug 2020 09:49)  mirtazapine 7.5 mg oral tablet: 1 tab(s) orally once a day (at bedtime) (20 Sep 2020 13:38)  NIFEdipine 60 mg oral tablet, extended release: 1 tab(s) orally once a day (27 Aug 2020 09:49)  pantoprazole 20 mg oral delayed release tablet: 1 tab(s) orally once a day (27 Aug 2020 09:49)  sertraline 100 mg oral tablet: 1 tab(s) orally once a day (27 Aug 2020 09:49)  timolol maleate 0.5% ophthalmic solution: 1 drop(s) to each affected eye every 12 hours (27 Aug 2020 09:49)  Vitamin D3 2000 intl units oral tablet: 1 tab(s) orally once a day (27 Aug 2020 09:49)       (27 Sep 2020 19:28)        PAST MEDICAL & SURGICAL HISTORY:  Afib  rate controlled currently    Hematuria    Bladder tumor    History of diabetic neuropathy    CAD (coronary artery disease) of bypass graft  cabg 1995 3v    Glaucoma    Arthritis    GERD (gastroesophageal reflux disease)    HTN (hypertension)    Depression    DM (diabetes mellitus)    S/P lumbar laminectomy  with repair of CSF leak using dural graft 7/8/2018 - dr Crenshaw    Malfunction of intrathecal infusion pump  implanted initially in 1996  reimplanted 2006  removed 6/27/2018    Foot amputation status, right  partial foot amputation  tarsals and metatarsals of right foot    S/P laparoscopic cholecystectomy    CAD (coronary artery disease) of bypass graft        Subjective and Objective:  Today,  Discussed with     Focused Palliative Care Evaluation:                   Symptoms:                                      Pain                                     Dyspnea                                     N/V                                     Appetite                                     Anxiety                                     Other _____________________                     Support Devices:              PHYSICAL EXAM:      Constitutional:    Eyes:    ENMT:    Neck:    Breasts:    Back:    Respiratory:    Cardiovascular:    Gastrointestinal:    Genitourinary:    Rectal:    Extremities:    Vascular:    Neurological:    Skin:    Lymph Nodes:    Musculoskeletal:    Psychiatric:        T(C): 36.4, Max: 36.6 (12:00)  HR: 68 (56 - 98)  BP: 114/56 (108/57 - 114/56)  RR: 17 (17 - 21)  SpO2: 99% (96% - 99%)      LABS/STUDIES:  10-02    135  |  100  |  26<H>  ----------------------------<  119<H>  5.6<H>   |  17  |  1.8<H>    Ca    8.1<L>      02 Oct 2020 04:40  Phos  6.1     10-02  Mg     2.2     10-02    TPro  4.2<L>  /  Alb  2.0<L>  /  TBili  0.7  /  DBili  0.4<H>  /  AST  >7000<H>  /  ALT  2507<H>  /  AlkPhos  67  10-02                            8.8    30.40 )-----------( 248      ( 02 Oct 2020 04:40 )             28.8       MEDICATIONS  (STANDING):  brimonidine 0.2% Ophthalmic Solution 1 Drop(s) Both EYES every 12 hours  dexMEDEtomidine Infusion 0.2 MICROgram(s)/kG/Hr (4.9 mL/Hr) IV Continuous <Continuous>  fentaNYL   Infusion 0.501 MICROgram(s)/kG/Hr (4.9 mL/Hr) IV Continuous <Continuous>  heparin   Injectable 5000 Unit(s) SubCutaneous every 8 hours  insulin lispro (HumaLOG) corrective regimen sliding scale   SubCutaneous three times a day before meals  lactated ringers. 1000 milliLiter(s) (120 mL/Hr) IV Continuous <Continuous>  latanoprost 0.005% Ophthalmic Solution 1 Drop(s) Both EYES at bedtime  meropenem  IVPB      meropenem  IVPB 1000 milliGRAM(s) IV Intermittent every 8 hours  metoprolol tartrate Injectable 5 milliGRAM(s) IV Push every 6 hours  norepinephrine Infusion 0.05 MICROgram(s)/kG/Min (4.59 mL/Hr) IV Continuous <Continuous>  pantoprazole  Injectable 40 milliGRAM(s) IV Push daily  timolol 0.5% Solution 1 Drop(s) Both EYES every 12 hours    MEDICATIONS  (PRN):          iStop:         PPS  Level    __________       Note PPS = Palliative Performance Scale; (c)2001, [facility] Society       Range from 100% meaning Full ambulation/self-care/intake/Level of Consicous                                                                              to        10% meaning Bedbound/Unable to do any activity/extensive disease /Total Care/ No PO intake/ LOC=Full/drowsy/+/-confusion        (0% = death)                     Prior to acute illness, patient's functionality reportedly                                                                                                                                                                                    REQUESTED OF: DR. PARKER    Chart reviewed, Hospital Day 5    PRIYANK WESLEY 69yMale  HPI:  GENERAL SURGERY CONSULT NOTE    Patient: PRIYANK WESLEY , 69y ([birth year])Male   MRN: [id number]  Location: [facility] ED  Visit: 09-27-20 Emergency  Date: 09-27-20 @ 19:31    HPI: 69 year old male with a PMH of bladder cancer s/p TURBT by Dr. Jo, recent hospital stay for C diff colitis discharged 9/21 on PO Dificin, Afib on eliquis, DM, CABG presents to ED for abdominal pain, nausea, vomiting and diarrhea. Patient was just discharged from the hospital 1 week ago after being her for about 1 month with C. Diff colitis. Patient was not improving so they started him on Fidaxomicin, and sent him home. Once home, he devloped abdominal pain, nausea and vomiting, and continued diarrhea. Now, patient's last bowel movement was Friday 9/25. passed some gas today. Otherwise, denies fever/chills, chest pain, sob, headache, numbness, tingling, weakness.    in ED, patient distended. NGT placed, with 1500 cc bilious output. Plan was to get CT with PO and IV contrast, but patient vomited after starting to drink the contrast. nevertheless, CT did show evidence of SBO.    Patient had re exploration today and entire bowel was found to be infarcted. Dr. Sanders discussed this with the son.  Patient had code blue and had ROSC after 2 mins. Son was called and code status changed to DNR.     PAST MEDICAL & SURGICAL HISTORY:  Afib  rate controlled currently    Hematuria    Bladder tumor    History of diabetic neuropathy    CAD (coronary artery disease) of bypass graft  cabg 1995 3v    Glaucoma    Arthritis    GERD (gastroesophageal reflux disease)    HTN (hypertension)    Depression    DM (diabetes mellitus)    S/P lumbar laminectomy  with repair of CSF leak using dural graft 7/8/2018 - dr Crenshaw    Malfunction of intrathecal infusion pump  implanted initially in 1996  reimplanted 2006  removed 6/27/2018    Foot amputation status, right  partial foot amputation  tarsals and metatarsals of right foot    S/P laparoscopic cholecystectomy    CAD (coronary artery disease) of bypass graft        Home Medications:  Actamin 325 mg oral tablet: 2 tab(s) orally every 6 hours, As needed, Mild Pain (1 - 3) (20 Sep 2020 13:38)  apixaban 5 mg oral tablet: 1 tab(s) orally every 12 hours (27 Aug 2020 09:49)  aspirin 81 mg oral delayed release tablet: 1 tab(s) orally once a day (27 Aug 2020 09:49)  Basaglar KwikPen: 40 unit(s) subcutaneous once a day (at bedtime) (27 Aug 2020 09:49)  buPROPion 150 mg/24 hours (XL) oral tablet, extended release: 1 tab(s) orally once a day (27 Aug 2020 09:49)  Combigan 0.2%-0.5% ophthalmic solution: 1 drop(s) to each affected eye every 12 hours (27 Aug 2020 09:49)  latanoprost 0.005% ophthalmic solution: 1 drop(s) to each affected eye once a day (in the evening) (27 Aug 2020 09:49)  lisinopril 20 mg oral tablet: 1 tab(s) orally once a day (27 Aug 2020 09:49)  melatonin 5 mg oral tablet: 1 tab(s) orally once a day (at bedtime), As needed, Insomnia (27 Aug 2020 09:49)  mirtazapine 7.5 mg oral tablet: 1 tab(s) orally once a day (at bedtime) (20 Sep 2020 13:38)  NIFEdipine 60 mg oral tablet, extended release: 1 tab(s) orally once a day (27 Aug 2020 09:49)  pantoprazole 20 mg oral delayed release tablet: 1 tab(s) orally once a day (27 Aug 2020 09:49)  sertraline 100 mg oral tablet: 1 tab(s) orally once a day (27 Aug 2020 09:49)  timolol maleate 0.5% ophthalmic solution: 1 drop(s) to each affected eye every 12 hours (27 Aug 2020 09:49)  Vitamin D3 2000 intl units oral tablet: 1 tab(s) orally once a day (27 Aug 2020 09:49)       (27 Sep 2020 19:28)        PAST MEDICAL & SURGICAL HISTORY:  Afib  rate controlled currently    Hematuria    Bladder tumor    History of diabetic neuropathy    CAD (coronary artery disease) of bypass graft  cabg 1995 3v    Glaucoma    Arthritis    GERD (gastroesophageal reflux disease)    HTN (hypertension)    Depression    DM (diabetes mellitus)    S/P lumbar laminectomy  with repair of CSF leak using dural graft 7/8/2018 - dr Crenshaw    Malfunction of intrathecal infusion pump  implanted initially in 1996  reimplanted 2006  removed 6/27/2018    Foot amputation status, right  partial foot amputation  tarsals and metatarsals of right foot    S/P laparoscopic cholecystectomy    CAD (coronary artery disease) of bypass graft        Subjective and Objective: Patient seen and examined at bedside. not able to participate with assessment.    Discussed with     Focused Palliative Care Evaluation:  not able to assess symptoms. No grimacing or dyspnea observed.                   Symptoms:                                      Pain                                     Dyspnea                                     N/V                                     Appetite                                     Anxiety                                     Other _____________________                     Support Devices:  intubated and on pressors           PHYSICAL EXAM:      Constitutional: lethargic, intubated, no distress     Eyes: conjunctiva and sclera normal     ENMT: normocephalic and atraumatic     Neck: trachea midline, no LAD     Respiratory: no dyspnea, vented BS     Cardiovascular: S1, S2, no M/R/G     Gastrointestinal: distended     Genitourinary: boone     Extremities: + edema    Neurological: lethargic, not able to move extremities     Psychiatric: not able to assess         T(C): 36.4, Max: 36.6 (12:00)  HR: 68 (56 - 98)  BP: 114/56 (108/57 - 114/56)  RR: 17 (17 - 21)  SpO2: 99% (96% - 99%)      LABS/STUDIES:  10-02    135  |  100  |  26<H>  ----------------------------<  119<H>  5.6<H>   |  17  |  1.8<H>    Ca    8.1<L>      02 Oct 2020 04:40  Phos  6.1     10-02  Mg     2.2     10-02    TPro  4.2<L>  /  Alb  2.0<L>  /  TBili  0.7  /  DBili  0.4<H>  /  AST  >7000<H>  /  ALT  2507<H>  /  AlkPhos  67  10-02                            8.8    30.40 )-----------( 248      ( 02 Oct 2020 04:40 )             28.8       MEDICATIONS  (STANDING):  brimonidine 0.2% Ophthalmic Solution 1 Drop(s) Both EYES every 12 hours  dexMEDEtomidine Infusion 0.2 MICROgram(s)/kG/Hr (4.9 mL/Hr) IV Continuous <Continuous>  fentaNYL   Infusion 0.501 MICROgram(s)/kG/Hr (4.9 mL/Hr) IV Continuous <Continuous>  heparin   Injectable 5000 Unit(s) SubCutaneous every 8 hours  insulin lispro (HumaLOG) corrective regimen sliding scale   SubCutaneous three times a day before meals  lactated ringers. 1000 milliLiter(s) (120 mL/Hr) IV Continuous <Continuous>  latanoprost 0.005% Ophthalmic Solution 1 Drop(s) Both EYES at bedtime  meropenem  IVPB      meropenem  IVPB 1000 milliGRAM(s) IV Intermittent every 8 hours  metoprolol tartrate Injectable 5 milliGRAM(s) IV Push every 6 hours  norepinephrine Infusion 0.05 MICROgram(s)/kG/Min (4.59 mL/Hr) IV Continuous <Continuous>  pantoprazole  Injectable 40 milliGRAM(s) IV Push daily  timolol 0.5% Solution 1 Drop(s) Both EYES every 12 hours    MEDICATIONS  (PRN):          iStop:  no records         PPS  Level    ____30%______       Note PPS = Palliative Performance Scale; (c)2001, [facility] Hospice Society       Range from 100% meaning Full ambulation/self-care/intake/Level of Consicous                                                                              to        10% meaning Bedbound/Unable to do any activity/extensive disease /Total Care/ No PO intake/ LOC=Full/drowsy/+/-confusion        (0% = death)                     Prior to acute illness, patient's functionality reportedly was low

## 2020-10-02 NOTE — CONSULT NOTE ADULT - ASSESSMENT
Consult Summary    Patient is a 69 yr old male who was admitted for SBO, s/p  resection, today found to have ischemic small bowel during closure in OR, given grave prognosis by Dr. Sanders.       Morphine Equivalent Daily Dose (MEDD):       ____________ minutes spent discusssing Advance Care Planning.     Recommendations:          Please Call [phone] PRN Consult Summary     69 year old male with a PMH of bladder cancer s/p TURBT by Dr. Jo, recent hospital stay for C diff colitis discharged  on PO Dificin, Afib on Eliquis DM, CABG presents to ED for abdominal pain, nausea, vomiting and diarrhea. Patient was just discharged from the hospital 1 week ago after being her for about 1 month with C. Diff colitis. Patient was not improving so they started him on Fidaxomicin, and sent him home. Patient admitted with ileus , s/p SBR and then taken back to OR today abd entire bowel was found to be ischemic Medical update was provided to son by team. Patient had code blue and had ROSC in 2 mins. COde status changed to DNR by son.     _______16_____ minutes spent discussing Advance Care Planning.     Patient  soon after he was seen.     Please Call [phone] PRN

## 2020-10-02 NOTE — PROCEDURE NOTE - NSPROCDETAILS_GEN_ALL_CORE
guidewire recovered/sterile technique, catheter placed/sterile dressing applied/lumen(s) aspirated and flushed

## 2020-10-02 NOTE — PRE-ANESTHESIA EVALUATION ADULT - NSANTHPMHFT_GEN_ALL_CORE
Patient s/p ex lap bowel surgery  Currently in ICU ventilator dependent  Septic on levophed and sedatives

## 2020-10-02 NOTE — PROGRESS NOTE ADULT - ATTENDING COMMENTS
I personally provided over 30 minutes of direct critical care to this patient.  I examined the patient with the PA and resident and discussed my plan with them.    pod1 s/p ex lap   hypotensive on pressors now  RR (machine): 14, TV (machine): 450, FiO2: 40, PEEP: 5, ITime: 1  10-02 @ 06:00--7.32 / 33 / 118 / 17 / 98   10-02 @ 03:18--7.35 / 35 / 105 / 19 / 98   lactate increasing, wbc increasing  pt needs re-exploration to identify intra-abdominal source

## 2020-10-02 NOTE — CHART NOTE - NSCHARTNOTEFT_GEN_A_CORE
HPI: Patient is 68 yo M with history of C. diff colitis that was treated with PO Fidaxomicin admitted on 9/28/20 for ileus and high NGT output who was medically managed in CEU/SDU.  On 9/30/20, patient had diffuse abdominal pain, elevated lactate to 8, wbc to 21 with CT showing pneumoperitoneum requiring ex-lap, 100cm SB resection and temporal closure with abthera vac for bowel perforation. HPI: Patient is 70 yo M with history of C. diff colitis that was treated with PO Fidaxomicin admitted on 20 for ileus and high NGT output who was medically managed in CEU/SDU.  On 20, patient had diffuse abdominal pain, elevated lactate to 8, wbc to 21 with CT showing pneumoperitoneum requiring ex-lap, 100cm SB resection and temporal closure with abthera vac for bowel perforation on 10/1/20. However, patient has continued to have elevated lactate, wbc, and became hypotensive requiring levophed, with suspicion for dead bowel.     Procedure: Today 10/2/20, patient was taken to the OR for exploration of the abdomen.  It was found that SB was dead from the ligament to Treitz to the cecum. No operation was performed and abthera vac was replaced.  Patient's son was called by the attending.  Upon movement from the OR bed to the ICU bed, patient because hypotensive with MAP of 40 and HR of 40, and was given epinephrine IVP of 20, 0.4 of glycopyrrolate, and ephedrine IVP 20.  Throughout the case, he was on levophed of 0.01.     OR Stats  OR time: 36 minutes  EBL: 0 cc  Blood products: 100cc  IVF: 100cc  UOP: minimal    Patient was seen and evaluated at bedside post-op in the ICU and found to be unresponsive to painful stimuli. HR 70, BP on cuff 103/49 MAP 72, hypotensive on the Adil[place] to SBP 80s, sating appropriately on /14/40/5.     However, after half hour in ICU post op, patient had a run of Vtach with normalization of cardiac rhythm, but then became hypotensive to SBP 40s, HR to 30s and coded.  At 12:37PM, CPR was started. He received 1 round of epi and 1 round of CPR with ROSC at 12:41. SICU attending arrived at scene, patient's family was notified and they decided to make patient DNR. At 13:27 patient .     PHYSICAL EXAM POST OP  Neuro: No response to painful stimuli  Cards: Normotensive on levophed, but BP and HR labile   Resp: On /14/40/5, sating appropriately  GI: Abthera vac in place, abd soft, distended  : Estrada in place, marginal UOP  Extremities: B/l UE and LE cool, pale     ASSESSMENT/PLAN:  Patient is DNR. Palliative team was consulted up finding dead SB in the OR. HPI: Patient is 68 yo M with history of C. diff colitis that was treated with PO Fidaxomicin who was admitted on 20 for ileus and high NGT output who was medically managed in CEU/SDU.  On 20, patient had diffuse abdominal pain, elevated lactate to 8, wbc to 21 with CT showing pneumoperitoneum requiring ex-lap, 100cm SB resection and temporal closure with Abthera vac for bowel perforation on 10/1/20. However, patient continued to have elevated lactate/wbc, and became hypotensive requiring levophed, with suspicion for dead bowel.     Procedure: Today 10/2/20, patient was taken to the OR for exploration of the abdomen.  It was found that SB was dead from the ligament of Treitz to the cecum. No operation was performed and Abthera vac was replaced.  Patient's son was called by the attending up discovery of dead bowel.  Upon movement of patient from the OR bed to the ICU bed, patient because hypotensive with MAP of 40 and HR of 40, and was given epinephrine IVP of 20, 0.4 of glycopyrrolate, and ephedrine IVP 20.  Throughout the case, he was on levophed.    Operative Findings: "On entry and evisceration it was immediately apparent that the entire small bowel was infarcted.  The infarction was apparent for the entire distal jejunum to and including the cecum, and the portion of the jejunum distal to the lot.  I called the patient's son and explained the findings and that the patient was now futile."    OR Stats  OR time: 36 minutes  EBL: 0 cc  Blood products: 100cc  IVF: 100cc  UOP: minimal    Patient was seen and evaluated at bedside post-op in the ICU and found to be unresponsive to painful stimuli. HR 70, BP on cuff 103/49 MAP 72, hypotensive on the Adil[place] to SBP 80s, sating appropriately on /14/40/5.     However, after half an hour in BICU post op, patient had a run of V tach with normalization of cardiac rhythm, but then became hypotensive to SBP 40s, HR to 30s and coded.  At 12:37PM, CPR was started. He received 1 round of epi and 1 round of CPR with ROSC at 12:41. SICU attending arrived at scene, patient's family was notified and they decided to make patient DNR. At 13:27 patient .     PHYSICAL EXAM POST OP  Neuro: No response to painful stimuli  Cards: BP and HR labile on levophed  Resp: On /14/40/5, sating appropriately  GI: Abthera vac in place, abd soft, distended  : Estrada in place, marginal UOP  Extremities: B/l UE and LE cool, pale     ASSESSMENT/PLAN:  Patient is DNR. Palliative team was consulted upon finding dead SB in the OR. Patient  at 13:27.

## 2020-10-02 NOTE — PROGRESS NOTE ADULT - SUBJECTIVE AND OBJECTIVE BOX
Progress Note: Surgery  Patient: PRIYANK WESLEY , 69y (1950)Male   MRN: [id number]  [facility]  Visit: 09-28-20 Inpatient  Date: 10-02-20 @ 07:15    Procedure/Diagnosis: POD 1 s/p SBR left in discontinuity with abthera placement for wound closure  Events over 24h: Overnight patients lactate continues to trend up, s/p 2.5 L bolus with minimal improvement in tachycardia. Patient also becoming hypotensive despite fluid resusictation necessitating pressor support. Central line placement was occurring at that time of AM rounds. Patient was on hep gtt for afib, however has since been stopped for possible operative intervention. Patient is tachycardic, hypotensive requiring pressors possibly still septic from intraabdominal source.    Vitals: T(F): 97.1 (10-02-20 @ 03:00), Max: 97.9 (10-01-20 @ 11:00)  HR: 56 (10-02-20 @ 06:00)  BP: 110/56 (10-01-20 @ 12:00) (110/56 - 177/82)  RR: 18 (10-02-20 @ 06:00)  SpO2: 99% (10-02-20 @ 06:00)  RR (machine): 14, TV (machine): 450, FiO2: 40, PEEP: 5, PIP: 13    Diet: Diet, NPO (10-01-20 @ 09:30)    IV Fluid: lactated ringers. 1000 milliLiter(s) (120 mL/Hr) IV Continuous <Continuous>      In:   10-01-20 @ 07:01  -  10-02-20 @ 07:00  --------------------------------------------------------  IN: 6831 mL      Out:   10-01-20 @ 07:01  -  10-02-20 @ 07:00  --------------------------------------------------------  OUT:    Indwelling Catheter - Urethral (mL): 575 mL    Nasogastric/Oral tube (mL): 400 mL  Total OUT: 975 mL        Net:   10-01-20 @ 07:01  -  10-02-20 @ 07:00  --------------------------------------------------------  NET: 5856 mL        Physical Examination:  General Appearance: NAD, alert and cooperative  Heart: S1 and S2. No murmurs. Rhythm is regular.  Lungs: Clear to auscultation BL without rales, rhonchi, wheezing, crackles or diminished breath sounds.  Abdomen: Soft, nondistended, nontender. No rigidity, guarding, or rebound tenderness.     Medications: [Standing]  brimonidine 0.2% Ophthalmic Solution 1 Drop(s) Both EYES every 12 hours  dexMEDEtomidine Infusion 0.2 MICROgram(s)/kG/Hr (4.9 mL/Hr) IV Continuous <Continuous>  fentaNYL   Infusion 0.501 MICROgram(s)/kG/Hr (4.9 mL/Hr) IV Continuous <Continuous>  heparin   Injectable 5000 Unit(s) SubCutaneous every 8 hours  insulin lispro (HumaLOG) corrective regimen sliding scale   SubCutaneous three times a day before meals  lactated ringers. 1000 milliLiter(s) (120 mL/Hr) IV Continuous <Continuous>  latanoprost 0.005% Ophthalmic Solution 1 Drop(s) Both EYES at bedtime  meropenem  IVPB      meropenem  IVPB 1000 milliGRAM(s) IV Intermittent every 8 hours  metoprolol tartrate Injectable 5 milliGRAM(s) IV Push every 6 hours  norepinephrine Infusion 0.05 MICROgram(s)/kG/Min (9.18 mL/Hr) IV Continuous <Continuous>  pantoprazole  Injectable 40 milliGRAM(s) IV Push daily  timolol 0.5% Solution 1 Drop(s) Both EYES every 12 hours    Medications:[PRN]    Labs:                        8.8    30.40 )-----------( 248      ( 02 Oct 2020 04:40 )             28.8   10-02    135  |  100  |  26<H>  ----------------------------<  119<H>  5.6<H>   |  17  |  1.8<H>    Ca    8.1<L>      02 Oct 2020 04:40  Phos  6.1     10-02  Mg     2.2     10-02    TPro  4.0<L>  /  Alb  2.0<L>  /  TBili  0.6  /  DBili  0.3<H>  /  AST  6703<H>  /  ALT  2122<H>  /  AlkPhos  70  10-02  LIVER FUNCTIONS - ( 02 Oct 2020 04:40 )  Alb: 2.0 g/dL / Pro: 4.0 g/dL / ALK PHOS: 70 U/L / ALT: 2122 U/L / AST: 6703 U/L / GGT: x         PT/INR - ( 02 Oct 2020 04:40 )   PT: 31.40 sec;   INR: 2.73 ratio         PTT - ( 02 Oct 2020 04:40 )  PTT:41.8 secABG - ( 02 Oct 2020 06:00 )  pH: 7.32  /  pCO2: 33    /  pO2: 118   / HCO3: 17    / Base Excess: -8.4  /  SaO2: 98              CARDIAC MARKERS ( 02 Oct 2020 04:40 )  x     / <0.01 ng/mL / 969 U/L / x     / 6.8 ng/mL  CARDIAC MARKERS ( 01 Oct 2020 15:45 )  x     / <0.01 ng/mL / 70 U/L / x     / 1.8 ng/mL  CARDIAC MARKERS ( 01 Oct 2020 09:55 )  x     / <0.01 ng/mL / 63 U/L / x     / 1.9 ng/mL      Micro/Urine:    Imaging:  None/24h

## 2020-10-02 NOTE — PROGRESS NOTE ADULT - SUBJECTIVE AND OBJECTIVE BOX
PRIYANK WESLEY  69y, Male    All available historical data reviewed    OVERNIGHT EVENTS:    PROCEDURES: 10/1 ex lap for Perforated viscus: Temporary closure of abdominal cavity. SB resection   Findings dusky colon, 100 ccs of SB resected.  Ex lap 10/2 : Temporary closure of abdominal cavity      ROS:  unable to obtain history secondary to patient's mental status and/or sedation  on vent, non responsive  Fio2 40%      VITALS:  T(F): 97.5, Max: 97.5 (10-02-20 @ 07:52)  HR: 68  BP: 114/56  RR: 17Vital Signs Last 24 Hrs  T(C): 36.4 (02 Oct 2020 09:36), Max: 36.4 (02 Oct 2020 07:52)  T(F): 97.5 (02 Oct 2020 07:52), Max: 97.5 (02 Oct 2020 07:52)  HR: 68 (02 Oct 2020 09:36) (56 - 81)  BP: 114/56 (02 Oct 2020 09:00) (108/57 - 114/56)  BP(mean): 78 (02 Oct 2020 09:00) (78 - 79)  RR: 17 (02 Oct 2020 09:36) (17 - 18)  SpO2: 99% (02 Oct 2020 09:36) (96% - 99%)    TESTS & MEASUREMENTS:                        8.8    30.40 )-----------( 248      ( 02 Oct 2020 04:40 )             28.8     10-02    135  |  100  |  26<H>  ----------------------------<  119<H>  5.6<H>   |  17  |  1.8<H>    Ca    8.1<L>      02 Oct 2020 04:40  Phos  6.1     10-02  Mg     2.2     10-02    TPro  4.2<L>  /  Alb  2.0<L>  /  TBili  0.7  /  DBili  0.4<H>  /  AST  >7000<H>  /  ALT  2507<H>  /  AlkPhos  67  10-02    LIVER FUNCTIONS - ( 02 Oct 2020 07:00 )  Alb: 2.0 g/dL / Pro: 4.2 g/dL / ALK PHOS: 67 U/L / ALT: 2507 U/L / AST: >7000 U/L / GGT: x             Culture - Urine (collected 09-28-20 @ 00:45)  Source: .Urine Clean Catch (Midstream)  Final Report (09-30-20 @ 11:55):    10,000 - 49,000 CFU/mL Klebsiella pneumoniae ESBL  Organism: Klebsiella pneumoniae ESBL (09-30-20 @ 11:55)  Organism: Klebsiella pneumoniae ESBL (09-30-20 @ 11:55)      -  Amikacin: S <=16      -  Amoxicillin/Clavulanic Acid: I 16/8      -  Ampicillin: R >16 These ampicillin results predict results for amoxicillin      -  Ampicillin/Sulbactam: R >16/8 Enterobacter, Citrobacter, and Serratia may develop resistance during prolonged therapy (3-4 days)      -  Aztreonam: R 16      -  Cefazolin: R >16 (MIC_CL_COM_ENTERIC_CEFAZU) For uncomplicated UTI with K. pneumoniae, E. coli, or P. mirablis: CECILIO <=16 is sensitive and CECILIO >=32 is resistant. This also predicts results for oral agents cefaclor, cefdinir, cefpodoxime, cefprozil, cefuroxime axetil, cephalexin and locarbef for uncomplicated UTI. Note that some isolates may be susceptible to these agents while testing resistant to cefazolin.      -  Cefepime: R >16      -  Cefoxitin: S <=8      -  Ceftriaxone: R >32 Enterobacter, Citrobacter, and Serratia may develop resistance during prolonged therapy      -  Ciprofloxacin: R >2      -  Ertapenem: S <=0.5      -  Gentamicin: R >8      -  Imipenem: S <=1      -  Levofloxacin: S <=0.5      -  Meropenem: S <=1      -  Nitrofurantoin: R >64 Should not be used to treat pyelonephritis      -  Piperacillin/Tazobactam: S <=8      -  Tigecycline: S <=2      -  Tobramycin: R >8      -  Trimethoprim/Sulfamethoxazole: R >2/38      Method Type: CECILIO    Culture - Blood (collected 09-27-20 @ 20:42)  Source: .Blood Blood  Preliminary Report (09-29-20 @ 01:02):    No growth to date.    Culture - Blood (collected 09-27-20 @ 20:42)  Source: .Blood Blood  Preliminary Report (09-29-20 @ 01:02):    No growth to date.            RADIOLOGY & ADDITIONAL TESTS:  Personal review of radiological diagnostics performed  Echo and EKG results noted when applicable.     MEDICATIONS:  brimonidine 0.2% Ophthalmic Solution 1 Drop(s) Both EYES every 12 hours  chlorhexidine 0.12% Liquid 15 milliLiter(s) Oral Mucosa every 12 hours  chlorhexidine 4% Liquid 1 Application(s) Topical once  dexMEDEtomidine Infusion 0.2 MICROgram(s)/kG/Hr IV Continuous <Continuous>  heparin   Injectable 5000 Unit(s) SubCutaneous every 8 hours  insulin lispro (HumaLOG) corrective regimen sliding scale   SubCutaneous three times a day before meals  lactated ringers. 1000 milliLiter(s) IV Continuous <Continuous>  latanoprost 0.005% Ophthalmic Solution 1 Drop(s) Both EYES at bedtime  meropenem  IVPB      meropenem  IVPB 1000 milliGRAM(s) IV Intermittent every 8 hours  norepinephrine Infusion 0.05 MICROgram(s)/kG/Min IV Continuous <Continuous>  ondansetron Injectable 4 milliGRAM(s) IV Push every 6 hours PRN  pantoprazole  Injectable 40 milliGRAM(s) IV Push daily  timolol 0.5% Solution 1 Drop(s) Both EYES every 12 hours      ANTIBIOTICS:  meropenem  IVPB      meropenem  IVPB 1000 milliGRAM(s) IV Intermittent every 8 hours

## 2020-10-02 NOTE — PROGRESS NOTE ADULT - ASSESSMENT
ASSESSMENT:  69yM w/ PMHx bladder cancer s/p TURBT by Dr. Jo, recent hospital stay for C diff colitis discharged 9/21 on PO Dificin, Afib on eliquis, DM, CABG presents to ED for abdominal pain, nausea, vomiting and diarrhea    IMPRESSION;  Severe septic shock with lactic acidosis ( 9.6 ) with ischemic bowel with perforated SB  10/1 ex lap for Perforated viscus: Temporary closure of abdominal cavity. SB resection   Findings dusky colon, 100 ccs of SB resected.  10/2  Ex lap :Temporary closure of abdominal cavity  MSOF with ARF, ischemic hepatitis, metabolic encephalopathy, coagulopathy, rhabdomylysis, significant leucocytosis, resp failure requiring MV  Prognosis is extremely poor and ABx of no benefit    RECOMMENDATIONS;  Supportive care  BCx  Meropenem 1 gm iv q8h ( adjust dosis if Cr worsens )  Vancomycin 1 gm iv q24h with further dosis based on daily level. If <20 then give 1 gm iv daily  Caspofungin 70 mg iv x once today and from 10/3 on Caspofungin 50 mg iv q24h

## 2020-10-05 LAB — SURGICAL PATHOLOGY STUDY: SIGNIFICANT CHANGE UP

## 2020-10-13 DIAGNOSIS — I25.10 ATHEROSCLEROTIC HEART DISEASE OF NATIVE CORONARY ARTERY WITHOUT ANGINA PECTORIS: ICD-10-CM

## 2020-10-13 DIAGNOSIS — E78.5 HYPERLIPIDEMIA, UNSPECIFIED: ICD-10-CM

## 2020-10-13 DIAGNOSIS — K21.9 GASTRO-ESOPHAGEAL REFLUX DISEASE WITHOUT ESOPHAGITIS: ICD-10-CM

## 2020-10-13 DIAGNOSIS — K56.609 UNSPECIFIED INTESTINAL OBSTRUCTION, UNSPECIFIED AS TO PARTIAL VERSUS COMPLETE OBSTRUCTION: ICD-10-CM

## 2020-10-13 DIAGNOSIS — K55.029 ACUTE INFARCTION OF SMALL INTESTINE, EXTENT UNSPECIFIED: ICD-10-CM

## 2020-10-13 DIAGNOSIS — Z95.1 PRESENCE OF AORTOCORONARY BYPASS GRAFT: ICD-10-CM

## 2020-10-13 DIAGNOSIS — K55.019 ACUTE (REVERSIBLE) ISCHEMIA OF SMALL INTESTINE, EXTENT UNSPECIFIED: ICD-10-CM

## 2020-10-13 DIAGNOSIS — Z16.12 EXTENDED SPECTRUM BETA LACTAMASE (ESBL) RESISTANCE: ICD-10-CM

## 2020-10-13 DIAGNOSIS — Z90.49 ACQUIRED ABSENCE OF OTHER SPECIFIED PARTS OF DIGESTIVE TRACT: ICD-10-CM

## 2020-10-13 DIAGNOSIS — Z79.82 LONG TERM (CURRENT) USE OF ASPIRIN: ICD-10-CM

## 2020-10-13 DIAGNOSIS — Z79.01 LONG TERM (CURRENT) USE OF ANTICOAGULANTS: ICD-10-CM

## 2020-10-13 DIAGNOSIS — M19.90 UNSPECIFIED OSTEOARTHRITIS, UNSPECIFIED SITE: ICD-10-CM

## 2020-10-13 DIAGNOSIS — Z98.1 ARTHRODESIS STATUS: ICD-10-CM

## 2020-10-13 DIAGNOSIS — K63.1 PERFORATION OF INTESTINE (NONTRAUMATIC): ICD-10-CM

## 2020-10-13 DIAGNOSIS — A41.9 SEPSIS, UNSPECIFIED ORGANISM: ICD-10-CM

## 2020-10-13 DIAGNOSIS — Z89.431 ACQUIRED ABSENCE OF RIGHT FOOT: ICD-10-CM

## 2020-10-13 DIAGNOSIS — E11.40 TYPE 2 DIABETES MELLITUS WITH DIABETIC NEUROPATHY, UNSPECIFIED: ICD-10-CM

## 2020-10-13 DIAGNOSIS — K65.8 OTHER PERITONITIS: ICD-10-CM

## 2020-10-13 DIAGNOSIS — R65.21 SEVERE SEPSIS WITH SEPTIC SHOCK: ICD-10-CM

## 2020-10-13 DIAGNOSIS — I48.91 UNSPECIFIED ATRIAL FIBRILLATION: ICD-10-CM

## 2020-10-13 DIAGNOSIS — Z66 DO NOT RESUSCITATE: ICD-10-CM

## 2020-10-13 DIAGNOSIS — C67.9 MALIGNANT NEOPLASM OF BLADDER, UNSPECIFIED: ICD-10-CM

## 2020-10-13 DIAGNOSIS — B96.1 KLEBSIELLA PNEUMONIAE [K. PNEUMONIAE] AS THE CAUSE OF DISEASES CLASSIFIED ELSEWHERE: ICD-10-CM

## 2020-10-13 DIAGNOSIS — E87.2 ACIDOSIS: ICD-10-CM

## 2021-10-18 NOTE — DISCHARGE NOTE ADULT - FUNCTIONAL STATUS DATE
29-Jul-2018 Vital Signs Last 24 Hrs  T(C): 36.6 (18 Oct 2021 03:28), Max: 36.7 (18 Oct 2021 00:18)  T(F): 97.8 (18 Oct 2021 03:28), Max: 98.1 (18 Oct 2021 00:18)  HR: 96 (18 Oct 2021 04:07) (95 - 116)  BP: 130/74 (18 Oct 2021 04:07) (111/72 - 134/83)  BP(mean): --  RR: 18 (18 Oct 2021 04:07) (18 - 18)  SpO2: 97% (18 Oct 2021 04:07) (97% - 99%)

## 2022-02-13 NOTE — CONSULT NOTE ADULT - NEUROLOGICAL DETAILS
alert and oriented x 3/loss of sensation to bilateral foot due to neuropathy (chronic)/cranial nerves intact
13-Feb-2022 17:34

## 2023-01-01 NOTE — CHART NOTE - NSCHARTNOTEFT_GEN_A_CORE
Spoke with Dr Mari about worsening clincal course and worsening labs overnight, plan to dc heparin gtt now and patient to go to OR this morning for second look. Spoke with Dr Mari about worsening clincal course and worsening labs overnight, plan to dc heparin gtt now and patient to go to OR this morning for second look and re exploratory laparotomy  Infant (Birth)

## 2023-06-01 NOTE — ED ADULT TRIAGE NOTE - TEMPERATURE IN CELSIUS (DEGREES C)
Detail Level: Detailed
Quality 431: Preventive Care And Screening: Unhealthy Alcohol Use - Screening: Patient identified as an unhealthy alcohol user when screened for unhealthy alcohol use using a systematic screening method and received brief counseling
36.7

## 2024-03-28 NOTE — PROGRESS NOTE ADULT - GASTROINTESTINAL DETAILS
soft/no rebound tenderness/no rigidity/no guarding
soft/no rebound tenderness/no rigidity/no guarding
PAST SURGICAL HISTORY:  History of arthroscopy of right shoulder

## 2024-05-04 NOTE — DIETITIAN INITIAL EVALUATION ADULT. - NUTRITION DIAGNOSITC TERMINOLOGY #1
Symptomatic care is recommended. Take all medications as prescribed and instructed. Follow up with primary care as directed or return to Emergency Department with worsening of symptoms.   
[employer] Intake

## 2024-11-12 NOTE — PROVIDER CONTACT NOTE (OTHER) - REASON
Pt refused discharge Detail Level: Zone Plan: Consult with Mariana recommended\\nRx for [employer] [place] cream QHS x 4 months in reserve Initiate Treatment: Mineral Based Sunscreen with a minimum of 30 SPF. Reapplication every 2 hrs or sooner depending on activity.\\nMake sure to discard  sunscreen. Plan: Consult with Mariana [place]
